# Patient Record
Sex: FEMALE | Race: WHITE | NOT HISPANIC OR LATINO | Employment: OTHER | URBAN - METROPOLITAN AREA
[De-identification: names, ages, dates, MRNs, and addresses within clinical notes are randomized per-mention and may not be internally consistent; named-entity substitution may affect disease eponyms.]

---

## 2017-01-02 ENCOUNTER — OFFICE VISIT (OUTPATIENT)
Dept: FAMILY MEDICINE | Facility: CLINIC | Age: 82
End: 2017-01-02
Payer: COMMERCIAL

## 2017-01-02 VITALS
OXYGEN SATURATION: 97 % | DIASTOLIC BLOOD PRESSURE: 58 MMHG | HEIGHT: 59 IN | TEMPERATURE: 99.4 F | HEART RATE: 62 BPM | BODY MASS INDEX: 28.43 KG/M2 | SYSTOLIC BLOOD PRESSURE: 124 MMHG | WEIGHT: 141 LBS

## 2017-01-02 DIAGNOSIS — I35.9 AORTIC VALVE DISORDER: ICD-10-CM

## 2017-01-02 DIAGNOSIS — I05.9 MITRAL VALVE DISORDER: ICD-10-CM

## 2017-01-02 DIAGNOSIS — R82.90 ABNORMAL URINALYSIS: ICD-10-CM

## 2017-01-02 DIAGNOSIS — M17.12 PRIMARY OSTEOARTHRITIS OF LEFT KNEE: ICD-10-CM

## 2017-01-02 DIAGNOSIS — Z01.818 PREOPERATIVE EXAMINATION: Primary | ICD-10-CM

## 2017-01-02 DIAGNOSIS — G89.29 OTHER CHRONIC PAIN: ICD-10-CM

## 2017-01-02 DIAGNOSIS — M35.3 PMR (POLYMYALGIA RHEUMATICA) (H): ICD-10-CM

## 2017-01-02 DIAGNOSIS — Z79.52 CURRENT CHRONIC USE OF SYSTEMIC STEROIDS: ICD-10-CM

## 2017-01-02 LAB
ALBUMIN UR-MCNC: NEGATIVE MG/DL
ANION GAP SERPL CALCULATED.3IONS-SCNC: 9 MMOL/L (ref 3–14)
APPEARANCE UR: ABNORMAL
BILIRUB UR QL STRIP: NEGATIVE
BUN SERPL-MCNC: 15 MG/DL (ref 7–30)
CALCIUM SERPL-MCNC: 9 MG/DL (ref 8.5–10.1)
CHLORIDE SERPL-SCNC: 103 MMOL/L (ref 94–109)
CO2 SERPL-SCNC: 27 MMOL/L (ref 20–32)
COLOR UR AUTO: YELLOW
CREAT SERPL-MCNC: 0.7 MG/DL (ref 0.52–1.04)
ERYTHROCYTE [DISTWIDTH] IN BLOOD BY AUTOMATED COUNT: 12.7 % (ref 10–15)
GFR SERPL CREATININE-BSD FRML MDRD: 80 ML/MIN/1.7M2
GLUCOSE SERPL-MCNC: 89 MG/DL (ref 70–99)
GLUCOSE UR STRIP-MCNC: NEGATIVE MG/DL
HCT VFR BLD AUTO: 38.1 % (ref 35–47)
HGB BLD-MCNC: 12.4 G/DL (ref 11.7–15.7)
HGB UR QL STRIP: ABNORMAL
INR PPP: 0.91 (ref 0.86–1.14)
KETONES UR STRIP-MCNC: NEGATIVE MG/DL
LEUKOCYTE ESTERASE UR QL STRIP: ABNORMAL
MCH RBC QN AUTO: 31.4 PG (ref 26.5–33)
MCHC RBC AUTO-ENTMCNC: 32.5 G/DL (ref 31.5–36.5)
MCV RBC AUTO: 97 FL (ref 78–100)
NITRATE UR QL: NEGATIVE
NON-SQ EPI CELLS #/AREA URNS LPF: ABNORMAL /LPF
PH UR STRIP: 6 PH (ref 5–7)
PLATELET # BLD AUTO: 247 10E9/L (ref 150–450)
POTASSIUM SERPL-SCNC: 3.8 MMOL/L (ref 3.4–5.3)
RBC # BLD AUTO: 3.95 10E12/L (ref 3.8–5.2)
RBC #/AREA URNS AUTO: ABNORMAL /HPF (ref 0–2)
SODIUM SERPL-SCNC: 139 MMOL/L (ref 133–144)
SP GR UR STRIP: <=1.005 (ref 1–1.03)
URN SPEC COLLECT METH UR: ABNORMAL
UROBILINOGEN UR STRIP-ACNC: 0.2 EU/DL (ref 0.2–1)
WBC # BLD AUTO: 11.2 10E9/L (ref 4–11)
WBC #/AREA URNS AUTO: ABNORMAL /HPF (ref 0–2)

## 2017-01-02 PROCEDURE — 81001 URINALYSIS AUTO W/SCOPE: CPT | Performed by: INTERNAL MEDICINE

## 2017-01-02 PROCEDURE — 80048 BASIC METABOLIC PNL TOTAL CA: CPT | Performed by: INTERNAL MEDICINE

## 2017-01-02 PROCEDURE — 85610 PROTHROMBIN TIME: CPT | Performed by: INTERNAL MEDICINE

## 2017-01-02 PROCEDURE — 36415 COLL VENOUS BLD VENIPUNCTURE: CPT | Performed by: INTERNAL MEDICINE

## 2017-01-02 PROCEDURE — 85027 COMPLETE CBC AUTOMATED: CPT | Performed by: INTERNAL MEDICINE

## 2017-01-02 PROCEDURE — 93000 ELECTROCARDIOGRAM COMPLETE: CPT | Performed by: INTERNAL MEDICINE

## 2017-01-02 PROCEDURE — 99215 OFFICE O/P EST HI 40 MIN: CPT | Performed by: INTERNAL MEDICINE

## 2017-01-02 ASSESSMENT — ANXIETY QUESTIONNAIRES
3. WORRYING TOO MUCH ABOUT DIFFERENT THINGS: NOT AT ALL
GAD7 TOTAL SCORE: 2
1. FEELING NERVOUS, ANXIOUS, OR ON EDGE: SEVERAL DAYS
7. FEELING AFRAID AS IF SOMETHING AWFUL MIGHT HAPPEN: SEVERAL DAYS
IF YOU CHECKED OFF ANY PROBLEMS ON THIS QUESTIONNAIRE, HOW DIFFICULT HAVE THESE PROBLEMS MADE IT FOR YOU TO DO YOUR WORK, TAKE CARE OF THINGS AT HOME, OR GET ALONG WITH OTHER PEOPLE: NOT DIFFICULT AT ALL
2. NOT BEING ABLE TO STOP OR CONTROL WORRYING: NOT AT ALL
5. BEING SO RESTLESS THAT IT IS HARD TO SIT STILL: NOT AT ALL
6. BECOMING EASILY ANNOYED OR IRRITABLE: NOT AT ALL

## 2017-01-02 ASSESSMENT — PAIN SCALES - GENERAL: PAINLEVEL: EXTREME PAIN (8)

## 2017-01-02 ASSESSMENT — PATIENT HEALTH QUESTIONNAIRE - PHQ9: 5. POOR APPETITE OR OVEREATING: NOT AT ALL

## 2017-01-02 NOTE — NURSING NOTE
"Chief Complaint   Patient presents with     Pre Op Exam       Initial /58 mmHg  Pulse 62  Temp(Src) 99.4  F (37.4  C) (Oral)  Ht 4' 11\" (1.499 m)  Wt 141 lb (63.957 kg)  BMI 28.46 kg/m2  SpO2 97%  Breastfeeding? No Estimated body mass index is 28.46 kg/(m^2) as calculated from the following:    Height as of this encounter: 4' 11\" (1.499 m).    Weight as of this encounter: 141 lb (63.957 kg).  BP completed using cuff size: regular  K.TEJADA/MA      "

## 2017-01-02 NOTE — PATIENT INSTRUCTIONS
Stop aspirin, ibuprofen, aleve, and fish oil products 7 days before surgery. Tylenol is okay if needed.   Take Atenolol, Citalopram, Omeprazole, and Prednisone on morning of surgery with small sip of water.     Before Your Surgery      Call your surgeon if there is any change in your health. This includes signs of a cold or flu (such as a sore throat, runny nose, cough, rash or fever).    Do not smoke, drink alcohol or take over the counter medicine (unless your surgeon or primary care doctor tells you to) for the 24 hours before and after surgery.    If you take prescribed drugs: Follow your doctor s orders about which medicines to take and which to stop until after surgery.    Eating and drinking prior to surgery: follow the instructions from your surgeon    Take a shower or bath the night before surgery. Use the soap your surgeon gave you to gently clean your skin. If you do not have soap from your surgeon, use your regular soap. Do not shave or scrub the surgery site.  Wear clean pajamas and have clean sheets on your bed.     [facility]    If you have any questions regarding to your visit please contact your care team:     Team Pink:   Clinic Hours Telephone Number   Internal Medicine:  Dr. Maddi Chamorro, NP       7am-7pm  Monday - Thursday   7am-5pm  Fridays  [phone]  (Appointment scheduling available 24/7)    Questions about your visit?  Team Line  [phone]   Urgent Care - [place] and [place] - 11am-9pm Monday-Friday Saturday-Sunday- 9am-5pm   [place] - 5pm-9pm Monday-Friday Saturday-Sunday- 9am-5pm  [phone] - [place]   [phone] - [place]       What options do I have for visits at the clinic other than the traditional office visit?  To expand how we care for you, many of our providers are utilizing electronic visits (e-visits) and telephone visits, when medically appropriate, for interactions with their patients  rather than a visit in the clinic.   We also offer nurse visits for many medical concerns. Just like any other service, we will bill your insurance company for this type of visit based on time spent on the phone with your provider. Not all insurance companies cover these visits. Please check with your medical insurance if this type of visit is covered. You will be responsible for any charges that are not paid by your insurance.      E-visits via Sightlyhart:  generally incur a $35.00 fee.  Telephone visits:  Time spent on the phone: *charged based on time that is spent on the phone in increments of 10 minutes. Estimated cost:   5-10 mins $30.00   11-20 mins. $59.00   21-30 mins. $85.00   Use [employer] (secure email communication and access to your chart) to send your primary care provider a message or make an appointment. Ask someone on your Team how to sign up for NanoG[employer].    For a Price Quote for your services, please call our Consumer Price Line at [phone].    As always, Thank you for trusting us with your health care needs!    Discharged by NADIA VERDIN

## 2017-01-02 NOTE — MR AVS SNAPSHOT
After Visit Summary   1/2/2017    Ailyn Trevino    MRN: [id number]           Patient Information     Date Of Birth          1935        Visit Information        Provider Department      1/2/2017 2:00 PM Maddi Church MD [facility]        Today's Diagnoses     Preoperative examination    -  1     Primary osteoarthritis of left knee         Aortic valve disorder         Mitral valve disorder         Other chronic pain         PMR (polymyalgia rheumatica) (H)         Current chronic use of systemic steroids           Care Instructions    Stop aspirin, ibuprofen, aleve, and fish oil products 7 days before surgery. Tylenol is okay if needed.   Take Atenolol, Citalopram, Omeprazole, and Prednisone on morning of surgery with small sip of water.     Before Your Surgery      Call your surgeon if there is any change in your health. This includes signs of a cold or flu (such as a sore throat, runny nose, cough, rash or fever).    Do not smoke, drink alcohol or take over the counter medicine (unless your surgeon or primary care doctor tells you to) for the 24 hours before and after surgery.    If you take prescribed drugs: Follow your doctor s orders about which medicines to take and which to stop until after surgery.    Eating and drinking prior to surgery: follow the instructions from your surgeon    Take a shower or bath the night before surgery. Use the soap your surgeon gave you to gently clean your skin. If you do not have soap from your surgeon, use your regular soap. Do not shave or scrub the surgery site.  Wear clean pajamas and have clean sheets on your bed.     [facility]    If you have any questions regarding to your visit please contact your care team:     Team Pink:   Clinic Hours Telephone Number   Internal Medicine:  Dr. Maddi Chamorro NP       7am-7pm  Monday - Thursday   7am-5pm  Fridays  [phone]  (Appointment  scheduling available 24/7)    Questions about your visit?  Team Line  [phone]   Urgent Care - [place] and [place] - 11am-9pm Monday-Friday Saturday-Sunday- 9am-5pm   [place] - 5pm-9pm Monday-Friday Saturday-Sunday- 9am-5pm  [phone] - [place]   [phone] - [place]       What options do I have for visits at the clinic other than the traditional office visit?  To expand how we care for you, many of our providers are utilizing electronic visits (e-visits) and telephone visits, when medically appropriate, for interactions with their patients rather than a visit in the clinic.   We also offer nurse visits for many medical concerns. Just like any other service, we will bill your insurance company for this type of visit based on time spent on the phone with your provider. Not all insurance companies cover these visits. Please check with your medical insurance if this type of visit is covered. You will be responsible for any charges that are not paid by your insurance.      E-visits via Next [employer]:  generally incur a $35.00 fee.  Telephone visits:  Time spent on the phone: *charged based on time that is spent on the phone in increments of 10 minutes. Estimated cost:   5-10 mins $30.00   11-20 mins. $59.00   21-30 mins. $85.00   Use Funky Moveshart (secure email communication and access to your chart) to send your primary care provider a message or make an appointment. Ask someone on your Team how to sign up for Next Safety.    For a Price Quote for your services, please call our Consumer Price Line at [phone].    As always, Thank you for trusting us with your health care needs!    Discharged by NADIA VERDIN          Follow-ups after your visit        Who to contact     If you have questions or need follow up information about today's clinic visit or your schedule please contact [facility] [place] directly at [phone].  Normal or non-critical lab and imaging results will be communicated to  "you by MyChart, letter or phone within 4 business days after the clinic has received the results. If you do not hear from us within 7 days, please contact the clinic through Synfora or phone. If you have a critical or abnormal lab result, we will notify you by phone as soon as possible.  Submit refill requests through Synfora or call your pharmacy and they will forward the refill request to us. Please allow 3 business days for your refill to be completed.          Additional Information About Your Visit        [employer] Information     Synfora gives you secure access to your electronic health record. If you see a primary care provider, you can also send messages to your care team and make appointments. If you have questions, please call your primary care clinic.  If you do not have a primary care provider, please call [phone] and they will assist you.        Your Vitals Were     Pulse Temperature Height BMI (Body Mass Index) Pulse Oximetry Breastfeeding?    62 99.4  F (37.4  C) (Oral) 4' 11\" (1.499 m) 28.46 kg/m2 97% No       Blood Pressure from Last 3 Encounters:   01/02/17 124/58   12/09/16 112/54   10/26/16 138/56    Weight from Last 3 Encounters:   01/02/17 141 lb (63.957 kg)   12/09/16 141 lb (63.957 kg)   10/04/16 141 lb (63.957 kg)              We Performed the Following     *UA reflex to Microscopic and Culture ([facility], [place] [facility] [place] [facility] (except [place] and [place])     Basic metabolic panel     CBC with platelets     EKG 12-lead complete w/read - Clinics     INR          Today's Medication Changes          These changes are accurate as of: 1/2/17  2:54 PM.  If you have any questions, ask your nurse or doctor.               These medicines have changed or have updated prescriptions.        Dose/Directions    omeprazole 20 MG tablet   This may have changed:    - when to take this  - reasons to take this  - additional instructions   Used for:  Esophageal reflux        Dose:  20 mg   Take 1 " tablet (20 mg) by mouth daily Take 30-60 minutes before a meal.   Quantity:  60 tablet   Refills:  3                Primary Care Provider Office Phone # Fax #    Maddi Church [phone]       [address]  [place] [address]        Thank you!     Thank you for choosing [facility]  for your care. Our goal is always to provide you with excellent care. Hearing back from our patients is one way we can continue to improve our services. Please take a few minutes to complete the written survey that you may receive in the mail after your visit with us. Thank you!             Your Updated Medication List - Protect others around you: Learn how to safely use, store and throw away your medicines at www.disposemymeds.org.          This list is accurate as of: 1/2/17  2:54 PM.  Always use your most recent med list.                   Brand Name Dispense Instructions for use    alendronate 70 MG tablet    FOSAMAX    12 tablet    Take 1 tablet (70 mg) by mouth every 7 days Take with over 8 ounces water and stay upright for at least 30 minutes after dose.  Take at least 60 minutes before breakfast       atenolol 25 MG tablet    TENORMIN    90 tablet    Take 1 tablet (25 mg) by mouth daily       CALCIUM + D 500-1000-40 MG-UNT-MCG Chew   Generic drug:  Calcium-Vitamin D-Vitamin K      Take 1 tablet by mouth 2 times daily       CENTRUM SILVER ADULT 50+ PO      Take 1 tablet by mouth daily       citalopram 20 MG tablet    celeXA    90 tablet    Take 1 tablet (20 mg) by mouth daily       HYDROcodone-acetaminophen 5-325 MG per tablet    NORCO    90 tablet    Take 1 tablet by mouth every 6 hours as needed for moderate to severe pain       ipratropium 0.06 % spray    ATROVENT    1 Box    Spray 2 sprays into both nostrils 4 times daily as needed for rhinitis       lisinopril 5 MG tablet    PRINIVIL/ZESTRIL    90 tablet    Take 1 tablet (5 mg) by mouth daily       omeprazole  20 MG tablet     60 tablet    Take 1 tablet (20 mg) by mouth daily Take 30-60 minutes before a meal.       predniSONE 1 MG tablet    DELTASONE    450 tablet    Take 5 tablets (5 mg) by mouth daily Taper as directed       simvastatin 10 MG tablet    ZOCOR    90 tablet    Take 1 tablet (10 mg) by mouth At Bedtime       triamcinolone 0.1 % cream    KENALOG    45 g    Apply sparingly to affected area two times daily for 7 days.       vitamin D 1000 UNITS capsule      Take 1 capsule by mouth daily

## 2017-01-02 NOTE — PROGRESS NOTES
.  [facility]  [address] [facility] [address]  [phone]  Dept: [phone]    PRE-OP EVALUATION:  Today's date: 2017    Ailyn Trevino (: 1935) presents for pre-operative evaluation assessment as requested by Dr. Hema Orosco.  She requires evaluation and anesthesia risk assessment prior to undergoing surgery/procedure for treatment of Left knee pain .  Proposed procedure: L TKA    Date of Surgery/ Procedure: 17  Time of Surgery/ Procedure: 7AM  Hospital/Surgical Facility: [facility]  Fax number for surgical facility: [phone]  Primary Physician: Maddi Church  Type of Anesthesia Anticipated: to be determined    Patient has a Health Care Directive or Living Will:  NO    1. NO - Do you have a history of heart attack, stroke, stent, bypass or surgery on an artery in the head, neck, heart or legs?  2. NO - Do you ever have any pain or discomfort in your chest?  3. NO - Do you have a history of  Heart Failure?  4. NO - Are you troubled by shortness of breath when: walking on the level, up a slight hill or at night?  5. NO - Do you currently have a cold, bronchitis or other respiratory infection?  6. NO - Do you have a cough, shortness of breath or wheezing?  7. NO - Do you sometimes get pains in the calves of your legs when you walk?  8. NO - Do you or anyone in your family have previous history of blood clots?  9. NO - Do you or does anyone in your family have a serious bleeding problem such as prolonged bleeding following surgeries or cuts?  10. NO - Have you ever had problems with anemia or been told to take iron pills?  11. NO - Have you had any abnormal blood loss such as black, tarry or bloody stools, or abnormal vaginal bleeding?  12. NO - Have you ever had a blood transfusion?  13. NO - Have you or any of your relatives ever had problems with anesthesia?  14. NO - Do you have sleep apnea, excessive snoring or daytime drowsiness?  15. NO - Do you have any  prosthetic heart valves?  16. NO - Do you have prosthetic joints?  17. NO - Is there any chance that you may be pregnant?      HPI:                                                      Brief HPI related to upcoming procedure: She has progressive left knee pain that hasn't responded to conservative treatment.      She has a history of aortic and mitral regurgitation.  She has seen cardiology a couple months ago with no changes needed.  See echo below.  Can walk 2 blocks without stopping to rest for her knee pain.      Kiara CAMPBELL/MA    She has a history of PMR and is on chronic prednisone for this.  Her pain is well controlled.     See problem list for active medical problems.  Problems all longstanding and stable, except as noted/documented.  See ROS for pertinent symptoms related to these conditions.                                                                                                  .    MEDICAL HISTORY:                                                      Patient Active Problem List    Diagnosis Date Noted     Current chronic use of systemic steroids 01/02/2017     Priority: Medium     Major depressive disorder, single episode, mild (H) 12/03/2015     Priority: Medium     Encounter for long-term current use of medication 08/03/2015     Priority: Medium     Problem list name updated by automated process. Provider to review       Other chronic pain 08/03/2015     Priority: Medium     Patient is followed by KIARA RAINES for ongoing prescription of pain medication.  All refills should be approved by this provider, or covering partner.    Medication(s): Hydrocodone/APAP.   Maximum quantity per month: 30  Clinic visit frequency required: Q 3 months     Controlled substance agreement on file: Yes       Date(s): 8/3/15    Pain Clinic evaluation in the past: No    DIRE Total Score(s):  No flowsheet data found.    Last [place]MP website verification:  done on 8/3/15    [email or web address]       PMR (polymyalgia rheumatica) (H) 04/10/2015     Priority: Medium     Impaired fasting glucose 11/26/2014     Priority: Medium     Aortic valve disorder 03/19/2014     Priority: Medium     Problem list name updated by automated process. Provider to review       Mitral valve disorder 03/19/2014     Priority: Medium     Problem list name updated by automated process. Provider to review       Insomnia 10/08/2013     Priority: Medium     Benadryl gives her RLS  Trazodone gave her side effects  Tried Ambien in the past  Vicodin helps her back and for sleep       Osteoarthritis of knee 10/08/2013     Priority: Medium     Sees Dr. Whaley  Patient is followed by KIARA RAINES for ongoing prescription of narcotic pain medicine.  Med: Vicodin.   Maximum use per month: 30  Expected duration: lifelong  Narcotic agreement on file: YES  Clinic visit recommended: Q 3 months       Health Care Home 09/28/2012     Priority: Medium     Turner Nieves RN,C--[phone]   [facility]        DX V65.8 REPLACED WITH [id number] [facility]ALTH CARE HOME (04/08/2013)       Renal cyst 08/07/2012     Priority: Medium     Workup with multiple serial imaging reveals benign hemorrhagic cyst       Granulomatous lung disease (H) 08/07/2012     Priority: Medium     Multiple serial CT with no change in granuloma and splenic lesions       Advanced directives, counseling/discussion 07/31/2012     Priority: Medium     Discussed advance care planning with patient; information given to patient to review. 7/31/2012          Osteopenia 07/31/2012     Priority: Medium     Due to worsening bone density in 2016 she was started on Fosamax.       Palpitations 07/31/2012     Priority: Medium     Hyperlipidemia LDL goal <130 07/31/2012     Priority: Medium      Past Medical History   Diagnosis Date     High cholesterol      Osteopenia 7/31/2012     Renal cyst 8/7/2012     Granulomatous lung disease (H)  8/7/2012     Insomnia 10/8/2013     Benadryl gives her RLS     Osteoarthritis of knee 10/8/2013     Sees Dr. Whaley     Other chronic pain 8/3/2015     Patient is followed by KIARA RAINES for ongoing prescription of pain medication.  All refills should be approved by this provider, or covering partner.  Medication(s): Hydrocodone/APAP.  Maximum quantity per month: 30 Clinic visit frequency required: Q 3 months   Controlled substance agreement on file: Yes      Date(s): 8/3/15  Pain Clinic evaluation in the past: No  DIRE Total Score(s): No fl     Past Surgical History   Procedure Laterality Date     Cholecystectomy  2010     Appendectomy  1951     Cataract iol, rt/lt  2010     bilateral      Surgical history of -   12/13     bilateral YAG laser surgery of eyes     Current Outpatient Prescriptions   Medication Sig Dispense Refill     predniSONE (DELTASONE) 1 MG tablet Take 5 tablets (5 mg) by mouth daily Taper as directed 450 tablet 1     atenolol (TENORMIN) 25 MG tablet Take 1 tablet (25 mg) by mouth daily 90 tablet 3     lisinopril (PRINIVIL,ZESTRIL) 5 MG tablet Take 1 tablet (5 mg) by mouth daily 90 tablet 3     HYDROcodone-acetaminophen (NORCO) 5-325 MG per tablet Take 1 tablet by mouth every 6 hours as needed for moderate to severe pain 90 tablet 0     simvastatin (ZOCOR) 10 MG tablet Take 1 tablet (10 mg) by mouth At Bedtime 90 tablet 3     citalopram (CELEXA) 20 MG tablet Take 1 tablet (20 mg) by mouth daily 90 tablet 1     ipratropium (ATROVENT) 0.06 % nasal spray Spray 2 sprays into both nostrils 4 times daily as needed for rhinitis 1 Box 3     alendronate (FOSAMAX) 70 MG tablet Take 1 tablet (70 mg) by mouth every 7 days Take with over 8 ounces water and stay upright for at least 30 minutes after dose.  Take at least 60 minutes before breakfast 12 tablet 3     Calcium-Vitamin D-Vitamin K (CALCIUM + D) 500-1000-40 MG-UNT-MCG CHEW Take 1 tablet by mouth 2 times daily       triamcinolone (KENALOG) 0.1 %  "cream Apply sparingly to affected area two times daily for 7 days. 45 g 0     omeprazole 20 MG tablet Take 1 tablet (20 mg) by mouth daily Take 30-60 minutes before a meal. (Patient taking differently: Take 20 mg by mouth as needed Take 30-60 minutes before a meal.) 60 tablet 3     Cholecalciferol (VITAMIN D) 1000 UNITS capsule Take 1 capsule by mouth daily       Multiple Vitamins-Minerals (CENTRUM SILVER ADULT 50+ PO) Take 1 tablet by mouth daily       OTC products: None, except as noted above    No Known Allergies   Latex Allergy: NO    Social History   Substance Use Topics     Smoking status: Former Smoker     Quit date: 01/01/1998     Smokeless tobacco: Never Used     Alcohol Use: Yes     History   Drug Use No       Echo 12/5/16  Final Conclusion    Normal LV size and systolic function with estimated ejection fraction of 60-65%.    Mild concentric LVH.    Moderate left atrial enlargement.    Mild aortic valve sclerosis without stenosis; moderate aortic regurgitation.    Mild mitral regurgitation.    Right ventricular systolic pressure estimated at 30 mmHg + RAP.    Estimated EF: 60-65%      REVIEW OF SYSTEMS:                                                     ROS: 10 point ROS neg other than the symptoms noted above in the HPI.     EXAM:                                                    /58 mmHg  Pulse 62  Temp(Src) 99.4  F (37.4  C) (Oral)  Ht 4' 11\" (1.499 m)  Wt 141 lb (63.957 kg)  BMI 28.46 kg/m2  SpO2 97%  Breastfeeding? No  GENERAL APPEARANCE: healthy, alert and no distress  EYES: Eyes grossly normal to inspection, PERRL and conjunctivae and sclerae normal  HENT: ear canals and TM's normal and oropharynx clear  NECK: no adenopathy, no asymmetry, masses, or scars, thyroid normal to palpation and no bruits  RESP: lungs clear to auscultation - no rales, rhonchi or wheezes  CV: regular rates and rhythm and normal S1 S2, no S3 or S4  LYMPHATICS: normal ant/post cervical and supraclavicular " nodes  ABDOMEN: soft, nontender, without hepatosplenomegaly or masses and bowel sounds normal  MS: extremities normal- no gross deformities noted  SKIN: no suspicious lesions or rashes  NEURO: mentation intact and speech normal  PSYCH: mentation appears normal and affect normal/bright     DIAGNOSTICS:                                                      EKG: EKG was reviewed by myself. Q waves in III and Avf which is unchanged from prior ekg's  Labs Drawn and in Process:   Unresulted Labs Ordered in the Past 30 Days of this Admission     No orders found from 11/4/2016 to 1/3/2017.          Recent Labs   Lab Test  10/04/16   1145  02/05/16   1500   03/27/15   1419   HGB   --   11.8   --   11.8   PLT   --   313   --   309   NA  138  140   < >  139   POTASSIUM  4.1  3.8   < >  4.1   CR  0.65  0.68   < >  0.78   A1C  5.2   --    --    --     < > = values in this interval not displayed.        IMPRESSION:                                                    Reason for surgery/procedure: left knee osteoarthritis   Diagnosis/reason for consult: comanage comorbid conditions and perioperative risk assessment     The proposed surgical procedure is considered INTERMEDIATE risk.    REVISED CARDIAC RISK INDEX  The patient has the following serious cardiovascular risks for perioperative complications such as (MI, PE, VFib and 3  AV Block):  No serious cardiac risks  INTERPRETATION: 0 risks: Class I (very low risk - 0.4% complication rate)    The patient has the following additional risks for perioperative complications:    Chronic steroid use for PMR      ICD-10-CM    1. Preoperative examination Z01.818 EKG 12-lead complete w/read - Clinics     CBC with platelets     Basic metabolic panel     INR     *UA reflex to Microscopic and Culture ([facility] (except [place] and [place])   2. Primary osteoarthritis of left knee M17.12    3. Aortic valve disorder I35.9 CBC with platelets     Basic metabolic  panel     INR     *UA reflex to Microscopic and Culture ([facility] (except [place] and [place])   4. Mitral valve disorder I05.9 CBC with platelets     Basic metabolic panel     INR     *UA reflex to Microscopic and Culture ([facility] (except [place] and [place])   5. Other chronic pain G89.29    6. PMR (polymyalgia rheumatica) (H) M35.3    7. Current chronic use of systemic steroids Z79.52        RECOMMENDATIONS:                                                      --Consult hospital rounder / IM to assist post-op medical management    Cardiovascular Risk  Performs 4 METs exercise without symptoms (Light housework (dusting, washing dishes)) .   Patient is already on a Beta Blocker. Continue Betablocker therapy after surgery, using Beta blocker order set as necessary for NPO status.  EKG is abnormal but stable for her since at least 2012.  No further cardiac workup.  Recent echo in 12/2016 stable.       --Patient is to take all scheduled medications on the day of surgery EXCEPT for modifications listed below.    Anticoagulant or Antiplatelet Medication Use  ASPIRIN: Discontinue ASA 7-10 days prior to procedure to reduce bleeding risk.  It should be resumed post-operatively.          Chronic Corticosteroid Use  Stress dose steroids are indicated due to chronic steroid use in last 3 months (e.g. >3 weeks of predisone 20 mg or daily prednisone 5 mg)  Moderate procedure:   Hydrocortisone 50-75 mg IV on day of surgery.  Taper to baseline preoperative dose over the subsequent 1-2 days.    Holding Ace inhibitor due to fluid shifts.      Patient Instructions   Stop aspirin, ibuprofen, aleve, and fish oil products 7 days before surgery. Tylenol is okay if needed.   Take Atenolol, Citalopram, Omeprazole, and Prednisone on morning of surgery with small sip of water.     Before Your Surgery      Call your surgeon if there is any change in your health. This includes  signs of a cold or flu (such as a sore throat, runny nose, cough, rash or fever).    Do not smoke, drink alcohol or take over the counter medicine (unless your surgeon or primary care doctor tells you to) for the 24 hours before and after surgery.    If you take prescribed drugs: Follow your doctor s orders about which medicines to take and which to stop until after surgery.    Eating and drinking prior to surgery: follow the instructions from your surgeon    Take a shower or bath the night before surgery. Use the soap your surgeon gave you to gently clean your skin. If you do not have soap from your surgeon, use your regular soap. Do not shave or scrub the surgery site.  Wear clean pajamas and have clean sheets on your bed.          APPROVAL GIVEN to proceed with proposed procedure, without further diagnostic evaluation, pending her labs        Signed Electronically by: Maddi Church MD    Copy of this evaluation report is provided to requesting physician.    [facility] Preop Guidelines

## 2017-01-03 ASSESSMENT — ANXIETY QUESTIONNAIRES: GAD7 TOTAL SCORE: 2

## 2017-01-03 ASSESSMENT — PATIENT HEALTH QUESTIONNAIRE - PHQ9: SUM OF ALL RESPONSES TO PHQ QUESTIONS 1-9: 0

## 2017-01-03 NOTE — PROGRESS NOTES
Quick Note:    Normal electrolytes. Normal kidney function. Normal blood clotting. Elevated white cell count is minor and consistent with prednisone treatment. The urine is slightly abnormal. Please advise on clean catch technique and come in for a repeat urinalysis reflex to micro and culture in 2-3 days.     Indication preop, microscopic hematuria  ______

## 2017-01-04 DIAGNOSIS — R82.90 ABNORMAL URINALYSIS: ICD-10-CM

## 2017-01-04 DIAGNOSIS — Z01.818 PREOPERATIVE EXAMINATION: ICD-10-CM

## 2017-01-04 LAB
ALBUMIN UR-MCNC: NEGATIVE MG/DL
APPEARANCE UR: CLEAR
BILIRUB UR QL STRIP: NEGATIVE
COLOR UR AUTO: YELLOW
GLUCOSE UR STRIP-MCNC: NEGATIVE MG/DL
HGB UR QL STRIP: ABNORMAL
KETONES UR STRIP-MCNC: NEGATIVE MG/DL
LEUKOCYTE ESTERASE UR QL STRIP: NEGATIVE
NITRATE UR QL: NEGATIVE
NON-SQ EPI CELLS #/AREA URNS LPF: ABNORMAL /LPF
PH UR STRIP: 6 PH (ref 5–7)
RBC #/AREA URNS AUTO: ABNORMAL /HPF (ref 0–2)
SP GR UR STRIP: 1.01 (ref 1–1.03)
URN SPEC COLLECT METH UR: ABNORMAL
UROBILINOGEN UR STRIP-ACNC: 0.2 EU/DL (ref 0.2–1)
WBC #/AREA URNS AUTO: ABNORMAL /HPF (ref 0–2)

## 2017-01-04 PROCEDURE — 81001 URINALYSIS AUTO W/SCOPE: CPT | Performed by: INTERNAL MEDICINE

## 2017-01-04 NOTE — PROGRESS NOTES
Quick Note:    No bladder infection.There is a little bit of blood in the urine that is very minor. It should not affect your surgery in any way and we can follow up on it after you are healed from surgery.     Dr. Church       -fax to preop  ______

## 2017-01-09 ENCOUNTER — TRANSFERRED RECORDS (OUTPATIENT)
Dept: HEALTH INFORMATION MANAGEMENT | Facility: CLINIC | Age: 82
End: 2017-01-09

## 2017-01-16 ENCOUNTER — CARE COORDINATION (OUTPATIENT)
Dept: CASE MANAGEMENT | Facility: CLINIC | Age: 82
End: 2017-01-16

## 2017-01-18 ENCOUNTER — CARE COORDINATION (OUTPATIENT)
Dept: CASE MANAGEMENT | Facility: CLINIC | Age: 82
End: 2017-01-18

## 2017-01-18 DIAGNOSIS — Z76.89 HEALTH CARE HOME: Primary | ICD-10-CM

## 2017-01-18 DIAGNOSIS — Z96.652 STATUS POST TOTAL LEFT KNEE REPLACEMENT: ICD-10-CM

## 2017-01-18 NOTE — Clinical Note
Health Care Home - Access Care Plan    About Me  Patient Name:  Ailyn Alfonso    YOB: 1935  Age:                            81 year old   [place] MRN:         [id number] Telephone Information:     Home Phone [phone]   Mobile Not on file.       Address:    [facility] [place] [address] Email address:  [email or web address]      Emergency Contact(s)  Name Relationship Lgl Grd Work Phone Home Phone Mobile Phone   1. KWAME ALFONSO Spouse   [phone]    2. DEIDRE DUBOSE Daughter    [phone]             Health Maintenance: Routine Health maintenance Reviewed: Due/Overdue:  Has an appointment with Dr. Church on 1/24/2017.    My Access Plan  Medical Emergency 911   Questions or concerns during clinic hours Primary Clinic Line, I will call the clinic directly: Primary Clinic: [facility] [phone]   24 Hour Appointment Line [phone] or  [phone])  (toll free)   24 Hour Nurse Line [phone] (toll free)   Questions or concerns outside clinic hours 24 Hour Appointment Line, I will call the after-hours on-call line:   [facility] [phone] or [phone] ([phone]) (toll-free)   Preferred Urgent Care Preferred Urgent Care: [facility], [phone]   Preferred Hospital Preferred Hospital: [facility]  [phone]   Preferred Pharmacy [facility] [address] [place] [facility] [place], [place] - [address]     Behavioral Health Crisis Line [facility], [phone] or [phone]     My Care Team Members  Patient Care Team       Relationship Specialty Notifications Start End    Maddi Church MD PCP - General Internal Medicine  4/10/15     Comment:  referring to Dr Hurst    Phone: [phone] Fax: [phone]         [facility] [address] [facility] [place] [address]    Bud Hurst MD MD Rheumatology  4/10/15     Phone: [phone] Fax: [phone]         [facility]  [address] [address]    Silvia Sarmiento, RN Case Manager   1/23/17     Phone: [phone] Fax: [phone]            My Medical and Care Information  Problem List   Patient Active Problem List   Diagnosis     Advanced directives, counseling/discussion     Osteopenia     Palpitations     Hyperlipidemia LDL goal <130     Renal cyst     Granulomatous lung disease (H)     Health Care Home     Insomnia     Osteoarthritis of knee     Aortic valve disorder     Mitral valve disorder     Impaired fasting glucose     PMR (polymyalgia rheumatica) (H)     Encounter for long-term current use of medication     Other chronic pain     Major depressive disorder, single episode, mild (H)     Current chronic use of systemic steroids      Current Medications and Allergies:  See printed Medication Report

## 2017-01-18 NOTE — Clinical Note
[facility] - CARE MANAGEMENT DEPT  [address] [address]  [place] [address]  Phone: [phone]    01/23/2017    Ailyn Trevino  [address] [address]        Dear Ailyn,  I am the Clinic Care Coordinator that works with your primary care provider's clinic. I wanted to thank you for spending the time to talk with me on 1/18/2017 regarding your recent discharge from [facility].  Below is a description of what Clinic Care Coordination is and how I can further assist you.     The Clinic Care Coordinator role is a Registered Nurse and/or  who understands the health care system. The goal of Clinic Care Coordination is to help you manage your health and improve access to the [facility] system in the most efficient manner.  The Registered Nurse can assist you in meeting your health care goals by providing education, coordinating services, and strengthening the communication among your providers. The  can assist you with financial, behavioral, psychosocial, and chemical dependency and counseling/psychiatric resources.    Please feel free to keep this letter and contact information to contact me at [phone] with any further questions or concerns that may arise. We at [facility] are focused on providing you with the highest-quality healthcare experience possible and that all starts with you.       Sincerely,     FANTA Chiang, RN, PHN  FPA Care Coordinator      Enclosed: I have enclosed a copy of a 24 Hour Access Plan. This has helpful phone numbers for you to call when needed. Please keep this in an easy to access place to use as needed.               Using Your Patient Care Plan: [facility]  When do I use my care plan?    Emergency room visits: The care plan gives the emergency room staff an overview of your health. And it gives instructions from your doctor about your care.    Hospital: If you bring your care plan, it will  take less time to give your health history when you are admitted.    Seeing a specialist:  Specialists can track changes to your medicines or enter a new diagnosis. You can have them fax the changes to your doctor to update your plan.    Regular or chronic care visits: Review your care plan for any errors before regular visits. Add information you feel would be helpful. (For example, all blood draws should be finger pokes if possible or note that your child cannot sit in a room for very long.)    Caregivers: Give the care plan to all caregivers. This might include your in-home health care team and family members.  How do I make changes to my care plan?  You may write on the care plan. Make changes by crossing out or adding information. Bring the revised care plan when you see your doctor, and share it with your Health Care Home team.  To send plan updates to your Health Care Home team, call, fax, mail or drop off the changes. We will update your care plan and send you a new copy. Please tell us if you need more than one copy. It s a good idea to keep a copy in your home, car, wheel-chair bag or wherever you might need one.

## 2017-01-18 NOTE — PROGRESS NOTES
Clinic Care Coordination Contact  OUTREACH    Referral Information:  Referral Source: SNF/TCU Hand-Off (non-IP Report)  Reason for Contact: s/p left TKA  Care Conference: No     Universal Utilization:   ED Visits in last year: 0  Hospital visits in last year: 1  Last PCP appointment: 01/20/17  Missed Appointments: 0  Concerns: None at this time. Only one admission this year for TKA  Multiple Providers or Specialists: Yes, ortho and rheumatology  Upcoming appointment: 01/20/17 (with ortho, 1/24/2017 with Dr. Church)    Clinical Concerns:  Current Medical Concerns: s/p left TKA    Current Behavioral Concerns: No concerns reported at this time    Education Provided to patient: Patient advised to take Senna while she is taking the oxycodone to prevent constipation.  I also advised her to consider ensure if she continues with  No appetite.  I also advised her to discuss loss of appetite with surgeon or PCP if it persists.   Clinical Pathway Name: None  Clinical Pathway: None    Medication Management:  Patient manages her own medications.      Functional Status:  Mobility Status: Independent w/Device  Equipment Currently Used at Home: walker, rolling  Transportation:   She has been able to get dressed and take a shower on her own.  She said her  is nearby when she showers.  She said family is helping with meals as her  does not cook. Se usually does the cooking.             Psychosocial:  Current living arrangement:: I live in a private home with spouse  Financial/Insurance: [facility]   and family are very supportive     Resources and Interventions:  Current Resources:     Outpatient PT at [facility] in [place]        Advanced Care Plans/Directives on file: No  Referrals Placed: None at this time     Goals: None         Barriers: Recent knee surgery  Strengths:  and family is supportive, able to express concerns and needs,  Following up as recommended with PCP and ortho.    Patient/Caregiver  understanding: Ailyn said she is doing well since being home.  She was only at [facility] for 3 days.  She said she is going to outpatient PT at [facility] every other day.  Her  is driving her to PT.  She is using Oxycodone mostly in the morning and before bed.  She will also take some prior to PT.  She uses Tylenol in between Oxycodone.  She is worried she will not be able to get a refill when she runs out.  I advised her that she is taking it appropriately and should hopefully have no issues getting a refill.  She said she will discuss it with her ortho doctor at her appointment on Friday.  She said she does have swelling in her knee but feels it is going down.  She is wearing compression, elevating her leg and icing her leg daily.  She said she does not have much of an appetite.  She said she tends to eat more in the evening.  We discussed trying to eat regularly and importance of nutrition and protein in healing.  I advised her to discuss with her surgeon and or PCP if the decreased appetite continues.  She verbalized understanding.  She said she has not had BM for two days.  She is not taking a stool softener while taking Oxycodone.  I also advised on importance of eating regularly and getting plenty of fluids to help with constipation.  She was not aware of her appointment with PCP on 1/24/2017.  She said she does have PT that day but will call to see if it can be moved back.    Frequency of Care Coordination: As needed       Plan:     1) Ailyn will follow up with surgeon as scheduled on 1/20/2017 and on 1/24/2017 with Dr. Church.    2) RN CC will mail out 24 Hour care plan.  No ongoing care coordination needs identified at this time.  Patient has good support from  and family.  I encouraged Ailyn to call me if she has questions or needs further assistance.      Silvia Sarmiento, FANTA, RN, PHN  A Care Coordinator  [phone]  January 18, 2017

## 2017-01-19 RX ORDER — ASPIRIN 325 MG
325 TABLET, DELAYED RELEASE (ENTERIC COATED) ORAL DAILY
Status: ON HOLD | COMMUNITY
Start: 2017-01-19 | End: 2018-10-24

## 2017-01-19 RX ORDER — ACETAMINOPHEN 500 MG
1000 TABLET ORAL EVERY 6 HOURS PRN
Status: ON HOLD | COMMUNITY
Start: 2017-01-19 | End: 2018-10-24

## 2017-01-19 RX ORDER — AMOXICILLIN 250 MG
2 CAPSULE ORAL 2 TIMES DAILY
Status: ON HOLD | COMMUNITY
Start: 2017-01-19 | End: 2018-10-24

## 2017-01-23 NOTE — PROGRESS NOTES
Clinic Care Coordination Contact  Care Team Conversations      Plan:  24 hour Access care plan mailed to Ailyn.  No ongoing care coordination needs identified at this time.  Patient has good support from family.  I encouraged Ailyn to call me if she has questions or needs further assistance.      FANTA Chiang, RN, PHN  A Care Coordinator  [phone]  January 23, 2017

## 2017-01-24 ENCOUNTER — OFFICE VISIT (OUTPATIENT)
Dept: FAMILY MEDICINE | Facility: CLINIC | Age: 82
End: 2017-01-24
Payer: COMMERCIAL

## 2017-01-24 VITALS
HEIGHT: 59 IN | DIASTOLIC BLOOD PRESSURE: 48 MMHG | SYSTOLIC BLOOD PRESSURE: 100 MMHG | BODY MASS INDEX: 28.02 KG/M2 | OXYGEN SATURATION: 98 % | HEART RATE: 58 BPM | TEMPERATURE: 97.8 F | WEIGHT: 139 LBS

## 2017-01-24 DIAGNOSIS — Z96.652 HISTORY OF ARTHROPLASTY OF LEFT KNEE: Primary | ICD-10-CM

## 2017-01-24 DIAGNOSIS — D62 ACUTE POSTHEMORRHAGIC ANEMIA: ICD-10-CM

## 2017-01-24 DIAGNOSIS — I95.1 ORTHOSTATIC HYPOTENSION: ICD-10-CM

## 2017-01-24 PROCEDURE — 99213 OFFICE O/P EST LOW 20 MIN: CPT | Performed by: INTERNAL MEDICINE

## 2017-01-24 NOTE — PATIENT INSTRUCTIONS
Stop taking Lisinopril. My Chart or call in your blood pressure on Thursday.  Follow up for repeat hemoglobin at the lab in 3 weeks.    [facility]    If you have any questions regarding to your visit please contact your care team:     Team Pink:   Clinic Hours Telephone Number   Internal Medicine:  Dr. Maddi Chamorro, NP       7am-7pm  Monday - Thursday   7am-5pm  Fridays  [phone]  (Appointment scheduling available 24/7)    Questions about your visit?  Team Line  [phone]   [facility] [place] [facility]pm Monday-Friday Saturday-Sunday- 9am-5pm   [place] - 5pm-9pm Monday-Friday Saturday-Sunday- 9am-5pm  [phone] - [place]   [phone] - [place]       What options do I have for visits at the clinic other than the traditional office visit?  To expand how we care for you, many of our providers are utilizing electronic visits (e-visits) and telephone visits, when medically appropriate, for interactions with their patients rather than a visit in the clinic.   We also offer nurse visits for many medical concerns. Just like any other service, we will bill your insurance company for this type of visit based on time spent on the phone with your provider. Not all insurance companies cover these visits. Please check with your medical insurance if this type of visit is covered. You will be responsible for any charges that are not paid by your insurance.      E-visits via Product Hunt:  generally incur a $35.00 fee.  Telephone visits:  Time spent on the phone: *charged based on time that is spent on the phone in increments of 10 minutes. Estimated cost:   5-10 mins $30.00   11-20 mins. $59.00   21-30 mins. $85.00   Use get2playt (secure email communication and access to your chart) to send your primary care provider a message or make an appointment. Ask someone on your Team how to sign up for Product Hunt.    For a Price Quote for your  services, please call our Consumer Price Line at [phone].    As always, Thank you for trusting us with your health care needs!    Discharged by NADIA Palomo

## 2017-01-24 NOTE — NURSING NOTE
"Chief Complaint   Patient presents with     Surgical Followup     01/09/2017       Initial /56 mmHg  Pulse 65  Temp(Src) 97.8  F (36.6  C) (Oral)  Ht 4' 11\" (1.499 m)  Wt 139 lb (63.05 kg)  BMI 28.06 kg/m2  SpO2 98% Estimated body mass index is 28.06 kg/(m^2) as calculated from the following:    Height as of this encounter: 4' 11\" (1.499 m).    Weight as of this encounter: 139 lb (63.05 kg).  BP completed using cuff size: sarita MASTERS CMA ([facility])      "

## 2017-01-24 NOTE — PROGRESS NOTES
INTERNAL MEDICINE   SUBJECTIVE:                                                    Ailyn Trevino is a 81 year old female who presents to clinic today for the following health issues:      Patient presents with:  Surgical Followup: 01/09/2017      SUBJECTIVE:                                                    Ailyn Trevino is a 81 year old female who presents to clinic today for the following health issues:          Hospital Follow-up Visit:    Hospital/Nursing Home/IP Rehab Facility: [facility]  Date of Admission: 1/9/17  Date of Discharge: 1/13/17  Reason(s) for Admission: knee arthroplasty             Problems taking medications regularly:  None       Medication changes since discharge: pain medication and stool softener       Problems adhering to non-medication therapy:  None    Summary of hospitalization:  CareEverywhere information obtained and reviewed  Diagnostic Tests/Treatments reviewed.  Follow up needed: Ortho  Other Healthcare Providers Involved in Patient s Care:         Specialist appointment - ortho  Update since discharge: improved.     Post Discharge Medication Reconciliation: discharge medications reconciled and changed, per note/orders (see AVS).  Plan of care communicated with patient and family     Coding guidelines for this visit:  Type of Medical   Decision Making Face-to-Face Visit       within 7 Days of discharge Face-to-Face Visit        within 14 days of discharge   Moderate Complexity [id number]   High Complexity 96197 [id number]                She has been getting better everyday since her left knee arthroplasty on 1/12/2017. She has been home for a week. She doesn't really have any concerns about her knee procedure. She has been going to physical therapy, which she thinks that been helping her pain. She takes 1 Hydrocodone in the morning and 1 at night and uses Tylenol in between. The pills help with the pain, but do not completely remove it. She explains that she has been getting more and  "more constipated since the surgery. She is fine now, after taking Miralax.     Problem list and histories reviewed & adjusted, as indicated.  Additional history: as documented    Labs reviewed in EPIC  Problem list, Medication list, Allergies, and Medical/Social/Surgical histories reviewed in [facility] and updated as appropriate.    ROS:  C: NEGATIVE for fever, chills, change in weight  GI: NEGATIVE for nausea, abdominal pain, heartburn, or change in bowel habits  M: NEGATIVE for significant arthralgias or myalgia POSITIVE for mild knee pain.   H: NEGATIVE for bleeding problems  P: NEGATIVE for changes in mood or affect  All other systems reviewed and were negative.      This document serves as a record of the services and decisions personally performed and made by Maddi Church MD. It was created on his/her behalf by Tatyana Thayer, a trained medical scribe. The creation of this document is based the provider's statements to the medical scribe.    Scribruddy Thayer 2:30 PM, January 24, 2017    OBJECTIVE:                                                    /48 mmHg  Pulse 58  Temp(Src) 97.8  F (36.6  C) (Oral)  Ht 1.499 m (4' 11\")  Wt 63.05 kg (139 lb)  BMI 28.06 kg/m2  SpO2 98%  Body mass index is 28.06 kg/(m^2).  GENERAL: healthy, alert and no distress  RESP: lungs clear to auscultation - no rales, rhonchi or wheezes  CV: regular rate and rhythm, normal S1 S2, no S3 or S4, no murmur, click or rub, no peripheral edema and peripheral pulses strong  MS: no gross musculoskeletal defects noted, no edema, healing surgical incision, trace pretibial edema, swelling in the knee.   PSYCH: mentation appears normal, affect normal/bright    Diagnostic Test Results:  None     ASSESSMENT/PLAN:                                                    I spent 17 minutes of time with the patient and >50% of it was in education and counseling regarding post operative health.   Status post left knee arthroplasty - doing " well.    1. Acute posthemorrhagic anemia  Common with her type of surgery.  Should resolve on its own otherwise will prescribe iron  - Hemoglobin; Future    2. Orthostatic hypotension   Patient will start checking blood pressures at home.  Is too low today   - order for DME; Equipment being ordered: blood pressure machine  Dispense: 1 Device; Refill: 1    Patient Instructions   Stop taking Lisinopril. My Chart or call in your blood pressure on Thursday.  Follow up for repeat hemoglobin at the lab in 3 weeks.      The information in this document, created by the medical scribe for me, accurately reflects the services I personally performed and the decisions made by me. I have reviewed and approved this document for accuracy prior to leaving the patient care area.  Maddi Church MD  2:30 PM, 01/24/2017    Maddi Church MD  [facility]    Start: 2:28 PM   End: 2:45 PM

## 2017-01-24 NOTE — MR AVS SNAPSHOT
After Visit Summary   1/24/2017    Ailyn Trevino    MRN: [id number]           Patient Information     Date Of Birth          1935        Visit Information        Provider Department      1/24/2017 2:15 PM Maddi Church MD [facility]        Today's Diagnoses     Acute posthemorrhagic anemia    -  1     Orthostatic hypotension           Care Instructions    Stop taking Lisinopril. My Chart or call in your blood pressure on Thursday.  Follow up for repeat hemoglobin at the lab in 3 weeks.    [facility]    If you have any questions regarding to your visit please contact your care team:     Team Pink:   Clinic Hours Telephone Number   Internal Medicine:  Dr. Maddi Chamorro NP       7am-7pm  Monday - Thursday   7am-5pm  Fridays  [phone]  (Appointment scheduling available 24/7)    Questions about your visit?  Team Line  [phone]   Urgent Care - [place] and [place] - 11am-9pm Monday-Friday Saturday-Sunday- 9am-5pm   [place] - 5pm-9pm Monday-Friday Saturday-Sunday- 9am-5pm  [phone] - [place]   [phone] - [place]       What options do I have for visits at the clinic other than the traditional office visit?  To expand how we care for you, many of our providers are utilizing electronic visits (e-visits) and telephone visits, when medically appropriate, for interactions with their patients rather than a visit in the clinic.   We also offer nurse visits for many medical concerns. Just like any other service, we will bill your insurance company for this type of visit based on time spent on the phone with your provider. Not all insurance companies cover these visits. Please check with your medical insurance if this type of visit is covered. You will be responsible for any charges that are not paid by your insurance.      E-visits via Appriss:  generally incur a $35.00 fee.  Telephone visits:  Time  spent on the phone: *charged based on time that is spent on the phone in increments of 10 minutes. Estimated cost:   5-10 mins $30.00   11-20 mins. $59.00   21-30 mins. $85.00   Use Hythiam (secure email communication and access to your chart) to send your primary care provider a message or make an appointment. Ask someone on your Team how to sign up for GotGamet.    For a Price Quote for your services, please call our [employer] Price Line at [phone].    As always, Thank you for trusting us with your health care needs!    Discharged by NADIA Palomo          Follow-ups after your visit        Future tests that were ordered for you today     Open Future Orders        Priority Expected Expires Ordered    Hemoglobin Routine 2/14/2017 1/24/2018 1/24/2017            Who to contact     If you have questions or need follow up information about today's clinic visit or your schedule please contact [facility] directly at [phone].  Normal or non-critical lab and imaging results will be communicated to you by MyChart, letter or phone within 4 business days after the clinic has received the results. If you do not hear from us within 7 days, please contact the clinic through Insight Direct (ServiceCEO)hart or phone. If you have a critical or abnormal lab result, we will notify you by phone as soon as possible.  Submit refill requests through Hythiam or call your pharmacy and they will forward the refill request to us. Please allow 3 business days for your refill to be completed.          Additional Information About Your Visit        Insight Direct (ServiceCEO)hart Information     Hythiam gives you secure access to your electronic health record. If you see a primary care provider, you can also send messages to your care team and make appointments. If you have questions, please call your primary care clinic.  If you do not have a primary care provider, please call [phone] and they will assist you.        Care EveryWhere ID     This is your Care  "EveryWhere ID. This could be used by other organizations to access your [place] medical records  [id number]        Your Vitals Were     Pulse Temperature Height BMI (Body Mass Index) Pulse Oximetry       58 97.8  F (36.6  C) (Oral) 4' 11\" (1.499 m) 28.06 kg/m2 98%        Blood Pressure from Last 3 Encounters:   01/24/17 100/48   01/02/17 124/58   12/09/16 112/54    Weight from Last 3 Encounters:   01/24/17 139 lb (63.05 kg)   01/02/17 141 lb (63.957 kg)   12/09/16 141 lb (63.957 kg)                 Today's Medication Changes          These changes are accurate as of: 1/24/17  2:47 PM.  If you have any questions, ask your nurse or doctor.               Start taking these medicines.        Dose/Directions    order for DME   Used for:  Orthostatic hypotension   Started by:  Maddi Church MD        Equipment being ordered: blood pressure machine   Quantity:  1 Device   Refills:  1         These medicines have changed or have updated prescriptions.        Dose/Directions    omeprazole 20 MG tablet   This may have changed:    - when to take this  - reasons to take this  - additional instructions   Used for:  Esophageal reflux        Dose:  20 mg   Take 1 tablet (20 mg) by mouth daily Take 30-60 minutes before a meal.   Quantity:  60 tablet   Refills:  3         Stop taking these medicines if you haven't already. Please contact your care team if you have questions.     lisinopril 5 MG tablet   Commonly known as:  PRINIVIL/ZESTRIL   Stopped by:  Maddi Church MD                Where to get your medicines      Some of these will need a paper prescription and others can be bought over the counter.  Ask your nurse if you have questions.     Bring a paper prescription for each of these medications    - order for DME             Primary Care Provider Office Phone # Fax #    Maddi Church [phone]       [facility] [address] [address]        Thank you!     Thank " you for choosing [facility]  for your care. Our goal is always to provide you with excellent care. Hearing back from our patients is one way we can continue to improve our services. Please take a few minutes to complete the written survey that you may receive in the mail after your visit with us. Thank you!             Your Updated Medication List - Protect others around you: Learn how to safely use, store and throw away your medicines at www.disposemymeds.org.          This list is accurate as of: 1/24/17  2:47 PM.  Always use your most recent med list.                   Brand Name Dispense Instructions for use    alendronate 70 MG tablet    FOSAMAX    12 tablet    Take 1 tablet (70 mg) by mouth every 7 days Take with over 8 ounces water and stay upright for at least 30 minutes after dose.  Take at least 60 minutes before breakfast       aspirin 325 MG EC tablet      Take 1 tablet (325 mg) by mouth daily For 30 days       atenolol 25 MG tablet    TENORMIN    90 tablet    Take 1 tablet (25 mg) by mouth daily       CALCIUM + D 500-1000-40 MG-UNT-MCG Chew   Generic drug:  Calcium-Vitamin D-Vitamin K      Take 1 tablet by mouth 2 times daily       CENTRUM SILVER ADULT 50+ PO      Take 1 tablet by mouth daily       citalopram 20 MG tablet    celeXA    90 tablet    Take 1 tablet (20 mg) by mouth daily       HYDROcodone-acetaminophen 5-325 MG per tablet    NORCO    90 tablet    Take 1 tablet by mouth every 6 hours as needed for moderate to severe pain       ipratropium 0.06 % spray    ATROVENT    1 Box    Spray 2 sprays into both nostrils 4 times daily as needed for rhinitis       omeprazole 20 MG tablet     60 tablet    Take 1 tablet (20 mg) by mouth daily Take 30-60 minutes before a meal.       order for DME     1 Device    Equipment being ordered: blood pressure machine       predniSONE 1 MG tablet    DELTASONE    450 tablet    Take 5 tablets (5 mg) by mouth daily Taper as directed       senna-docusate  8.6-50 MG per tablet    SENOKOT-S;PERICOLACE     Take 2 tablets by mouth 2 times daily       simvastatin 10 MG tablet    ZOCOR    90 tablet    Take 1 tablet (10 mg) by mouth At Bedtime       triamcinolone 0.1 % cream    KENALOG    45 g    Apply sparingly to affected area two times daily for 7 days.       TYLENOL 500 MG tablet   Generic drug:  acetaminophen      Take 2 tablets (1,000 mg) by mouth every 6 hours as needed (Max acetaminophen dose: 4000mg in 24 hrs.)       vitamin D 1000 UNITS capsule      Take 1 capsule by mouth daily

## 2017-01-25 DIAGNOSIS — K21.9 GASTROESOPHAGEAL REFLUX DISEASE, ESOPHAGITIS PRESENCE NOT SPECIFIED: Primary | ICD-10-CM

## 2017-01-26 NOTE — TELEPHONE ENCOUNTER
omeprazole 20 MG tablet      Last Written Prescription Date: 11/26/14  Last Fill Quantity: 60,  # refills: 3   Last Office Visit with [facility], [facility] or [facility] prescribing provider: 1/24/17

## 2017-01-27 NOTE — TELEPHONE ENCOUNTER
Routing refill request to provider for review/approval because:  A break in medication: last refilled on 11/26/14    Jes CORLEY RN, BSN

## 2017-02-02 DIAGNOSIS — F32.1 MAJOR DEPRESSIVE DISORDER, SINGLE EPISODE, MODERATE (H): Primary | ICD-10-CM

## 2017-02-02 NOTE — TELEPHONE ENCOUNTER
citalopram (CELEXA) 20 MG tablet     Last Written Prescription Date: 8/5/16  Last Fill Quantity: 90, # refills: 1  Last Office Visit with [facility] primary care provider:  1/24/17        Last PHQ-9 score on record=   PHQ-9 SCORE 1/2/2017   Total Score 0

## 2017-02-06 RX ORDER — CITALOPRAM HYDROBROMIDE 20 MG/1
TABLET ORAL
Qty: 90 TABLET | Refills: 1 | Status: SHIPPED | OUTPATIENT
Start: 2017-02-06 | End: 2017-08-10

## 2017-02-07 DIAGNOSIS — D62 ACUTE POSTHEMORRHAGIC ANEMIA: ICD-10-CM

## 2017-02-07 LAB — HGB BLD-MCNC: 12.7 G/DL (ref 11.7–15.7)

## 2017-02-07 PROCEDURE — 85018 HEMOGLOBIN: CPT | Performed by: INTERNAL MEDICINE

## 2017-02-07 PROCEDURE — 36415 COLL VENOUS BLD VENIPUNCTURE: CPT | Performed by: INTERNAL MEDICINE

## 2017-02-07 NOTE — TELEPHONE ENCOUNTER
Prescription approved per [facility] Refill Protocol.  Keily Christopher RN

## 2017-02-08 ENCOUNTER — MYC MEDICAL ADVICE (OUTPATIENT)
Dept: FAMILY MEDICINE | Facility: CLINIC | Age: 82
End: 2017-02-08

## 2017-03-17 ENCOUNTER — MYC MEDICAL ADVICE (OUTPATIENT)
Dept: FAMILY MEDICINE | Facility: CLINIC | Age: 82
End: 2017-03-17

## 2017-03-17 ENCOUNTER — OFFICE VISIT (OUTPATIENT)
Dept: FAMILY MEDICINE | Facility: CLINIC | Age: 82
End: 2017-03-17
Payer: COMMERCIAL

## 2017-03-17 VITALS
OXYGEN SATURATION: 98 % | BODY MASS INDEX: 28.68 KG/M2 | TEMPERATURE: 97.2 F | SYSTOLIC BLOOD PRESSURE: 152 MMHG | WEIGHT: 142 LBS | DIASTOLIC BLOOD PRESSURE: 64 MMHG | HEART RATE: 63 BPM

## 2017-03-17 DIAGNOSIS — R73.01 IMPAIRED FASTING GLUCOSE: ICD-10-CM

## 2017-03-17 DIAGNOSIS — M35.3 PMR (POLYMYALGIA RHEUMATICA) (H): Primary | ICD-10-CM

## 2017-03-17 DIAGNOSIS — D72.829 LEUKOCYTOSIS, UNSPECIFIED TYPE: ICD-10-CM

## 2017-03-17 DIAGNOSIS — M25.562 CHRONIC PAIN OF LEFT KNEE: ICD-10-CM

## 2017-03-17 DIAGNOSIS — G89.29 CHRONIC PAIN OF LEFT KNEE: ICD-10-CM

## 2017-03-17 DIAGNOSIS — R15.2 FECAL URGENCY: ICD-10-CM

## 2017-03-17 DIAGNOSIS — M85.80 OSTEOPENIA: ICD-10-CM

## 2017-03-17 DIAGNOSIS — Z79.52 CURRENT CHRONIC USE OF SYSTEMIC STEROIDS: ICD-10-CM

## 2017-03-17 DIAGNOSIS — R53.83 FATIGUE, UNSPECIFIED TYPE: ICD-10-CM

## 2017-03-17 DIAGNOSIS — I87.2 VENOUS STASIS DERMATITIS OF LEFT LOWER EXTREMITY: ICD-10-CM

## 2017-03-17 DIAGNOSIS — F32.0 MAJOR DEPRESSIVE DISORDER, SINGLE EPISODE, MILD (H): ICD-10-CM

## 2017-03-17 LAB
BASOPHILS # BLD AUTO: 0 10E9/L (ref 0–0.2)
BASOPHILS NFR BLD AUTO: 0.3 %
CRP SERPL-MCNC: 7.5 MG/L (ref 0–8)
DIFFERENTIAL METHOD BLD: NORMAL
EOSINOPHIL # BLD AUTO: 0.3 10E9/L (ref 0–0.7)
EOSINOPHIL NFR BLD AUTO: 3.1 %
ERYTHROCYTE [DISTWIDTH] IN BLOOD BY AUTOMATED COUNT: 12.8 % (ref 10–15)
HCT VFR BLD AUTO: 38.4 % (ref 35–47)
HGB BLD-MCNC: 12.9 G/DL (ref 11.7–15.7)
LYMPHOCYTES # BLD AUTO: 1.3 10E9/L (ref 0.8–5.3)
LYMPHOCYTES NFR BLD AUTO: 14.2 %
MCH RBC QN AUTO: 32.9 PG (ref 26.5–33)
MCHC RBC AUTO-ENTMCNC: 33.6 G/DL (ref 31.5–36.5)
MCV RBC AUTO: 98 FL (ref 78–100)
MONOCYTES # BLD AUTO: 0.7 10E9/L (ref 0–1.3)
MONOCYTES NFR BLD AUTO: 7.2 %
NEUTROPHILS # BLD AUTO: 7 10E9/L (ref 1.6–8.3)
NEUTROPHILS NFR BLD AUTO: 75.2 %
PLATELET # BLD AUTO: 242 10E9/L (ref 150–450)
RBC # BLD AUTO: 3.92 10E12/L (ref 3.8–5.2)
WBC # BLD AUTO: 9.3 10E9/L (ref 4–11)

## 2017-03-17 PROCEDURE — 36415 COLL VENOUS BLD VENIPUNCTURE: CPT | Performed by: INTERNAL MEDICINE

## 2017-03-17 PROCEDURE — 86140 C-REACTIVE PROTEIN: CPT | Performed by: INTERNAL MEDICINE

## 2017-03-17 PROCEDURE — 85025 COMPLETE CBC W/AUTO DIFF WBC: CPT | Performed by: INTERNAL MEDICINE

## 2017-03-17 PROCEDURE — 99214 OFFICE O/P EST MOD 30 MIN: CPT | Performed by: INTERNAL MEDICINE

## 2017-03-17 RX ORDER — ALENDRONATE SODIUM 70 MG/1
70 TABLET ORAL
Qty: 12 TABLET | Refills: 3 | Status: SHIPPED
Start: 2017-03-17 | End: 2018-04-21

## 2017-03-17 RX ORDER — HYDROCODONE BITARTRATE AND ACETAMINOPHEN 5; 325 MG/1; MG/1
1 TABLET ORAL EVERY 8 HOURS PRN
Qty: 90 TABLET | Refills: 0 | Status: SHIPPED | OUTPATIENT
Start: 2017-03-17 | End: 2017-08-10

## 2017-03-17 ASSESSMENT — PAIN SCALES - GENERAL: PAINLEVEL: MILD PAIN (3)

## 2017-03-17 NOTE — MR AVS SNAPSHOT
After Visit Summary   3/17/2017    Ailyn Trevino    MRN: [id number]           Patient Information     Date Of Birth          1935        Visit Information        Provider Department      3/17/2017 10:00 AM Maddi Church MD [facility]        Today's Diagnoses     Chronic pain of left knee    -  1    Osteopenia        PMR (polymyalgia rheumatica) (H)        Fatigue, unspecified type        Major depressive disorder, single episode, mild (H)        Current chronic use of systemic steroids        Impaired fasting glucose        Leukocytosis, unspecified type        Fecal urgency        Venous stasis dermatitis of left lower extremity          Care Instructions    - You can stop Fosamax once you're completely off prednisone.     - Let me know if your top blood pressure number goes below 100, or if it goes above 140.     - If you're not going to be at home, you can take an Imodium to help with bowel control.     - You can start using your compression stockings again.  You can get a pair 10-20 compression stocking from [employer] or ordered online.     [facility]    If you have any questions regarding to your visit please contact your care team:     Team Pink:   Clinic Hours Telephone Number   Internal Medicine:  Dr. Maddi Chamorro NP       7am-7pm  Monday - Thursday   7am-5pm  Fridays  [phone]  (Appointment scheduling available 24/7)    Questions about your visit?  Team Line  [phone]   Urgent Care - [place] - 11am-9pm Monday-Friday Saturday-Sunday- 9am-5pm   [place] - 5pm-9pm Monday-Friday Saturday-Sunday- 9am-5pm  [phone] - [place]  [phone] - [place]       What options do I have for visits at the clinic other than the traditional office visit?  To expand how we care for you, many of our providers are utilizing electronic visits (e-visits) and telephone visits, when medically  appropriate, for interactions with their patients rather than a visit in the clinic.   We also offer nurse visits for many medical concerns. Just like any other service, we will bill your insurance company for this type of visit based on time spent on the phone with your provider. Not all insurance companies cover these visits. Please check with your medical insurance if this type of visit is covered. You will be responsible for any charges that are not paid by your insurance.      E-visits via [employer]:  generally incur a $35.00 fee.  Telephone visits:  Time spent on the phone: *charged based on time that is spent on the phone in increments of 10 minutes. Estimated cost:   5-10 mins $30.00   11-20 mins. $59.00   21-30 mins. $85.00   Use [facility] (secure email communication and access to your chart) to send your primary care provider a message or make an appointment. Ask someone on your Team how to sign up for [facility].    For a Price Quote for your services, please call our retickr Line at [phone].    As always, Thank you for trusting us with your health care needs!    Discharged By:  JALEN APARICIO          Follow-ups after your visit        Who to contact     If you have questions or need follow up information about today's clinic visit or your schedule please contact [facility] directly at [phone].  Normal or non-critical lab and imaging results will be communicated to you by [employer], letter or phone within 4 business days after the clinic has received the results. If you do not hear from us within 7 days, please contact the clinic through [employer] or phone. If you have a critical or abnormal lab result, we will notify you by phone as soon as possible.  Submit refill requests through [facility] or call your pharmacy and they will forward the refill request to us. Please allow 3 business days for your refill to be completed.          Additional Information About Your Visit        [facility]  Information     [employer] gives you secure access to your electronic health record. If you see a primary care provider, you can also send messages to your care team and make appointments. If you have questions, please call your primary care clinic.  If you do not have a primary care provider, please call [phone] and they will assist you.        Care EveryWhere ID     This is your Care EveryWhere ID. This could be used by other organizations to access your [place] medical records  [id number]        Your Vitals Were     Pulse Temperature Pulse Oximetry BMI (Body Mass Index)          63 97.2  F (36.2  C) (Oral) 98% 28.68 kg/m2         Blood Pressure from Last 3 Encounters:   03/17/17 152/64   01/24/17 100/48   01/02/17 124/58    Weight from Last 3 Encounters:   03/17/17 142 lb (64.4 kg)   01/24/17 139 lb (63 kg)   01/02/17 141 lb (64 kg)              We Performed the Following     CBC with platelets differential     CRP inflammation     DEPRESSION ACTION PLAN (DAP) Order [[id number]]          Today's Medication Changes          These changes are accurate as of: 3/17/17 11:06 AM.  If you have any questions, ask your nurse or doctor.               These medicines have changed or have updated prescriptions.        Dose/Directions    HYDROcodone-acetaminophen 5-325 MG per tablet   Commonly known as:  NORCO   This may have changed:    - when to take this  - additional instructions   Used for:  Chronic pain of left knee   Changed by:  Maddi Church MD        Dose:  1 tablet   Take 1 tablet by mouth every 8 hours as needed for moderate to severe pain maximum 3 tablet(s) per day   Quantity:  90 tablet   Refills:  0       * omeprazole 20 MG tablet   This may have changed:    - when to take this  - reasons to take this  - additional instructions   Used for:  Esophageal reflux   Changed by:  Maddi Church MD        Dose:  20 mg   Take 1 tablet (20 mg) by mouth daily Take 30-60 minutes before a meal.   Quantity:  60  tablet   Refills:  3       * omeprazole 20 MG CR capsule   Commonly known as:  priLOSEC   This may have changed:  Another medication with the same name was changed. Make sure you understand how and when to take each.   Used for:  Gastroesophageal reflux disease, esophagitis presence not specified   Changed by:  Maddi Church MD        TAKE ONE CAPSULE BY MOUTH TWICE DAILY 30-60 MINUTES BEFORE A MEAL.   Quantity:  180 capsule   Refills:  1       * order for DME   This may have changed:  Another medication with the same name was added. Make sure you understand how and when to take each.   Used for:  Orthostatic hypotension   Changed by:  Maddi Church MD        Equipment being ordered: blood pressure machine   Quantity:  1 Device   Refills:  1       * order for DME   This may have changed:  You were already taking a medication with the same name, and this prescription was added. Make sure you understand how and when to take each.   Used for:  Venous stasis dermatitis of left lower extremity   Changed by:  Maddi Church MD        Equipment being ordered: 10-20 mm Hg knee high compression stockings   Quantity:  1 Device   Refills:  0       * Notice:  This list has 4 medication(s) that are the same as other medications prescribed for you. Read the directions carefully, and ask your doctor or other care provider to review them with you.         Where to get your medicines      These medications were sent to [place] [address] [place] [facility]  [address]  [address] [place] [address]     Phone:  [phone]     alendronate 70 MG tablet         Some of these will need a paper prescription and others can be bought over the counter.  Ask your nurse if you have questions.     Bring a paper prescription for each of these medications     HYDROcodone-acetaminophen 5-325 MG per tablet    order for DME                Primary Care Provider Office Phone # Fax #    Maddi Church [phone]        [facility] [address]  [place] [address]        Thank you!     Thank you for choosing [facility]  for your care. Our goal is always to provide you with excellent care. Hearing back from our patients is one way we can continue to improve our services. Please take a few minutes to complete the written survey that you may receive in the mail after your visit with us. Thank you!             Your Updated Medication List - Protect others around you: Learn how to safely use, store and throw away your medicines at www.disposemymeds.org.          This list is accurate as of: 3/17/17 11:06 AM.  Always use your most recent med list.                   Brand Name Dispense Instructions for use    alendronate 70 MG tablet    FOSAMAX    12 tablet    Take 1 tablet (70 mg) by mouth every 7 days Take with over 8 ounces water and stay upright for at least 30 minutes after dose.  Take at least 60 minutes before breakfast       aspirin 325 MG EC tablet      Take 325 mg by mouth daily Reported on 3/17/2017       atenolol 25 MG tablet    TENORMIN    90 tablet    Take 1 tablet (25 mg) by mouth daily       CALCIUM + D 500-1000-40 MG-UNT-MCG Chew   Generic drug:  Calcium-Vitamin D-Vitamin K      Take 1 tablet by mouth 2 times daily       CENTRUM SILVER ADULT 50+ PO      Take 1 tablet by mouth daily       citalopram 20 MG tablet    celeXA    90 tablet    TAKE 1 TABLET (20 MG) BY MOUTH DAILY       HYDROcodone-acetaminophen 5-325 MG per tablet    NORCO    90 tablet    Take 1 tablet by mouth every 8 hours as needed for moderate to severe pain maximum 3 tablet(s) per day       ipratropium 0.06 % spray    ATROVENT    1 Box    Spray 2 sprays into both nostrils 4 times daily as needed for rhinitis       * omeprazole 20 MG tablet     60 tablet    Take 1 tablet (20 mg) by mouth daily Take 30-60 minutes before a meal.       * omeprazole 20 MG CR capsule    priLOSEC    180 capsule    TAKE ONE CAPSULE BY MOUTH  TWICE DAILY 30-60 MINUTES BEFORE A MEAL.       * order for DME     1 Device    Equipment being ordered: blood pressure machine       * order for DME     1 Device    Equipment being ordered: 10-20 mm Hg knee high compression stockings       predniSONE 1 MG tablet    DELTASONE    450 tablet    Take 5 tablets (5 mg) by mouth daily Taper as directed       senna-docusate 8.6-50 MG per tablet    SENOKOT-S;PERICOLACE     Take 2 tablets by mouth 2 times daily       simvastatin 10 MG tablet    ZOCOR    90 tablet    Take 1 tablet (10 mg) by mouth At Bedtime       triamcinolone 0.1 % cream    KENALOG    45 g    Apply sparingly to affected area two times daily for 7 days.       TYLENOL 500 MG tablet   Generic drug:  acetaminophen      Take 2 tablets (1,000 mg) by mouth every 6 hours as needed (Max acetaminophen dose: 4000mg in 24 hrs.)       vitamin D 1000 UNITS capsule      Take 1 capsule by mouth daily       * Notice:  This list has 4 medication(s) that are the same as other medications prescribed for you. Read the directions carefully, and ask your doctor or other care provider to review them with you.

## 2017-03-17 NOTE — PROGRESS NOTES
INTERNAL MEDICINE   SUBJECTIVE:                                                    Ailyn Trevino is a 81 year old female who presents to clinic today for the following health issues:      Chief Complaint   Patient presents with     RECHECK     Recheck Medication       Left Knee F/U: Her knee has been getting better, but it is still sore. She has been using a cane for assistance with walking. She explains that therapy has been going well and she enjoys going. Since the surgery she has been having more bowel movements than in the past. She is generally able to control it, but there have been times that she has not gotten to the bathroom in time. Her left ankle continues to have some numbness.     Wrist Weakness: She has been having muscle pain and weakness in her hands and wrists. She notes that this is more of a strength issue rather than any pain. She has been taking 4 Prednisone tablets per day. She has been taking this for a month without any change in symptoms.     Fatigue: She has been very sleepy throughout the day now. She does not know if this is related to her recent surgery or medications.     Medication F/U: She has not been taking Fosamax. She estimates that she took it for about 6 months, and might stop using it again, but once she is off of Prednisone. She is taking Atenolol. She is not taking Simvastatin.     Problem list and histories reviewed & adjusted, as indicated.  Additional history: as documented    Patient Active Problem List   Diagnosis     Advanced directives, counseling/discussion     Osteopenia     Palpitations     Hyperlipidemia LDL goal <130     Renal cyst     Granulomatous lung disease (H)     Health Care Home     Insomnia     Osteoarthritis of knee     Aortic valve disorder     Mitral valve disorder     Impaired fasting glucose     PMR (polymyalgia rheumatica) (H)     Encounter for long-term current use of medication     Other chronic pain     Major depressive disorder, single  episode, mild (H)     Current chronic use of systemic steroids     Past Surgical History   Procedure Laterality Date     Cholecystectomy  2010     Appendectomy  1951     Cataract iol, rt/lt  2010     bilateral      Surgical history of -   12/13     bilateral YAG laser surgery of eyes       Social History   Substance Use Topics     Smoking status: Former Smoker     Quit date: 1/1/1998     Smokeless tobacco: Never Used     Alcohol use Yes     Family History   Problem Relation Age of Onset     CANCER Mother      CEREBROVASCULAR DISEASE Father          Labs reviewed in EPIC    Reviewed and updated as needed this visit by clinical staff       Reviewed and updated as needed this visit by Provider       ROS:  C: NEGATIVE for fever, chills, change in weight. POSITIVE for fatigue.   GI: NEGATIVE for nausea, abdominal pain, heartburn. POSITIVE for fecal urgency.   M: POSITIVE for left knee soreness.   N: NEGATIVE for dizziness or paresthesias. POSITIVE for morning wrist weakness bilaterally.   All other systems reviewed and were negative.      This document serves as a record of the services and decisions personally performed and made by Maddi Church MD. It was created on his/her behalf by Tatyana Thayer, a trained medical scribe. The creation of this document is based the provider's statements to the medical scribe.    Isha Thayer 10:18 AM, March 17, 2017    OBJECTIVE:                                                    /60  Pulse 63  Temp 97.2  F (36.2  C) (Oral)  Wt 64.4 kg (142 lb)  SpO2 98%  BMI 28.68 kg/m2  Body mass index is 28.68 kg/(m^2).  GENERAL: healthy, alert and no distress  RESP: lungs clear to auscultation - no rales, rhonchi or wheezes  CV: regular rate and rhythm, normal S1 S2, no S3 or S4, 2/6 murmur best heard at the apex, click or rub, no peripheral edema and peripheral pulses strong  ABDOMEN: soft, nontender, no hepatosplenomegaly, no masses and bowel sounds normal  MS: no gross  musculoskeletal defects noted, no edema. Erythema and thickened skin to the left ankle.   PSYCH: mentation appears normal, affect normal/bright    Diagnostic Test Results:  Results for orders placed or performed in visit on 02/07/17   Hemoglobin   Result Value Ref Range    Hemoglobin 12.7 11.7 - 15.7 g/dL        ASSESSMENT/PLAN:                                                    I spent 25 minutes of time with the patient and >50% of it was in education and counseling regarding preventive healthcare.     1. Osteopenia  Needs to take fosamax due to prednisone use.  The current medical regimen is effective; continue present plan and medications.   - alendronate (FOSAMAX) 70 MG tablet; Take 1 tablet (70 mg) by mouth every 7 days Take with over 8 ounces water and stay upright for at least 30 minutes after dose.  Take at least 60 minutes before breakfast  Dispense: 12 tablet; Refill: 3    2. Chronic pain of left knee  Follow up with ortho.  Signed pain contract as she has been getting narcotics haphazardly.   The current medical regimen is effective; continue present plan and medications. Patient signed controlled substance agreement.   - HYDROcodone-acetaminophen (NORCO) 5-325 MG per tablet; Take 1 tablet by mouth every 8 hours as needed for moderate to severe pain maximum 3 tablet(s) per day  Dispense: 90 tablet; Refill: 0    3. PMR (polymyalgia rheumatica) (H)  Titrate down prednisone to 3 mg if crp is normal   - CRP inflammation    4. Fatigue, unspecified type  Multifactorial related to meds, medical conditions.     5. Major depressive disorder, single episode, mild (H)  The current medical regimen is effective;  continue present plan and medications.     6. Current chronic use of systemic steroids  Discussed with patient risk of ulcers and bone diseae     7. Impaired fasting glucose      8. Leukocytosis, unspecified type  Likely due to prednisone.   - CBC with platelets differential    9. Fecal urgency  Unclear  etiology.    10. Venous stasis dermatitis of left lower extremity  Since her surgery.  Will use compression   - order for DME; Equipment being ordered: 10-20 mm Hg knee high compression stockings  Dispense: 1 Device; Refill: 0    Patient Instructions   - You can stop Fosamax once you're completely off prednisone.     - Let me know if your top blood pressure number goes below 100, or if it goes above 140.     - If you're not going to be at home, you can take an Imodium to help with bowel control.     - You can start using your compression stockings again.  You can get a pair 10-20 compression stocking from [employer] or ordered online.       The information in this document, created by the medical scribe for me, accurately reflects the services I personally performed and the decisions made by me. I have reviewed and approved this document for accuracy prior to leaving the patient care area.  Maddi Church MD  10:18 AM, 03/17/176    Maddi Church MD  [facility]    Start: 10:39 AM   End: 11:04 AM

## 2017-03-17 NOTE — PATIENT INSTRUCTIONS
- You can stop Fosamax once you're completely off prednisone.     - Let me know if your top blood pressure number goes below 100, or if it goes above 140.     - If you're not going to be at home, you can take an Imodium to help with bowel control.     - You can start using your compression stockings again.  You can get a pair 10-20 compression stocking from [employer] or ordered online.     [facility] [place]    If you have any questions regarding to your visit please contact your care team:     Team Pink:   Clinic Hours Telephone Number   Internal Medicine:  Dr. Maddi Chamorro, NP       7am-7pm  Monday - Thursday   7am-5pm  Fridays  [phone]  (Appointment scheduling available 24/7)    Questions about your visit?  Team Line  [phone]   Urgent Care - [place] and [place] - 11am-9pm Monday-Friday Saturday-Sunday- 9am-5pm   [place] - 5pm-9pm Monday-Friday Saturday-Sunday- 9am-5pm  [phone] - [place]   [phone] - [place]       What options do I have for visits at the clinic other than the traditional office visit?  To expand how we care for you, many of our providers are utilizing electronic visits (e-visits) and telephone visits, when medically appropriate, for interactions with their patients rather than a visit in the clinic.   We also offer nurse visits for many medical concerns. Just like any other service, we will bill your insurance company for this type of visit based on time spent on the phone with your provider. Not all insurance companies cover these visits. Please check with your medical insurance if this type of visit is covered. You will be responsible for any charges that are not paid by your insurance.      E-visits via [employer]:  generally incur a $35.00 fee.  Telephone visits:  Time spent on the phone: *charged based on time that is spent on the phone in increments of 10 minutes. Estimated cost:   5-10 mins $30.00   11-20  mins. $59.00   21-30 mins. $85.00   Use [employer] (secure email communication and access to your chart) to send your primary care provider a message or make an appointment. Ask someone on your Team how to sign up for [employer].    For a Price Quote for your services, please call our [employer] Price Line at [phone].    As always, Thank you for trusting us with your health care needs!    Discharged By:  JALEN APARICIO

## 2017-03-17 NOTE — LETTER
[facility]    03/17/17    Patient: Ailyn Trevino  YOB: 1935  Medical Record Number: [id number]                                                                  Controlled Substance Agreement  I understand that my care provider has prescribed controlled substances (narcotics, tranquilizers, and/or stimulants) to help manage my condition(s).  I am taking this medicine to help me function or work.  I know that this is strong medicine.  It could have serious side effects and even cause a dependency on the drug.  If I stop these medicines suddenly, I could have severe withdrawal symptoms.    The risks, benefits, and side effects of these medication(s) were explained to me.  I agree that:  1. I will take part in other treatments as advised by my provider.  This may be psychiatry or counseling, physical therapy, behavioral therapy, group treatment, or a referral to a pain clinic.  I will reduce or stop my medicine when my provider tells me to do so.   2. I will take my medicines as prescribed.  I will not change the dose or schedule unless my provider tells me to.  There will be no refills if I  run out early.   I may be contacted at any time without warning and asked to complete a drug test or pill count.   3. I will keep all my appointments at the clinic.  If I miss appointments or fail to follow instructions, my provider may stop my medicine.  4. I will not ask other providers to prescribe controlled substances. And I will not accept controlled substances from other people. If I need another prescribed controlled substance for a new reason, I will notify my provider within one business day.  5. If I enroll in the [place] Medical Marijuana program, I will tell my provider.  I will also sign an agreement to share my medical records with my provider.  6. I will use one pharmacy to fill all of my controlled substance prescriptions.  If my prescription is mailed to my pharmacy, it may take 5  to 7 days for my medicine to be ready.  7. I understand that my provider, clinic care team, and pharmacy can track controlled substance prescriptions from other providers through a central database (prescription monitoring program).  8. I will bring in my list of medications (or my medicine bottles) each time I come to the clinic.  REV- 04/2016                                                                                                                                            Page 1 of 2      [facility]    03/17/17    Patient: Ailyn Trevino  YOB: 1935  Medical Record Number: [id number]    9. Refills of controlled substances will be made only during office hours.  It is up to me to make sure that I do not run out of my medicines on weekends or holidays.    10. I am responsible for my prescriptions.  If the medicine is lost or stolen, it will not be replaced.   I also agree not to share these medicines with anyone.  11. I agree to not use ANY illegal or recreational drugs.  This includes marijuana, cocaine, bath salts or other drugs.  I agree not to use alcohol unless my provider says I may.  I agree to give urine samples whenever asked.  If I fail to give a urine sample, the provider may stop my medicine.     12. I will tell my nurse or provider right away if I become pregnant or have a new medical problem treated outside of [facility].  13. I understand that this medicine can affect my thinking and judgment.  It may be unsafe for me to drive, use machinery and do dangerous tasks.  I will not do any of these things until I know how the medicine affects me.  If my dose changes, I will wait to see how it affects me.  I will contact my provider if I have concerns about medicine side effects.  I understand that if I do not follow any of the conditions above, my prescriptions or treatment may be stopped.    I agree that my provider, clinic care team, and pharmacy may work with any  city, state or federal law enforcement agency that investigates the misuse, sale, or other diversion of my controlled medicine. I will allow my provider to discuss my care with or share a copy of this agreement with any other treating provider, pharmacy or emergency room where I receive care.  I agree to give up (waive) any right of privacy or confidentiality with respect to these authorizations.   I have read this agreement and have asked questions about anything I did not understand.   ___________________________________    ___________________________  Patient Signature                                                           Date and Time  ___________________________________     ____________________________  Witness                                                                            Date and Time  ___________________________________  Maddi Church MD  REV-  04/2016                                                                                                                                                                 Page 2 of 2  Opioid Pain Medicines (also known as Narcotics)  What You Need to Know      What are opioids?   Opioids are pain medicines that must be prescribed by a doctor. Examples are:     morphine (MS Contin, Afua)    oxycodone (Oxycontin)    oxycodone and acetaminophen (Percocet)    hydrocodone and acetaminophen (Vicodin, Norco)     fentanyl patch (Duragesic)     hydromorphone (Dilaudid)     methadone     What do opioids do well?   Opioids are best for short-term pain after a surgery or injury. They also work well for cancer pain. Unlike other pain medicines, they do not cause liver or kidney failure or ulcers. They may help some people with long-lasting (chronic) pain.     What do opioids NOT do well?   Opioids never get rid of pain entirely, and they do not work well for most patients with chronic pain. Opioids do not reduce swelling, one of the causes of pain. They also don t  work well for nerve pain.     Side effects  Talk to your doctor before you start or decide to keep taking one of these medicines. Side effects include:    Lowers your breathing rate enough that it could cause death    Death due to taking more than the prescribed dose    Serious lifelong opioid use      Dependence is not the same as addiction. Addiction is when people keep using a substance that harms their body, their mind or their relations with others. If you have a history of drug or alcohol abuse, taking opioids can cause a relapse.  Over time, opioids don t work as well. Most people will need higher and higher doses. The higher the dose, the more serious the side effects. We don t know the long-term effects of opioids.   People who have used opioids for a long time have a lower quality of life, worse depression, higher levels of pain and more visits to doctors.  Overdose from prescription drugs is the second leading cause of death in the [place] The risk of overdose rises when opioids are taken with other drugs such as:    Medicines used for anxiety and panic attacks (such as lorazepam, alprazolam, clonazepam    Other sedatives    Alcohol    Illegal drugs such as heroin  Never share your opioids with others. Be sure to store opioids in a secure place, locked if possible.Young children can easily swallow them and overdose.     Are there other ways to manage pain?  Ways to help reduce pain:    Exercise every day.    Treat health problems that may be causing pain.    Treat mental health problems like depression and anxiety.     Worse depression symptoms; Less pleasure in things you usually enjoy    Feeling tired or sluggish    Slower thoughts or cloudy thinking    Being more sensitive to pain over time; Pain is harder to control.    Trouble sleeping or restless sleep    Changes in hormone levels (for example, less testosterone).     Changes in sex drive or ability to have sex    Long lasting nausea and  constipation    Trouble breathing while asleep; This is worse with lung problems like COPD or sleep apnea.    Unsafe driving    Getting sick more often    Itching    Feeling dizzy    Dry mouth    Sweating    Trouble emptying the bladder (peeing). This is worse if you have an enlarged prostate or get urinary tract infections (UTIs).    What else should I know about opioids?  When someone takes opioids for too long or too often, they become dependent. This means that if you stop or reduce the medicine too quickly, you will have withdrawal symptoms.          Practice good sleep habits.  Try to go to bed and get up at the same time every day.    Stop smoking.  Tobacco use can make pain worse.    Do things that you enjoy.    Find a way to work through pain without drugs.  Try deep breathing, meditation, visual imagery and aromatherapy.    Ask your doctor to help you create a plan to manage your pain.

## 2017-03-17 NOTE — NURSING NOTE
"Chief Complaint   Patient presents with     RECHECK     Recheck Medication       Initial /60  Pulse 63  Temp 97.2  F (36.2  C) (Oral)  Wt 142 lb (64.4 kg)  SpO2 98%  BMI 28.68 kg/m2 Estimated body mass index is 28.68 kg/(m^2) as calculated from the following:    Height as of 1/24/17: 4' 11\" (1.499 m).    Weight as of this encounter: 142 lb (64.4 kg).  Medication Reconciliation: complete   Nu Nieves MA    "

## 2017-03-20 NOTE — PROGRESS NOTES
CRP is on the high end of normal.  Let's keep at 4 mg prednisone for another month.  CRP at that time - lab only appointment needed.      Dr. Church    crp in 1 month - indication PMR

## 2017-03-27 ENCOUNTER — MYC MEDICAL ADVICE (OUTPATIENT)
Dept: FAMILY MEDICINE | Facility: CLINIC | Age: 82
End: 2017-03-27

## 2017-03-27 DIAGNOSIS — M35.3 PMR (POLYMYALGIA RHEUMATICA) (H): ICD-10-CM

## 2017-03-27 RX ORDER — PREDNISONE 1 MG/1
6 TABLET ORAL DAILY
COMMUNITY
Start: 2017-03-27 | End: 2017-04-18

## 2017-04-18 ENCOUNTER — MYC MEDICAL ADVICE (OUTPATIENT)
Dept: FAMILY MEDICINE | Facility: CLINIC | Age: 82
End: 2017-04-18

## 2017-04-18 DIAGNOSIS — M35.3 PMR (POLYMYALGIA RHEUMATICA) (H): ICD-10-CM

## 2017-04-18 RX ORDER — PREDNISONE 1 MG/1
3 TABLET ORAL DAILY
COMMUNITY
Start: 2017-04-18 | End: 2018-04-21

## 2017-04-18 NOTE — TELEPHONE ENCOUNTER
Per DR. Church: ok to increase prednisone to 8mg. See if rheumatology has any openings or cancellations. Otherwise, f/u with Dr. Christopher    Patient notified.   No openings available for rheumatology.  Scheduled appointment to see DR. Christopher on 4/20/17    Keily Christopher RN

## 2017-04-20 ENCOUNTER — OFFICE VISIT (OUTPATIENT)
Dept: FAMILY MEDICINE | Facility: CLINIC | Age: 82
End: 2017-04-20
Payer: COMMERCIAL

## 2017-04-20 VITALS
DIASTOLIC BLOOD PRESSURE: 62 MMHG | OXYGEN SATURATION: 98 % | BODY MASS INDEX: 29.03 KG/M2 | SYSTOLIC BLOOD PRESSURE: 136 MMHG | WEIGHT: 144 LBS | HEIGHT: 59 IN | TEMPERATURE: 98.2 F | HEART RATE: 67 BPM

## 2017-04-20 DIAGNOSIS — M35.3 PMR (POLYMYALGIA RHEUMATICA) (H): Primary | ICD-10-CM

## 2017-04-20 LAB
BASOPHILS # BLD AUTO: 0 10E9/L (ref 0–0.2)
BASOPHILS NFR BLD AUTO: 0.3 %
CRP SERPL-MCNC: 5.8 MG/L (ref 0–8)
DIFFERENTIAL METHOD BLD: ABNORMAL
EOSINOPHIL # BLD AUTO: 0 10E9/L (ref 0–0.7)
EOSINOPHIL NFR BLD AUTO: 0.3 %
ERYTHROCYTE [DISTWIDTH] IN BLOOD BY AUTOMATED COUNT: 12.8 % (ref 10–15)
ERYTHROCYTE [SEDIMENTATION RATE] IN BLOOD BY WESTERGREN METHOD: 6 MM/H (ref 0–30)
HCT VFR BLD AUTO: 40.2 % (ref 35–47)
HGB BLD-MCNC: 13.4 G/DL (ref 11.7–15.7)
LYMPHOCYTES # BLD AUTO: 1.1 10E9/L (ref 0.8–5.3)
LYMPHOCYTES NFR BLD AUTO: 11 %
MCH RBC QN AUTO: 32 PG (ref 26.5–33)
MCHC RBC AUTO-ENTMCNC: 33.3 G/DL (ref 31.5–36.5)
MCV RBC AUTO: 96 FL (ref 78–100)
MONOCYTES # BLD AUTO: 0.4 10E9/L (ref 0–1.3)
MONOCYTES NFR BLD AUTO: 3.5 %
NEUTROPHILS # BLD AUTO: 8.5 10E9/L (ref 1.6–8.3)
NEUTROPHILS NFR BLD AUTO: 84.9 %
PLATELET # BLD AUTO: 268 10E9/L (ref 150–450)
RBC # BLD AUTO: 4.19 10E12/L (ref 3.8–5.2)
WBC # BLD AUTO: 10 10E9/L (ref 4–11)

## 2017-04-20 PROCEDURE — 85652 RBC SED RATE AUTOMATED: CPT | Performed by: INTERNAL MEDICINE

## 2017-04-20 PROCEDURE — 85025 COMPLETE CBC W/AUTO DIFF WBC: CPT | Performed by: INTERNAL MEDICINE

## 2017-04-20 PROCEDURE — 86140 C-REACTIVE PROTEIN: CPT | Performed by: INTERNAL MEDICINE

## 2017-04-20 PROCEDURE — 99214 OFFICE O/P EST MOD 30 MIN: CPT | Performed by: INTERNAL MEDICINE

## 2017-04-20 PROCEDURE — 36415 COLL VENOUS BLD VENIPUNCTURE: CPT | Performed by: INTERNAL MEDICINE

## 2017-04-20 RX ORDER — PREDNISONE 5 MG/1
20 TABLET ORAL DAILY
Qty: 120 TABLET | Refills: 0 | Status: SHIPPED | OUTPATIENT
Start: 2017-04-20 | End: 2017-05-15

## 2017-04-20 ASSESSMENT — PAIN SCALES - GENERAL: PAINLEVEL: EXTREME PAIN (8)

## 2017-04-20 NOTE — LETTER
[address] [place] [address]    April 21, 2017    Ailyn Trevino  [address] [place] [address] [place]  [facility] [place] [address]          Dear Ailyn,    These tests are actually completely normal. I will forward your chart to your primary care physician to consider this when she sees you in follow up.  Results for orders placed or performed in visit on 04/20/17   CRP inflammation   Result Value Ref Range    CRP Inflammation 5.8 0.0 - 8.0 mg/L   Erythrocyte sedimentation rate auto   Result Value Ref Range    Sed Rate 6 0 - 30 mm/h   CBC with platelets differential   Result Value Ref Range    WBC 10.0 4.0 - 11.0 10e9/L    RBC Count 4.19 3.8 - 5.2 10e12/L    Hemoglobin 13.4 11.7 - 15.7 g/dL    Hematocrit 40.2 35.0 - 47.0 %    MCV 96 78 - 100 fl    MCH 32.0 26.5 - 33.0 pg    MCHC 33.3 31.5 - 36.5 g/dL    RDW 12.8 10.0 - 15.0 %    Platelet Count 268 150 - 450 10e9/L    Diff Method Automated Method     % Neutrophils 84.9 %    % Lymphocytes 11.0 %    % Monocytes 3.5 %    % Eosinophils 0.3 %    % Basophils 0.3 %    Absolute Neutrophil 8.5 (H) 1.6 - 8.3 10e9/L    Absolute Lymphocytes 1.1 0.8 - 5.3 10e9/L    Absolute Monocytes 0.4 0.0 - 1.3 10e9/L    Absolute Eosinophils 0.0 0.0 - 0.7 10e9/L    Absolute Basophils 0.0 0.0 - 0.2 10e9/L   If you have any questions or concerns, please me or my clinic team at [phone].      Sincerely,        Miki Christopher MD/sherie

## 2017-04-20 NOTE — MR AVS SNAPSHOT
After Visit Summary   4/20/2017    Ailyn Trevino    MRN: [id number]           Patient Information     Date Of Birth          1935        Visit Information        Provider Department      4/20/2017 2:30 PM Miki Christopher MD [facility]        Today's Diagnoses     PMR (polymyalgia rheumatica) (H)    -  1      Care Instructions    - I will follow up with you on lab test results.    - Take 20 MG of Prednisone per day until your visit Dr. Church.    - For PMR   Joint guidelines from the  League Against Rheumatism (EULAR) and the American College of Rheumatology (ACR) conditionally recommend starting corticosteroid therapy with 12.5-25 mg/day of prednisone or the equivalent. A systematic examination of the peer-reviewed literature, which included 30 studies, found that remission of PMR seemed to be achieved for most patients with a starting dose of prednisone at 15 mg/day. A slow tapering of the prednisone, less than 1 mg/month, was associated with fewer relapses. Once prednisone is tapered to 10 mg/day, a slow taper by 1 mg every 2 months until treatment discontinuation was associated with optimal control of disease activity.    [facility]    If you have any questions regarding to your visit please contact your care team:     Team Pink:   Clinic Hours Telephone Number   Internal Medicine:  Dr. Maddi Chamorro NP       7am-7pm  Monday - Thursday   7am-5pm  Fridays  [phone]  (Appointment scheduling available 24/7)    Questions about your visit?  Team Line  [phone]   Urgent Care - [place] - 11am-9pm Monday-Friday Saturday-Sunday- 9am-5pm   [place] - 5pm-9pm Monday-Friday Saturday-Sunday- 9am-5pm  [phone] - [place]y HYDE  [phone] - [place]       What options do I have for visits at the clinic other than the traditional office visit?  To expand how we care for you, many of our  providers are utilizing electronic visits (e-visits) and telephone visits, when medically appropriate, for interactions with their patients rather than a visit in the clinic.   We also offer nurse visits for many medical concerns. Just like any other service, we will bill your insurance company for this type of visit based on time spent on the phone with your provider. Not all insurance companies cover these visits. Please check with your medical insurance if this type of visit is covered. You will be responsible for any charges that are not paid by your insurance.      E-visits via DarkWorks:  generally incur a $35.00 fee.  Telephone visits:  Time spent on the phone: *charged based on time that is spent on the phone in increments of 10 minutes. Estimated cost:   5-10 mins $30.00   11-20 mins. $59.00   21-30 mins. $85.00   Use DarkWorks (secure email communication and access to your chart) to send your primary care provider a message or make an appointment. Ask someone on your Team how to sign up for DarkWorks.    For a Price Quote for your services, please call our SimpleSite Line at [phone].    As always, Thank you for trusting us with your health care needs!    Discharged By:  JALEN APARICIO          Follow-ups after your visit        Your next 10 appointments already scheduled     May 09, 2017 10:30 AM CDT   [facility]    [address] [address]   [phone]           Patient must bring picture ID.  Patient should be prepared to give a urine specimen  Please do not eat 10-12 hours before your appointment if you are coming in fasting for labs on lipids, cholesterol, or glucose (sugar).  Pregnant women should follow their Care Team instructions. Water with medications is okay. Do not drink coffee or other fluids.   If you have concerns about taking  your medications, please ask at office or if scheduling via DarkWorks, send a message by  "clicking on Secure Messaging, Message Your Care Team.            May 15, 2017  3:30 PM CDT   Office Visit with Maddi Church MD   [facility])    [address]  [place] [address] [phone]           Bring a current list of meds and any records pertaining to this visit.  For Physicals, please bring immunization records and any forms needing to be filled out.  Please arrive 10 minutes early to complete paperwork.              Who to contact     If you have questions or need follow up information about today's clinic visit or your schedule please contact [facility] directly at [phone].  Normal or non-critical lab and imaging results will be communicated to you by BuzzSpicehart, letter or phone within 4 business days after the clinic has received the results. If you do not hear from us within 7 days, please contact the clinic through BuzzSpicehart or phone. If you have a critical or abnormal lab result, we will notify you by phone as soon as possible.  Submit refill requests through S[employer]ORTEX or call your pharmacy and they will forward the refill request to us. Please allow 3 business days for your refill to be completed.          Additional Information About Your Visit        [employer] Information     SAV[employer]EX gives you secure access to your electronic health record. If you see a primary care provider, you can also send messages to your care team and make appointments. If you have questions, please call your primary care clinic.  If you do not have a primary care provider, please call [phone] and they will assist you.        Care EveryWhere ID     This is your Care EveryWhere ID. This could be used by other organizations to access your [facility] medical records  [id number]        Your Vitals Were     Pulse Temperature Height Pulse Oximetry BMI (Body Mass Index)       67 98.2  F (36.8  C) (Oral) 4' 11\" (1.499 m) 98% 29.08 kg/m2        Blood Pressure from " Last 3 Encounters:   04/20/17 136/62   03/17/17 152/64   01/24/17 100/48    Weight from Last 3 Encounters:   04/20/17 144 lb (65.3 kg)   03/17/17 142 lb (64.4 kg)   01/24/17 139 lb (63 kg)              We Performed the Following     CBC with platelets differential     CRP inflammation     Erythrocyte sedimentation rate auto          Today's Medication Changes          These changes are accurate as of: 4/20/17  3:09 PM.  If you have any questions, ask your nurse or doctor.               These medicines have changed or have updated prescriptions.        Dose/Directions    * omeprazole 20 MG tablet   This may have changed:    - when to take this  - reasons to take this  - additional instructions   Used for:  Esophageal reflux   Changed by:  Maddi Church MD        Dose:  20 mg   Take 1 tablet (20 mg) by mouth daily Take 30-60 minutes before a meal.   Quantity:  60 tablet   Refills:  3       * omeprazole 20 MG CR capsule   Commonly known as:  priLOSEC   This may have changed:  Another medication with the same name was changed. Make sure you understand how and when to take each.   Used for:  Gastroesophageal reflux disease, esophagitis presence not specified   Changed by:  Maddi Church MD        TAKE ONE CAPSULE BY MOUTH TWICE DAILY 30-60 MINUTES BEFORE A MEAL.   Quantity:  180 capsule   Refills:  1       * predniSONE 1 MG tablet   Commonly known as:  DELTASONE   This may have changed:  Another medication with the same name was added. Make sure you understand how and when to take each.   Used for:  PMR (polymyalgia rheumatica) (H)   Changed by:  Maddi Church MD        Dose:  8 mg   Take 8 tablets (8 mg) by mouth daily Taper as directed   Refills:  0       * predniSONE 5 MG tablet   Commonly known as:  DELTASONE   This may have changed:  You were already taking a medication with the same name, and this prescription was added. Make sure you understand how and when to take each.   Used for:  PMR (polymyalgia  rheumatica) (H)   Changed by:  Miki Christopher MD        Dose:  20 mg   Take 4 tablets (20 mg) by mouth daily   Quantity:  120 tablet   Refills:  0       * Notice:  This list has 4 medication(s) that are the same as other medications prescribed for you. Read the directions carefully, and ask your doctor or other care provider to review them with you.         Where to get your medicines      Call your pharmacy to confirm that your medication is ready for pickup. It may take up to 24 hours for them to receive the prescription. If the prescription is not ready within 3 business days, please contact your clinic or your provider.     We will let you know when these medications are ready. If you don't hear back within 3 business days, please contact us.     predniSONE 5 MG tablet                Primary Care Provider Office Phone # Fax #    Maddi Marine Church [phone]       [address] [address]        Thank you!     Thank you for choosing [facility]  for your care. Our goal is always to provide you with excellent care. Hearing back from our patients is one way we can continue to improve our services. Please take a few minutes to complete the written survey that you may receive in the mail after your visit with us. Thank you!             Your Updated Medication List - Protect others around you: Learn how to safely use, store and throw away your medicines at www.disposemymeds.org.          This list is accurate as of: 4/20/17  3:09 PM.  Always use your most recent med list.                   Brand Name Dispense Instructions for use    alendronate 70 MG tablet    FOSAMAX    12 tablet    Take 1 tablet (70 mg) by mouth every 7 days Take with over 8 ounces water and stay upright for at least 30 minutes after dose.  Take at least 60 minutes before breakfast       aspirin 325 MG EC tablet      Take 325 mg by mouth daily Reported on 3/17/2017       atenolol  25 MG tablet    TENORMIN    90 tablet    Take 1 tablet (25 mg) by mouth daily       CALCIUM + D 500-1000-40 MG-UNT-MCG Chew   Generic drug:  Calcium-Vitamin D-Vitamin K      Take 1 tablet by mouth 2 times daily Reported on 4/20/2017       CENTRUM SILVER ADULT 50+ PO      Take 1 tablet by mouth daily Reported on 4/20/2017       citalopram 20 MG tablet    celeXA    90 tablet    TAKE 1 TABLET (20 MG) BY MOUTH DAILY       HYDROcodone-acetaminophen 5-325 MG per tablet    NORCO    90 tablet    Take 1 tablet by mouth every 8 hours as needed for moderate to severe pain maximum 3 tablet(s) per day       ipratropium 0.06 % spray    ATROVENT    1 Box    Spray 2 sprays into both nostrils 4 times daily as needed for rhinitis       * omeprazole 20 MG tablet     60 tablet    Take 1 tablet (20 mg) by mouth daily Take 30-60 minutes before a meal.       * omeprazole 20 MG CR capsule    priLOSEC    180 capsule    TAKE ONE CAPSULE BY MOUTH TWICE DAILY 30-60 MINUTES BEFORE A MEAL.       * order for DME     1 Device    Equipment being ordered: blood pressure machine       * order for DME     1 Device    Equipment being ordered: 10-20 mm Hg knee high compression stockings       * predniSONE 1 MG tablet    DELTASONE     Take 8 tablets (8 mg) by mouth daily Taper as directed       * predniSONE 5 MG tablet    DELTASONE    120 tablet    Take 4 tablets (20 mg) by mouth daily       senna-docusate 8.6-50 MG per tablet    SENOKOT-S;PERICOLACE     Take 2 tablets by mouth 2 times daily Reported on 4/20/2017       simvastatin 10 MG tablet    ZOCOR    90 tablet    Take 1 tablet (10 mg) by mouth At Bedtime       triamcinolone 0.1 % cream    KENALOG    45 g    Apply sparingly to affected area two times daily for 7 days.       TYLENOL 500 MG tablet   Generic drug:  acetaminophen      Take 2 tablets (1,000 mg) by mouth every 6 hours as needed (Max acetaminophen dose: 4000mg in 24 hrs.)       vitamin D 1000 UNITS capsule      Take 1 capsule by mouth daily  Reported on 4/20/2017       * Notice:  This list has 6 medication(s) that are the same as other medications prescribed for you. Read the directions carefully, and ask your doctor or other care provider to review them with you.

## 2017-04-20 NOTE — PATIENT INSTRUCTIONS
- I will follow up with you on lab test results.    - Take 20 MG of Prednisone per day until your visit Dr. Church.    - For PMR   Joint guidelines from the  League Against Rheumatism (EULAR) and the American College of Rheumatology (ACR) conditionally recommend starting corticosteroid therapy with 12.5-25 mg/day of prednisone or the equivalent. A systematic examination of the peer-reviewed literature, which included 30 studies, found that remission of PMR seemed to be achieved for most patients with a starting dose of prednisone at 15 mg/day. A slow tapering of the prednisone, less than 1 mg/month, was associated with fewer relapses. Once prednisone is tapered to 10 mg/day, a slow taper by 1 mg every 2 months until treatment discontinuation was associated with optimal control of disease activity.    [facility]    If you have any questions regarding to your visit please contact your care team:     Team Pink:   Clinic Hours Telephone Number   Internal Medicine:  Dr. Mdadi Chamorro, NP       7am-7pm  Monday - Thursday   7am-5pm  Fridays  [phone]  (Appointment scheduling available 24/7)    Questions about your visit?  Team Line  [phone]   Urgent Care - [place] and [place] - 11am-9pm Monday-Friday Saturday-Sunday- 9am-5pm   [place] - 5pm-9pm Monday-Friday Saturday-Sunday- 9am-5pm  [phone] - [facility]DE  [phone] - [place]       What options do I have for visits at the clinic other than the traditional office visit?  To expand how we care for you, many of our providers are utilizing electronic visits (e-visits) and telephone visits, when medically appropriate, for interactions with their patients rather than a visit in the clinic.   We also offer nurse visits for many medical concerns. Just like any other service, we will bill your insurance company for this type of visit based on time spent on the phone with your provider.  Not all insurance companies cover these visits. Please check with your medical insurance if this type of visit is covered. You will be responsible for any charges that are not paid by your insurance.      E-visits via [employer]:  generally incur a $35.00 fee.  Telephone visits:  Time spent on the phone: *charged based on time that is spent on the phone in increments of 10 minutes. Estimated cost:   5-10 mins $30.00   11-20 mins. $59.00   21-30 mins. $85.00   Use [employer] (secure email communication and access to your chart) to send your primary care provider a message or make an appointment. Ask someone on your Team how to sign up for [employer].    For a Price Quote for your services, please call our Consumer Price Line at [phone].    As always, Thank you for trusting us with your health care needs!    Discharged By:  JALEN APARICIO

## 2017-04-20 NOTE — NURSING NOTE
"Chief Complaint   Patient presents with     Pain     Pain in Hips. Elbows and under Buttocks        Initial /62  Pulse 67  Temp 98.2  F (36.8  C) (Oral)  Ht 4' 11\" (1.499 m)  Wt 144 lb (65.3 kg)  SpO2 98%  BMI 29.08 kg/m2 Estimated body mass index is 29.08 kg/(m^2) as calculated from the following:    Height as of this encounter: 4' 11\" (1.499 m).    Weight as of this encounter: 144 lb (65.3 kg).  Medication Reconciliation: complete   Nu Nieves MA    "

## 2017-04-20 NOTE — PROGRESS NOTES
SUBJECTIVE:                                                    Ailyn Trevino is a 81 year old female who presents to clinic today for the following health issues:      Chief Complaint   Patient presents with     Pain     Pain in Hips. Elbows and under Buttocks      PMR -- She states she has had pain in her ribcage, back, and hips. She was diagnosed with PMR on 4/10/2015. She states she usually takes 2 MG Prednisone a day, but her symptoms worsened a few weeks ago. On 04/18/2017 her prescription was increased to 8 MG but she has not noticed any improvement.    Problem list and histories reviewed & adjusted, as indicated.  Additional history: as documented    Patient Active Problem List   Diagnosis     Advanced directives, counseling/discussion     Osteopenia     Palpitations     Hyperlipidemia LDL goal <130     Renal cyst     Granulomatous lung disease (H)     Health Care Home     Insomnia     Osteoarthritis of knee     Aortic valve disorder     Mitral valve disorder     Impaired fasting glucose     PMR (polymyalgia rheumatica) (H)     Encounter for long-term current use of medication     Other chronic pain     Major depressive disorder, single episode, mild (H)     Current chronic use of systemic steroids     Fatigue, unspecified type     Chronic pain of left knee     Venous stasis dermatitis of left lower extremity     Fecal urgency     Past Surgical History:   Procedure Laterality Date     APPENDECTOMY  1951     CATARACT IOL, RT/LT  2010    bilateral      CHOLECYSTECTOMY  2010     SURGICAL HISTORY OF -   12/13    bilateral YAG laser surgery of eyes       Social History   Substance Use Topics     Smoking status: Former Smoker     Quit date: 1/1/1998     Smokeless tobacco: Never Used     Alcohol use Yes     Family History   Problem Relation Age of Onset     CANCER Mother      CEREBROVASCULAR DISEASE Father          Current Outpatient Prescriptions   Medication Sig Dispense Refill     predniSONE (DELTASONE) 5 MG  tablet Take 4 tablets (20 mg) by mouth daily 120 tablet 0     predniSONE (DELTASONE) 1 MG tablet Take 8 tablets (8 mg) by mouth daily Taper as directed       alendronate (FOSAMAX) 70 MG tablet Take 1 tablet (70 mg) by mouth every 7 days Take with over 8 ounces water and stay upright for at least 30 minutes after dose.  Take at least 60 minutes before breakfast 12 tablet 3     HYDROcodone-acetaminophen (NORCO) 5-325 MG per tablet Take 1 tablet by mouth every 8 hours as needed for moderate to severe pain maximum 3 tablet(s) per day 90 tablet 0     order for DME Equipment being ordered: 10-20 mm Hg knee high compression stockings 1 Device 0     citalopram (CELEXA) 20 MG tablet TAKE 1 TABLET (20 MG) BY MOUTH DAILY 90 tablet 1     omeprazole (PRILOSEC) 20 MG CR capsule TAKE ONE CAPSULE BY MOUTH TWICE DAILY 30-60 MINUTES BEFORE A MEAL. 180 capsule 1     order for DME Equipment being ordered: blood pressure machine 1 Device 1     acetaminophen (TYLENOL) 500 MG tablet Take 2 tablets (1,000 mg) by mouth every 6 hours as needed (Max acetaminophen dose: 4000mg in 24 hrs.)       aspirin 325 MG EC tablet Take 325 mg by mouth daily Reported on 3/17/2017       atenolol (TENORMIN) 25 MG tablet Take 1 tablet (25 mg) by mouth daily 90 tablet 3     simvastatin (ZOCOR) 10 MG tablet Take 1 tablet (10 mg) by mouth At Bedtime 90 tablet 3     ipratropium (ATROVENT) 0.06 % nasal spray Spray 2 sprays into both nostrils 4 times daily as needed for rhinitis 1 Box 3     triamcinolone (KENALOG) 0.1 % cream Apply sparingly to affected area two times daily for 7 days. 45 g 0     omeprazole 20 MG tablet Take 1 tablet (20 mg) by mouth daily Take 30-60 minutes before a meal. (Patient taking differently: Take 20 mg by mouth as needed Take 30-60 minutes before a meal.) 60 tablet 3     senna-docusate (SENOKOT-S;PERICOLACE) 8.6-50 MG per tablet Take 2 tablets by mouth 2 times daily Reported on 4/20/2017       Calcium-Vitamin D-Vitamin K (CALCIUM + D)  "500-1000-40 MG-UNT-MCG CHEW Take 1 tablet by mouth 2 times daily Reported on 4/20/2017       Cholecalciferol (VITAMIN D) 1000 UNITS capsule Take 1 capsule by mouth daily Reported on 4/20/2017       Multiple Vitamins-Minerals (CENTRUM SILVER ADULT 50+ PO) Take 1 tablet by mouth daily Reported on 4/20/2017       Labs reviewed in EPIC    Reviewed and updated as needed this visit by clinical staff  Tobacco  Allergies  Meds  Med Hx  Surg Hx  Fam Hx  Soc Hx      Reviewed and updated as needed this visit by Provider         ROS:  Constitutional, HEENT, cardiovascular, pulmonary, GI, , musculoskeletal, neuro, skin, endocrine and psych systems are negative, except as otherwise noted.    This document serves as a record of the services and decisions personally performed and made by Miki Christopher MD. It was created on their behalf by Arnaldo Mcgregor, a trained medical scribe. The creation of this document is based the provider's statements to the medical scribe.  Arnaldo Mcgregor April 20, 2017 2:38 PM    OBJECTIVE:                                                    /62  Pulse 67  Temp 98.2  F (36.8  C) (Oral)  Ht 1.499 m (4' 11\")  Wt 65.3 kg (144 lb)  SpO2 98%  BMI 29.08 kg/m2  Body mass index is 29.08 kg/(m^2).  GENERAL: healthy, alert and no distress  EYES: Eyes grossly normal to inspection, PERRL and conjunctivae and sclerae normal  NECK: no adenopathy, no asymmetry, masses, or scars and thyroid normal to palpation  ABDOMEN: soft, nontender, no hepatosplenomegaly, no masses and bowel sounds normal  MS: no gross musculoskeletal defects noted, no edema, Hip range of motion normal, no trigger point tenderness noted, no trochanteric bursitis findings. She is mildly sore all across the pelvic girdle without focal findings on musculoskeletal exam  SKIN: no suspicious lesions or rashes to visible skin  NEURO: mentation intact and speech normal  PSYCH: mentation appears normal, affect normal/bright    Diagnostic " Test Results:  Results for orders placed or performed in visit on 03/17/17   CRP inflammation   Result Value Ref Range    CRP Inflammation 7.5 0.0 - 8.0 mg/L   CBC with platelets differential   Result Value Ref Range    WBC 9.3 4.0 - 11.0 10e9/L    RBC Count 3.92 3.8 - 5.2 10e12/L    Hemoglobin 12.9 11.7 - 15.7 g/dL    Hematocrit 38.4 35.0 - 47.0 %    MCV 98 78 - 100 fl    MCH 32.9 26.5 - 33.0 pg    MCHC 33.6 31.5 - 36.5 g/dL    RDW 12.8 10.0 - 15.0 %    Platelet Count 242 150 - 450 10e9/L    Diff Method Automated Method     % Neutrophils 75.2 %    % Lymphocytes 14.2 %    % Monocytes 7.2 %    % Eosinophils 3.1 %    % Basophils 0.3 %    Absolute Neutrophil 7.0 1.6 - 8.3 10e9/L    Absolute Lymphocytes 1.3 0.8 - 5.3 10e9/L    Absolute Monocytes 0.7 0.0 - 1.3 10e9/L    Absolute Eosinophils 0.3 0.0 - 0.7 10e9/L    Absolute Basophils 0.0 0.0 - 0.2 10e9/L        ASSESSMENT/PLAN:                                                      (M35.3) PMR (polymyalgia rheumatica) (H)  (primary encounter diagnosis)  Comment: this is sometimes a tricky diagnosis as possibilities of continued reoccurrences may continue for a long time. I agreed to a returned to 20 milligrams of prednisone based on guidelines for polymyalgia rheumatica treatment but the rate of tapering off can be complicated. I suggested a follow up with Dr. Dale Marquis, rheumatologist with [facility] if further dose reductions with prednisone continue to be associated with reoccurrence of symptoms   Plan: CRP inflammation, Erythrocyte sedimentation         rate auto, CBC with platelets differential,         predniSONE (DELTASONE) 5 MG tablet        She has a follow up with primary care physician in approximately 1 month      Patient Instructions   - I will follow up with you on lab test results.    - Take 20 MG of Prednisone per day until your visit Dr. Church.    - For PMR   Joint guidelines from the  League Against Rheumatism (EULAR) and the American College of  Rheumatology (ACR) conditionally recommend starting corticosteroid therapy with 12.5-25 mg/day of prednisone or the equivalent. A systematic examination of the peer-reviewed literature, which included 30 studies, found that remission of PMR seemed to be achieved for most patients with a starting dose of prednisone at 15 mg/day. A slow tapering of the prednisone, less than 1 mg/month, was associated with fewer relapses. Once prednisone is tapered to 10 mg/day, a slow taper by 1 mg every 2 months until treatment discontinuation was associated with optimal control of disease activity.      The information in this document, created by the medical scribe for me, accurately reflects the services I personally performed and the decisions made by me. I have reviewed and approved this document for accuracy.   MD Miki Lowe MD  [facility]

## 2017-05-09 DIAGNOSIS — M35.3 PMR (POLYMYALGIA RHEUMATICA) (H): ICD-10-CM

## 2017-05-09 LAB
CRP SERPL-MCNC: 4.4 MG/L (ref 0–8)
ERYTHROCYTE [SEDIMENTATION RATE] IN BLOOD BY WESTERGREN METHOD: 5 MM/H (ref 0–30)

## 2017-05-09 PROCEDURE — 36415 COLL VENOUS BLD VENIPUNCTURE: CPT | Performed by: NURSE PRACTITIONER

## 2017-05-09 PROCEDURE — 85652 RBC SED RATE AUTOMATED: CPT | Performed by: NURSE PRACTITIONER

## 2017-05-09 PROCEDURE — 86140 C-REACTIVE PROTEIN: CPT | Performed by: NURSE PRACTITIONER

## 2017-05-09 NOTE — PROGRESS NOTES
Dear Ailyn,    Your recent test results are attached.      Normal marker of inflammation.    If you have any questions please feel free to contact [phone] or myself via [employer].    Sincerely,  Opal Chamorro, CNP

## 2017-05-15 ENCOUNTER — OFFICE VISIT (OUTPATIENT)
Dept: FAMILY MEDICINE | Facility: CLINIC | Age: 82
End: 2017-05-15
Payer: COMMERCIAL

## 2017-05-15 ENCOUNTER — RADIANT APPOINTMENT (OUTPATIENT)
Dept: GENERAL RADIOLOGY | Facility: CLINIC | Age: 82
End: 2017-05-15
Attending: INTERNAL MEDICINE
Payer: COMMERCIAL

## 2017-05-15 VITALS
DIASTOLIC BLOOD PRESSURE: 80 MMHG | OXYGEN SATURATION: 96 % | TEMPERATURE: 99.2 F | SYSTOLIC BLOOD PRESSURE: 126 MMHG | BODY MASS INDEX: 29.29 KG/M2 | HEART RATE: 70 BPM | WEIGHT: 145 LBS

## 2017-05-15 DIAGNOSIS — M85.80 OSTEOPENIA: ICD-10-CM

## 2017-05-15 DIAGNOSIS — M25.551 PAIN IN JOINT INVOLVING RIGHT PELVIC REGION AND THIGH: Primary | ICD-10-CM

## 2017-05-15 DIAGNOSIS — F32.0 MAJOR DEPRESSIVE DISORDER, SINGLE EPISODE, MILD (H): ICD-10-CM

## 2017-05-15 DIAGNOSIS — R15.2 FECAL URGENCY: ICD-10-CM

## 2017-05-15 DIAGNOSIS — R19.8 CHANGE IN BOWEL MOVEMENT: ICD-10-CM

## 2017-05-15 DIAGNOSIS — M35.3 PMR (POLYMYALGIA RHEUMATICA) (H): ICD-10-CM

## 2017-05-15 PROCEDURE — 99214 OFFICE O/P EST MOD 30 MIN: CPT | Performed by: INTERNAL MEDICINE

## 2017-05-15 PROCEDURE — 73502 X-RAY EXAM HIP UNI 2-3 VIEWS: CPT

## 2017-05-15 PROCEDURE — 72170 X-RAY EXAM OF PELVIS: CPT | Mod: 59

## 2017-05-15 RX ORDER — PREDNISONE 5 MG/1
5 TABLET ORAL DAILY
Qty: 120 TABLET | Refills: 3
Start: 2017-05-15 | End: 2018-04-21

## 2017-05-15 ASSESSMENT — PAIN SCALES - GENERAL: PAINLEVEL: EXTREME PAIN (8)

## 2017-05-15 NOTE — PROGRESS NOTES
INTERNAL MEDICINE   SUBJECTIVE:                                                    Ailyn Trevino is a 81 year old female who presents to clinic today for the following health issues:  PMR F/U      Polymyalgia Rheumatica Check: She reports that she continues to have pain. She has been taking 20 MG Prednisone as instructed by Dr. Christopher, but does not feel that this has been helping. The pain in her ribs and hips has somewhat improved since she started taking 20 MG Prednisone. She has been having pain in her left knee and is in physical therapy for a pulled muscle through her groin. She is uncertain as to whether or not this is really a muscular issue, and she will sometimes have a burning sensation through the area. It was suggested that she will need to meet with a neurologist because her balance is not great. She does not feel comfortable with how she walks. She feels that this is due to the pain when she walks than it is something else. The pain has been going on for about 6 weeks.     Bowel Issues: She has noticed that when she goes to the bathroom, she will sometimes have a bowel movement while urinating which hasn't happened to her in the past. Her stools will also have yellowish parts to them that has her concerned and they are like diarrhea in consistency. She has been taking Prilosec occasionally.       Problem list and histories reviewed & adjusted, as indicated.  Additional history: as documented    Patient Active Problem List   Diagnosis     Advanced directives, counseling/discussion     Osteopenia     Palpitations     Hyperlipidemia LDL goal <130     Renal cyst     Granulomatous lung disease (H)     Health Care Home     Insomnia     Osteoarthritis of knee     Aortic valve disorder     Mitral valve disorder     Impaired fasting glucose     PMR (polymyalgia rheumatica) (H)     Encounter for long-term current use of medication     Other chronic pain     Major depressive disorder, single episode, mild (H)      Current chronic use of systemic steroids     Fatigue, unspecified type     Chronic pain of left knee     Venous stasis dermatitis of left lower extremity     Fecal urgency     Past Surgical History:   Procedure Laterality Date     APPENDECTOMY  1951     CATARACT IOL, RT/LT  2010    bilateral      CHOLECYSTECTOMY  2010     SURGICAL HISTORY OF -   12/13    bilateral YAG laser surgery of eyes       Social History   Substance Use Topics     Smoking status: Former Smoker     Quit date: 1/1/1998     Smokeless tobacco: Never Used     Alcohol use Yes     Family History   Problem Relation Age of Onset     CANCER Mother      CEREBROVASCULAR DISEASE Father          Labs reviewed in EPIC    Reviewed and updated as needed this visit by clinical staff  Tobacco  Allergies  Meds  Med Hx  Surg Hx  Fam Hx  Soc Hx      Reviewed and updated as needed this visit by Provider       ROS:  C: NEGATIVE for fever, chills, change in weight  GI: NEGATIVE for nausea, abdominal pain, heartburn. POSITIVE for diarrhea.   : NEGATIVE for frequency, dysuria, or hematuria  M: NEGATIVE for significant arthralgias or myalgia. POSITIVE for pain in ribs, lower back, hips, and left knee.   N: NEGATIVE for dizziness or paresthesias. POSITIVE for weakness in wrists.   P: NEGATIVE for changes in mood or affect  All other systems reviewed and were negative.      This document serves as a record of the services and decisions personally performed and made by Maddi Church MD. It was created on his/her behalf by Tatyana Thayer, a trained medical scribe. The creation of this document is based the provider's statements to the medical scribe.    Isha Thayer 3:47 PM, May 15, 2017    OBJECTIVE:                                                    /80 (BP Location: Right arm, Patient Position: Chair, Cuff Size: Adult Regular)  Pulse 70  Temp 99.2  F (37.3  C) (Oral)  Wt 65.8 kg (145 lb)  SpO2 96%  BMI 29.29 kg/m2  Body mass index is  29.29 kg/(m^2).  GENERAL: healthy, alert and no distress  RESP: lungs clear to auscultation - no rales, rhonchi or wheezes  CV: regular rate and rhythm, normal S1 S2, no S3 or S4, no murmur, click or rub, no peripheral edema and peripheral pulses strong  ABDOMEN: soft, nontender, no hepatosplenomegaly, no masses and bowel sounds normal  MS: no gross musculoskeletal defects noted, no edema  PSYCH: mentation appears normal, affect normal/bright    Diagnostic Test Results:  No results found for this or any previous visit (from the past 24 hour(s)).  Results for orders placed or performed in visit on 05/09/17   ESR: Erythrocyte sedimentation rate   Result Value Ref Range    Sed Rate 5 0 - 30 mm/h   CRP, inflammation   Result Value Ref Range    CRP Inflammation 4.4 0.0 - 8.0 mg/L        ASSESSMENT/PLAN:                                                    I spent 20 minutes of time with the patient and >50% of it was in education and counseling regarding preventive healthcare.     1. PMR (polymyalgia rheumatica) (H)   Patient to start taking 10 MG for three days, and will then start 5 MG. Her pain is not related to PMR as the inflammatory markers were normal, pain was not classic (right groin and hands) and didn't respond to increasing prednisone.    - predniSONE (DELTASONE) 5 MG tablet; Take 1 tablet (5 mg) by mouth daily  Dispense: 120 tablet; Refill: 3    2. Fecal urgency  Not limiting her life at all.      3. Change in bowel movement  Bowel movements are now yellow.  Doubt liver disease as patient is concerned about.      4. Major depressive disorder, single episode, mild (H)  The current medical regimen is effective;  continue present plan and medications.     5. Osteopenia  The current medical regimen is effective;  continue present plan and medications.     6. Pain in joint involving right pelvic region and thigh  I independently visualized the xray:  No fracture.  Awaiting official read by radiology.  Patient to  schedule appointment with chiropractor.  Per patient instructions.   - XR Pelvis 1/2 Views  - XR Hip Right 2-3 Views  - [facility] REFERRAL    Patient Instructions   - Start taking 10 MG Prednisone for three days, then drop to 5 MG.   - Schedule an appointment with the chiropractor. [facility] is at the [facility]. If this does not help, then you might need to consult with your surgeon.   - In one month let's start going down to 4 mg of prednisone daily.  -Avoid ibuprofen.      The information in this document, created by the medical scribe for me, accurately reflects the services I personally performed and the decisions made by me. I have reviewed and approved this document for accuracy prior to leaving the patient care area.  Maddi Church MD  3:48 PM, 05/15/17    Maddi Church MD  [facility]     Start: 3:53 PM   Out: 4:08 PM   In: 4:58 PM   End: 5:03 PM

## 2017-05-15 NOTE — MR AVS SNAPSHOT
After Visit Summary   5/15/2017    Ailyn Trevino    MRN: [id number]           Patient Information     Date Of Birth          1935        Visit Information        Provider Department      5/15/2017 3:30 PM Maddi Church MD [facility]        Today's Diagnoses     Fecal urgency    -  1    PMR (polymyalgia rheumatica) (H)        Change in bowel movement        Major depressive disorder, single episode, mild (H)        Osteopenia        Pain in joint involving right pelvic region and thigh          Care Instructions    - Start taking 10 MG Prednisone for three days, then drop to 5 MG.   - Schedule an appointment with the chiropractor. Guido Mg is at the [facility]. If this does not help, then you might need to consult with your surgeon.   - In one month let's start going down to 4 mg of prednisone daily.  -Avoid ibuprofen.      [facility]    If you have any questions regarding to your visit please contact your care team:     Team Pink:   Clinic Hours Telephone Number   Internal Medicine:  Dr. Maddi Chamorro NP       7am-7pm  Monday - Thursday   7am-5pm  Fridays  [phone]  (Appointment scheduling available 24/7)    Questions about your visit?  Team Line  [phone]   Urgent Care - [place] and [place] - 11am-9pm Monday-Friday Saturday-Sunday- 9am-5pm   [place] - 5pm-9pm Monday-Friday Saturday-Sunday- 9am-5pm  [phone] - [place]  [phone] - [place]       What options do I have for visits at the clinic other than the traditional office visit?  To expand how we care for you, many of our providers are utilizing electronic visits (e-visits) and telephone visits, when medically appropriate, for interactions with their patients rather than a visit in the clinic.   We also offer nurse visits for many medical concerns. Just like any other service, we will bill your insurance company for this  type of visit based on time spent on the phone with your provider. Not all insurance companies cover these visits. Please check with your medical insurance if this type of visit is covered. You will be responsible for any charges that are not paid by your insurance.      E-visits via Kleen Extremehart:  generally incur a $35.00 fee.  Telephone visits:  Time spent on the phone: *charged based on time that is spent on the phone in increments of 10 minutes. Estimated cost:   5-10 mins $30.00   11-20 mins. $59.00   21-30 mins. $85.00   Use bCommunities (secure email communication and access to your chart) to send your primary care provider a message or make an appointment. Ask someone on your Team how to sign up for bCommunities.    For a Price Quote for your services, please call our Craigslist Price Line at [phone].    As always, Thank you for trusting us with your health care needs!    Discharged by Silvia MASTERS CMA ([facility])          Follow-ups after your visit        Additional Services     ALEX[facility]A PT, HAND, AND CHIROPRACTIC REFERRAL       **This order will print in the [facility] Scheduling Office**    Physical Therapy, Hand Therapy and Chiropractic Care are available through:    *[facility]  *[facility]  *[facility]    Call one number to schedule at any of the above locations: [phone].    Your provider has referred you to: Chiropractic at [facility] or [facility]    Indication/Reason for Referral: Hip Pain and pelvic pain  Onset of Illness: 6 weeks   Therapy Orders: Evaluate and Treat  Special Programs:   Special Request:     Mikaela Al      Additional Comments for the Therapist or Chiropractor:     Please be aware that coverage of these services is subject to the terms and limitations of your health insurance plan.  Call member services at your health plan with any benefit or coverage questions.      Please bring the following to your appointment:    *Your personal calendar for scheduling  future appointments  *Comfortable clothing                  Who to contact     If you have questions or need follow up information about today's clinic visit or your schedule please contact [facility] directly at [phone].  Normal or non-critical lab and imaging results will be communicated to you by MyChart, letter or phone within 4 business days after the clinic has received the results. If you do not hear from us within 7 days, please contact the clinic through Dryadhart or phone. If you have a critical or abnormal lab result, we will notify you by phone as soon as possible.  Submit refill requests through SelectHub or call your pharmacy and they will forward the refill request to us. Please allow 3 business days for your refill to be completed.          Additional Information About Your Visit        SelectHub Information     SelectHub gives you secure access to your electronic health record. If you see a primary care provider, you can also send messages to your care team and make appointments. If you have questions, please call your primary care clinic.  If you do not have a primary care provider, please call [phone] and they will assist you.        Care EveryWhere ID     This is your Care EveryWhere ID. This could be used by other organizations to access your [place] medical records  [id number]        Your Vitals Were     Pulse Temperature Pulse Oximetry BMI (Body Mass Index)          70 99.2  F (37.3  C) (Oral) 96% 29.29 kg/m2         Blood Pressure from Last 3 Encounters:   05/15/17 126/80   04/20/17 136/62   03/17/17 152/64    Weight from Last 3 Encounters:   05/15/17 145 lb (65.8 kg)   04/20/17 144 lb (65.3 kg)   03/17/17 142 lb (64.4 kg)              We Performed the Following     ALEX[facility]DRA PT, HAND, AND CHIROPRACTIC REFERRAL     XR Hip Right 2-3 Views     XR Pelvis 1/2 Views          Today's Medication Changes          These changes are accurate as of: 5/15/17  5:10 PM.  If you have any  questions, ask your nurse or doctor.               These medicines have changed or have updated prescriptions.        Dose/Directions    omeprazole 20 MG tablet   This may have changed:    - when to take this  - reasons to take this  - additional instructions  - Another medication with the same name was removed. Continue taking this medication, and follow the directions you see here.   Used for:  Esophageal reflux   Changed by:  Maddi Church MD        Dose:  20 mg   Take 1 tablet (20 mg) by mouth daily Take 30-60 minutes before a meal.   Quantity:  60 tablet   Refills:  3       * predniSONE 1 MG tablet   Commonly known as:  DELTASONE   This may have changed:  Another medication with the same name was changed. Make sure you understand how and when to take each.   Used for:  PMR (polymyalgia rheumatica) (H)   Changed by:  Maddi Church MD        Dose:  8 mg   Take 8 tablets (8 mg) by mouth daily Taper as directed   Refills:  0       * predniSONE 5 MG tablet   Commonly known as:  DELTASONE   This may have changed:  how much to take   Used for:  PMR (polymyalgia rheumatica) (H)   Changed by:  Maddi Church MD        Dose:  5 mg   Take 1 tablet (5 mg) by mouth daily   Quantity:  120 tablet   Refills:  3       * Notice:  This list has 2 medication(s) that are the same as other medications prescribed for you. Read the directions carefully, and ask your doctor or other care provider to review them with you.         Where to get your medicines      Some of these will need a paper prescription and others can be bought over the counter.  Ask your nurse if you have questions.     You don't need a prescription for these medications     predniSONE 5 MG tablet                Primary Care Provider Office Phone # Fax #    Maddi Church [phone]       [address] [address]        Thank you!     Thank you for choosing [facility]  for your  care. Our goal is always to provide you with excellent care. Hearing back from our patients is one way we can continue to improve our services. Please take a few minutes to complete the written survey that you may receive in the mail after your visit with us. Thank you!             Your Updated Medication List - Protect others around you: Learn how to safely use, store and throw away your medicines at www.disposemymeds.org.          This list is accurate as of: 5/15/17  5:10 PM.  Always use your most recent med list.                   Brand Name Dispense Instructions for use    alendronate 70 MG tablet    FOSAMAX    12 tablet    Take 1 tablet (70 mg) by mouth every 7 days Take with over 8 ounces water and stay upright for at least 30 minutes after dose.  Take at least 60 minutes before breakfast       aspirin 325 MG EC tablet      Take 325 mg by mouth daily Reported on 3/17/2017       atenolol 25 MG tablet    TENORMIN    90 tablet    Take 1 tablet (25 mg) by mouth daily       CALCIUM + D 500-1000-40 MG-UNT-MCG Chew   Generic drug:  Calcium-Vitamin D-Vitamin K      Take 1 tablet by mouth 2 times daily Reported on 4/20/2017       CENTRUM SILVER ADULT 50+ PO      Take 1 tablet by mouth daily Reported on 4/20/2017       citalopram 20 MG tablet    celeXA    90 tablet    TAKE 1 TABLET (20 MG) BY MOUTH DAILY       HYDROcodone-acetaminophen 5-325 MG per tablet    NORCO    90 tablet    Take 1 tablet by mouth every 8 hours as needed for moderate to severe pain maximum 3 tablet(s) per day       ipratropium 0.06 % spray    ATROVENT    1 Box    Spray 2 sprays into both nostrils 4 times daily as needed for rhinitis       omeprazole 20 MG tablet     60 tablet    Take 1 tablet (20 mg) by mouth daily Take 30-60 minutes before a meal.       * order for DME     1 Device    Equipment being ordered: blood pressure machine       * order for DME     1 Device    Equipment being ordered: 10-20 mm Hg knee high compression stockings       *  predniSONE 1 MG tablet    DELTASONE     Take 8 tablets (8 mg) by mouth daily Taper as directed       * predniSONE 5 MG tablet    DELTASONE    120 tablet    Take 1 tablet (5 mg) by mouth daily       senna-docusate 8.6-50 MG per tablet    SENOKOT-S;PERICOLACE     Take 2 tablets by mouth 2 times daily Reported on 4/20/2017       simvastatin 10 MG tablet    ZOCOR    90 tablet    Take 1 tablet (10 mg) by mouth At Bedtime       triamcinolone 0.1 % cream    KENALOG    45 g    Apply sparingly to affected area two times daily for 7 days.       TYLENOL 500 MG tablet   Generic drug:  acetaminophen      Take 2 tablets (1,000 mg) by mouth every 6 hours as needed (Max acetaminophen dose: 4000mg in 24 hrs.)       vitamin D 1000 UNITS capsule      Take 1 capsule by mouth daily Reported on 4/20/2017       * Notice:  This list has 4 medication(s) that are the same as other medications prescribed for you. Read the directions carefully, and ask your doctor or other care provider to review them with you.

## 2017-05-15 NOTE — NURSING NOTE
"Chief Complaint   Patient presents with     RECHECK       Initial /80 (BP Location: Right arm, Patient Position: Chair, Cuff Size: Adult Regular)  Pulse 70  Temp 99.2  F (37.3  C) (Oral)  Wt 145 lb (65.8 kg)  SpO2 96%  BMI 29.29 kg/m2 Estimated body mass index is 29.29 kg/(m^2) as calculated from the following:    Height as of 4/20/17: 4' 11\" (1.499 m).    Weight as of this encounter: 145 lb (65.8 kg).  Medication Reconciliation: complete   ADRIAN/MA      "

## 2017-05-15 NOTE — PATIENT INSTRUCTIONS
- Start taking 10 MG Prednisone for three days, then drop to 5 MG.   - Schedule an appointment with the chiropractor. Guido Mg is at the [facility]. If this does not help, then you might need to consult with your surgeon.   - In one month let's start going down to 4 mg of prednisone daily.  -Avoid ibuprofen.      [facility]    If you have any questions regarding to your visit please contact your care team:     Team Pink:   Clinic Hours Telephone Number   Internal Medicine:  Dr. Maddi Chamorro, NP       7am-7pm  Monday - Thursday   7am-5pm  Fridays  [phone]  (Appointment scheduling available 24/7)    Questions about your visit?  Team Line  [phone]   Urgent Care - [place] and [facility] - 11am-9pm Monday-Friday Saturday-Sunday- 9am-5pm   [place] - 5pm-9pm Monday-Friday Saturday-Sunday- 9am-5pm  [phone] - [place]   [phone] [facility]       What options do I have for visits at the clinic other than the traditional office visit?  To expand how we care for you, many of our providers are utilizing electronic visits (e-visits) and telephone visits, when medically appropriate, for interactions with their patients rather than a visit in the clinic.   We also offer nurse visits for many medical concerns. Just like any other service, we will bill your insurance company for this type of visit based on time spent on the phone with your provider. Not all insurance companies cover these visits. Please check with your medical insurance if this type of visit is covered. You will be responsible for any charges that are not paid by your insurance.      E-visits via MondeCafes:  generally incur a $35.00 fee.  Telephone visits:  Time spent on the phone: *charged based on time that is spent on the phone in increments of 10 minutes. Estimated cost:   5-10 mins $30.00   11-20 mins. $59.00   21-30 mins. $85.00   Use MondeCafes (secure email  communication and access to your chart) to send your primary care provider a message or make an appointment. Ask someone on your Team how to sign up for Vigilenthart.    For a Price Quote for your services, please call our Consumer Price Line at [phone].    As always, Thank you for trusting us with your health care needs!    Discharged by Silvia MASTERS CMA ([facility])

## 2017-06-15 ENCOUNTER — THERAPY VISIT (OUTPATIENT)
Dept: CHIROPRACTIC MEDICINE | Facility: CLINIC | Age: 82
End: 2017-06-15
Payer: COMMERCIAL

## 2017-06-15 DIAGNOSIS — M62.838 SPASM OF MUSCLE: ICD-10-CM

## 2017-06-15 DIAGNOSIS — M99.05 SOMATIC DYSFUNCTION OF PELVIS REGION: Primary | ICD-10-CM

## 2017-06-15 DIAGNOSIS — M54.50 LUMBAGO: ICD-10-CM

## 2017-06-15 DIAGNOSIS — M99.03 SOMATIC DYSFUNCTION OF LUMBAR REGION: ICD-10-CM

## 2017-06-15 PROCEDURE — 98940 CHIROPRACT MANJ 1-2 REGIONS: CPT | Mod: AT | Performed by: CHIROPRACTOR

## 2017-06-15 PROCEDURE — 99203 OFFICE O/P NEW LOW 30 MIN: CPT | Mod: 25 | Performed by: CHIROPRACTOR

## 2017-06-15 NOTE — PROGRESS NOTES
Initial Chiropractic Clinic Visit    PCP: Maddi Church    Ailyn Trevino is a 81 year old female who is seen  in consultation at the request of  Maddi Church M.D. presenting with low back pain . Patient reports that the onset was about 2 months ago. 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  She currently rates her pain at a 9 out of 10.  There are no radiating factors, she is able to sleep through the night and symptome tend to be worse in the evening.      Injury: none    Location of Pain: lower right lumbar at the following level(s) L4 , L5  and PSIS Right   Duration of Pain: 2 month(s)  Rating of Pain at worst: 9/10  Rating of Pain Currently: 9/10  Symptoms are better with: Rest  Symptoms are worse with: walking  Additional Features:      Health History  as reported by the patient:    How does the patient rate their own health:   Good    Current or past medical history:   Osteoporosis    Medical allergies  None    Past Traumas/Surgeries  Orthopedic: knee/foot and gallbladder    Family History  The family history includes CANCER in her mother; CEREBROVASCULAR DISEASE in her father.    Medications:  Anti-depressants, Pain and Steroids    Occupation:  retired    Primary job tasks:   Driving, Lifting/carrying and Repetitive tasks    Barriers as home/work:   none    Additional health Issues:                 Ailyn was asked to complete the Oswestry Low Back Disability Index and Mikaela Start Back screening tool.  today in the office.  Patient declined to complete the form.    Review of Systems  Musculoskeletal: as above  Remainder of review of systems is negative including constitutional, CV, pulmonary, GI, Skin and Neurologic except as noted in HPI or medical history.    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 Whaley     Osteopenia 7/31/2012     Other chronic pain 8/3/2015    Patient is followed by KIARA RAINES for ongoing prescription of pain medication.  All refills should be approved by this provider, or covering partner.  Medication(s): Hydrocodone/APAP.  Maximum quantity per month: 30 Clinic visit frequency required: Q 3 months   Controlled substance agreement on file: Yes      Date(s): 8/3/15  Pain Clinic evaluation in the past: No  DIRE Total Score(s): No fl     Renal cyst 8/7/2012     Past Surgical History:   Procedure Laterality Date     APPENDECTOMY  1951     CATARACT IOL, RT/LT  2010    bilateral      CHOLECYSTECTOMY  2010     SURGICAL HISTORY OF -   12/13    bilateral YAG laser surgery of eyes     Objective  There were no vitals taken for this visit.      GENERAL APPEARANCE: healthy, alert and no distress   GAIT: NORMAL  SKIN: no suspicious lesions or rashes  NEURO: Normal strength and tone, mentation intact and speech normal  PSYCH:  mentation appears normal and affect normal/bright    Low back exam:    Inspection:  \"     no visible deformity in the low back       normal skin\",    ROM:       full flexion       limited extension due to pain    Tender:       paraspinal muscles    Non Tender:       remainder of lumbar spine    Strength:       hip flexion 5/5       knee extension 5/5       ankle dorsiflexion 5/5       ankle plantarflexion 5/5       dorsiflexion of the great toe 5/5    Reflexes:          Sensation:      grossly intact throughout lower extremities    Special tests:  SLR - Right negative and Left negative, Fabere - Right negative and Left negative, Yeoman's - Right negative and Left negative, Delano - Right negative and Left negative and Ely's - Right negative and Left negative    Segmental spinal dysfunction/restrictions found at::  L4 Right rotation restricted  L5 Right rotation restricted  PSIS Right Extension " restriction.    The following soft tissue hypotonicities were observed:Piriformis: right, referred pain: no    Trigger points were found in:Piriformis    Muscle spasm found in:Piriformis      Radiology:      Assessment:    1. Somatic dysfunction of pelvis region    2. Lumbago    3. Somatic dysfunction of lumbar region    4. Spasm of muscle        RX ordered/plan of care  Anticipated outcomes  Possible risks and side effects    After discussing the risk and benefits of care, patient consented to treatment    Prognosis: Good      Patient's condition:  Patient had restrictions pre-manipulation    Treatment effectiveness:  Post manipulation there is better intersegmental movement and Patient claims to feel looser post manipulation      Plan:    Procedures:  Evaluation and Management:  12684 Moderate level exam 30 min    CMT:  72169 Chiropractic manipulative treatment 1-2 regions performed   Lumbar: Activator, L4, L5, Prone  Pelvis: Activator, PSIS Right , Prone    Modalities:  12324: Heat:   For 5 min to Piriformis  20802: MSTM:  To Piriformis  for 5 min    Therapeutic procedures:  None    Response to Treatment  Reduction in symptoms as reported by patient      Treatment plan and goals:  Goals:  Ailyn would like to be able to walk comfortably for 4 block in 6 weeks    Frequency of care  Duration of care is estimated to be 6 weeks, from the initial treatment.  It is estimated that the patient will need a total of 6 visits to resolve this episode.  For the initial therapeutic trial of care, the frequency is recommended at 1 X week, once daily.  A reevaluation would be clinically appropriate in 6 visits, to determine progress and further course of care.    In-Office Treatment  Evaluation  Spinal Chiropractic Manipulative Therapy:    Modalities:  Heat and mstm        Recommendations:    Instructions:ice 20 minutes every other hour as needed    Follow-up:  Return to care in one week.       Discussed the assessment with the  patient.      Disclaimer: This note consists of symbols derived from keyboarding, dictation and/or voice recognition software. As a result, there may be errors in the script that have gone undetected. Please consider this when interpreting information found in this chart.

## 2017-06-22 ENCOUNTER — THERAPY VISIT (OUTPATIENT)
Dept: CHIROPRACTIC MEDICINE | Facility: CLINIC | Age: 82
End: 2017-06-22
Payer: COMMERCIAL

## 2017-06-22 DIAGNOSIS — M62.838 SPASM OF MUSCLE: ICD-10-CM

## 2017-06-22 DIAGNOSIS — M99.05 SEGMENTAL DYSFUNCTION OF PELVIC REGION: Primary | ICD-10-CM

## 2017-06-22 DIAGNOSIS — M54.50 LUMBAGO: ICD-10-CM

## 2017-06-22 DIAGNOSIS — M99.03 SOMATIC DYSFUNCTION OF LUMBAR REGION: ICD-10-CM

## 2017-06-22 PROCEDURE — 98940 CHIROPRACT MANJ 1-2 REGIONS: CPT | Mod: AT | Performed by: CHIROPRACTOR

## 2017-06-22 NOTE — PROGRESS NOTES
Visit #:  2 of 8, based on treatment plan    Subjective:  Ailyn Trevino is a 81 year old female who is seen in f/u up for:        Segmental dysfunction of pelvic region  Lumbago  Somatic dysfunction of lumbar region  Spasm of muscle.     Since last visit on 6/15/2017,  Ailyn Trevino reports the following changes: Pain immediately after last treatment: 2/10 and their pain level today 8/10.  Ailyn reports that she has relief for a couple of hours after last session and then the pain returned.  She is  Frustrated that this happened.    Area of chief complaint:  Lumbar :  Symptoms are graded at 8/10. The quality is described as stiff, achey, sharp, dull.  Motion has increased, but is still not normal. Patient feels that they have not improved and feel the same.        Objective:  The following was observed:    P: pain elicited on palpation, piriformis on left    A: static palpation demonstrates intersegmental asymmetry , pelvis and lumbar    R: motion palpation notes restricted motion, :  L4 Right rotation restricted  L5 Right rotation restricted  PSIS Right Extension restriction    T: hypertonicity at: Piriformis L>>R      Assessment:    Segmental spinal dysfunction/restrictions found at:  L4  L5  PSIS Right    Diagnoses:      1. Segmental dysfunction of pelvic region    2. Lumbago    3. Somatic dysfunction of lumbar region    4. Spasm of muscle        Patient's condition:  Patient had restrictions pre-manipulation    Treatment effectiveness:  Post manipulation there is better intersegmental movement and Patient claims to feel looser post manipulation      Procedures:  CMT:  95224 Chiropractic manipulative treatment 1-2 regions performed   Lumbar: Activator, L4, L5, Prone  Pelvis: Activator, PSIS Right , Prone    Modalities:  77702: MSTM:  To Piriformis  for 5 min    Therapeutic procedures:  None      Prognosis: Good    Progress towards Goals: Patient is making progress towards the goal     Response to Treatment:    Reduction in symptoms as reported by patient      Recommendations:    Instructions:ice 20 minutes every other hour as needed    Follow-up:  Return to care in one week    XR of lumbar spine

## 2017-06-26 ENCOUNTER — OFFICE VISIT (OUTPATIENT)
Dept: FAMILY MEDICINE | Facility: CLINIC | Age: 82
End: 2017-06-26
Payer: COMMERCIAL

## 2017-06-26 VITALS
OXYGEN SATURATION: 97 % | HEART RATE: 59 BPM | SYSTOLIC BLOOD PRESSURE: 146 MMHG | WEIGHT: 149.4 LBS | DIASTOLIC BLOOD PRESSURE: 46 MMHG | BODY MASS INDEX: 30.18 KG/M2

## 2017-06-26 DIAGNOSIS — M25.531 PAIN IN BOTH WRISTS: ICD-10-CM

## 2017-06-26 DIAGNOSIS — R26.9 ABNORMAL GAIT: ICD-10-CM

## 2017-06-26 DIAGNOSIS — M35.3 PMR (POLYMYALGIA RHEUMATICA) (H): Primary | ICD-10-CM

## 2017-06-26 DIAGNOSIS — R10.31 RIGHT GROIN PAIN: ICD-10-CM

## 2017-06-26 DIAGNOSIS — I10 BENIGN ESSENTIAL HYPERTENSION: ICD-10-CM

## 2017-06-26 DIAGNOSIS — M25.532 PAIN IN BOTH WRISTS: ICD-10-CM

## 2017-06-26 DIAGNOSIS — I35.9 AORTIC VALVE DISORDER: ICD-10-CM

## 2017-06-26 LAB — ERYTHROCYTE [SEDIMENTATION RATE] IN BLOOD BY WESTERGREN METHOD: 6 MM/H (ref 0–30)

## 2017-06-26 PROCEDURE — 86431 RHEUMATOID FACTOR QUANT: CPT | Performed by: INTERNAL MEDICINE

## 2017-06-26 PROCEDURE — 36415 COLL VENOUS BLD VENIPUNCTURE: CPT | Performed by: INTERNAL MEDICINE

## 2017-06-26 PROCEDURE — 99214 OFFICE O/P EST MOD 30 MIN: CPT | Performed by: INTERNAL MEDICINE

## 2017-06-26 PROCEDURE — 80053 COMPREHEN METABOLIC PANEL: CPT | Performed by: INTERNAL MEDICINE

## 2017-06-26 PROCEDURE — 86038 ANTINUCLEAR ANTIBODIES: CPT | Performed by: INTERNAL MEDICINE

## 2017-06-26 PROCEDURE — 86140 C-REACTIVE PROTEIN: CPT | Performed by: INTERNAL MEDICINE

## 2017-06-26 PROCEDURE — 85652 RBC SED RATE AUTOMATED: CPT | Performed by: INTERNAL MEDICINE

## 2017-06-26 PROCEDURE — 86200 CCP ANTIBODY: CPT | Performed by: INTERNAL MEDICINE

## 2017-06-26 PROCEDURE — 84443 ASSAY THYROID STIM HORMONE: CPT | Performed by: INTERNAL MEDICINE

## 2017-06-26 RX ORDER — LISINOPRIL 5 MG/1
5 TABLET ORAL DAILY
Qty: 90 TABLET | Refills: 1 | Status: SHIPPED | OUTPATIENT
Start: 2017-06-26 | End: 2017-10-13

## 2017-06-26 NOTE — NURSING NOTE
"Chief Complaint   Patient presents with     RECHECK     on leg/chiropractor ; Not helping        Initial /50  Pulse 59  Wt 149 lb 6.4 oz (67.8 kg)  SpO2 97%  BMI 30.18 kg/m2 Estimated body mass index is 30.18 kg/(m^2) as calculated from the following:    Height as of 4/20/17: 4' 11\" (1.499 m).    Weight as of this encounter: 149 lb 6.4 oz (67.8 kg).  Medication Reconciliation: complete     Kajal Le MA    "

## 2017-06-26 NOTE — PROGRESS NOTES
"INTERNAL MEDICINE  SUBJECTIVE:                                                    Ailyn Trevino is a 81 year old female who presents to clinic today for the following health issues:    Patient presents to clinic today for evaluation of leg pain. Chiropractor not offering relief.     Clinic on 5/15/17:  \"Polymyalgia Rheumatica Check: She reports that she continues to have pain. She has been taking 20 MG Prednisone as instructed by Dr. Christopher, but does not feel that this has been helping. The pain in her ribs and hips has somewhat improved since she started taking 20 MG Prednisone. She has been having pain in her left knee and is in physical therapy for a pulled muscle through her groin. She is uncertain as to whether or not this is really a muscular issue, and she will sometimes have a burning sensation through the area. It was suggested that she will need to meet with a neurologist because her balance is not great. She does not feel comfortable with how she walks. She feels that this is due to the pain when she walks than it is something else. The pain has been going on for about 6 weeks.   1. PMR (polymyalgia rheumatica) (H)   Patient to start taking 10 MG for three days, and will then start 5 MG. Her pain is not related to PMR as the inflammatory markers were normal, pain was not classic (right groin and hands) and didn't respond to increasing prednisone.\"        She feels rotten. Patient is having difficulties walking and it's not because of her knee. Her knee is  but doesn't prevent her from walking. She has aching in her ribs, groin area, and in her wrists. It's uncomfortable for her to wear a bra. She saw the chiropractor twice and doesn't feel she's making any progress. Chiropractor recommended possible lumbar XR.  Her gait feels off when walking. She feels like she \"doesn't have the power in her legs\". Patient is unsure if it's her spine; if she should return to the back specialist, or where she " should go from here. She last saw ortho for follow up after knee surgery in March/April. Patient mentioned her symptoms, he noted that her gait was off, and he told her to attend therapy. If that didn't worked her recommended she see a Neurologist. Patient denies numbness or tingling in her lower extremities. Her feet are bad and she's considered getting orthotics.     Patient has noticed increased fatigue. She feels tired all the time. Denies night sweats, fevers, chills. She is sleeping through the night in the absence of pain. She's tried to ice the areas and continues to do the exercises she was provided.     She is currently on 4 mg of prednisone- for two weeks now.  She is not taking lisinopril- stopped after surgery as her BP was low.      BP Readings from Last 3 Encounters:   06/26/17 146/46   05/15/17 126/80   04/20/17 136/62     Wt Readings from Last 5 Encounters:   06/26/17 67.8 kg (149 lb 6.4 oz)   05/15/17 65.8 kg (145 lb)   04/20/17 65.3 kg (144 lb)   03/17/17 64.4 kg (142 lb)   01/24/17 63 kg (139 lb)     Problem list and histories reviewed & adjusted, as indicated.  Additional history: as documented    Labs reviewed in EPIC  Reviewed and updated as needed this visit by clinical staff  Reviewed and updated as needed this visit by Provider      ROS:  C: NEGATIVE for fever, chills, change in weight  R: NEGATIVE for significant cough or SOB  CV: NEGATIVE for chest pain, palpitations or peripheral edema  M: NEGATIVE for significant arthralgias or myalgia  N: NEGATIVE for weakness, dizziness or paresthesias  P: NEGATIVE for changes in mood or affect    This document serves as a record of the services and decisions personally performed and made by Maddi Church MD. It was created on his/her behalf by Ophelia De Jesus, trained medical scribe. The creation of this document is based the provider's statements to the medical scribes.    Scribruddy De Jesus 5:58 PM, June 26, 2017  OBJECTIVE:   /46  Pulse  59  Wt 67.8 kg (149 lb 6.4 oz)  SpO2 97%  BMI 30.18 kg/m2  Body mass index is 30.18 kg/(m^2).  GENERAL: alert and no distress  RESP: lungs clear to auscultation - no rales, rhonchi or wheezes  CV: regular rate and rhythm, normal S1 S2, no S3 or S4, no murmur, click or rub, no peripheral edema and peripheral pulses strong  ABDOMEN: soft, nontender, no hepatosplenomegaly, no masses and bowel sounds normal  BACK: negative straight leg raises   MS: no gross musculoskeletal defects noted, no edema. No active synovitis, normal squeeze test, full ROM in the wrists and hips   NEURO: Normal strength and tone, mentation intact and speech normal  PSYCH: mentation appears normal, affect normal/bright    Diagnostic Test Results:  No results found for this or any previous visit (from the past 24 hour(s)).   ASSESSMENT/PLAN:   1. Benign essential hypertension  Above goal. Pt will restart lisinopril 5 mg.   - lisinopril (PRINIVIL/ZESTRIL) 5 MG tablet; Take 1 tablet (5 mg) by mouth daily  Dispense: 90 tablet; Refill: 1  - TSH with free T4 reflex  - Comprehensive metabolic panel    2. Aortic valve disorder  Stable. No need to repeat echo.  Blood pressure needs to remain lower.  - TSH with free T4 reflex  - Comprehensive metabolic panel    3. PMR (polymyalgia rheumatica) (H)  She continues to have body aches in her bilateral sides, wrists, and groin area, with unclear etiology. This doesn't fit a PMR type picture but as I look back those are the symptoms she had when first diagnosed with PMR.  She is no longer seeing rheumatology due to provider moving.  Pt is having difficulty walking with abnormal gait from her sx. She was place on a prednisone taper 5/15/17 and is currently on 4 mg daily. Chiropractic is not offering relief. She will schedule with Rheumatology for further evaluation.   - RHEUMATOLOGY REFERRAL  - TSH with free T4 reflex  - Comprehensive metabolic panel  - Antinuclear antibody screen by EIA  - CRP inflammation  -  Erythrocyte sedimentation rate auto  - Rheumatoid factor  - Cyclic Citrullinated Peptide Antibody IgG    4. Right groin pain  See above. Has orthopaedist that she needs to continue to discuss this with.  Xray and exam negative.  Not fitting with PMR.  Physical therapy and chiropractic not helping.   - RHEUMATOLOGY REFERRAL  - TSH with free T4 reflex  - Comprehensive metabolic panel  - Antinuclear antibody screen by EIA  - CRP inflammation  - Erythrocyte sedimentation rate auto  - Rheumatoid factor  - Cyclic Citrullinated Peptide Antibody IgG    5. Pain in both wrists  See above.  - RHEUMATOLOGY REFERRAL  - TSH with free T4 reflex  - Comprehensive metabolic panel  - Antinuclear antibody screen by EIA  - CRP inflammation  - Erythrocyte sedimentation rate auto  - Rheumatoid factor  - Cyclic Citrullinated Peptide Antibody IgG    6. Abnormal gait  See above.  - RHEUMATOLOGY REFERRAL  - TSH with free T4 reflex  - Comprehensive metabolic panel  - Antinuclear antibody screen by EIA  - CRP inflammation  - Erythrocyte sedimentation rate auto  - Rheumatoid factor  - Cyclic Citrullinated Peptide Antibody IgG      Patient Instructions   Schedule with rheumatology Dr. Marquis OR Dr. Alexis for further evaluation.    I think you can stop chiropractic as you are not experiencing relief.     Stop simvastatin and see if your aching improves.    Restart lisinopril 5 mg to help lower your blood pressure.      I spent 22 minutes of time with the patient and >50% of it was in education and counseling regarding chiropractor follow up.    The information in this document, created by the medical scribe for me, accurately reflects the services I personally performed and the decisions made by me. I have reviewed and approved this document for accuracy prior to leaving the patient care area.  Maddi Church MD  5:58 PM, 06/26/17    Maddi Church MD  [facility]    Start 6:19 PM  End 6:21 PM  Post call  Start 6:29 PM  End 6:48 PM

## 2017-06-26 NOTE — MR AVS SNAPSHOT
After Visit Summary   6/26/2017    Ailyn Trevino    MRN: [id number]           Patient Information     Date Of Birth          1935        Visit Information        Provider Department      6/26/2017 6:00 PM Maddi Church MD [facility]        Today's Diagnoses     Benign essential hypertension    -  1    Aortic valve disorder        PMR (polymyalgia rheumatica) (H)        Right groin pain        Pain in both wrists        Abnormal gait          Care Instructions    Schedule with rheumatology Dr. Marquis OR Dr. Hurst for further evaluation.    I think you can stop chiropractic as you are not experiencing relief.     Stop simvastatin and see if your aching improves.    Restart lisinopril 5 mg to help lower your blood pressure.    [facility]    If you have any questions regarding to your visit please contact your care team:     Team Pink:   Clinic Hours Telephone Number   Internal Medicine:  Dr. Maddi Chamorro NP       7am-7pm  Monday - Thursday   7am-5pm  Fridays  [phone]  (Appointment scheduling available 24/7)    Questions about your visit?  Team Line  [phone]   Urgent Care - [place] and [place] - 11am-9pm Monday-Friday Saturday-Sunday- 9am-5pm   [place] - 5pm-9pm Monday-Friday Saturday-Sunday- 9am-5pm  [phone] - [place]   [phone] - [place]       What options do I have for visits at the clinic other than the traditional office visit?  To expand how we care for you, many of our providers are utilizing electronic visits (e-visits) and telephone visits, when medically appropriate, for interactions with their patients rather than a visit in the clinic.   We also offer nurse visits for many medical concerns. Just like any other service, we will bill your insurance company for this type of visit based on time spent on the phone with your provider. Not all insurance companies cover  these visits. Please check with your medical insurance if this type of visit is covered. You will be responsible for any charges that are not paid by your insurance.      E-visits via [employer]:  generally incur a $35.00 fee.  Telephone visits:  Time spent on the phone: *charged based on time that is spent on the phone in increments of 10 minutes. Estimated cost:   5-10 mins $30.00   11-20 mins. $59.00   21-30 mins. $85.00   Use Mark[employer] (secure email communication and access to your chart) to send your primary care provider a message or make an appointment. Ask someone on your Team how to sign up for Markkit.    For a Price Quote for your services, please call our IQMS Line at [phone].    As always, Thank you for trusting us with your health care needs!    Kajal Le MA            Follow-ups after your visit        Additional Services     RHEUMATOLOGY REFERRAL       Your provider has referred you to: G: [facility] [phone]   [email or web address]  FMG: [facility] [phone]   [email or web address]  FMG:  [facility]   [phone] [email or web address]  FMG: [facility] [phone]   [email or web address]  [facility] [phone]   [email or web address]    Please be aware that coverage of these services is subject to the terms and limitations of your health insurance plan.  Call member services at your health plan with any benefit or coverage questions.      Please bring the following with you to your appointment:    (1) Any X-Rays, CTs or MRIs which have been performed.  Contact the facility where they were done to arrange for  prior to your scheduled appointment.    (2) List of current medications   (3) This referral  request   (4) Any documents/labs given to you for this referral                  Your next 10 appointments already scheduled     Jun 29, 2017  2:00 PM CDT   ALEX[facility]ANDRA Chiropractor with [facility] (ALEXSANDRA CAVAZOS)    [address]  [place] [address] [phone]              Who to contact     If you have questions or need follow up information about today's clinic visit or your schedule please contact [facility] [place] directly at [phone].  Normal or non-critical lab and imaging results will be communicated to you by MediaVasthart, letter or phone within 4 business days after the clinic has received the results. If you do not hear from us within 7 days, please contact the clinic through Levantat or phone. If you have a critical or abnormal lab result, we will notify you by phone as soon as possible.  Submit refill requests through [employer] or call your pharmacy and they will forward the refill request to us. Please allow 3 business days for your refill to be completed.          Additional Information About Your Visit        MyChart Information     [employer] gives you secure access to your electronic health record. If you see a primary care provider, you can also send messages to your care team and make appointments. If you have questions, please call your primary care clinic.  If you do not have a primary care provider, please call [phone] and they will assist you.        Care EveryWhere ID     This is your Care EveryWhere ID. This could be used by other organizations to access your [facility] medical records  [id number]        Your Vitals Were     Pulse Pulse Oximetry BMI (Body Mass Index)             59 97% 30.18 kg/m2          Blood Pressure from Last 3 Encounters:   06/26/17 146/46   05/15/17 126/80   04/20/17 136/62    Weight from Last 3 Encounters:   06/26/17 149 lb 6.4 oz (67.8 kg)   05/15/17 145 lb (65.8 kg)   04/20/17 144 lb (65.3 kg)               We Performed the Following     Antinuclear antibody screen by EIA     Comprehensive metabolic panel     CRP inflammation     Cyclic Citrullinated Peptide Antibody IgG     Erythrocyte sedimentation rate auto     Rheumatoid factor     RHEUMATOLOGY REFERRAL     TSH with free T4 reflex          Today's Medication Changes          These changes are accurate as of: 6/26/17  6:49 PM.  If you have any questions, ask your nurse or doctor.               Start taking these medicines.        Dose/Directions    lisinopril 5 MG tablet   Commonly known as:  PRINIVIL/ZESTRIL   Used for:  Benign essential hypertension   Started by:  Maddi Church MD        Dose:  5 mg   Take 1 tablet (5 mg) by mouth daily   Quantity:  90 tablet   Refills:  1         These medicines have changed or have updated prescriptions.        Dose/Directions    omeprazole 20 MG tablet   This may have changed:    - when to take this  - reasons to take this  - additional instructions   Used for:  Esophageal reflux        Dose:  20 mg   Take 1 tablet (20 mg) by mouth daily Take 30-60 minutes before a meal.   Quantity:  60 tablet   Refills:  3         Stop taking these medicines if you haven't already. Please contact your care team if you have questions.     simvastatin 10 MG tablet   Commonly known as:  ZOCOR   Stopped by:  Maddi Church MD                Where to get your medicines      These medications were sent to [place] [address] [place] [facility] [place], [place] - [address]  [address] [place] [address]     Phone:  [phone]     lisinopril 5 MG tablet                Primary Care Provider Office Phone # Fax #    Maddi Church [phone]       [facility] [address]  [place] [address]        Equal Access to Services     [facility] NA : Hadii tammie hineso Socesar, waaxda luqadaha, qaybta kaalmada miles, ayesha muhammad. So [facility] [phone].    ATENCIÓN: Spring cortes,  tiene a valdez disposición servicios gratuitos de asistencia lingüística. Rajesh sifuentes [phone].    We comply with applicable federal civil rights laws and [place] laws. We do not discriminate on the basis of race, color, national origin, age, disability sex, sexual orientation or gender identity.            Thank you!     Thank you for choosing [facility]  for your care. Our goal is always to provide you with excellent care. Hearing back from our patients is one way we can continue to improve our services. Please take a few minutes to complete the written survey that you may receive in the mail after your visit with us. Thank you!             Your Updated Medication List - Protect others around you: Learn how to safely use, store and throw away your medicines at www.disposemymeds.org.          This list is accurate as of: 6/26/17  6:49 PM.  Always use your most recent med list.                   Brand Name Dispense Instructions for use Diagnosis    alendronate 70 MG tablet    FOSAMAX    12 tablet    Take 1 tablet (70 mg) by mouth every 7 days Take with over 8 ounces water and stay upright for at least 30 minutes after dose.  Take at least 60 minutes before breakfast    Osteopenia       aspirin 325 MG EC tablet      Take 325 mg by mouth daily Reported on 3/17/2017    Health Care Home, Status post total left knee replacement       atenolol 25 MG tablet    TENORMIN    90 tablet    Take 1 tablet (25 mg) by mouth daily    Benign essential hypertension       CALCIUM + D 500-1000-40 MG-UNT-MCG Chew   Generic drug:  Calcium-Vitamin D-Vitamin K      Take 1 tablet by mouth 2 times daily Reported on 4/20/2017        CENTRUM SILVER ADULT 50+ PO      Take 1 tablet by mouth daily Reported on 4/20/2017        citalopram 20 MG tablet    celeXA    90 tablet    TAKE 1 TABLET (20 MG) BY MOUTH DAILY    Major depressive disorder, single episode, moderate (H)       HYDROcodone-acetaminophen 5-325 MG per tablet    NORCO    90  tablet    Take 1 tablet by mouth every 8 hours as needed for moderate to severe pain maximum 3 tablet(s) per day    Chronic pain of left knee       ipratropium 0.06 % spray    ATROVENT    1 Box    Spray 2 sprays into both nostrils 4 times daily as needed for rhinitis    Chronic rhinitis       lisinopril 5 MG tablet    PRINIVIL/ZESTRIL    90 tablet    Take 1 tablet (5 mg) by mouth daily    Benign essential hypertension       omeprazole 20 MG tablet     60 tablet    Take 1 tablet (20 mg) by mouth daily Take 30-60 minutes before a meal.    Esophageal reflux       * order for DME     1 Device    Equipment being ordered: blood pressure machine    Orthostatic hypotension       * order for DME     1 Device    Equipment being ordered: 10-20 mm Hg knee high compression stockings    Venous stasis dermatitis of left lower extremity       * predniSONE 1 MG tablet    DELTASONE     Take 8 tablets (8 mg) by mouth daily Taper as directed    PMR (polymyalgia rheumatica) (H)       * predniSONE 5 MG tablet    DELTASONE    120 tablet    Take 1 tablet (5 mg) by mouth daily    PMR (polymyalgia rheumatica) (H)       senna-docusate 8.6-50 MG per tablet    SENOKOT-S;PERICOLACE     Take 2 tablets by mouth 2 times daily Reported on 4/20/2017    Health Care Home, Status post total left knee replacement       triamcinolone 0.1 % cream    KENALOG    45 g    Apply sparingly to affected area two times daily for 7 days.    Rash       TYLENOL 500 MG tablet   Generic drug:  acetaminophen      Take 2 tablets (1,000 mg) by mouth every 6 hours as needed (Max acetaminophen dose: 4000mg in 24 hrs.)    Health Care Home, Status post total left knee replacement       vitamin D 1000 UNITS capsule      Take 1 capsule by mouth daily Reported on 4/20/2017        * Notice:  This list has 4 medication(s) that are the same as other medications prescribed for you. Read the directions carefully, and ask your doctor or other care provider to review them with you.

## 2017-06-26 NOTE — PATIENT INSTRUCTIONS
Schedule with rheumatology Dr. Marquis OR Dr. Hurst for further evaluation.    I think you can stop chiropractic as you are not experiencing relief.     Stop simvastatin and see if your aching improves.    Restart lisinopril 5 mg to help lower your blood pressure.    [facility]    If you have any questions regarding to your visit please contact your care team:     Team Pink:   Clinic Hours Telephone Number   Internal Medicine:  Dr. Maddi Chamorro NP       7am-7pm  Monday - Thursday   7am-5pm  Fridays  [phone]  (Appointment scheduling available 24/7)    Questions about your visit?  Team Line  [phone]   Urgent Care - [place] and [place] - 11am-9pm Monday-Friday Saturday-Sunday- 9am-5pm   [place] - 5pm-9pm Monday-Friday Saturday-Sunday- 9am-5pm  [phone] - [place]   [phone] - [place]       What options do I have for visits at the clinic other than the traditional office visit?  To expand how we care for you, many of our providers are utilizing electronic visits (e-visits) and telephone visits, when medically appropriate, for interactions with their patients rather than a visit in the clinic.   We also offer nurse visits for many medical concerns. Just like any other service, we will bill your insurance company for this type of visit based on time spent on the phone with your provider. Not all insurance companies cover these visits. Please check with your medical insurance if this type of visit is covered. You will be responsible for any charges that are not paid by your insurance.      E-visits via [employer]:  generally incur a $35.00 fee.  Telephone visits:  Time spent on the phone: *charged based on time that is spent on the phone in increments of 10 minutes. Estimated cost:   5-10 mins $30.00   11-20 mins. $59.00   21-30 mins. $85.00   Use [employer] (secure email communication and access to your chart) to send your  primary care provider a message or make an appointment. Ask someone on your Team how to sign up for Proterrohart.    For a Price Quote for your services, please call our Consumer Price Line at [phone].    As always, Thank you for trusting us with your health care needs!    Kajal Le MA

## 2017-06-27 LAB
ALBUMIN SERPL-MCNC: 3.8 G/DL (ref 3.4–5)
ALP SERPL-CCNC: 62 U/L (ref 40–150)
ALT SERPL W P-5'-P-CCNC: 27 U/L (ref 0–50)
ANION GAP SERPL CALCULATED.3IONS-SCNC: 6 MMOL/L (ref 3–14)
AST SERPL W P-5'-P-CCNC: 18 U/L (ref 0–45)
BILIRUB SERPL-MCNC: 0.5 MG/DL (ref 0.2–1.3)
BUN SERPL-MCNC: 16 MG/DL (ref 7–30)
CALCIUM SERPL-MCNC: 8.7 MG/DL (ref 8.5–10.1)
CHLORIDE SERPL-SCNC: 108 MMOL/L (ref 94–109)
CO2 SERPL-SCNC: 28 MMOL/L (ref 20–32)
CREAT SERPL-MCNC: 0.61 MG/DL (ref 0.52–1.04)
CRP SERPL-MCNC: 5.2 MG/L (ref 0–8)
GFR SERPL CREATININE-BSD FRML MDRD: NORMAL ML/MIN/1.7M2
GLUCOSE SERPL-MCNC: 81 MG/DL (ref 70–99)
POTASSIUM SERPL-SCNC: 4.1 MMOL/L (ref 3.4–5.3)
PROT SERPL-MCNC: 6.9 G/DL (ref 6.8–8.8)
RHEUMATOID FACT SER NEPH-ACNC: <20 IU/ML (ref 0–20)
SODIUM SERPL-SCNC: 142 MMOL/L (ref 133–144)
TSH SERPL DL<=0.005 MIU/L-ACNC: 0.91 MU/L (ref 0.4–4)

## 2017-06-27 NOTE — PROGRESS NOTES
Normal rheumatoid factor.  Normal thyroid. Normal electrolytes. Normal kidney function. Normal liver blood test. Normal inflammation test.

## 2017-06-28 LAB
ANA SER QL IA: NORMAL
CCP AB SER IA-ACNC: NORMAL U/ML

## 2017-07-07 ENCOUNTER — TRANSFERRED RECORDS (OUTPATIENT)
Dept: HEALTH INFORMATION MANAGEMENT | Facility: CLINIC | Age: 82
End: 2017-07-07

## 2017-07-09 ENCOUNTER — MYC MEDICAL ADVICE (OUTPATIENT)
Dept: FAMILY MEDICINE | Facility: CLINIC | Age: 82
End: 2017-07-09

## 2017-08-01 ENCOUNTER — TRANSFERRED RECORDS (OUTPATIENT)
Dept: HEALTH INFORMATION MANAGEMENT | Facility: CLINIC | Age: 82
End: 2017-08-01

## 2017-08-10 ENCOUNTER — TELEPHONE (OUTPATIENT)
Dept: FAMILY MEDICINE | Facility: CLINIC | Age: 82
End: 2017-08-10

## 2017-08-10 DIAGNOSIS — F32.1 MAJOR DEPRESSIVE DISORDER, SINGLE EPISODE, MODERATE (H): ICD-10-CM

## 2017-08-10 DIAGNOSIS — M25.562 CHRONIC PAIN OF LEFT KNEE: ICD-10-CM

## 2017-08-10 DIAGNOSIS — G89.29 CHRONIC PAIN OF LEFT KNEE: ICD-10-CM

## 2017-08-10 RX ORDER — CITALOPRAM HYDROBROMIDE 20 MG/1
TABLET ORAL
Qty: 90 TABLET | Refills: 1 | Status: SHIPPED | OUTPATIENT
Start: 2017-08-10 | End: 2018-02-12

## 2017-08-10 RX ORDER — HYDROCODONE BITARTRATE AND ACETAMINOPHEN 5; 325 MG/1; MG/1
1 TABLET ORAL EVERY 8 HOURS PRN
Qty: 90 TABLET | Refills: 0 | Status: SHIPPED | OUTPATIENT
Start: 2017-08-10 | End: 2017-10-13

## 2017-08-10 ASSESSMENT — PATIENT HEALTH QUESTIONNAIRE - PHQ9: SUM OF ALL RESPONSES TO PHQ QUESTIONS 1-9: 2

## 2017-08-10 NOTE — TELEPHONE ENCOUNTER
Called and completed a PHQ 9, patient also would like refills for her hydrocodone- acetaminophen medication. And she also wanted to know if she still needs to have her visit with the rheumatologist.

## 2017-08-10 NOTE — TELEPHONE ENCOUNTER
citalopram (CELEXA) 20 MG tablet     Last Written Prescription Date: 2/6/17  Last Fill Quantity: 90, # refills: 1  Last Office Visit with G primary care provider:  6/26/17        Last PHQ-9 score on record=   PHQ-9 SCORE 1/2/2017   Total Score 0

## 2017-08-11 NOTE — TELEPHONE ENCOUNTER
Called patient and informed of going to rheumatology, and agreed. Put script downstairs for patient to  today or Monday.     Silvia MASTERS CMA ([facility])

## 2017-10-10 NOTE — PROGRESS NOTES
INTERNAL MEDICINE  SUBJECTIVE:   Ailyn Trevino is a 82 year old female who presents to clinic today for the following health issues:    Patient presents to clinic today for cholesterol recheck.     Knee pain - She has been having trouble walking since her knee surgery. Her balance is off and she cannot walk far. Patient is unsure of what to do. She did not like the chiropractor she worked with but is open to a new chiropractor or other options. Denies pain while sitting.     Meds - Since she stopped taking her cholesterol meds, she has had no pain in arms. She is no longer taking lisinopril or omeprazole. She uses atenolol and takes 1 hydrocodone per day.     Additional notes:  Her depression is stable.   SI joint injection did not alleviate her lower back pain    She is down to 3 mg on her prednisone.  She doesn't feel like her PMR is back.  She has no arm or leg pain.      Has history of granulomatous lung disease.  Patient denies any exertional chest pain, dyspnea, palpitations, syncope, orthopnea, edema or paroxysmal nocturnal dyspnea.     She has also noted a vaginal odor.  No new sexual partners.       Problem list and histories reviewed & adjusted, as indicated.  Additional history: as documented    Labs reviewed in EPIC    Reviewed and updated as needed this visit by clinical staff  Tobacco  Allergies  Meds  Problems  Med Hx  Surg Hx  Fam Hx  Soc Hx        Reviewed and updated as needed this visit by Provider  Allergies  Meds  Problems         ROS:  C: NEGATIVE for fever, chills, change in weight  I: NEGATIVE for worrisome rashes, moles or lesions  E/M: NEGATIVE for ear, mouth and throat problems  R: NEGATIVE for significant cough or SOB  CV: NEGATIVE for chest pain, palpitations or peripheral edema  M: NEGATIVE for significant arthralgias or myalgia  N: NEGATIVE for weakness, dizziness or paresthesias  E: NEGATIVE for temperature intolerance, skin/hair changes  P: NEGATIVE for changes in mood or  "affect    This document serves as a record of the services and decisions personally performed and made by Maddi Church MD. It was created on his/her behalf by Bhavik Kelly, trained medical scribe. The creation of this document is based the provider's statements to the medical scribes.    Isha Kelly 2:19 PM, October 13, 2017    OBJECTIVE:   /62  Pulse 62  Temp 99.1  F (37.3  C) (Oral)  Ht 1.499 m (4' 11\")  Wt 68 kg (150 lb)  SpO2 95%  BMI 30.3 kg/m2  Body mass index is 30.3 kg/(m^2).  GENERAL: healthy, alert and no distress  RESP: lungs clear to auscultation - no rales, rhonchi or wheezes  CV: regular rate and rhythm, normal S1 S2, no S3 or S4, no murmur, click or rub, no peripheral edema and peripheral pulses strong  NEURO: Normal strength and tone, mentation intact and speech normal  PSYCH: mentation appears normal, affect normal/bright    Diagnostic Test Results:  Results for orders placed or performed in visit on 10/13/17   Wet prep   Result Value Ref Range    Specimen Description Vagina     Wet Prep No Trichomonas seen     Wet Prep Clue cells seen (A)     Wet Prep Yeast seen (A)        ASSESSMENT/PLAN:   1. Abnormal gait  Unclear etiology   - [facility] PT, HAND, AND CHIROPRACTIC REFERRAL    2. Benign essential hypertension  The current medical regimen is effective;  continue present plan and medications.  - atenolol (TENORMIN) 25 MG tablet; Take 1 tablet (25 mg) by mouth daily  Dispense: 90 tablet; Refill: 3    3. Chronic pain of left knee  I advise patient not to take medication for sleep.   - HYDROcodone-acetaminophen (NORCO) 5-325 MG per tablet; Take 1 tablet by mouth every 8 hours as needed for moderate to severe pain maximum 3 tablet(s) per day  Dispense: 90 tablet; Refill: 0    4. Granulomatous lung disease (H)  Stable     5. PMR (polymyalgia rheumatica) (H)  Patient will follow up with rheumatology.  Plan for relapse is needed.  - CRP inflammation; Future  - **ESR FUTURE anytime; Future    6. " Major depressive disorder, single episode, mild (H)  Stable. The current medical regimen is effective;  continue present plan and medications.    7. Hyperlipidemia LDL goal <130  Patient will start Pravastatin if cholesterol is still elevated  - **Lipid panel reflex to direct LDL FUTURE anytime; Future    8. High risk medication use    - Drug abuse screen 6 urine (chem dep) ([facility])    9. Vaginal odor    - Wet prep    Patient Instructions     Consider going to physical therapy and getting your limb length measured and gait evaluated.    Take half a tablet of hydrocodone or try melatonin for sleep.     Schedule a lab appointment.  If the cholesterol is still elevated then we will start Pravastatin.      [facility]    If you have any questions regarding to your visit please contact your care team:     Team Pink:   Clinic Hours Telephone Number   Internal Medicine:  Dr. Maddi Chamorro, NP       7am-7pm  Monday - Thursday   7am-5pm  Fridays  [phone]  (Appointment scheduling available 24/7)    Questions about your visit?  Team Line  [phone]   Urgent Care - [place] and [place] - 11am-9pm Monday-Friday Saturday-Sunday- 9am-5pm   [place] - 5pm-9pm Monday-Friday Saturday-Sunday- 9am-5pm  [phone] - [place]  [phone] - [place]       What options do I have for visits at the clinic other than the traditional office visit?  To expand how we care for you, many of our providers are utilizing electronic visits (e-visits) and telephone visits, when medically appropriate, for interactions with their patients rather than a visit in the clinic.   We also offer nurse visits for many medical concerns. Just like any other service, we will bill your insurance company for this type of visit based on time spent on the phone with your provider. Not all insurance companies cover these visits. Please check with your medical insurance if this type of  visit is covered. You will be responsible for any charges that are not paid by your insurance.      E-visits via myinfoQhart:  generally incur a $35.00 fee.  Telephone visits:  Time spent on the phone: *charged based on time that is spent on the phone in increments of 10 minutes. Estimated cost:   5-10 mins $30.00   11-20 mins. $59.00   21-30 mins. $85.00   Use myinfoQhart (secure email communication and access to your chart) to send your primary care provider a message or make an appointment. Ask someone on your Team how to sign up for Factyle.    For a Price Quote for your services, please call our Telebit Price Line at [phone].    As always, Thank you for trusting us with your health care needs!    Discharged by Silvia MASTERS CMA ([facility])        The information in this document, created by a scribe for me, accurately reflects the services I personally performed and the decisions made by me. I have reviewed and approved this document for accuracy.     I spent 20 minutes of time with the patient and >50% of it was in education and counseling regarding cholesterol recheck.    Maddi Church MD  [facility]    Start 2:15 PM  End 2:35 PM

## 2017-10-13 ENCOUNTER — OFFICE VISIT (OUTPATIENT)
Dept: INTERNAL MEDICINE | Facility: CLINIC | Age: 82
End: 2017-10-13
Payer: COMMERCIAL

## 2017-10-13 VITALS
WEIGHT: 150 LBS | TEMPERATURE: 99.1 F | HEIGHT: 59 IN | DIASTOLIC BLOOD PRESSURE: 62 MMHG | BODY MASS INDEX: 30.24 KG/M2 | OXYGEN SATURATION: 95 % | SYSTOLIC BLOOD PRESSURE: 104 MMHG | HEART RATE: 62 BPM

## 2017-10-13 DIAGNOSIS — I10 BENIGN ESSENTIAL HYPERTENSION: ICD-10-CM

## 2017-10-13 DIAGNOSIS — J84.10 GRANULOMATOUS LUNG DISEASE (H): ICD-10-CM

## 2017-10-13 DIAGNOSIS — Z79.899 HIGH RISK MEDICATION USE: ICD-10-CM

## 2017-10-13 DIAGNOSIS — R26.9 ABNORMAL GAIT: Primary | ICD-10-CM

## 2017-10-13 DIAGNOSIS — M25.562 CHRONIC PAIN OF LEFT KNEE: ICD-10-CM

## 2017-10-13 DIAGNOSIS — M35.3 PMR (POLYMYALGIA RHEUMATICA) (H): ICD-10-CM

## 2017-10-13 DIAGNOSIS — N76.0 BV (BACTERIAL VAGINOSIS): ICD-10-CM

## 2017-10-13 DIAGNOSIS — N89.8 VAGINAL ODOR: ICD-10-CM

## 2017-10-13 DIAGNOSIS — B96.89 BV (BACTERIAL VAGINOSIS): ICD-10-CM

## 2017-10-13 DIAGNOSIS — F32.0 MAJOR DEPRESSIVE DISORDER, SINGLE EPISODE, MILD (H): ICD-10-CM

## 2017-10-13 DIAGNOSIS — G89.29 CHRONIC PAIN OF LEFT KNEE: ICD-10-CM

## 2017-10-13 DIAGNOSIS — E78.5 HYPERLIPIDEMIA LDL GOAL <130: ICD-10-CM

## 2017-10-13 LAB
AMPHETAMINES UR QL SCN: NEGATIVE
BARBITURATES UR QL: NEGATIVE
BENZODIAZ UR QL: NEGATIVE
CANNABINOIDS UR QL SCN: NEGATIVE
COCAINE UR QL: NEGATIVE
ETHANOL UR QL SCN: NEGATIVE
OPIATES UR QL SCN: POSITIVE
SPECIMEN SOURCE: ABNORMAL
WET PREP SPEC: ABNORMAL

## 2017-10-13 PROCEDURE — 80307 DRUG TEST PRSMV CHEM ANLYZR: CPT | Performed by: INTERNAL MEDICINE

## 2017-10-13 PROCEDURE — 80320 DRUG SCREEN QUANTALCOHOLS: CPT | Performed by: INTERNAL MEDICINE

## 2017-10-13 PROCEDURE — 99207 C PAF COMPLETED  NO CHARGE: CPT | Performed by: INTERNAL MEDICINE

## 2017-10-13 PROCEDURE — 99214 OFFICE O/P EST MOD 30 MIN: CPT | Performed by: INTERNAL MEDICINE

## 2017-10-13 PROCEDURE — 87210 SMEAR WET MOUNT SALINE/INK: CPT | Performed by: INTERNAL MEDICINE

## 2017-10-13 RX ORDER — HYDROCODONE BITARTRATE AND ACETAMINOPHEN 5; 325 MG/1; MG/1
1 TABLET ORAL EVERY 8 HOURS PRN
Qty: 90 TABLET | Refills: 0 | Status: SHIPPED | OUTPATIENT
Start: 2017-11-03 | End: 2018-04-21

## 2017-10-13 RX ORDER — METRONIDAZOLE 500 MG/1
500 TABLET ORAL 2 TIMES DAILY
Qty: 14 TABLET | Refills: 0 | Status: SHIPPED | OUTPATIENT
Start: 2017-10-13 | End: 2017-10-20

## 2017-10-13 RX ORDER — ATENOLOL 25 MG/1
25 TABLET ORAL DAILY
Qty: 90 TABLET | Refills: 3 | Status: SHIPPED | OUTPATIENT
Start: 2017-10-13 | End: 2017-11-22

## 2017-10-13 NOTE — PROGRESS NOTES
Bacterial vaginosis is present.  This is an overgrowth of vaginal bacteria.  Flagyl 500 mg twice daily for 7 days. No alcohol while taking this medication.  A yeast infection is also seen.  Please use Monistat over the counter treatment.

## 2017-10-13 NOTE — MR AVS SNAPSHOT
After Visit Summary   10/13/2017    Ailyn Trevino    MRN: [id number]           Patient Information     Date Of Birth          1935        Visit Information        Provider Department      10/13/2017 2:00 PM Maddi Church MD [facility]        Today's Diagnoses     Abnormal gait    -  1    Benign essential hypertension        Chronic pain of left knee        Granulomatous lung disease (H)        PMR (polymyalgia rheumatica) (H)        Major depressive disorder, single episode, mild (H)        Hyperlipidemia LDL goal <130        High risk medication use        Vaginal odor          Care Instructions    Consider going to physical therapy and getting your limb length measured and gait evaluated.    Take half a tablet of hydrocodone or try melatonin for sleep.     Schedule a lab appointment.  If the cholesterol is still elevated then we will start Pravastatin.      [facility]    If you have any questions regarding to your visit please contact your care team:     Team Pink:   Clinic Hours Telephone Number   Internal Medicine:  Dr. Maddi Chamorro NP       7am-7pm  Monday - Thursday   7am-5pm  Fridays  [phone]  (Appointment scheduling available 24/7)    Questions about your visit?  Team Line  [phone]   Urgent Care - [place] and [place] - 11am-9pm Monday-Friday Saturday-Sunday- 9am-5pm   [place] - 5pm-9pm Monday-Friday Saturday-Sunday- 9am-5pm  [phone] - [place]   [phone] - [place]       What options do I have for visits at the clinic other than the traditional office visit?  To expand how we care for you, many of our providers are utilizing electronic visits (e-visits) and telephone visits, when medically appropriate, for interactions with their patients rather than a visit in the clinic.   We also offer nurse visits for many medical concerns. Just like any other service, we will bill your  insurance company for this type of visit based on time spent on the phone with your provider. Not all insurance companies cover these visits. Please check with your medical insurance if this type of visit is covered. You will be responsible for any charges that are not paid by your insurance.      E-visits via Personal CapitalharUni-Control:  generally incur a $35.00 fee.  Telephone visits:  Time spent on the phone: *charged based on time that is spent on the phone in increments of 10 minutes. Estimated cost:   5-10 mins $30.00   11-20 mins. $59.00   21-30 mins. $85.00   Use RateElert (secure email communication and access to your chart) to send your primary care provider a message or make an appointment. Ask someone on your Team how to sign up for RateElert.    For a Price Quote for your services, please call our Consumer Price Line at [phone].    As always, Thank you for trusting us with your health care needs!    Discharged by Silvia MASTERS CMA ([facility])            Follow-ups after your visit        Additional Services     [facility]EX[facility] PT, HAND, AND CHIROPRACTIC REFERRAL       **This order will print in the [facility] Scheduling Office**    Physical Therapy, Hand Therapy and Chiropractic Care are available through:    *[facility]  *[facility]  *[facility]    Call one number to schedule at any of the above locations: [phone].    Your provider has referred you to: Physical Therapy at [facility] or [facility]    Indication/Reason for Referral: gait abnormality  Onset of Illness: months  Therapy Orders: Evaluate and Treat  Special Programs:  Limb length discrepancy?  Special Request: Modalities:   Orthopedics Special Request:     Mikaela Al      Additional Comments for the Therapist or Chiropractor:     Please be aware that coverage of these services is subject to the terms and limitations of your health insurance plan.  Call member services at your health plan with any benefit or coverage questions.       Please bring the following to your appointment:    *Your personal calendar for scheduling future appointments  *Comfortable clothing                  Your next 10 appointments already scheduled     Oct 18, 2017 11:00 AM CDT   New Visit with Dale Marquis MD   [facility] [place] ([facility] [place])    [address]  [place] [id number] [phone]              Future tests that were ordered for you today     Open Future Orders        Priority Expected Expires Ordered    **Lipid panel reflex to direct LDL FUTURE anytime Routine 10/13/2017 10/13/2018 10/13/2017    CRP inflammation Routine  10/13/2018 10/13/2017    **ESR FUTURE anytime Routine 10/13/2017 10/13/2018 10/13/2017            Who to contact     If you have questions or need follow up information about today's clinic visit or your schedule please contact [facility] directly at [phone].  Normal or non-critical lab and imaging results will be communicated to you by Optima Neurosciencehart, letter or phone within 4 business days after the clinic has received the results. If you do not hear from us within 7 days, please contact the clinic through Optima Neurosciencehart or phone. If you have a critical or abnormal lab result, we will notify you by phone as soon as possible.  Submit refill requests through Cloudsnap or call your pharmacy and they will forward the refill request to us. Please allow 3 business days for your refill to be completed.          Additional Information About Your Visit        Optima NeuroscienceharBiOWiSH Information     Cloudsnap gives you secure access to your electronic health record. If you see a primary care provider, you can also send messages to your care team and make appointments. If you have questions, please call your primary care clinic.  If you do not have a primary care provider, please call [phone] and they will assist you.        Care EveryWhere ID     This is your Care EveryWhere ID. This could be used by other organizations  "to access your [facility] medical records  [id number]        Your Vitals Were     Pulse Temperature Height Pulse Oximetry BMI (Body Mass Index)       62 99.1  F (37.3  C) (Oral) 4' 11\" (1.499 m) 95% 30.3 kg/m2        Blood Pressure from Last 3 Encounters:   10/13/17 104/62   06/26/17 146/46   05/15/17 126/80    Weight from Last 3 Encounters:   10/13/17 150 lb (68 kg)   06/26/17 149 lb 6.4 oz (67.8 kg)   05/15/17 145 lb (65.8 kg)              We Performed the Following     Drug abuse screen 6 urine (chem dep) ([facility])     ALEXSANDRA PT, HAND, AND CHIROPRACTIC REFERRAL     PAF COMPLETED     Wet prep          Today's Medication Changes          These changes are accurate as of: 10/13/17  2:39 PM.  If you have any questions, ask your nurse or doctor.               Stop taking these medicines if you haven't already. Please contact your care team if you have questions.     lisinopril 5 MG tablet   Commonly known as:  PRINIVIL/ZESTRIL   Stopped by:  Maddi Church MD           omeprazole 20 MG tablet   Stopped by:  Maddi Church MD                Where to get your medicines      These medications were sent to [place] [address] [place] [facility] [place] [id number] [facility] [address] [place] [address]     Phone:  [phone]     atenolol 25 MG tablet         Some of these will need a paper prescription and others can be bought over the counter.  Ask your nurse if you have questions.     Bring a paper prescription for each of these medications     HYDROcodone-acetaminophen 5-325 MG per tablet                Primary Care Provider Office Phone # Fax #    Maddi Church [phone] [address]  [place] [address]        Equal Access to Services     NILE MONZON : Cj Wang, waaxda luqadaha, qaybta kaalmada adegennyyamirtha, ayesha muhammad. So [facility] [phone].    ATENCIÓN: Si habla español, tiene a valdez disposición servicios gratuitos de asistencia lingüística. " Saeeddell sifuentes [phone].    We comply with applicable federal civil rights laws and [place] laws. We do not discriminate on the basis of race, color, national origin, age, disability, sex, sexual orientation, or gender identity.            Thank you!     Thank you for choosing [facility] [place]  for your care. Our goal is always to provide you with excellent care. Hearing back from our patients is one way we can continue to improve our services. Please take a few minutes to complete the written survey that you may receive in the mail after your visit with us. Thank you!             Your Updated Medication List - Protect others around you: Learn how to safely use, store and throw away your medicines at www.disposemymeds.org.          This list is accurate as of: 10/13/17  2:39 PM.  Always use your most recent med list.                   Brand Name Dispense Instructions for use Diagnosis    alendronate 70 MG tablet    FOSAMAX    12 tablet    Take 1 tablet (70 mg) by mouth every 7 days Take with over 8 ounces water and stay upright for at least 30 minutes after dose.  Take at least 60 minutes before breakfast    Osteopenia       aspirin 325 MG EC tablet      Take 325 mg by mouth daily Reported on 3/17/2017    Health Care Home, Status post total left knee replacement       atenolol 25 MG tablet    TENORMIN    90 tablet    Take 1 tablet (25 mg) by mouth daily    Benign essential hypertension       CALCIUM + D 500-1000-40 MG-UNT-MCG Chew   Generic drug:  Calcium-Vitamin D-Vitamin K      Take 1 tablet by mouth 2 times daily Reported on 4/20/2017        CENTRUM SILVER ADULT 50+ PO      Take 1 tablet by mouth daily Reported on 4/20/2017        citalopram 20 MG tablet    celeXA    90 tablet    TAKE 1 TABLET (20 MG) BY MOUTH DAILY    Major depressive disorder, single episode, moderate (H)       HYDROcodone-acetaminophen 5-325 MG per tablet   Start taking on:  11/3/2017    NORCO    90 tablet    Take 1 tablet by mouth  every 8 hours as needed for moderate to severe pain maximum 3 tablet(s) per day    Chronic pain of left knee       ipratropium 0.06 % spray    ATROVENT    1 Box    Spray 2 sprays into both nostrils 4 times daily as needed for rhinitis    Chronic rhinitis       * order for DME     1 Device    Equipment being ordered: blood pressure machine    Orthostatic hypotension       * order for DME     1 Device    Equipment being ordered: 10-20 mm Hg knee high compression stockings    Venous stasis dermatitis of left lower extremity       * predniSONE 1 MG tablet    DELTASONE     Take 3 mg by mouth daily Taper as directed    PMR (polymyalgia rheumatica) (H)       * predniSONE 5 MG tablet    DELTASONE    120 tablet    Take 1 tablet (5 mg) by mouth daily    PMR (polymyalgia rheumatica) (H)       senna-docusate 8.6-50 MG per tablet    SENOKOT-S;PERICOLACE     Take 2 tablets by mouth 2 times daily Reported on 4/20/2017    Health Care Home, Status post total left knee replacement       triamcinolone 0.1 % cream    KENALOG    45 g    Apply sparingly to affected area two times daily for 7 days.    Rash       TYLENOL 500 MG tablet   Generic drug:  acetaminophen      Take 2 tablets (1,000 mg) by mouth every 6 hours as needed (Max acetaminophen dose: 4000mg in 24 hrs.)    Health Care Home, Status post total left knee replacement       vitamin D 1000 UNITS capsule      Take 1 capsule by mouth daily Reported on 4/20/2017        * Notice:  This list has 4 medication(s) that are the same as other medications prescribed for you. Read the directions carefully, and ask your doctor or other care provider to review them with you.

## 2017-10-13 NOTE — PATIENT INSTRUCTIONS
Consider going to physical therapy and getting your limb length measured and gait evaluated.    Take half a tablet of hydrocodone or try melatonin for sleep.     Schedule a lab appointment.  If the cholesterol is still elevated then we will start Pravastatin.      [facility]    If you have any questions regarding to your visit please contact your care team:     Team Pink:   Clinic Hours Telephone Number   Internal Medicine:  Dr. Maddi Chamorro, NP       7am-7pm  Monday - Thursday   7am-5pm  Fridays  [phone]  (Appointment scheduling available 24/7)    Questions about your visit?  Team Line  [phone]   Ur[facility] - [place] [facility] - 11am-9pm Monday-Friday Saturday-Sunday- 9am-5pm   [place] - 5pm-9pm Monday-Friday Saturday-Sunday- 9am-5pm  [phone] - [place]   [phone] - [place]       What options do I have for visits at the clinic other than the traditional office visit?  To expand how we care for you, many of our providers are utilizing electronic visits (e-visits) and telephone visits, when medically appropriate, for interactions with their patients rather than a visit in the clinic.   We also offer nurse visits for many medical concerns. Just like any other service, we will bill your insurance company for this type of visit based on time spent on the phone with your provider. Not all insurance companies cover these visits. Please check with your medical insurance if this type of visit is covered. You will be responsible for any charges that are not paid by your insurance.      E-visits via bewarket:  generally incur a $35.00 fee.  Telephone visits:  Time spent on the phone: *charged based on time that is spent on the phone in increments of 10 minutes. Estimated cost:   5-10 mins $30.00   11-20 mins. $59.00   21-30 mins. $85.00   Use bewarket (secure email communication and access to your chart) to send your primary care  provider a message or make an appointment. Ask someone on your Team how to sign up for Extreme Reach (formerly BrandAds)hart.    For a Price Quote for your services, please call our Consumer Price Line at [phone].    As always, Thank you for trusting us with your health care needs!    Discharged by Silvia MASTERS CMA ([facility])

## 2017-10-13 NOTE — NURSING NOTE
"Chief Complaint   Patient presents with     Lipids       Initial /62  Pulse 62  Temp 99.1  F (37.3  C) (Oral)  Ht 4' 11\" (1.499 m)  Wt 150 lb (68 kg)  SpO2 95%  BMI 30.3 kg/m2 Estimated body mass index is 30.3 kg/(m^2) as calculated from the following:    Height as of this encounter: 4' 11\" (1.499 m).    Weight as of this encounter: 150 lb (68 kg).  Medication Reconciliation: complete   Silvia MASTERS CMA ([facility])      "

## 2017-10-18 ENCOUNTER — OFFICE VISIT (OUTPATIENT)
Dept: RHEUMATOLOGY | Facility: CLINIC | Age: 82
End: 2017-10-18
Payer: COMMERCIAL

## 2017-10-18 ENCOUNTER — RADIANT APPOINTMENT (OUTPATIENT)
Dept: GENERAL RADIOLOGY | Facility: CLINIC | Age: 82
End: 2017-10-18
Attending: INTERNAL MEDICINE
Payer: COMMERCIAL

## 2017-10-18 VITALS
RESPIRATION RATE: 16 BRPM | SYSTOLIC BLOOD PRESSURE: 117 MMHG | DIASTOLIC BLOOD PRESSURE: 46 MMHG | TEMPERATURE: 98.9 F | HEART RATE: 63 BPM | WEIGHT: 149.8 LBS | OXYGEN SATURATION: 94 % | HEIGHT: 59 IN | BODY MASS INDEX: 30.2 KG/M2

## 2017-10-18 DIAGNOSIS — G89.29 CHRONIC MIDLINE THORACIC BACK PAIN: ICD-10-CM

## 2017-10-18 DIAGNOSIS — M35.3 PMR (POLYMYALGIA RHEUMATICA) (H): ICD-10-CM

## 2017-10-18 DIAGNOSIS — M54.6 CHRONIC MIDLINE THORACIC BACK PAIN: ICD-10-CM

## 2017-10-18 DIAGNOSIS — M35.3 PMR (POLYMYALGIA RHEUMATICA) (H): Primary | ICD-10-CM

## 2017-10-18 LAB
CRP SERPL-MCNC: 7.1 MG/L (ref 0–8)
ERYTHROCYTE [SEDIMENTATION RATE] IN BLOOD BY WESTERGREN METHOD: 8 MM/H (ref 0–30)

## 2017-10-18 PROCEDURE — 72072 X-RAY EXAM THORAC SPINE 3VWS: CPT

## 2017-10-18 PROCEDURE — 86140 C-REACTIVE PROTEIN: CPT | Performed by: INTERNAL MEDICINE

## 2017-10-18 PROCEDURE — 36415 COLL VENOUS BLD VENIPUNCTURE: CPT | Performed by: INTERNAL MEDICINE

## 2017-10-18 PROCEDURE — 85652 RBC SED RATE AUTOMATED: CPT | Performed by: INTERNAL MEDICINE

## 2017-10-18 PROCEDURE — 99214 OFFICE O/P EST MOD 30 MIN: CPT | Performed by: INTERNAL MEDICINE

## 2017-10-18 ASSESSMENT — PAIN SCALES - GENERAL: PAINLEVEL: SEVERE PAIN (6)

## 2017-10-18 NOTE — Clinical Note
Dr. Church, PMR seems well controlled but symptoms of her April flare are concerning for thoracic spine issue.  I am checking an x-rays today and possibly MRI depending on results. Dale

## 2017-10-18 NOTE — MR AVS SNAPSHOT
After Visit Summary   10/18/2017    Ailyn Trevino    MRN: [id number]           Patient Information     Date Of Birth          1935        Visit Information        Provider Department      10/18/2017 11:00 AM Dale Marquis MD [facility]        Today's Diagnoses     PMR (polymyalgia rheumatica) (H)    -  1    Chronic midline thoracic back pain          Care Instructions    [facility] schedule line number is [phone].    Dr. Marquis s Rheumatology Clinics  Locations Clinic Hours Telephone Number     [facility]  [address]  [place] [address]     Wednesday: 7:20AM - 4:00PM  Thursday:     7:20AM - 4:00PM     Friday:          7:20AM - 11:00AM       To schedule an appointment with  Dr. Marquis,  please contact  Specialty [phone]       [facility]  [address]  [place] [address]       Monday:       7:20AM - 4:00PM        [facility]  [address] [address]       Tuesday:      7:20AM - 4:00PM          Thank you!    Mesha Nieves CMA              Follow-ups after your visit        Your next 10 appointments already scheduled     Oct 27, 2017 10:50 AM CDT   (Arrive by 10:35 AM)   [facility] Extremity with Pj Magana PT   [facility] ([facility])    [address]  [address]  [place] [address]   [phone]            2017  3:40 PM CST   Return Visit with MD [facility] ([facility])    [address] [address]   [phone]              Future tests that were ordered for you today     Open Future Orders        Priority Expected Expires Ordered    XR Thoracic Spine 3 Views Routine 10/18/2017 10/19/2018 10/18/2017            Who to contact     If you have questions or need follow up information about today's clinic visit or your schedule please contact [facility] directly at  "[phone].  Normal or non-critical lab and imaging results will be communicated to you by MyChart, letter or phone within 4 business days after the clinic has received the results. If you do not hear from us within 7 days, please contact the clinic through Accer[facility]art or phone. If you have a critical or abnormal lab result, we will notify you by phone as soon as possible.  Submit refill requests through Happy [employer] or call your pharmacy and they will forward the refill request to us. Please allow 3 business days for your refill to be completed.          Additional Information About Your Visit        Accerahart Information     Happy Days gives you secure access to your electronic health record. If you see a primary care provider, you can also send messages to your care team and make appointments. If you have questions, please call your primary care clinic.  If you do not have a primary care provider, please call [phone] and they will assist you.        Care EveryWhere ID     This is your Care EveryWhere ID. This could be used by other organizations to access your [facility] medical records  [id number]        Your Vitals Were     Pulse Temperature Respirations Height Pulse Oximetry BMI (Body Mass Index)    63 98.9  F (37.2  C) (Oral) 16 1.499 m (4' 11\") 94% 30.26 kg/m2       Blood Pressure from Last 3 Encounters:   10/18/17 117/46   10/13/17 104/62   06/26/17 146/46    Weight from Last 3 Encounters:   10/18/17 67.9 kg (149 lb 12.8 oz)   10/13/17 68 kg (150 lb)   06/26/17 67.8 kg (149 lb 6.4 oz)              We Performed the Following     CRP inflammation     Erythrocyte sedimentation rate auto        Primary Care Provider Office Phone # Fax #    Maddi Church [phone] [address] [place] [address]        Equal Access to Services     NILE MONZON AH: Hadii tammie hineso Socesar, waaxda luqadaha, qaybta kaalmada adeegyada, ayesha muhammad. So wa " [phone].    ATENCIÓN: Si tabby cortes, tiene a valdez disposición servicios gratuitos de asistencia lingüística. Rajesh sifuentes [phone].    We comply with applicable federal civil rights laws and [place] laws. We do not discriminate on the basis of race, color, national origin, age, disability, sex, sexual orientation, or gender identity.            Thank you!     Thank you for choosing [facility]  for your care. Our goal is always to provide you with excellent care. Hearing back from our patients is one way we can continue to improve our services. Please take a few minutes to complete the written survey that you may receive in the mail after your visit with us. Thank you!             Your Updated Medication List - Protect others around you: Learn how to safely use, store and throw away your medicines at www.disposemymeds.org.          This list is accurate as of: 10/18/17 11:51 AM.  Always use your most recent med list.                   Brand Name Dispense Instructions for use Diagnosis    alendronate 70 MG tablet    FOSAMAX    12 tablet    Take 1 tablet (70 mg) by mouth every 7 days Take with over 8 ounces water and stay upright for at least 30 minutes after dose.  Take at least 60 minutes before breakfast    Osteopenia       aspirin 325 MG EC tablet      Take 325 mg by mouth daily Reported on 3/17/2017    Health Care Home, Status post total left knee replacement       atenolol 25 MG tablet    TENORMIN    90 tablet    Take 1 tablet (25 mg) by mouth daily    Benign essential hypertension       CALCIUM + D 500-1000-40 MG-UNT-MCG Chew   Generic drug:  Calcium-Vitamin D-Vitamin K      Take 1 tablet by mouth 2 times daily Reported on 4/20/2017        CENTRUM SILVER ADULT 50+ PO      Take 1 tablet by mouth daily Reported on 4/20/2017        citalopram 20 MG tablet    celeXA    90 tablet    TAKE 1 TABLET (20 MG) BY MOUTH DAILY    Major depressive disorder, single episode, moderate (H)        HYDROcodone-acetaminophen 5-325 MG per tablet   Start taking on:  11/3/2017    NORCO    90 tablet    Take 1 tablet by mouth every 8 hours as needed for moderate to severe pain maximum 3 tablet(s) per day    Chronic pain of left knee       ipratropium 0.06 % spray    ATROVENT    1 Box    Spray 2 sprays into both nostrils 4 times daily as needed for rhinitis    Chronic rhinitis       metroNIDAZOLE 500 MG tablet    FLAGYL    14 tablet    Take 1 tablet (500 mg) by mouth 2 times daily for 7 days    BV (bacterial vaginosis)       * order for DME     1 Device    Equipment being ordered: blood pressure machine    Orthostatic hypotension       * order for DME     1 Device    Equipment being ordered: 10-20 mm Hg knee high compression stockings    Venous stasis dermatitis of left lower extremity       * predniSONE 1 MG tablet    DELTASONE     Take 3 mg by mouth daily Taper as directed    PMR (polymyalgia rheumatica) (H)       * predniSONE 5 MG tablet    DELTASONE    120 tablet    Take 1 tablet (5 mg) by mouth daily    PMR (polymyalgia rheumatica) (H)       senna-docusate 8.6-50 MG per tablet    SENOKOT-S;PERICOLACE     Take 2 tablets by mouth 2 times daily Reported on 4/20/2017    Health Care Home, Status post total left knee replacement       triamcinolone 0.1 % cream    KENALOG    45 g    Apply sparingly to affected area two times daily for 7 days.    Rash       TYLENOL 500 MG tablet   Generic drug:  acetaminophen      Take 2 tablets (1,000 mg) by mouth every 6 hours as needed (Max acetaminophen dose: 4000mg in 24 hrs.)    Health Care Home, Status post total left knee replacement       vitamin D 1000 UNITS capsule      Take 1 capsule by mouth daily Reported on 4/20/2017        * Notice:  This list has 4 medication(s) that are the same as other medications prescribed for you. Read the directions carefully, and ask your doctor or other care provider to review them with you.

## 2017-10-18 NOTE — PROGRESS NOTES
"Rheumatology Clinic Visit      Ailyn Trevino MRN# [id number]   YOB: 1935 Age: 82 year old      Date of visit: 10/18/17   Referring provider: Dr. Maddi Church  PCP: Dr. Maddi Church    Chief Complaint   Patient presents with:  Consult: PMR. Dr. Church has requested her to be seen by Dr. Marquis. She has been dealing with PMR for about 2 years, going on 3 years. She currently takes prednisone but would like to get off it. Most pain with walking, just had an epidural injection.       Assessment and Plan     1. Polymyalgia rheumatica / Back Pain: Based on historical records, I agreed that she most likely had polymyalgia rheumatica. PMR was steroid responsive and she has been able to reduce her prednisone dose to 3 mg daily without issue, but then had a \"flare\" involving back pain that radiated around to include just over her bilateral lower rib cage. At the time of her flare in April 2017, the ESR and CRP were normal. She was also having hip pain at that time; she had her left TKA performed in January 2017. Prednisone dose was increased at that time with resolution of her pain. I question if the cause of her pain is due to polymyalgia rheumatica versus potential spinal etiology. I suspect that she may have degenerative changes of her thoracic spine that could explain the worsening back pain with radiation to her lower ribs. That she has degenerative changes seen on a lumbar spine MRI that was performed at [facility], per a clinic note from [facility] orthopedics. Check x-ray of the thoracic spine today as well as ESR and CRP. Depending on the results, may get an MRI of the thoracic spine that she would like to have done at [facility] if it is needed.  - Labs today: ESR and CRP  - X-rays today: T-spine    Ms. Trevino verbalized agreement with and understanding of the rational for the diagnosis and treatment plan.  All questions were answered to best of my ability and the patient's " "satisfaction. Ms. Trevino was advised to contact the clinic with any questions that may arise after the clinic visit.      Thank you for involving me in the care of the patient    Return to clinic: 2 weeks      HPI   Ailyn Trevino is a 82 year old female with a past medical history significant for hyperlipidemia, granulomatous lung disease, aortic valve disorder, mitral valve disorder, impaired fasting glucose, polymyalgia rheumatica, and depression who is seen in consultation at the request of Dr. Maddi Church for evaluation of PMR, right groin pain, both wrists, and abnormal gait.    4/20/2015 rheumatology clinic note by Dr. Bud Hurst at the [facility] documents the patient does not have signs or symptoms of GCA. Significant myalgias in March 2015 in her shoulders and popliteal area, but not in the hip area. Cortisone shots in the hip in March 2015. Elevated CRP and mildly abnormal sedimentation rate. Morning stiffness for about 2 weeks. 2 hours to loosen up. Difficulty lifting her arms above her shoulders. CK was normal. Prednisone 20 mg daily \"helped about almost completely in 4 days' time\"    Today, Ms. Trevino reports that she was initially diagnosed with pulmonology rheumatica and treated with steroids it was very effective. At that time, she reports having some difficulty raising her arms above her head. She says that she has always had some hip and knee pain that was thought to be secondary to her left knee osteoarthritis. She had a left total knee arthroplasty in January 2017. She also reports having flat feet bilaterally. She has been reducing prednisone slowly over time, and is currently on prednisone 3 mg daily. When she was evaluated by Dr. Christopher  on 4/20/2017 it was noted that she was having pain in her rib cage, back, and hips. She had just reduced prednisone and today she says at that time she was on 1 mg daily, so the dose was increased to 20 mg a day with resolution " of her pain. She feels like she has still doing well while on prednisone 3 mg daily. She still has some thoracic back pain. No difficult or raising her arms above her head today. No difficulty standing up from a low seated position. No jaw claudication, scalp tenderness, headaches, or vision changes.she also reports having some right groin pain that she thinks is due to altered gait because of her knee issues. She also reports having lower back pain that was addressed at [facility] with an epidural steroid injection that was not effective. This was at the direction of her orthopedic surgeon who is at [facility] orthopedics. She reports having had an MRI of her lumbar spine at [facility]. 8/1/2017 clinic note by Prosper Leonardo at [facility] documents that an MRI showed multilevel degenerative changes namely L4-L5 central canal compromise and severe right lateral recess stenosis. Currently without hand pain, but she says that she has had some pain in her fingers from time to time. She denies having stiffness in her hands.     Denies fevers, chills, nausea, vomiting, constipation, diarrhea. No abdominal pain. No chest pain/pressure, palpitations, or shortness of breath. No LE swelling. No neck pain. No oral or nasal sores.  No rash. No sicca symptoms. No photosensitivity or photophobia. No eye pain or redness. No history of inflammatory eye disease.  No history of DVT, pulmonary embolism, or miscarriage.   No history of serositis.  No history of Raynaud's Phenomenon.  seizure history No known renal disorder.      Tobacco:  None  EtOH:  occasional  Drugs:   Occupation: used to work as a , retired now    ROS   GEN: No fevers, chills, night sweats, or weight change  SKIN: No itching, rashes, sores  HEENT: No epistaxis. No oral or nasal ulcers.  CV: No chest pain, pressure, palpitations, or dyspnea on exertion.  PULM: No SOB, wheeze, cough.  GI: No nausea, vomiting, constipation,  diarrhea. No blood in stool. No abdominal pain.  : No blood in urine.  MSK: See HPI.  NEURO: No numbness or tingling  ENDO: No heat/cold intolerance.  EXT: No LE swelling  PSYCH: Negative    Active Problem List     Patient Active Problem List   Diagnosis     Advanced directives, counseling/discussion     Osteopenia     Palpitations     Hyperlipidemia LDL goal <130     Renal cyst     Granulomatous lung disease (H)     Health Care Home     Insomnia     Osteoarthritis of knee     Aortic valve disorder     Mitral valve disorder     Impaired fasting glucose     PMR (polymyalgia rheumatica) (H)     Encounter for long-term current use of medication     Other chronic pain     Major depressive disorder, single episode, mild (H)     Current chronic use of systemic steroids     Fatigue, unspecified type     Chronic pain of left knee     Venous stasis dermatitis of left lower extremity     Fecal urgency     Past Medical History     Past Medical History:   Diagnosis Date     Granulomatous lung disease (H) 8/7/2012     High cholesterol      Insomnia 10/8/2013    Benadryl gives her RLS     Osteoarthritis of knee 10/8/2013    Sees Dr. Whaley     Osteopenia 7/31/2012     Other chronic pain 8/3/2015    Patient is followed by KIARA RAINES for ongoing prescription of pain medication.  All refills should be approved by this provider, or covering partner.  Medication(s): Hydrocodone/APAP.  Maximum quantity per month: 30 Clinic visit frequency required: Q 3 months   Controlled substance agreement on file: Yes      Date(s): 8/3/15  Pain Clinic evaluation in the past: No  DIRE Total Score(s): No fl     Renal cyst 8/7/2012     Past Surgical History     Past Surgical History:   Procedure Laterality Date     APPENDECTOMY  1951     CATARACT IOL, RT/LT  2010    bilateral      CHOLECYSTECTOMY  2010     KNEE SURGERY Left 01/2017     SURGICAL HISTORY OF -   12/13    bilateral YAG laser surgery of eyes     Allergy   No Known  Allergies  Current Medication List     Current Outpatient Prescriptions   Medication Sig     atenolol (TENORMIN) 25 MG tablet Take 1 tablet (25 mg) by mouth daily     [START ON 11/3/2017] HYDROcodone-acetaminophen (NORCO) 5-325 MG per tablet Take 1 tablet by mouth every 8 hours as needed for moderate to severe pain maximum 3 tablet(s) per day     metroNIDAZOLE (FLAGYL) 500 MG tablet Take 1 tablet (500 mg) by mouth 2 times daily for 7 days     citalopram (CELEXA) 20 MG tablet TAKE 1 TABLET (20 MG) BY MOUTH DAILY     predniSONE (DELTASONE) 5 MG tablet Take 1 tablet (5 mg) by mouth daily     predniSONE (DELTASONE) 1 MG tablet Take 3 mg by mouth daily Taper as directed     alendronate (FOSAMAX) 70 MG tablet Take 1 tablet (70 mg) by mouth every 7 days Take with over 8 ounces water and stay upright for at least 30 minutes after dose.  Take at least 60 minutes before breakfast     order for DME Equipment being ordered: 10-20 mm Hg knee high compression stockings     order for DME Equipment being ordered: blood pressure machine     acetaminophen (TYLENOL) 500 MG tablet Take 2 tablets (1,000 mg) by mouth every 6 hours as needed (Max acetaminophen dose: 4000mg in 24 hrs.)     aspirin 325 MG EC tablet Take 325 mg by mouth daily Reported on 3/17/2017     senna-docusate (SENOKOT-S;PERICOLACE) 8.6-50 MG per tablet Take 2 tablets by mouth 2 times daily Reported on 4/20/2017     ipratropium (ATROVENT) 0.06 % nasal spray Spray 2 sprays into both nostrils 4 times daily as needed for rhinitis     Calcium-Vitamin D-Vitamin K (CALCIUM + D) 500-1000-40 MG-UNT-MCG CHEW Take 1 tablet by mouth 2 times daily Reported on 4/20/2017     triamcinolone (KENALOG) 0.1 % cream Apply sparingly to affected area two times daily for 7 days.     Cholecalciferol (VITAMIN D) 1000 UNITS capsule Take 1 capsule by mouth daily Reported on 4/20/2017     Multiple Vitamins-Minerals (CENTRUM SILVER ADULT 50+ PO) Take 1 tablet by mouth daily Reported on 4/20/2017  "    No current facility-administered medications for this visit.        Social History   See HPI    Family History     Family History   Problem Relation Age of Onset     CANCER Mother      CEREBROVASCULAR DISEASE Father      Denies family history of autoimmune disease     Physical Exam     Temp Readings from Last 3 Encounters:   10/18/17 98.9  F (37.2  C) (Oral)   10/13/17 99.1  F (37.3  C) (Oral)   05/15/17 99.2  F (37.3  C) (Oral)     BP Readings from Last 5 Encounters:   10/18/17 117/46   10/13/17 104/62   06/26/17 146/46   05/15/17 126/80   04/20/17 136/62     Pulse Readings from Last 1 Encounters:   10/18/17 63     Resp Readings from Last 1 Encounters:   10/18/17 16     Estimated body mass index is 30.26 kg/(m^2) as calculated from the following:    Height as of this encounter: 1.499 m (4' 11\").    Weight as of this encounter: 67.9 kg (149 lb 12.8 oz).    GEN: NAD  HEENT: MMM. No oral lesions. EOMI. Anicteric, noninjected sclera  CV: S1, S2. RRR. No m/r/g.  PULM: CTA bilaterally. No w/c.  ABD: +BS.   MSK:  Hands, wrists, and elbows without synovial swelling, increased warmth, tenderness to palpation, or overlying erythema.  Negative MCP squeeze.  Normal  strength.  Heberden's and Kecia's nodes present. Bilateral shoulders nontender to palpation or with active ROM; for range of motion. She is able to raise her arms above her head without difficulty. Bilateral hips nontender to direct palpation or with range of motion. Knees, ankles, and feet without swelling or tenderness to palpation. She is able to stand up from a chair without assistance and without difficulty   NEURO: UE and LE strengths 5/5 and equal bilaterally.   SKIN: No rash  EXT: No LE edema  PSYCH: Alert. Appropriate.    Labs / Imaging (select studies)     RF/CCP  Recent Labs   Lab Test  06/26/17   1854  04/20/15   1340  02/02/15   1115   CCPABY   --   <20  Interpretation:  Negative     --    CCPIGG  <1  Negative     --    --    RHF  <20   --   " <20     CALVIN  Recent Labs   Lab Test  06/26/17   1854  02/02/15   1115   DOUGIE  <1.0  Interpretation:  Negative    <1.0  Interpretation:  Negative       CBC  Recent Labs   Lab Test  04/20/17   1513  03/17/17   1112  02/07/17   1015  01/02/17   1503  02/05/16   1500   WBC  10.0  9.3   --   11.2*  10.8   RBC  4.19  3.92   --   3.95  3.74*   HGB  13.4  12.9  12.7  12.4  11.8   HCT  40.2  38.4   --   38.1  35.7   MCV  96  98   --   97  96   RDW  12.8  12.8   --   12.7  12.7   PLT  268  242   --   247  313   MCH  32.0  32.9   --   31.4  31.6   MCHC  33.3  33.6   --   32.5  33.1   NEUTROPHIL  84.9  75.2   --    --   63.2   LYMPH  11.0  14.2   --    --   25.9   MONOCYTE  3.5  7.2   --    --   9.5   EOSINOPHIL  0.3  3.1   --    --   1.1   BASOPHIL  0.3  0.3   --    --   0.3   ANEU  8.5*  7.0   --    --   6.8   ALYM  1.1  1.3   --    --   2.8   LAURENCE  0.4  0.7   --    --   1.0   AEOS  0.0  0.3   --    --   0.1   ABAS  0.0  0.0   --    --   0.0     CMP  Recent Labs   Lab Test  06/26/17   1854  01/02/17   1503  10/04/16   1145  02/05/16   1500   12/02/15   0901  03/27/15   1419  08/26/11   NA  142  139  138  140   < >  145*  139   < >  140   POTASSIUM  4.1  3.8  4.1  3.8   --   3.9  4.1   < >  4.5   CHLORIDE  108  103  107  106   --   110*  107   < >  106   CO2  28  27  25  29   --   28  28   < >   --    ANIONGAP  6  9  6  5   --   7  4   < >  11.5   GLC  81  89  79  70   --   87  118*   < >  91   BUN  16  15  16  18   --   13  22   < >  10   CR  0.61  0.70  0.65  0.68   --   0.66  0.78   < >  0.9   GFRESTIMATED  >90  Non  GFR Calc    80  88  84   --   86  71   < >   --    GFRESTBLACK  >90   GFR Calc    >90   GFR Calc    >90   GFR Calc    >90   GFR Calc     --   >90   GFR Calc    86   < >   --    ALEJANDRO  8.7  9.0  8.7  9.1   --   8.8  9.1   < >  9.3   BILITOTAL  0.5   --    --   0.4   --    --   0.5   --   0.5   ALBUMIN  3.8   --    --    3.5   --    --   3.6   --   4.1   PROTTOTAL  6.9   --    --   6.9   --    --   6.8   --   6.9   ALKPHOS  62   --    --   72   --    --   93   --   106   AST  18   --   16  15   --   15  15   < >  25   ALT  27   --   23  18   --   30  27   < >  40    < > = values in this interval not displayed.     HgA1c  Recent Labs   Lab Test  10/04/16   1145   A1C  5.2     Calcium/VitaminD  Recent Labs   Lab Test  06/26/17   1854  01/02/17   1503  10/04/16   1145  02/05/16   1500   10/08/13   1031  08/01/12   1018   ALEJANDRO  8.7  9.0  8.7  9.1   < >  9.5  9.2   VITDT   --    --    --   39   --   49  35    < > = values in this interval not displayed.     ESR/CRP  Recent Labs   Lab Test  06/26/17   1854  05/09/17   1042  04/20/17   1513   SED  6  5  6   CRP  5.2  4.4  5.8     CK/Aldolase  Recent Labs   Lab Test  02/05/16   1500  03/27/15   1419  10/08/13   1031   CKT  41  47  44     TSH/T4  Recent Labs   Lab Test  06/26/17   1854  02/05/16   1500  11/18/14   0937   TSH  0.91  1.31  1.42     Lipid Panel  Recent Labs   Lab Test  12/02/15   0901  07/02/15   0904  11/18/14   0937  03/17/14   1159   CHOL  172  215*  157  202*   TRIG  88  114  87  170*   HDL  64  65  58  45*   LDL  90  127  82  123   VLDL   --   23  17  34*   CHOLHDLRATIO   --   3.3  2.7  4.5   NHDL  108   --    --    --      UA  Recent Labs   Lab Test  01/04/17   0850  01/02/17   1459   COLOR  Yellow  Yellow   APPEARANCE  Clear  Slightly Cloudy   URINEGLC  Negative  Negative   URINEBILI  Negative  Negative   SG  1.015  <=1.005   URINEPH  6.0  6.0   PROTEIN  Negative  Negative   UROBILINOGEN  0.2  0.2   NITRITE  Negative  Negative   UBLD  Small*  Trace*   LEUKEST  Negative  Small*   WBCU  O - 2  5-10*   RBCU  2-5*  2-5*   SQUAMOUSEPI  Few  Few     Urine Microscopic  Recent Labs   Lab Test  01/04/17   0850  01/02/17   1459   WBCU  O - 2  5-10*   RBCU  2-5*  2-5*   SQUAMOUSEPI  Few  Few     Immunization History     Immunization History   Administered Date(s) Administered      Influenza (High Dose) 3 valent vaccine 10/08/2013, 10/22/2014, 10/05/2015, 10/04/2016, 09/08/2017     Influenza (IIV3) 10/26/2010, 10/24/2011, 10/23/2012     Pneumococcal (PCV 13) 11/03/2015     Pneumococcal 23 valent 12/16/2004     Tdap (Adacel,Boostrix) 07/21/2010     Zoster vaccine, live 06/24/2009          Chart documentation done in part with Dragon Voice recognition Software. Although reviewed after completion, some word and grammatical error may remain.    Dale Marquis MD

## 2017-10-18 NOTE — NURSING NOTE
"Chief Complaint   Patient presents with     Consult     PMR. Dr. Church has requested her to be seen by Dr. Marquis. She has been dealing with PMR for about 2 years, going on 3 years. She currently takes prednisone but would like to get off it. Most pain with walking, just had an epidural injection.        Initial /46  Pulse 63  Temp 98.9  F (37.2  C) (Oral)  Resp 16  Ht 1.499 m (4' 11\")  Wt 67.9 kg (149 lb 12.8 oz)  SpO2 94%  BMI 30.26 kg/m2 Estimated body mass index is 30.26 kg/(m^2) as calculated from the following:    Height as of this encounter: 1.499 m (4' 11\").    Weight as of this encounter: 67.9 kg (149 lb 12.8 oz).  Medication Reconciliation: finn Sabillon Certified Medical Assistant  RAPID3 (0-30) Cumulative Score  15.7          RAPID3 Weighted Score (divide #4 by 3 and that is the weighted score)  5.23         "

## 2017-10-18 NOTE — PATIENT INSTRUCTIONS
[facility] Imaging schedule line number is [phone].    Dr. Marquis s Rheumatology Clinics  Locations Clinic Hours Telephone Number     Danilo Mojica  [address] [place] [address]     Wednesday: 7:20AM - 4:00PM  Thursday:     7:20AM - 4:00PM     Friday:          7:20AM - 11:00AM       To schedule an appointment with  Dr. Marquis,  please contact  Specialty [phone]       Danilo Lopez  [address]  [place] [address]       Monday:       7:20AM - 4:00PM        Danilo Gold  [address] [place] [address]       Tuesday:      7:20AM - 4:00PM          Thank you!    Mesha Nieves [facility]

## 2017-10-19 DIAGNOSIS — M54.6 CHRONIC MIDLINE THORACIC BACK PAIN: Primary | ICD-10-CM

## 2017-10-19 DIAGNOSIS — G89.29 CHRONIC MIDLINE THORACIC BACK PAIN: Primary | ICD-10-CM

## 2017-10-20 NOTE — PROGRESS NOTES
"Rheumatology team: please call to ensure Ms. Trevino gets the message below.  Also, the order for the MRI will need to be sent to [facility] and a call to confirm receipt will be needed.  The patient may also  the order.  If she prefers to have it done at [facility] then the order may be faxed over there instead.     Ogorod message sent:  \"Ms. Trevino,    Inflammatory markers ESR and CRP are normal.  There are multilevel thoracic spine degenerative changes seen on x-ray.     To further evaluate, I have placed an order for an MRI to be done at [facility].  Please call to schedule the MRI.    Sincerely,  Dale Marquis MD  10/19/2017 11:47 PM\""

## 2017-10-23 DIAGNOSIS — M35.3 PMR (POLYMYALGIA RHEUMATICA) (H): ICD-10-CM

## 2017-10-23 DIAGNOSIS — E78.5 HYPERLIPIDEMIA LDL GOAL <130: ICD-10-CM

## 2017-10-23 LAB
CHOLEST SERPL-MCNC: 209 MG/DL
CRP SERPL-MCNC: 12.6 MG/L (ref 0–8)
ERYTHROCYTE [SEDIMENTATION RATE] IN BLOOD BY WESTERGREN METHOD: 8 MM/H (ref 0–30)
HDLC SERPL-MCNC: 57 MG/DL
LDLC SERPL CALC-MCNC: 118 MG/DL
NONHDLC SERPL-MCNC: 152 MG/DL
TRIGL SERPL-MCNC: 169 MG/DL

## 2017-10-23 PROCEDURE — 85652 RBC SED RATE AUTOMATED: CPT | Performed by: INTERNAL MEDICINE

## 2017-10-23 PROCEDURE — 36415 COLL VENOUS BLD VENIPUNCTURE: CPT | Performed by: INTERNAL MEDICINE

## 2017-10-23 PROCEDURE — 80061 LIPID PANEL: CPT | Performed by: INTERNAL MEDICINE

## 2017-10-23 PROCEDURE — 86140 C-REACTIVE PROTEIN: CPT | Performed by: INTERNAL MEDICINE

## 2017-10-23 NOTE — PROGRESS NOTES
Ailyn Arreola.      Cholesterol is higher but is ok.  No changes.      I will send your inflammation test results to Dr. Marquis for his opinion.    Dr. Church

## 2017-10-31 ENCOUNTER — THERAPY VISIT (OUTPATIENT)
Dept: PHYSICAL THERAPY | Facility: CLINIC | Age: 82
End: 2017-10-31
Payer: COMMERCIAL

## 2017-10-31 DIAGNOSIS — M54.59 MECHANICAL LOW BACK PAIN: Primary | ICD-10-CM

## 2017-10-31 DIAGNOSIS — R26.9 ABNORMAL GAIT: ICD-10-CM

## 2017-10-31 PROCEDURE — G8978 MOBILITY CURRENT STATUS: HCPCS | Mod: GP | Performed by: PHYSICAL THERAPIST

## 2017-10-31 PROCEDURE — 97110 THERAPEUTIC EXERCISES: CPT | Mod: GP | Performed by: PHYSICAL THERAPIST

## 2017-10-31 PROCEDURE — 97140 MANUAL THERAPY 1/> REGIONS: CPT | Mod: GP | Performed by: PHYSICAL THERAPIST

## 2017-10-31 PROCEDURE — 97161 PT EVAL LOW COMPLEX 20 MIN: CPT | Mod: GP | Performed by: PHYSICAL THERAPIST

## 2017-10-31 PROCEDURE — G8979 MOBILITY GOAL STATUS: HCPCS | Mod: GP | Performed by: PHYSICAL THERAPIST

## 2017-10-31 NOTE — PROGRESS NOTES
Subjective:    Patient is a 82 year old female presenting with rehab back hpi. The history is provided by the patient.   Ailyn Trevino is a 82 year old female with a lumbar and sacroiliac condition.  Condition occurred with:  Degenerative joint disease.  Condition occurred: at home.  This is a chronic condition  Pt reports she walks with pain in her back. She feels she walks funny due to her LBP. She has had LBP for many years. She had knee TKA left in 2017. .    Patient reports pain:  Lower lumbar spine, central lumbar spine, lumbar spine left and lumbar spine right.  Radiates to:  Gluteals left and gluteals right.   and is intermittent and reported as 8/10.  Associated symptoms:  Loss of motion/stiffness. Pain is the same all the time.  Symptoms are exacerbated by standing, walking, carrying and bending and relieved by rest.    Special tests:  MRI.        Pertinent medical history includes:  Rheumatoid arthritis, osteoporosis and high blood pressure.        Current occupation is Retired, 40yrs  .            Red flags:  None as reported by the patient.                        Objective:    System         Lumbar/SI Evaluation              Lumbar Palpation:    Tenderness present at Left:    Quadratus Lumborum; Erector Spinae; Piriformis; Iliac Crest; Gluteus Medius and Greater Trochanter  Tenderness present at Right: Quadratus Lumborum; Erector Spinae; Piriformis; Iliac Crest; Gluteus Medius and Greater Trochanter  Functional Tests:  Core strength and proprioception lumbar: single bridge unable bilat           Spinal Segmental Conclusions:     Level: Hypo noted at S1, L5, L4, L3 and L1      SI joint/Sacrum:    Rt SI dysf, post rot Rt>left , pelvic asymetry, symetrical leg length.       Left negative at:    Ilium Ventromedial  Right positive at:    Ilium Ventromedial    Sacral conclusion right:  Sacral torsion, upslip, posterior inominate, locked, outflare and elevated pubic sym                                   Hip Evaluation  HIP AROM:  AROM:    Left Hip:     Normal    Right Hip:   Normal                  Hip PROM:  Hip PROM:  Left Hip:    Normal  Right Hip:  Normal                          Hip Strength:  : 4/5 hip abd left  : -4/5 hip abd, -3/5 hip ext bilat                           Hip Special Testing:    Left hip positive for the following special tests:  Piriformis and Ana's  Left hip negative for the following special tests:  Holli; Fadir/Labrum or SLR   Right hip positive for the following special tests:  Piriformis and Ana'sRight hip negative for the following special tests:  Holli; Fadir/Labrum or SLR    Hip Palpation:    Left hip tenderness present at:   Greater Trachanter; IT Band; Piriformis; Adductors; Abductors; Gluteus Medius and Bursa  Right hip tenderness present at:  Greater Trachanter; IT Band; Piriformis; Adductors; Abductors; Gluteus Medius and Bursa             General     ROS    Assessment/Plan:      Patient is a 82 year old female with sacral, right side hip and right side knee complaints.    Patient has the following significant findings with corresponding treatment plan.                Diagnosis 1:  Abnormal gait   Pain -  manual therapy, self management and home program  Decreased ROM/flexibility - manual therapy and therapeutic exercise  Decreased joint mobility - manual therapy and therapeutic exercise  Decreased strength - therapeutic exercise and therapeutic activities  Decreased proprioception - neuro re-education and therapeutic activities  Impaired gait - gait training  Impaired muscle performance - neuro re-education  Decreased function - therapeutic activities  Diagnosis 2:  LBP    Pain -  mechanical traction, manual therapy, self management and home program  Decreased ROM/flexibility - manual therapy, therapeutic exercise and home program  Decreased joint mobility - manual therapy and therapeutic exercise  Decreased strength - therapeutic exercise and therapeutic  activities  Decreased proprioception - neuro re-education and therapeutic activities  Impaired gait - gait training  Impaired muscle performance - neuro re-education  Decreased function - therapeutic activities    Therapy Evaluation Codes:   1) History comprised of:   Personal factors that impact the plan of care:      Age, Coping style, Overall behavior pattern and Past/current experiences.    Comorbidity factors that impact the plan of care are:      High blood pressure, Osteoarthritis and Rheumatoid arthritis.     Medications impacting care: Pain.  2) Examination of Body Systems comprised of:   Body structures and functions that impact the plan of care:      Hip, Knee, Lumbar spine and Sacral illiac joint.   Activity limitations that impact the plan of care are:      Squatting/kneeling, Stairs, Standing and Walking.  3) Clinical presentation characteristics are:   Stable/Uncomplicated.  4) Decision-Making    Low complexity using standardized patient assessment instrument and/or measureable assessment of functional outcome.  Cumulative Therapy Evaluation is: Low complexity.    Previous and current functional limitations:  (See Goal Flow Sheet for this information)    Short term and Long term goals: (See Goal Flow Sheet for this information)     Communication ability:  Patient appears to be able to clearly communicate and understand verbal and written communication and follow directions correctly.  Treatment Explanation - The following has been discussed with the patient:   RX ordered/plan of care  Anticipated outcomes  Possible risks and side effects  This patient would benefit from PT intervention to resume normal activities.   Rehab potential is good.    Frequency:  1 X week, once daily  Duration:  for 6 weeks  Discharge Plan:  Achieve all LTG.  Independent in home treatment program.  Reach maximal therapeutic benefit.    Please refer to the daily flowsheet for treatment today, total treatment time and time  spent performing 1:1 timed codes.

## 2017-10-31 NOTE — MR AVS SNAPSHOT
After Visit Summary   10/31/2017    Ailyn Trevino    MRN: [id number]           Patient Information     Date Of Birth          1935        Visit Information        Provider Department      10/31/2017 3:20 PM Pj Magana, PT [facility]        Today's Diagnoses     Mechanical low back pain    -  1    Abnormal gait           Follow-ups after your visit        Your next 10 appointments already scheduled     Nov 01, 2017 11:45 AM CDT   MR THORACIC SPINE W/O CONTRAST with BEMR1   [facility] ([facility])    [address] [place] [id number] [phone]           Take your medicines as usual, unless your doctor tells you not to. Bring a list of your current medicines to your exam (including vitamins, minerals and over-the-counter drugs). Also bring the results of similar scans you may have had.  Please remove any body piercings and hair extensions before you arrive.  Follow your doctor s orders. If you do not, we may have to postpone your exam.  You will not have contrast for this exam. You do not need to do anything special to prepare.  The MRI machine uses a strong magnet. Please wear clothes without metal (snaps, zippers). A sweatsuit works well, or we may give you a hospital gown.   **IMPORTANT** THE INSTRUCTIONS BELOW ARE ONLY FOR THOSE PATIENTS WHO HAVE BEEN TOLD THEY WILL RECEIVE SEDATION OR GENERAL ANESTHESIA DURING THEIR MRI PROCEDURE:  IF YOU WILL RECEIVE SEDATION (take medicine to help you relax during your exam):   You must get the medicine from your doctor before you arrive. Bring the medicine to the exam. Do not take it at home.   Arrive one hour early. Bring someone who can take you home after the test. Your medicine will make you sleepy. After the exam, you may not drive, take a bus or take a taxi by yourself.   No eating 8 hours before your exam. You may have clear liquids up until 4 hours before your exam. (Clear liquids include  water, clear tea, black coffee and fruit juice without pulp.)  IF YOU WILL RECEIVE ANESTHESIA (be asleep for your exam):   Arrive 1 1/2 hours early. Bring someone who can take you home after the test. You may not drive, take a bus or take a taxi by yourself.   No eating 8 hours before your exam. You may have clear liquids up until 4 hours before your exam. (Clear liquids include water, clear tea, black coffee and fruit juice without pulp.)   You will spend four to five hours in the recovery room.  Please call the Imaging Department at your exam site with any questions.            Nov 09, 2017  3:40 PM CST   Return Visit with Dale Marquis MD   [facility]    [address] [address]   [phone]            Nov 13, 2017  3:20 PM CST   [facility] Extremity with [facility] ([facility])    [address]  [address]  [place] [address] [phone]              Who to contact     If you have questions or need follow up information about today's clinic visit or your schedule please contact [facility] directly at [phone].  Normal or non-critical lab and imaging results will be communicated to you by MyChart, letter or phone within 4 business days after the clinic has received the results. If you do not hear from us within 7 days, please contact the clinic through MyChart or phone. If you have a critical or abnormal lab result, we will notify you by phone as soon as possible.  Submit refill requests through [facility] or call your pharmacy and they will forward the refill request to us. Please allow 3 business days for your refill to be completed.          Additional Information About Your Visit        [facility] [facility] Information     [facility] gives you secure access to your electronic health record. If you see a primary care provider, you can also send messages to your care team and make appointments. If you have questions, please  call your primary care clinic.  If you do not have a primary care provider, please call [phone] and they will assist you.        Care EveryWhere ID     This is your Care EveryWhere ID. This could be used by other organizations to access your [place] medical records  [id number]         Blood Pressure from Last 3 Encounters:   10/18/17 117/46   10/13/17 104/62   06/26/17 146/46    Weight from Last 3 Encounters:   10/18/17 67.9 kg (149 lb 12.8 oz)   10/13/17 68 kg (150 lb)   06/26/17 67.8 kg (149 lb 6.4 oz)              We Performed the Following     HC PT EVAL, LOW COMPLEXITY     [facility] INITIAL EVAL REPORT     MANUAL THER TECH,1+REGIONS,EA 15 MIN     THERAPEUTIC EXERCISES        Primary Care Provider Office Phone # Fax #    Maddi Marine Church [phone] [address]  [place] [address]        Equal Access to Services     [facility] AH: Hadii aad ku hadasho Soomaali, waaxda luqadaha, qaybta kaalmada adeegyada, waxay idiin hayaan adeeg kharash la'aan . So [facility] [phone].    ATENCIÓN: Si habla español, tiene a valdez disposición servicios gratuitos de asistencia lingüística. Llame al [phone].    We comply with applicable federal civil rights laws and [place] laws. We do not discriminate on the basis of race, color, national origin, age, disability, sex, sexual orientation, or gender identity.            Thank you!     Thank you for choosing [facility] [place] [facility]  for your care. Our goal is always to provide you with excellent care. Hearing back from our patients is one way we can continue to improve our services. Please take a few minutes to complete the written survey that you may receive in the mail after your visit with us. Thank you!             Your Updated Medication List - Protect others around you: Learn how to safely use, store and throw away your medicines at www.disposemymeds.org.          This list is accurate as of: 10/31/17  6:15 PM.  Always use your most recent med list.                    Brand Name Dispense Instructions for use Diagnosis    alendronate 70 MG tablet    FOSAMAX    12 tablet    Take 1 tablet (70 mg) by mouth every 7 days Take with over 8 ounces water and stay upright for at least 30 minutes after dose.  Take at least 60 minutes before breakfast    Osteopenia       aspirin 325 MG EC tablet      Take 325 mg by mouth daily Reported on 3/17/2017    Health Care Home, Status post total left knee replacement       atenolol 25 MG tablet    TENORMIN    90 tablet    Take 1 tablet (25 mg) by mouth daily    Benign essential hypertension       CALCIUM + D 500-1000-40 MG-UNT-MCG Chew   Generic drug:  Calcium-Vitamin D-Vitamin K      Take 1 tablet by mouth 2 times daily Reported on 4/20/2017        CENTRUM SILVER ADULT 50+ PO      Take 1 tablet by mouth daily Reported on 4/20/2017        citalopram 20 MG tablet    celeXA    90 tablet    TAKE 1 TABLET (20 MG) BY MOUTH DAILY    Major depressive disorder, single episode, moderate (H)       HYDROcodone-acetaminophen 5-325 MG per tablet   Start taking on:  11/3/2017    NORCO    90 tablet    Take 1 tablet by mouth every 8 hours as needed for moderate to severe pain maximum 3 tablet(s) per day    Chronic pain of left knee       ipratropium 0.06 % spray    ATROVENT    1 Box    Spray 2 sprays into both nostrils 4 times daily as needed for rhinitis    Chronic rhinitis       * order for DME     1 Device    Equipment being ordered: blood pressure machine    Orthostatic hypotension       * order for DME     1 Device    Equipment being ordered: 10-20 mm Hg knee high compression stockings    Venous stasis dermatitis of left lower extremity       * predniSONE 1 MG tablet    DELTASONE     Take 3 mg by mouth daily Taper as directed    PMR (polymyalgia rheumatica) (H)       * predniSONE 5 MG tablet    DELTASONE    120 tablet    Take 1 tablet (5 mg) by mouth daily    PMR (polymyalgia rheumatica) (H)       senna-docusate 8.6-50 MG per tablet     SENOKOT-S;PERICOLACE     Take 2 tablets by mouth 2 times daily Reported on 4/20/2017    Health Care Home, Status post total left knee replacement       triamcinolone 0.1 % cream    KENALOG    45 g    Apply sparingly to affected area two times daily for 7 days.    Rash       TYLENOL 500 MG tablet   Generic drug:  acetaminophen      Take 2 tablets (1,000 mg) by mouth every 6 hours as needed (Max acetaminophen dose: 4000mg in 24 hrs.)    Health Care Home, Status post total left knee replacement       vitamin D 1000 UNITS capsule      Take 1 capsule by mouth daily Reported on 4/20/2017        * Notice:  This list has 4 medication(s) that are the same as other medications prescribed for you. Read the directions carefully, and ask your doctor or other care provider to review them with you.

## 2017-11-01 ENCOUNTER — RADIANT APPOINTMENT (OUTPATIENT)
Dept: MRI IMAGING | Facility: CLINIC | Age: 82
End: 2017-11-01
Attending: INTERNAL MEDICINE
Payer: COMMERCIAL

## 2017-11-01 ENCOUNTER — TELEPHONE (OUTPATIENT)
Dept: INTERNAL MEDICINE | Facility: CLINIC | Age: 82
End: 2017-11-01

## 2017-11-01 DIAGNOSIS — M54.6 CHRONIC MIDLINE THORACIC BACK PAIN: ICD-10-CM

## 2017-11-01 DIAGNOSIS — G89.29 CHRONIC MIDLINE THORACIC BACK PAIN: ICD-10-CM

## 2017-11-01 DIAGNOSIS — M54.50 LOWER BACK PAIN: Primary | ICD-10-CM

## 2017-11-01 PROCEDURE — 72146 MRI CHEST SPINE W/O DYE: CPT | Mod: TC

## 2017-11-01 NOTE — TELEPHONE ENCOUNTER
Message  Received: Yesterday       Maddi Church MD  P Fz Rn Triage Pool                    Team RN, can you write this physical therapy order again and link to low back pain?  Thanks ,   Dr. Church            Previous Messages       ----- Message -----      From: Pj Magana, PT      Sent: 10/31/2017   6:16 PM        To: MD Dr Ranjit Rich,   Thanks for referring Ailyn to PT.  This will be fun. But I need orders for her LBP. Could I ask you to enter an order for her back too? Thanks!!     Ann Magana PT          Keily Christopher RN

## 2017-11-09 ENCOUNTER — OFFICE VISIT (OUTPATIENT)
Dept: RHEUMATOLOGY | Facility: CLINIC | Age: 82
End: 2017-11-09
Payer: COMMERCIAL

## 2017-11-09 VITALS
DIASTOLIC BLOOD PRESSURE: 54 MMHG | TEMPERATURE: 97.9 F | BODY MASS INDEX: 30.38 KG/M2 | WEIGHT: 150.4 LBS | OXYGEN SATURATION: 95 % | SYSTOLIC BLOOD PRESSURE: 126 MMHG | HEART RATE: 66 BPM

## 2017-11-09 DIAGNOSIS — G89.29 CHRONIC MIDLINE BACK PAIN, UNSPECIFIED BACK LOCATION: Primary | ICD-10-CM

## 2017-11-09 DIAGNOSIS — M54.9 CHRONIC MIDLINE BACK PAIN, UNSPECIFIED BACK LOCATION: Primary | ICD-10-CM

## 2017-11-09 PROCEDURE — 99213 OFFICE O/P EST LOW 20 MIN: CPT | Performed by: INTERNAL MEDICINE

## 2017-11-09 NOTE — NURSING NOTE
"Chief Complaint   Patient presents with     RECHECK     follow up on MRI        Initial /54  Pulse 66  Temp 97.9  F (36.6  C) (Oral)  Wt 68.2 kg (150 lb 6.4 oz)  SpO2 95%  BMI 30.38 kg/m2 Estimated body mass index is 30.38 kg/(m^2) as calculated from the following:    Height as of 10/18/17: 1.499 m (4' 11\").    Weight as of this encounter: 68.2 kg (150 lb 6.4 oz).  BP completed using cuff size: sarita Wynn MA.... 3:50 PM....11/9/2017           RAPID3 (0-30) Cumulative Score  20.3          RAPID3 Weighted Score (divide #4 by 3 and that is the weighted score)  6.8         "

## 2017-11-09 NOTE — PROGRESS NOTES
"Rheumatology Clinic Visit      Ailyn Trevino MRN# [id number]   YOB: 1935 Age: 82 year old      Date of visit: 11/09/17   PCP: Dr. Maddi Church    Chief Complaint   Patient presents with:  RECHECK: follow up on MRI       Assessment and Plan     1. Polymyalgia rheumatica / Back Pain: Based on historical records, I agreed that she most likely had polymyalgia rheumatica. PMR was steroid responsive and she has been able to reduce her prednisone dose to 3 mg daily without issue, but then had a \"flare\" involving back pain that radiated around to include just over her bilateral lower rib cage. At the time of her flare in April 2017, the ESR and CRP were normal. She was also having hip pain at that time; she had her left TKA performed in January 2017. Prednisone dose was increased at that time with resolution of her pain. I suspect that the pain she was having at that time was due to degenerative spine change rather than PMR.  11/1/2017 MRI of the T-spine showed minimal disc space narrowing at several levels in the mid and lower thoracic spine, minimal osteophyte formation or disc protrusions at several levels, but no stenosis.  Large right renal cyst and several small left renal cysts incidentally seen - I advised her to discuss this finding with her PCP. She has degenerative changes seen on a lumbar spine MRI that was performed at [facility], per a clinic note from [facility] orth[facility]. At this point I believe that she can continue tapering off of prednisone slowly as the PMR appears well controlled.  I advised Ms. Trevino to reduce prednisone to 2mg daily x2weeks, then 1mg daily x2weeks, then stop; she said that she did not need a refill of prednisone. I advised her to notify me if PMR symptoms return.  Encouraged her to continue PT for the back pain.    Ms. Trevino verbalized agreement with and understanding of the rational for the diagnosis and treatment plan.  All questions were answered to " "best of my ability and the patient's satisfaction. Ms. Trevino was advised to contact the clinic with any questions that may arise after the clinic visit.      Thank you for involving me in the care of the patient    Return to clinic: JANELL CORRALES   Ailyn Trevino is a 82 year old female with a past medical history significant for hyperlipidemia, granulomatous lung disease, aortic valve disorder, mitral valve disorder, impaired fasting glucose, polymyalgia rheumatica, and depression who presents for f/u of back pain.     4/20/2015 rheumatology clinic note by Dr. Bud Hurst at the [facility] documents the patient does not have signs or symptoms of GCA. Significant myalgias in March 2015 in her shoulders and popliteal area, but not in the hip area. Cortisone shots in the hip in March 2015. Elevated CRP and mildly abnormal sedimentation rate. Morning stiffness for about 2 weeks. 2 hours to loosen up. Difficulty lifting her arms above her shoulders. CK was normal. Prednisone 20 mg daily \"helped about almost completely in 4 days' time\"    In 10/2017, Ms. Trevino reported that she was initially diagnosed with PMR and treated with steroids that were very effective. At that time, she reports having some difficulty raising her arms above her head. She says that she has always had some hip and knee pain that was thought to be secondary to her left knee osteoarthritis. She had a left total knee arthroplasty in January 2017. She also reports having flat feet bilaterally. She has been reducing prednisone slowly over time, and was on prednisone 3 mg daily at that time. When she was evaluated by Dr. Christopher  on 4/20/2017 it was noted that she was having pain in her rib cage, back, and hips. She had just reduced prednisone and today she says at that time she was on 1 mg daily, so the dose was increased to 20 mg a day with resolution of her pain. She feels like she has still doing well while on prednisone " 3 mg daily. She still has some thoracic back pain. No difficult or raising her arms above her head today. No difficulty standing up from a low seated position. No jaw claudication, scalp tenderness, headaches, or vision changes.she also reports having some right groin pain that she thinks is due to altered gait because of her knee issues. She also reports having lower back pain that was addressed at [facility] with an epidural steroid injection that was not effective. This was at the direction of her orthopedic surgeon who is at [facility] orthopedics. She reports having had an MRI of her lumbar spine at [facility]. 8/1/2017 clinic note by Prosper Leonardo at [facility] orthopedics documents that an MRI showed multilevel degenerative changes namely L4-L5 central canal compromise and severe right lateral recess stenosis. Currently without hand pain, but she says that she has had some pain in her fingers from time to time. She denies having stiffness in her hands.     Today, she returns after having completed an MRI of the thoracic spine.  This showed renal cysts and degenerative changes of the T-spine.  We reviewed the MRI together in clinic.  Denies jaw claudication, scalp tenderness, vision change, new headache, difficulty standing up from a chair, or difficulty raising her arms above her head.  Still on prednisone 3mg daily. No wrist pain currently.    Denies fevers, chills, nausea, vomiting, constipation, diarrhea. No abdominal pain. No chest pain/pressure, palpitations, or shortness of breath. No LE swelling. No neck pain. No oral or nasal sores.  No rash. No sicca symptoms. No photosensitivity or photophobia. No eye pain or redness. No history of inflammatory eye disease.  No history of DVT, pulmonary embolism, or miscarriage.   No history of serositis.  No history of Raynaud's Phenomenon.  seizure history No known renal disorder.      Tobacco:  None  EtOH:  occasional  Drugs:   Occupation:  used to work as a , retired now    ROS   GEN: No fevers, chills, night sweats, or weight change  SKIN: No itching, rashes, sores  HEENT: No epistaxis. No oral or nasal ulcers.  CV: No chest pain, pressure, palpitations, or dyspnea on exertion.  PULM: No SOB, wheeze, cough.  GI: No nausea, vomiting, constipation, diarrhea. No blood in stool. No abdominal pain.  : No blood in urine.  MSK: See HPI.  NEURO: No numbness or tingling  ENDO: No heat/cold intolerance.  EXT: No LE swelling  PSYCH: Negative    Active Problem List     Patient Active Problem List   Diagnosis     Advanced directives, counseling/discussion     Osteopenia     Palpitations     Hyperlipidemia LDL goal <130     Renal cyst     Granulomatous lung disease (H)     Health Care Home     Insomnia     Osteoarthritis of knee     Aortic valve disorder     Mitral valve disorder     Impaired fasting glucose     PMR (polymyalgia rheumatica) (H)     Encounter for long-term current use of medication     Other chronic pain     Major depressive disorder, single episode, mild (H)     Current chronic use of systemic steroids     Fatigue, unspecified type     Chronic pain of left knee     Venous stasis dermatitis of left lower extremity     Fecal urgency     Mechanical low back pain     Abnormal gait     Past Medical History     Past Medical History:   Diagnosis Date     Granulomatous lung disease (H) 8/7/2012     High cholesterol      Insomnia 10/8/2013    Benadryl gives her RLS     Osteoarthritis of knee 10/8/2013    Sees Dr. Whaley     Osteopenia 7/31/2012     Other chronic pain 8/3/2015    Patient is followed by KIARA RAINES for ongoing prescription of pain medication.  All refills should be approved by this provider, or covering partner.  Medication(s): Hydrocodone/APAP.  Maximum quantity per month: 30 Clinic visit frequency required: Q 3 months   Controlled substance agreement on file: Yes      Date(s): 8/3/15  Pain Clinic evaluation in the past: No   DIRE Total Score(s): No fl     Renal cyst 8/7/2012     Past Surgical History     Past Surgical History:   Procedure Laterality Date     APPENDECTOMY  1951     CATARACT IOL, RT/LT  2010    bilateral      CHOLECYSTECTOMY  2010     KNEE SURGERY Left 01/2017     SURGICAL HISTORY OF -   12/13    bilateral YAG laser surgery of eyes     Allergy   No Known Allergies  Current Medication List     Current Outpatient Prescriptions   Medication Sig     atenolol (TENORMIN) 25 MG tablet Take 1 tablet (25 mg) by mouth daily     HYDROcodone-acetaminophen (NORCO) 5-325 MG per tablet Take 1 tablet by mouth every 8 hours as needed for moderate to severe pain maximum 3 tablet(s) per day     citalopram (CELEXA) 20 MG tablet TAKE 1 TABLET (20 MG) BY MOUTH DAILY     predniSONE (DELTASONE) 5 MG tablet Take 1 tablet (5 mg) by mouth daily     predniSONE (DELTASONE) 1 MG tablet Take 3 mg by mouth daily Taper as directed     alendronate (FOSAMAX) 70 MG tablet Take 1 tablet (70 mg) by mouth every 7 days Take with over 8 ounces water and stay upright for at least 30 minutes after dose.  Take at least 60 minutes before breakfast     order for DME Equipment being ordered: 10-20 mm Hg knee high compression stockings     order for DME Equipment being ordered: blood pressure machine     acetaminophen (TYLENOL) 500 MG tablet Take 2 tablets (1,000 mg) by mouth every 6 hours as needed (Max acetaminophen dose: 4000mg in 24 hrs.)     aspirin 325 MG EC tablet Take 325 mg by mouth daily Reported on 3/17/2017     senna-docusate (SENOKOT-S;PERICOLACE) 8.6-50 MG per tablet Take 2 tablets by mouth 2 times daily Reported on 4/20/2017     ipratropium (ATROVENT) 0.06 % nasal spray Spray 2 sprays into both nostrils 4 times daily as needed for rhinitis     Calcium-Vitamin D-Vitamin K (CALCIUM + D) 500-1000-40 MG-UNT-MCG CHEW Take 1 tablet by mouth 2 times daily Reported on 4/20/2017     triamcinolone (KENALOG) 0.1 % cream Apply sparingly to affected area two times  "daily for 7 days.     Cholecalciferol (VITAMIN D) 1000 UNITS capsule Take 1 capsule by mouth daily Reported on 4/20/2017     Multiple Vitamins-Minerals (CENTRUM SILVER ADULT 50+ PO) Take 1 tablet by mouth daily Reported on 4/20/2017     No current facility-administered medications for this visit.        Social History   See HPI    Family History     Family History   Problem Relation Age of Onset     CANCER Mother      CEREBROVASCULAR DISEASE Father      Denies family history of autoimmune disease     Physical Exam     Temp Readings from Last 3 Encounters:   10/18/17 98.9  F (37.2  C) (Oral)   10/13/17 99.1  F (37.3  C) (Oral)   05/15/17 99.2  F (37.3  C) (Oral)     BP Readings from Last 5 Encounters:   10/18/17 117/46   10/13/17 104/62   06/26/17 146/46   05/15/17 126/80   04/20/17 136/62     Pulse Readings from Last 1 Encounters:   10/18/17 63     Resp Readings from Last 1 Encounters:   10/18/17 16     Estimated body mass index is 30.26 kg/(m^2) as calculated from the following:    Height as of 10/18/17: 1.499 m (4' 11\").    Weight as of 10/18/17: 67.9 kg (149 lb 12.8 oz).    GEN: NAD  HEENT: MMM. No oral lesions. EOMI. Anicteric, noninjected sclera  CV: S1, S2. RRR. No m/r/g.  PULM: CTA bilaterally. No w/c.  MSK:  Hands, wrists, and elbows without synovial swelling, increased warmth, tenderness to palpation, or overlying erythema.  Negative MCP squeeze.  Heberden's and Kecia's nodes present. Bilateral shoulders nontender to palpation or with active ROM; for range of motion. She is able to raise her arms above her head without difficulty. Bilateral hips nontender to direct palpation or with range of motion. Knees, ankles, and feet without swelling or tenderness to palpation. She is able to stand up from a chair without assistance and without difficulty   NEURO: UE and LE strengths 5/5 and equal bilaterally.   SKIN: No rash  EXT: No LE edema  PSYCH: Alert. Appropriate.    Labs / Imaging (select studies) "   RF/CCP  Recent Labs   Lab Test  06/26/17   1854  04/20/15   1340  02/02/15   1115   CCPABY   --   <20  Interpretation:  Negative     --    CCPIGG  <1  Negative     --    --    RHF  <20   --   <20     CALVIN  Recent Labs   Lab Test  06/26/17   1854  02/02/15   1115   DOUGIE  <1.0  Interpretation:  Negative    <1.0  Interpretation:  Negative       CBC  Recent Labs   Lab Test  04/20/17   1513  03/17/17   1112  02/07/17   1015  01/02/17   1503  02/05/16   1500   WBC  10.0  9.3   --   11.2*  10.8   RBC  4.19  3.92   --   3.95  3.74*   HGB  13.4  12.9  12.7  12.4  11.8   HCT  40.2  38.4   --   38.1  35.7   MCV  96  98   --   97  96   RDW  12.8  12.8   --   12.7  12.7   PLT  268  242   --   247  313   MCH  32.0  32.9   --   31.4  31.6   MCHC  33.3  33.6   --   32.5  33.1   NEUTROPHIL  84.9  75.2   --    --   63.2   LYMPH  11.0  14.2   --    --   25.9   MONOCYTE  3.5  7.2   --    --   9.5   EOSINOPHIL  0.3  3.1   --    --   1.1   BASOPHIL  0.3  0.3   --    --   0.3   ANEU  8.5*  7.0   --    --   6.8   ALYM  1.1  1.3   --    --   2.8   LAURENCE  0.4  0.7   --    --   1.0   AEOS  0.0  0.3   --    --   0.1   ABAS  0.0  0.0   --    --   0.0     CMP  Recent Labs   Lab Test  06/26/17   1854 01/02/17   1503  10/04/16   1145  02/05/16   1500   12/02/15   0901  03/27/15   1419  08/26/11   NA  142  139  138  140   < >  145*  139   < >  140   POTASSIUM  4.1  3.8  4.1  3.8   --   3.9  4.1   < >  4.5   CHLORIDE  108  103  107  106   --   110*  107   < >  106   CO2  28  27  25  29   --   28  28   < >   --    ANIONGAP  6  9  6  5   --   7  4   < >  11.5   GLC  81  89  79  70   --   87  118*   < >  91   BUN  16  15  16  18   --   13  22   < >  10   CR  0.61  0.70  0.65  0.68   --   0.66  0.78   < >  0.9   GFRESTIMATED  >90  Non  GFR Calc    80  88  84   --   86  71   < >   --    GFRESTBLACK  >90   GFR Calc    >90   GFR Calc    >90   GFR Calc    >90   GFR Calc     --    >90   GFR Calc    86   < >   --    ALEJANDRO  8.7  9.0  8.7  9.1   --   8.8  9.1   < >  9.3   BILITOTAL  0.5   --    --   0.4   --    --   0.5   --   0.5   ALBUMIN  3.8   --    --   3.5   --    --   3.6   --   4.1   PROTTOTAL  6.9   --    --   6.9   --    --   6.8   --   6.9   ALKPHOS  62   --    --   72   --    --   93   --   106   AST  18   --   16  15   --   15  15   < >  25   ALT  27   --   23  18   --   30  27   < >  40    < > = values in this interval not displayed.     HgA1c  Recent Labs   Lab Test  10/04/16   1145   A1C  5.2     Calcium/VitaminD  Recent Labs   Lab Test  06/26/17   1854  01/02/17   1503  10/04/16   1145  02/05/16   1500   10/08/13   1031  08/01/12   1018   ALEJANDRO  8.7  9.0  8.7  9.1   < >  9.5  9.2   VITDT   --    --    --   39   --   49  35    < > = values in this interval not displayed.     ESR/CRP  Recent Labs   Lab Test  10/23/17   1032  10/18/17   1154  06/26/17   1854   SED  8  8  6   CRP  12.6*  7.1  5.2     CK/Aldolase  Recent Labs   Lab Test  02/05/16   1500  03/27/15   1419  10/08/13   1031   CKT  41  47  44     TSH/T4  Recent Labs   Lab Test  06/26/17   1854  02/05/16   1500  11/18/14   0937   TSH  0.91  1.31  1.42     UA  Recent Labs   Lab Test  01/04/17   0850  01/02/17   1459   COLOR  Yellow  Yellow   APPEARANCE  Clear  Slightly Cloudy   URINEGLC  Negative  Negative   URINEBILI  Negative  Negative   SG  1.015  <=1.005   URINEPH  6.0  6.0   PROTEIN  Negative  Negative   UROBILINOGEN  0.2  0.2   NITRITE  Negative  Negative   UBLD  Small*  Trace*   LEUKEST  Negative  Small*   WBCU  O - 2  5-10*   RBCU  2-5*  2-5*   SQUAMOUSEPI  Few  Few     Urine Microscopic  Recent Labs   Lab Test  01/04/17   0850  01/02/17   1459   WBCU  O - 2  5-10*   RBCU  2-5*  2-5*   SQUAMOUSEPI  Few  Few     Immunization History     Immunization History   Administered Date(s) Administered     Influenza (High Dose) 3 valent vaccine 10/08/2013, 10/22/2014, 10/05/2015, 10/04/2016, 09/08/2017      Influenza (IIV3) 10/26/2010, 10/24/2011, 10/23/2012     Pneumococcal (PCV 13) 11/03/2015     Pneumococcal 23 valent 12/16/2004     Tdap (Adacel,Boostrix) 07/21/2010     Zoster vaccine, live 06/24/2009          Chart documentation done in part with Dragon Voice recognition Software. Although reviewed after completion, some word and grammatical error may remain.    Dale Marquis MD

## 2017-11-13 ENCOUNTER — THERAPY VISIT (OUTPATIENT)
Dept: PHYSICAL THERAPY | Facility: CLINIC | Age: 82
End: 2017-11-13
Payer: COMMERCIAL

## 2017-11-13 DIAGNOSIS — R26.9 ABNORMAL GAIT: ICD-10-CM

## 2017-11-13 DIAGNOSIS — M54.59 MECHANICAL LOW BACK PAIN: ICD-10-CM

## 2017-11-13 PROCEDURE — 97140 MANUAL THERAPY 1/> REGIONS: CPT | Mod: GP | Performed by: PHYSICAL THERAPIST

## 2017-11-13 PROCEDURE — 97112 NEUROMUSCULAR REEDUCATION: CPT | Mod: GP | Performed by: PHYSICAL THERAPIST

## 2017-11-13 PROCEDURE — 97110 THERAPEUTIC EXERCISES: CPT | Mod: GP | Performed by: PHYSICAL THERAPIST

## 2017-11-22 ENCOUNTER — THERAPY VISIT (OUTPATIENT)
Dept: PHYSICAL THERAPY | Facility: CLINIC | Age: 82
End: 2017-11-22
Payer: COMMERCIAL

## 2017-11-22 DIAGNOSIS — R26.9 ABNORMAL GAIT: ICD-10-CM

## 2017-11-22 DIAGNOSIS — M54.59 MECHANICAL LOW BACK PAIN: ICD-10-CM

## 2017-11-22 PROCEDURE — 97140 MANUAL THERAPY 1/> REGIONS: CPT | Mod: GP

## 2017-11-22 PROCEDURE — 97110 THERAPEUTIC EXERCISES: CPT | Mod: GP

## 2017-11-28 ENCOUNTER — THERAPY VISIT (OUTPATIENT)
Dept: PHYSICAL THERAPY | Facility: CLINIC | Age: 82
End: 2017-11-28
Payer: COMMERCIAL

## 2017-11-28 DIAGNOSIS — R26.9 ABNORMAL GAIT: ICD-10-CM

## 2017-11-28 DIAGNOSIS — M54.59 MECHANICAL LOW BACK PAIN: ICD-10-CM

## 2017-11-28 PROCEDURE — 97140 MANUAL THERAPY 1/> REGIONS: CPT | Mod: GP | Performed by: PHYSICAL THERAPIST

## 2017-11-28 PROCEDURE — 97110 THERAPEUTIC EXERCISES: CPT | Mod: GP | Performed by: PHYSICAL THERAPIST

## 2017-12-07 ENCOUNTER — THERAPY VISIT (OUTPATIENT)
Dept: PHYSICAL THERAPY | Facility: CLINIC | Age: 82
End: 2017-12-07
Payer: COMMERCIAL

## 2017-12-07 DIAGNOSIS — R26.9 ABNORMAL GAIT: ICD-10-CM

## 2017-12-07 DIAGNOSIS — M54.59 MECHANICAL LOW BACK PAIN: ICD-10-CM

## 2017-12-07 PROCEDURE — 97140 MANUAL THERAPY 1/> REGIONS: CPT | Mod: GP | Performed by: PHYSICAL THERAPIST

## 2017-12-07 PROCEDURE — 97110 THERAPEUTIC EXERCISES: CPT | Mod: GP | Performed by: PHYSICAL THERAPIST

## 2017-12-07 NOTE — MR AVS SNAPSHOT
After Visit Summary   12/7/2017    Ailyn Trevino    MRN: [id number]           Patient Information     Date Of Birth          1935        Visit Information        Provider Department      12/7/2017 12:40 PM Pj Magana, PT [facility]        Today's Diagnoses     Mechanical low back pain        Abnormal gait           Follow-ups after your visit        Who to contact     If you have questions or need follow up information about today's clinic visit or your schedule please contact [facility] directly at [phone].  Normal or non-critical lab and imaging results will be communicated to you by [employer], letter or phone within 4 business days after the clinic has received the results. If you do not hear from us within 7 days, please contact the clinic through [employer] or phone. If you have a critical or abnormal lab result, we will notify you by phone as soon as possible.  Submit refill requests through [employer] or call your pharmacy and they will forward the refill request to us. Please allow 3 business days for your refill to be completed.          Additional Information About Your Visit        MyChart Information     [employer] gives you secure access to your electronic health record. If you see a primary care provider, you can also send messages to your care team and make appointments. If you have questions, please call your primary care clinic.  If you do not have a primary care provider, please call [phone] and they will assist you.        Care EveryWhere ID     This is your Care EveryWhere ID. This could be used by other organizations to access your [place] medical records  [id number]         Blood Pressure from Last 3 Encounters:   11/09/17 126/54   10/18/17 117/46   10/13/17 104/62    Weight from Last 3 Encounters:   11/09/17 68.2 kg (150 lb 6.4 oz)   10/18/17 67.9 kg (149 lb 12.8 oz)   10/13/17 68 kg (150 lb)              We Performed the Following     MANUAL THER  TECH,1+[facility], 15 MIN     THERAPEUTIC EXERCISES        Primary Care Provider Office Phone # Fax #    Maddi Church [phone]       [address]  [place] [address]        Equal Access to Services     NHUNGANETTE NA : Hadii aad ku hadasho Soomaali, waaxda luqadaha, qaybta kaalmada adeegyada, waxay idiin hayaan adegenny fisher latessyn . So [facility] [phone].    ATENCIÓN: Si habla español, tiene a valdez disposición servicios gratuitos de asistencia lingüística. Llame al [phone].    We comply with applicable federal civil rights laws and [place] laws. We do not discriminate on the basis of race, color, national origin, age, disability, sex, sexual orientation, or gender identity.            Thank you!     Thank you for choosing [facility]  for your care. Our goal is always to provide you with excellent care. Hearing back from our patients is one way we can continue to improve our services. Please take a few minutes to complete the written survey that you may receive in the mail after your visit with us. Thank you!             Your Updated Medication List - Protect others around you: Learn how to safely use, store and throw away your medicines at www.disposemymeds.org.          This list is accurate as of: 12/7/17  1:27 PM.  Always use your most recent med list.                   Brand Name Dispense Instructions for use Diagnosis    alendronate 70 MG tablet    FOSAMAX    12 tablet    Take 1 tablet (70 mg) by mouth every 7 days Take with over 8 ounces water and stay upright for at least 30 minutes after dose.  Take at least 60 minutes before breakfast    Osteopenia       aspirin 325 MG EC tablet      Take 325 mg by mouth daily Reported on 3/17/2017    Health Care Home, Status post total left knee replacement       atenolol 25 MG tablet    TENORMIN    90 tablet    TAKE 1 TABLET (25 MG) BY MOUTH DAILY    Benign essential hypertension       CALCIUM + D 500-1000-40 MG-UNT-MCG Chew    Generic drug:  Calcium-Vitamin D-Vitamin K      Take 1 tablet by mouth 2 times daily Reported on 4/20/2017        CENTRUM SILVER ADULT 50+ PO      Take 1 tablet by mouth daily Reported on 4/20/2017        citalopram 20 MG tablet    celeXA    90 tablet    TAKE 1 TABLET (20 MG) BY MOUTH DAILY    Major depressive disorder, single episode, moderate (H)       HYDROcodone-acetaminophen 5-325 MG per tablet    NORCO    90 tablet    Take 1 tablet by mouth every 8 hours as needed for moderate to severe pain maximum 3 tablet(s) per day    Chronic pain of left knee       ipratropium 0.06 % spray    ATROVENT    1 Box    Spray 2 sprays into both nostrils 4 times daily as needed for rhinitis    Chronic rhinitis       * order for DME     1 Device    Equipment being ordered: blood pressure machine    Orthostatic hypotension       * order for DME     1 Device    Equipment being ordered: 10-20 mm Hg knee high compression stockings    Venous stasis dermatitis of left lower extremity       * predniSONE 1 MG tablet    DELTASONE     Take 3 mg by mouth daily Taper as directed    PMR (polymyalgia rheumatica) (H)       * predniSONE 5 MG tablet    DELTASONE    120 tablet    Take 1 tablet (5 mg) by mouth daily    PMR (polymyalgia rheumatica) (H)       senna-docusate 8.6-50 MG per tablet    SENOKOT-S;PERICOLACE     Take 2 tablets by mouth 2 times daily Reported on 4/20/2017    Health Care Home, Status post total left knee replacement       triamcinolone 0.1 % cream    KENALOG    45 g    Apply sparingly to affected area two times daily for 7 days.    Rash       TYLENOL 500 MG tablet   Generic drug:  acetaminophen      Take 2 tablets (1,000 mg) by mouth every 6 hours as needed (Max acetaminophen dose: 4000mg in 24 hrs.)    Health Care Home, Status post total left knee replacement       vitamin D 1000 UNITS capsule      Take 1 capsule by mouth daily Reported on 4/20/2017        * Notice:  This list has 4 medication(s) that are the same as other  medications prescribed for you. Read the directions carefully, and ask your doctor or other care provider to review them with you.

## 2017-12-08 DIAGNOSIS — M35.3 PMR (POLYMYALGIA RHEUMATICA) (H): ICD-10-CM

## 2017-12-08 DIAGNOSIS — M54.59 MECHANICAL LOW BACK PAIN: ICD-10-CM

## 2017-12-08 LAB
CRP SERPL-MCNC: 5.4 MG/L (ref 0–8)
ERYTHROCYTE [SEDIMENTATION RATE] IN BLOOD BY WESTERGREN METHOD: 8 MM/H (ref 0–30)

## 2017-12-08 PROCEDURE — 36415 COLL VENOUS BLD VENIPUNCTURE: CPT | Performed by: INTERNAL MEDICINE

## 2017-12-08 PROCEDURE — 86140 C-REACTIVE PROTEIN: CPT | Performed by: INTERNAL MEDICINE

## 2017-12-08 PROCEDURE — 85652 RBC SED RATE AUTOMATED: CPT | Performed by: INTERNAL MEDICINE

## 2017-12-16 ENCOUNTER — TELEPHONE (OUTPATIENT)
Dept: FAMILY MEDICINE | Facility: CLINIC | Age: 82
End: 2017-12-16

## 2017-12-16 DIAGNOSIS — E78.5 HYPERLIPIDEMIA LDL GOAL <130: Primary | ICD-10-CM

## 2017-12-16 DIAGNOSIS — E78.00 HYPERCHOLESTEROLEMIA: ICD-10-CM

## 2017-12-18 NOTE — TELEPHONE ENCOUNTER
Addended by: SHER BRADFORD on: 5/15/2017 04:15 PM     Modules accepted: Orders     simvastatin (ZOCOR) 10 MG tablet (Discontinued)      Last Written Prescription Date:  10/4/2016  Last Fill Quantity: 90,   # refills: 3  Last Office Visit: 10/13/2017  Future Office visit:       Routing refill request to provider for review/approval because:  Drug not active on patient's medication list

## 2017-12-19 NOTE — TELEPHONE ENCOUNTER
Spoke with pt. She did not initiate this request. She stopped taking the simvastatin on 6/26/17 due to aches and pains. She did not restart this. Going off of the medication helped the aches and pains go away. Now is off of the prednisone as well.     Should she have labs done to see where her lipids are at now? Please advise.    Brandi Weeks RN  [facility]

## 2017-12-20 RX ORDER — SIMVASTATIN 10 MG
TABLET ORAL
Qty: 90 TABLET | Refills: 2 | OUTPATIENT
Start: 2017-12-20

## 2017-12-20 NOTE — TELEPHONE ENCOUNTER
Pt notified of provider's message. Lab only appt. Scheduled for 1/3/18.    Called pharmacy and cancelled refill on Simvastatin as pt is not taking this at this time.    Brandi Weeks RN  [facility]

## 2018-01-03 DIAGNOSIS — E78.5 HYPERLIPIDEMIA LDL GOAL <130: ICD-10-CM

## 2018-01-03 DIAGNOSIS — E78.00 HYPERCHOLESTEROLEMIA: ICD-10-CM

## 2018-01-03 LAB
CHOLEST SERPL-MCNC: 216 MG/DL
HDLC SERPL-MCNC: 53 MG/DL
LDLC SERPL CALC-MCNC: 134 MG/DL
NONHDLC SERPL-MCNC: 163 MG/DL
TRIGL SERPL-MCNC: 144 MG/DL

## 2018-01-03 PROCEDURE — 36415 COLL VENOUS BLD VENIPUNCTURE: CPT | Performed by: INTERNAL MEDICINE

## 2018-01-03 PROCEDURE — 80061 LIPID PANEL: CPT | Performed by: INTERNAL MEDICINE

## 2018-01-17 ENCOUNTER — TELEPHONE (OUTPATIENT)
Dept: RHEUMATOLOGY | Facility: CLINIC | Age: 83
End: 2018-01-17

## 2018-01-17 NOTE — TELEPHONE ENCOUNTER
Reason for call:  Other   Patient called regarding (reason for call): call back  Additional comments: Aching all over body since no prednisone; please contact with next steps.      Phone number to reach patient:  Home number on file [phone] (home)    Best Time:  ASAP    Can we leave a detailed message on this number?  YES

## 2018-01-19 NOTE — TELEPHONE ENCOUNTER
Rheumatology team: Please call Ms. Trevino to schedule an appointment within the next 2 weeks.  If no availability then discuss with me.    Dale Marquis MD  1/19/2018 12:27 AM

## 2018-01-22 NOTE — TELEPHONE ENCOUNTER
Appointment made for patient for 1/24/2018 at 8:20. Patient was happy.  Mesha Nieves CMA  1/22/2018 10:44 AM

## 2018-01-24 ENCOUNTER — OFFICE VISIT (OUTPATIENT)
Dept: RHEUMATOLOGY | Facility: CLINIC | Age: 83
End: 2018-01-24
Payer: COMMERCIAL

## 2018-01-24 ENCOUNTER — RADIANT APPOINTMENT (OUTPATIENT)
Dept: GENERAL RADIOLOGY | Facility: CLINIC | Age: 83
End: 2018-01-24
Attending: INTERNAL MEDICINE
Payer: COMMERCIAL

## 2018-01-24 VITALS
WEIGHT: 151.2 LBS | HEART RATE: 60 BPM | BODY MASS INDEX: 30.48 KG/M2 | SYSTOLIC BLOOD PRESSURE: 149 MMHG | OXYGEN SATURATION: 94 % | DIASTOLIC BLOOD PRESSURE: 69 MMHG | HEIGHT: 59 IN

## 2018-01-24 DIAGNOSIS — M06.4 INFLAMMATORY POLYARTHROPATHY (H): ICD-10-CM

## 2018-01-24 DIAGNOSIS — M06.4 INFLAMMATORY POLYARTHROPATHY (H): Primary | ICD-10-CM

## 2018-01-24 DIAGNOSIS — Z79.899 HIGH RISK MEDICATIONS (NOT ANTICOAGULANTS) LONG-TERM USE: ICD-10-CM

## 2018-01-24 LAB
ALBUMIN SERPL-MCNC: 3.7 G/DL (ref 3.4–5)
ALP SERPL-CCNC: 78 U/L (ref 40–150)
ALT SERPL W P-5'-P-CCNC: 26 U/L (ref 0–50)
AST SERPL W P-5'-P-CCNC: 17 U/L (ref 0–45)
BASOPHILS # BLD AUTO: 0 10E9/L (ref 0–0.2)
BASOPHILS NFR BLD AUTO: 0.5 %
BILIRUB DIRECT SERPL-MCNC: 0.1 MG/DL (ref 0–0.2)
BILIRUB SERPL-MCNC: 0.5 MG/DL (ref 0.2–1.3)
CREAT SERPL-MCNC: 0.61 MG/DL (ref 0.52–1.04)
CRP SERPL-MCNC: 6.3 MG/L (ref 0–8)
DIFFERENTIAL METHOD BLD: NORMAL
EOSINOPHIL # BLD AUTO: 0.3 10E9/L (ref 0–0.7)
EOSINOPHIL NFR BLD AUTO: 4.6 %
ERYTHROCYTE [DISTWIDTH] IN BLOOD BY AUTOMATED COUNT: 12.7 % (ref 10–15)
ERYTHROCYTE [SEDIMENTATION RATE] IN BLOOD BY WESTERGREN METHOD: 7 MM/H (ref 0–30)
GFR SERPL CREATININE-BSD FRML MDRD: >90 ML/MIN/1.7M2
HBV CORE AB SERPL QL IA: NONREACTIVE
HBV SURFACE AG SERPL QL IA: NONREACTIVE
HCT VFR BLD AUTO: 39.8 % (ref 35–47)
HCV AB SERPL QL IA: NONREACTIVE
HGB BLD-MCNC: 13.3 G/DL (ref 11.7–15.7)
LYMPHOCYTES # BLD AUTO: 1.4 10E9/L (ref 0.8–5.3)
LYMPHOCYTES NFR BLD AUTO: 22 %
MCH RBC QN AUTO: 31.7 PG (ref 26.5–33)
MCHC RBC AUTO-ENTMCNC: 33.4 G/DL (ref 31.5–36.5)
MCV RBC AUTO: 95 FL (ref 78–100)
MONOCYTES # BLD AUTO: 0.7 10E9/L (ref 0–1.3)
MONOCYTES NFR BLD AUTO: 11.7 %
NEUTROPHILS # BLD AUTO: 3.9 10E9/L (ref 1.6–8.3)
NEUTROPHILS NFR BLD AUTO: 61.2 %
PLATELET # BLD AUTO: 253 10E9/L (ref 150–450)
PROT SERPL-MCNC: 6.8 G/DL (ref 6.8–8.8)
RBC # BLD AUTO: 4.2 10E12/L (ref 3.8–5.2)
WBC # BLD AUTO: 6.3 10E9/L (ref 4–11)

## 2018-01-24 PROCEDURE — 82565 ASSAY OF CREATININE: CPT | Performed by: INTERNAL MEDICINE

## 2018-01-24 PROCEDURE — 99214 OFFICE O/P EST MOD 30 MIN: CPT | Performed by: INTERNAL MEDICINE

## 2018-01-24 PROCEDURE — 87340 HEPATITIS B SURFACE AG IA: CPT | Performed by: INTERNAL MEDICINE

## 2018-01-24 PROCEDURE — 85025 COMPLETE CBC W/AUTO DIFF WBC: CPT | Performed by: INTERNAL MEDICINE

## 2018-01-24 PROCEDURE — 36415 COLL VENOUS BLD VENIPUNCTURE: CPT | Performed by: INTERNAL MEDICINE

## 2018-01-24 PROCEDURE — 86704 HEP B CORE ANTIBODY TOTAL: CPT | Performed by: INTERNAL MEDICINE

## 2018-01-24 PROCEDURE — 85652 RBC SED RATE AUTOMATED: CPT | Performed by: INTERNAL MEDICINE

## 2018-01-24 PROCEDURE — 80076 HEPATIC FUNCTION PANEL: CPT | Performed by: INTERNAL MEDICINE

## 2018-01-24 PROCEDURE — 86140 C-REACTIVE PROTEIN: CPT | Performed by: INTERNAL MEDICINE

## 2018-01-24 PROCEDURE — 73130 X-RAY EXAM OF HAND: CPT | Mod: RT

## 2018-01-24 PROCEDURE — 86803 HEPATITIS C AB TEST: CPT | Performed by: INTERNAL MEDICINE

## 2018-01-24 RX ORDER — FOLIC ACID 1 MG/1
1 TABLET ORAL DAILY
Qty: 100 TABLET | Refills: 1 | Status: SHIPPED | OUTPATIENT
Start: 2018-01-24 | End: 2018-08-13

## 2018-01-24 RX ORDER — PREDNISONE 1 MG/1
TABLET ORAL
Qty: 180 TABLET | Refills: 0 | Status: SHIPPED | OUTPATIENT
Start: 2018-01-24 | End: 2018-04-21

## 2018-01-24 RX ORDER — METHOTREXATE 2.5 MG/1
15 TABLET ORAL WEEKLY
Qty: 24 TABLET | Refills: 3 | Status: SHIPPED | OUTPATIENT
Start: 2018-01-24 | End: 2018-05-18

## 2018-01-24 NOTE — PROGRESS NOTES
"Rheumatology Clinic Visit      Ailyn Trevino MRN# [id number]   YOB: 1935 Age: 82 year old      Date of visit: 1/24/18   PCP: Dr. Maddi Church    Chief Complaint   Patient presents with:  RECHECK: patient states her wrists and hands hurt, neck and both sides of chest area hurt. Pain is getting worse    Assessment and Plan     1.  Inflammatory arthritis / PMR: Based on historical records, I agreed that she most likely had polymyalgia rheumatica. PMR was steroid responsive and she has been able to reduce her prednisone dose to 3 mg daily without issue, but then had a \"flare\" involving back pain that radiated around to include just over her bilateral lower rib cage. At the time of her flare in April 2017, the ESR and CRP were normal. She was also having hip pain at that time; she had her left TKA performed in January 2017. Prednisone dose was increased at that time with resolution of her pain. I suspected that the pain she was having at that time was due to degenerative spine change rather than PMR.  11/1/2017 MRI of the T-spine showed minimal disc space narrowing at several levels in the mid and lower thoracic spine, minimal osteophyte formation or disc protrusions at several levels, but no stenosis.  Large right renal cyst and several small left renal cysts incidentally seen - I advised her to discuss this finding with her PCP. She has degenerative changes seen on a lumbar spine MRI that was performed at [facility], per a clinic note from [facility] orthopedics.  Prednisone was tapered off over time without cues worsening of her symptoms.  Today, she returns with bilateral wrist swelling and tenderness palpation; also some pain in the back of her hand.  Continues to have neck pain and back pain.  Given this finding, I suspect that she has an inflammatory arthritis and because she did well previously with prednisone 3 mg daily will restart this with plans to taper over time as methotrexate " becomes effective.  Methotrexate will be used as a steroid sparing DMARD.    - Start methotrexate 15 mg once weekly  - Start folic acid 1 mg daily  - Start prednisone 3 mg daily ×30 days, then 2 mg daily ×30 days, then 1 mg daily ×30 days, then stop  - X-rays today: bilateral hands  - Labs today: CBC, creatinine, hepatic panel, ESR, CRP, hepatitis B/C  - Labs monthly: CBC, creatinine, hepatic panel  - Labs 2-3 days prior to the next rheumatology clinic visit: CBC, Creatinine, Hepatic Panel, ESR, CRP    # Methotrexate Risks and Benefits: The risks and benefits of methotrexate were discussed in detail and the patient verbalized understanding.  The risks discussed include, but are not limited to, the risk for hypersensitivity, anaphylaxis, anaphylactoid reactions, infections, bone marrow suppression, renal toxicity, hepatotoxicity, pulmonary toxicity, malignancy, impaired fertility, GI upset, alopecia, and oral and nasal sores.  Folic acid supplementation is recommended during methotrexate therapy to help prevent some of the side effects. Pregnancy prevention and planning was discussed; it is recommended that women of childbearing potential use reliable contraception during therapy.  The risks of taking both methotrexate and alcohol were reviewed; complete alcohol avoidance was discussed.  Routine laboratory monitoring is required during methotrexate therapy. Taking MTX once weekly, all within a 24 hour period was stressed and the patient verbalized this instruction back to me.  I encouraged reviewing the package insert and asking any questions about the medication.    # Prednisone Risks and Benefits: The risks and benefits of prednisone were discussed in detail and the patient verbalized understanding.  The risks discussed include, but are not limited to, weight gain, fluid retention, impaired wound healing, hyperglycemia, adrenal suppression, GI upset, peptic ulcer, hepatotoxicity, aseptic necrosis of the femoral and  "humeral heads, osteoporosis, myopathy, tendon rupture (particularly Achilles tendon), ocular changes including an increased intraocular pressure.  I encouraged reviewing the package insert and asking any questions about the medication.      Ms. Trevino verbalized agreement with and understanding of the rational for the diagnosis and treatment plan.  All questions were answered to best of my ability and the patient's satisfaction. Ms. Trevino was advised to contact the clinic with any questions that may arise after the clinic visit.      Thank you for involving me in the care of the patient    Return to clinic: 3 months      HPI   Ailyn Trevino is a 82 year old female with a past medical history significant for hyperlipidemia, granulomatous lung disease, aortic valve disorder, mitral valve disorder, impaired fasting glucose, polymyalgia rheumatica, and depression who presents for f/u of arthritis.     4/20/2015 rheumatology clinic note by Dr. Bud Husrt at the [facility] documents the patient does not have signs or symptoms of GCA. Significant myalgias in March 2015 in her shoulders and popliteal area, but not in the hip area. Cortisone shots in the hip in March 2015. Elevated CRP and mildly abnormal sedimentation rate. Morning stiffness for about 2 weeks. 2 hours to loosen up. Difficulty lifting her arms above her shoulders. CK was normal. Prednisone 20 mg daily \"helped about almost completely in 4 days' time\"    In 10/2017, Ms. Trevino reported that she was initially diagnosed with PMR and treated with steroids that were very effective. At that time, she reports having some difficulty raising her arms above her head. She says that she has always had some hip and knee pain that was thought to be secondary to her left knee osteoarthritis. She had a left total knee arthroplasty in January 2017. She also reports having flat feet bilaterally. She has been reducing prednisone slowly over " time, and was on prednisone 3 mg daily at that time. When she was evaluated by Dr. Christopher  on 4/20/2017 it was noted that she was having pain in her rib cage, back, and hips. She had just reduced prednisone and today she says at that time she was on 1 mg daily, so the dose was increased to 20 mg a day with resolution of her pain. She feels like she has still doing well while on prednisone 3 mg daily. She still has some thoracic back pain. No difficult or raising her arms above her head today. No difficulty standing up from a low seated position. No jaw claudication, scalp tenderness, headaches, or vision changes.she also reports having some right groin pain that she thinks is due to altered gait because of her knee issues. She also reports having lower back pain that was addressed at [facility] with an epidural steroid injection that was not effective. This was at the direction of her orthopedic surgeon who is at [facility]. She reports having had an MRI of her lumbar spine at [facility]. 8/1/2017 clinic note by Prosper Leonardo at [facility] orthopedics documents that an MRI showed multilevel degenerative changes namely L4-L5 central canal compromise and severe right lateral recess stenosis. Currently without hand pain, but she says that she has had some pain in her fingers from time to time. She denies having stiffness in her hands.     She then had an MRI of the thoracic spine showing degenerative changes of the thoracic spine    Today, she returns complaining of bilateral wrist pain, also some pain across the back of her hand.  Pain is worse in the morning with morning stiffness for at least one hour.  Stiffness improves with activity.  Pain in her hands improves with activity.  She continues to have pain in her lower back, upper back, mid back, and neck.  spine pain is worse with activity and improves with rest.  She is following with an orthopedic surgeon for her back.  No difficulty  raising her arms above her head or standing up from a low seated position.  Denies jaw claudication, scalp tenderness, vision change, new headache, difficulty standing up from a chair, or difficulty raising her arms above her head.  Still on prednisone 3mg daily. No wrist pain currently.    Denies fevers, chills, nausea, vomiting, constipation, diarrhea. No abdominal pain. No chest pain/pressure, palpitations, or shortness of breath. No LE swelling. No neck pain. No oral or nasal sores.  No rash. No sicca symptoms. No photosensitivity or photophobia. No eye pain or redness. No history of inflammatory eye disease.  No history of DVT, pulmonary embolism, or miscarriage.   No history of serositis.  No history of Raynaud's Phenomenon.  seizure history No known renal disorder.      Tobacco:  None  EtOH: No more than 1 drink per day  Drugs:   Occupation: used to work as a , retired now    ROS   GEN: No fevers, chills, night sweats, or weight change  SKIN: No itching, rashes, sores  HEENT: No oral or nasal ulcers.  CV: No chest pain, pressure, palpitations, or dyspnea on exertion.  PULM: No SOB, wheeze, cough.  GI: No nausea, vomiting, constipation, diarrhea. No blood in stool. No abdominal pain.  : No blood in urine.  MSK: See HPI.  NEURO: See HPI  ENDO: No heat/cold intolerance.  EXT: No LE swelling  PSYCH: Negative    Active Problem List     Patient Active Problem List   Diagnosis     Advanced directives, counseling/discussion     Osteopenia     Palpitations     Hyperlipidemia LDL goal <130     Renal cyst     Granulomatous lung disease (H)     Health Care Home     Insomnia     Osteoarthritis of knee     Aortic valve disorder     Mitral valve disorder     Impaired fasting glucose     PMR (polymyalgia rheumatica) (H)     Encounter for long-term current use of medication     Other chronic pain     Major depressive disorder, single episode, mild (H)     Current chronic use of systemic steroids     Fatigue,  unspecified type     Chronic pain of left knee     Venous stasis dermatitis of left lower extremity     Fecal urgency     Mechanical low back pain     Abnormal gait     Past Medical History     Past Medical History:   Diagnosis Date     Granulomatous lung disease (H) 8/7/2012     High cholesterol      Insomnia 10/8/2013    Benadryl gives her RLS     Osteoarthritis of knee 10/8/2013    Sees Dr. Whaley     Osteopenia 7/31/2012     Other chronic pain 8/3/2015    Patient is followed by KIARA RAINES for ongoing prescription of pain medication.  All refills should be approved by this provider, or covering partner.  Medication(s): Hydrocodone/APAP.  Maximum quantity per month: 30 Clinic visit frequency required: Q 3 months   Controlled substance agreement on file: Yes      Date(s): 8/3/15  Pain Clinic evaluation in the past: No  DIRE Total Score(s): No fl     Renal cyst 8/7/2012     Past Surgical History     Past Surgical History:   Procedure Laterality Date     APPENDECTOMY  1951     CATARACT IOL, RT/LT  2010    bilateral      CHOLECYSTECTOMY  2010     KNEE SURGERY Left 01/2017     SURGICAL HISTORY OF -   12/13    bilateral YAG laser surgery of eyes     Allergy   No Known Allergies  Current Medication List     Current Outpatient Prescriptions   Medication Sig     IBUPROFEN PO      atenolol (TENORMIN) 25 MG tablet TAKE 1 TABLET (25 MG) BY MOUTH DAILY     HYDROcodone-acetaminophen (NORCO) 5-325 MG per tablet Take 1 tablet by mouth every 8 hours as needed for moderate to severe pain maximum 3 tablet(s) per day     citalopram (CELEXA) 20 MG tablet TAKE 1 TABLET (20 MG) BY MOUTH DAILY     senna-docusate (SENOKOT-S;PERICOLACE) 8.6-50 MG per tablet Take 2 tablets by mouth 2 times daily Reported on 4/20/2017     Calcium-Vitamin D-Vitamin K (CALCIUM + D) 500-1000-40 MG-UNT-MCG CHEW Take 1 tablet by mouth 2 times daily Reported on 4/20/2017     Cholecalciferol (VITAMIN D) 1000 UNITS capsule Take 1 capsule by mouth daily  Reported on 4/20/2017     Multiple Vitamins-Minerals (CENTRUM SILVER ADULT 50+ PO) Take 1 tablet by mouth daily Reported on 4/20/2017     predniSONE (DELTASONE) 5 MG tablet Take 1 tablet (5 mg) by mouth daily (Patient not taking: Reported on 1/24/2018)     predniSONE (DELTASONE) 1 MG tablet Take 3 mg by mouth daily Taper as directed     alendronate (FOSAMAX) 70 MG tablet Take 1 tablet (70 mg) by mouth every 7 days Take with over 8 ounces water and stay upright for at least 30 minutes after dose.  Take at least 60 minutes before breakfast (Patient not taking: Reported on 1/24/2018)     order for DME Equipment being ordered: 10-20 mm Hg knee high compression stockings     order for DME Equipment being ordered: blood pressure machine     acetaminophen (TYLENOL) 500 MG tablet Take 2 tablets (1,000 mg) by mouth every 6 hours as needed (Max acetaminophen dose: 4000mg in 24 hrs.)     aspirin 325 MG EC tablet Take 325 mg by mouth daily Reported on 3/17/2017     ipratropium (ATROVENT) 0.06 % nasal spray Spray 2 sprays into both nostrils 4 times daily as needed for rhinitis (Patient not taking: Reported on 1/24/2018)     triamcinolone (KENALOG) 0.1 % cream Apply sparingly to affected area two times daily for 7 days. (Patient not taking: Reported on 1/24/2018)     No current facility-administered medications for this visit.        Social History   See HPI    Family History     Family History   Problem Relation Age of Onset     CANCER Mother      CEREBROVASCULAR DISEASE Father      Denies family history of autoimmune disease     Physical Exam     Temp Readings from Last 3 Encounters:   11/09/17 97.9  F (36.6  C) (Oral)   10/18/17 98.9  F (37.2  C) (Oral)   10/13/17 99.1  F (37.3  C) (Oral)     BP Readings from Last 5 Encounters:   01/24/18 147/67   11/09/17 126/54   10/18/17 117/46   10/13/17 104/62   06/26/17 146/46     Pulse Readings from Last 1 Encounters:   01/24/18 68     Resp Readings from Last 1 Encounters:   10/18/17 16  "    Estimated body mass index is 30.54 kg/(m^2) as calculated from the following:    Height as of this encounter: 1.499 m (4' 11\").    Weight as of this encounter: 68.6 kg (151 lb 3.2 oz).    GEN: NAD  HEENT: MMM. No oral lesions. EOMI. Anicteric, noninjected sclera  CV: S1, S2. RRR. No m/r/g.  PULM: CTA bilaterally. No w/c.  MSK: Mild swelling and tenderness palpation of the bilateral wrists.  Also tenderness palpation of the right second MCP and bilateral second PIPs without swelling.  Heberden's and Kecia's nodes present.  Elbows and shoulders without swelling or tenderness to palpation.  Normal range of motion of the shoulders.  She is able to raise her arms above her head without difficulty.   Bilateral hips nontender to direct palpation or with range of motion. Knees, ankles, and feet without swelling or tenderness to palpation. She is able to stand up unassisted from a chair without difficulty.   NEURO: UE and LE strengths 5/5 and equal bilaterally.   SKIN: No rash  EXT: No LE edema  PSYCH: Alert. Appropriate.    Labs / Imaging (select studies)   RF/CCP  Recent Labs   Lab Test  06/26/17   1854  04/20/15   1340  02/02/15   1115   CCPABY   --   <20  Interpretation:  Negative     --    CCPIGG  <1  Negative     --    --    RHF  <20   --   <20     CALVIN  Recent Labs   Lab Test  06/26/17   1854  02/02/15   1115   DOUGIE  <1.0  Interpretation:  Negative    <1.0  Interpretation:  Negative       CBC  Recent Labs   Lab Test  04/20/17   1513  03/17/17   1112  02/07/17   1015  01/02/17   1503  02/05/16   1500   WBC  10.0  9.3   --   11.2*  10.8   RBC  4.19  3.92   --   3.95  3.74*   HGB  13.4  12.9  12.7  12.4  11.8   HCT  40.2  38.4   --   38.1  35.7   MCV  96  98   --   97  96   RDW  12.8  12.8   --   12.7  12.7   PLT  268  242   --   247  313   MCH  32.0  32.9   --   31.4  31.6   MCHC  33.3  33.6   --   32.5  33.1   NEUTROPHIL  84.9  75.2   --    --   63.2   LYMPH  11.0  14.2   --    --   25.9   MONOCYTE  3.5  7.2   -- "    --   9.5   EOSINOPHIL  0.3  3.1   --    --   1.1   BASOPHIL  0.3  0.3   --    --   0.3   ANEU  8.5*  7.0   --    --   6.8   ALYM  1.1  1.3   --    --   2.8   LAURENCE  0.4  0.7   --    --   1.0   AEOS  0.0  0.3   --    --   0.1   ABAS  0.0  0.0   --    --   0.0     CMP  Recent Labs   Lab Test  06/26/17   1854  01/02/17   1503  10/04/16   1145  02/05/16   1500   12/02/15   0901  03/27/15   1419  08/26/11   NA  142  139  138  140   < >  145*  139   < >  140   POTASSIUM  4.1  3.8  4.1  3.8   --   3.9  4.1   < >  4.5   CHLORIDE  108  103  107  106   --   110*  107   < >  106   CO2  28  27  25  29   --   28  28   < >   --    ANIONGAP  6  9  6  5   --   7  4   < >  11.5   GLC  81  89  79  70   --   87  118*   < >  91   BUN  16  15  16  18   --   13  22   < >  10   CR  0.61  0.70  0.65  0.68   --   0.66  0.78   < >  0.9   GFRESTIMATED  >90  Non  GFR Calc    80  88  84   --   86  71   < >   --    GFRESTBLACK  >90   GFR Calc    >90   GFR Calc    >90   GFR Calc    >90   GFR Calc     --   >90   GFR Calc    86   < >   --    ALEJANDRO  8.7  9.0  8.7  9.1   --   8.8  9.1   < >  9.3   BILITOTAL  0.5   --    --   0.4   --    --   0.5   --   0.5   ALBUMIN  3.8   --    --   3.5   --    --   3.6   --   4.1   PROTTOTAL  6.9   --    --   6.9   --    --   6.8   --   6.9   ALKPHOS  62   --    --   72   --    --   93   --   106   AST  18   --   16  15   --   15  15   < >  25   ALT  27   --   23  18   --   30  27   < >  40    < > = values in this interval not displayed.     HgA1c  Recent Labs   Lab Test  10/04/16   1145   A1C  5.2     Calcium/VitaminD  Recent Labs   Lab Test  06/26/17   1854  01/02/17   1503  10/04/16   1145  02/05/16   1500   10/08/13   1031  08/01/12   1018   ALEJANDRO  8.7  9.0  8.7  9.1   < >  9.5  9.2   VITDT   --    --    --   39   --   49  35    < > = values in this interval not displayed.     ESR/CRP  Recent Labs   Lab Test   12/08/17   1532  10/23/17   1032  10/18/17   1154   SED  8  8  8   CRP  5.4  12.6*  7.1     CK/Aldolase  Recent Labs   Lab Test  02/05/16   1500  03/27/15   1419  10/08/13   1031   CKT  41  47  44     TSH/T4  Recent Labs   Lab Test  06/26/17   1854  02/05/16   1500  11/18/14   0937   TSH  0.91  1.31  1.42     Immunization History     Immunization History   Administered Date(s) Administered     Influenza (High Dose) 3 valent vaccine 10/08/2013, 10/22/2014, 10/05/2015, 10/04/2016, 09/08/2017     Influenza (IIV3) PF 10/26/2010, 10/24/2011, 10/23/2012     Pneumo Conj 13-V (2010&after) 11/03/2015     Pneumococcal 23 valent 12/16/2004     Tdap (Adacel,Boostrix) 07/21/2010     Zoster vaccine, live 06/24/2009          Chart documentation done in part with Dragon Voice recognition Software. Although reviewed after completion, some word and grammatical error may remain.    Dale Marquis MD

## 2018-01-24 NOTE — MR AVS SNAPSHOT
After Visit Summary   1/24/2018    Ailyn Trevino    MRN: [id number]           Patient Information     Date Of Birth          1935        Visit Information        Provider Department      1/24/2018 8:20 AM Dale Marquis MD [facility]        Today's Diagnoses     Inflammatory polyarthropathy (H)    -  1       Follow-ups after your visit        Your next 10 appointments already scheduled     Apr 23, 2018 11:00 AM CDT   LAB with  LAB   [facility] [place] ([facility])    [address] [address]   [phone]           Please do not eat 10-12 hours before your appointment if you are coming in fasting for labs on lipids, cholesterol, or glucose (sugar). This does not apply to pregnant women. Water, hot tea and black coffee (with nothing added) are okay. Do not drink other fluids, diet soda or chew gum.            Apr 26, 2018 10:00 AM CDT   Return Visit with Dale Marquis MD   [facility] [place] ([facility]    [address] [facility] [address]   [phone]              Future tests that were ordered for you today     Open Future Orders        Priority Expected Expires Ordered    XR Hand Bilateral G/E 3 Views Routine 1/24/2018 1/24/2019 1/24/2018    Hepatic panel Routine 4/20/2018 5/9/2018 1/24/2018    CRP inflammation Routine 4/20/2018 5/9/2018 1/24/2018    Erythrocyte sedimentation rate auto Routine 4/20/2018 5/9/2018 1/24/2018    Creatinine Routine 4/20/2018 5/9/2018 1/24/2018    CBC with platelets differential Routine 4/20/2018 5/9/2018 1/24/2018            Who to contact     If you have questions or need follow up information about today's clinic visit or your schedule please contact [facility] directly at [phone].  Normal or non-critical lab and imaging results will be communicated to you by MyChart, letter or phone within 4 business days after the clinic has received the results. If  "you do not hear from us within 7 days, please contact the clinic through textmetix or phone. If you have a critical or abnormal lab result, we will notify you by phone as soon as possible.  Submit refill requests through textmetix or call your pharmacy and they will forward the refill request to us. Please allow 3 business days for your refill to be completed.          Additional Information About Your Visit        [employer] Information     [employer]metix gives you secure access to your electronic health record. If you see a primary care provider, you can also send messages to your care team and make appointments. If you have questions, please call your primary care clinic.  If you do not have a primary care provider, please call [phone] and they will assist you.        Care EveryWhere ID     This is your Care EveryWhere ID. This could be used by other organizations to access your [facility] medical records  [id number]        Your Vitals Were     Pulse Height Pulse Oximetry BMI (Body Mass Index)          68 1.499 m (4' 11\") 94% 30.54 kg/m2         Blood Pressure from Last 3 Encounters:   01/24/18 147/67   11/09/17 126/54   10/18/17 117/46    Weight from Last 3 Encounters:   01/24/18 68.6 kg (151 lb 3.2 oz)   11/09/17 68.2 kg (150 lb 6.4 oz)   10/18/17 67.9 kg (149 lb 12.8 oz)              We Performed the Following     CBC with platelets differential     Creatinine     CRP inflammation     Erythrocyte sedimentation rate auto     Hepatic panel     Hepatitis B core antibody     Hepatitis B surface antigen     Hepatitis C Screen Reflex to HCV RNA Quant and Genotype          Today's Medication Changes          These changes are accurate as of 1/24/18  9:11 AM.  If you have any questions, ask your nurse or doctor.               Start taking these medicines.        Dose/Directions    folic acid 1 MG tablet   Commonly known as:  FOLVITE   Used for:  Inflammatory polyarthropathy (H)   Started by:  Dale Marquis MD        Dose:  1 " mg   Take 1 tablet (1 mg) by mouth daily   Quantity:  100 tablet   Refills:  1       methotrexate sodium 2.5 MG Tabs   Used for:  Inflammatory polyarthropathy (H)   Started by:  Dale Marquis MD        Dose:  15 mg   Take 15 mg by mouth once a week . Take all 6 tablets on the same day of each week.   Quantity:  24 tablet   Refills:  3         These medicines have changed or have updated prescriptions.        Dose/Directions    * predniSONE 1 MG tablet   Commonly known as:  DELTASONE   This may have changed:  Another medication with the same name was added. Make sure you understand how and when to take each.   Used for:  PMR (polymyalgia rheumatica) (H)   Changed by:  Dale Marquis MD        Dose:  3 mg   Take 3 mg by mouth daily Taper as directed   Refills:  0       * predniSONE 5 MG tablet   Commonly known as:  DELTASONE   This may have changed:  Another medication with the same name was added. Make sure you understand how and when to take each.   Used for:  PMR (polymyalgia rheumatica) (H)   Changed by:  Dale Marquis MD        Dose:  5 mg   Take 1 tablet (5 mg) by mouth daily   Quantity:  120 tablet   Refills:  3       * predniSONE 1 MG tablet   Commonly known as:  DELTASONE   This may have changed:  You were already taking a medication with the same name, and this prescription was added. Make sure you understand how and when to take each.   Used for:  Inflammatory polyarthropathy (H)   Changed by:  Dale Marquis MD        Prednisone 3mg daily x30days, then 2mg daily x30days, then 1mg daily x30days, then stop.   Quantity:  180 tablet   Refills:  0       * Notice:  This list has 3 medication(s) that are the same as other medications prescribed for you. Read the directions carefully, and ask your doctor or other care provider to review them with you.         Where to get your medicines      These medications were sent to [facility] [address] [place] [facility] [place] - [address]  [address] [place] [address]      Phone:  [phone]     folic acid 1 MG tablet    methotrexate sodium 2.5 MG Tabs    predniSONE 1 MG tablet                Primary Care Provider Office Phone # Fax #    Maddi Church [phone] [address] [facility] [address]        Equal Access to Services     [facility]: Hadii aad ku hadasho Soomaali, waaxda luqadaha, qaybta kaalmada adeegyada, waxay idiin hayaan adegenny fisher laRemingtonteddyn . So [facility] [phone].    ATENCIÓN: Si habla español, tiene a valdez disposición servicios gratuitos de asistencia lingüística. Llame al [phone].    We comply with applicable federal civil rights laws and [place] laws. We do not discriminate on the basis of race, color, national origin, age, disability, sex, sexual orientation, or gender identity.            Thank you!     Thank you for choosing [facility]  for your care. Our goal is always to provide you with excellent care. Hearing back from our patients is one way we can continue to improve our services. Please take a few minutes to complete the written survey that you may receive in the mail after your visit with us. Thank you!             Your Updated Medication List - Protect others around you: Learn how to safely use, store and throw away your medicines at www.disposemymeds.org.          This list is accurate as of 1/24/18  9:11 AM.  Always use your most recent med list.                   Brand Name Dispense Instructions for use Diagnosis    alendronate 70 MG tablet    FOSAMAX    12 tablet    Take 1 tablet (70 mg) by mouth every 7 days Take with over 8 ounces water and stay upright for at least 30 minutes after dose.  Take at least 60 minutes before breakfast    Osteopenia       aspirin 325 MG EC tablet      Take 325 mg by mouth daily Reported on 3/17/2017    Health Care Home, Status post total left knee replacement       atenolol 25 MG tablet    TENORMIN    90 tablet    TAKE 1 TABLET (25 MG) BY MOUTH DAILY     Benign essential hypertension       CALCIUM + D 500-1000-40 MG-UNT-MCG Chew   Generic drug:  Calcium-Vitamin D-Vitamin K      Take 1 tablet by mouth 2 times daily Reported on 4/20/2017        CENTRUM SILVER ADULT 50+ PO      Take 1 tablet by mouth daily Reported on 4/20/2017        citalopram 20 MG tablet    celeXA    90 tablet    TAKE 1 TABLET (20 MG) BY MOUTH DAILY    Major depressive disorder, single episode, moderate (H)       folic acid 1 MG tablet    FOLVITE    100 tablet    Take 1 tablet (1 mg) by mouth daily    Inflammatory polyarthropathy (H)       HYDROcodone-acetaminophen 5-325 MG per tablet    NORCO    90 tablet    Take 1 tablet by mouth every 8 hours as needed for moderate to severe pain maximum 3 tablet(s) per day    Chronic pain of left knee       IBUPROFEN PO           ipratropium 0.06 % spray    ATROVENT    1 Box    Spray 2 sprays into both nostrils 4 times daily as needed for rhinitis    Chronic rhinitis       methotrexate sodium 2.5 MG Tabs     24 tablet    Take 15 mg by mouth once a week . Take all 6 tablets on the same day of each week.    Inflammatory polyarthropathy (H)       * order for DME     1 Device    Equipment being ordered: blood pressure machine    Orthostatic hypotension       * order for DME     1 Device    Equipment being ordered: 10-20 mm Hg knee high compression stockings    Venous stasis dermatitis of left lower extremity       * predniSONE 1 MG tablet    DELTASONE     Take 3 mg by mouth daily Taper as directed    PMR (polymyalgia rheumatica) (H)       * predniSONE 5 MG tablet    DELTASONE    120 tablet    Take 1 tablet (5 mg) by mouth daily    PMR (polymyalgia rheumatica) (H)       * predniSONE 1 MG tablet    DELTASONE    180 tablet    Prednisone 3mg daily x30days, then 2mg daily x30days, then 1mg daily x30days, then stop.    Inflammatory polyarthropathy (H)       senna-docusate 8.6-50 MG per tablet    SENOKOT-S;PERICOLACE     Take 2 tablets by mouth 2 times daily Reported on  4/20/2017    Health Care Home, Status post total left knee replacement       triamcinolone 0.1 % cream    KENALOG    45 g    Apply sparingly to affected area two times daily for 7 days.    Rash       TYLENOL 500 MG tablet   Generic drug:  acetaminophen      Take 2 tablets (1,000 mg) by mouth every 6 hours as needed (Max acetaminophen dose: 4000mg in 24 hrs.)    Health Care Home, Status post total left knee replacement       vitamin D 1000 UNITS capsule      Take 1 capsule by mouth daily Reported on 4/20/2017        * Notice:  This list has 5 medication(s) that are the same as other medications prescribed for you. Read the directions carefully, and ask your doctor or other care provider to review them with you.

## 2018-01-24 NOTE — NURSING NOTE
"Chief Complaint   Patient presents with     RECHECK     patient states her wrists and hands hurt, neck and both sides of chest area hurt. Pain is getting worse       Initial /67 (BP Location: Left arm, Patient Position: Chair, Cuff Size: Adult Regular)  Pulse 68  Ht 1.499 m (4' 11\")  Wt 68.6 kg (151 lb 3.2 oz)  SpO2 94%  BMI 30.54 kg/m2 Estimated body mass index is 30.54 kg/(m^2) as calculated from the following:    Height as of this encounter: 1.499 m (4' 11\").    Weight as of this encounter: 68.6 kg (151 lb 3.2 oz).  BP completed using cuff size: regular         RAPID3 (0-30) Cumulative Score  24.7          RAPID3 Weighted Score (divide #4 by 3 and that is the weighted score)  8.23         "

## 2018-01-25 ENCOUNTER — TELEPHONE (OUTPATIENT)
Dept: RHEUMATOLOGY | Facility: CLINIC | Age: 83
End: 2018-01-25

## 2018-01-25 NOTE — TELEPHONE ENCOUNTER
Prior auth for methotrexate has been submitted and approved on covermymeds.    Your request has been approved   CaseId:[id number];Product Name:ANTINEOPLASTICS (Cyclophosphamide tablets and capsules, melphalan tablets (Alkeran), methotrexate tablets (Trexall, Rheumatrex), methotrexate soln (Xatmep), and methotrexate injection)- B VS. D only (PA type 3) - [employer] - [place];Status:Approved;Coverage Start Date:01/01/2018;Coverage End Date:01/30/2021;    Pharmacy has been notified.  Mesha Nieves CMA  1/25/2018 2:15 PM

## 2018-01-26 ENCOUNTER — TELEPHONE (OUTPATIENT)
Dept: RHEUMATOLOGY | Facility: CLINIC | Age: 83
End: 2018-01-26

## 2018-01-26 NOTE — TELEPHONE ENCOUNTER
Reason for Call:  Other prescription    Detailed comments: Patient has questions on new medication Methotrexate. [facility] called her but she does not understand why and that [facility]. Is there an insurance issue? Please call.    Phone Number Patient can be reached at: Home number on file [phone] (home)    Best Time: any    Can we leave a detailed message on this number? YES    Call taken on 1/26/2018 at 9:59 AM by Ronda Gan

## 2018-01-29 ENCOUNTER — TELEPHONE (OUTPATIENT)
Dept: RHEUMATOLOGY | Facility: CLINIC | Age: 83
End: 2018-01-29

## 2018-01-29 NOTE — TELEPHONE ENCOUNTER
Reason for Call:  Request for results:    Name of test or procedure: Labs and x-ray    Date of test of procedure: last week    Location of the test or procedure: [place]    OK to leave the result message on voice mail or with a family member? YES    Phone number Patient can be reached at:  Home number on file [phone] (home)    Additional comments: patient calling to check the status of the results.    Call taken on 1/29/2018 at 1:46 PM by Destiny Neri

## 2018-01-30 NOTE — TELEPHONE ENCOUNTER
Patient notified that methotrexate prior authorization has been approved through 1/30/2021. She may have gotten a call from [employer] when they were reviewing the PA.    Jani Vazquez CMA

## 2018-01-31 NOTE — TELEPHONE ENCOUNTER
Rheumatology team: Please call to notify Ms. Trevino of the following message:    Ms. Trevino,    Labs are normal.  X-rays show degenerative changes.  This does not change the plan outlined during the clinic visit.    Sincerely,  Dale Marquis MD  1/30/2018 7:12 PM

## 2018-01-31 NOTE — PROGRESS NOTES
"Kut message sent:  \"Ms. Trevino,    Labs are normal.  X-rays show degenerative changes.  This does not change the plan outlined during the clinic visit.    Sincerely,  Dale Marquis MD  1/30/2018 7:12 PM\""

## 2018-04-05 PROBLEM — R26.9 ABNORMAL GAIT: Status: RESOLVED | Noted: 2017-10-31 | Resolved: 2018-04-05

## 2018-04-05 PROBLEM — M54.59 MECHANICAL LOW BACK PAIN: Status: RESOLVED | Noted: 2017-10-31 | Resolved: 2018-04-05

## 2018-04-05 NOTE — PROGRESS NOTES
Subjective:  HPI                    Objective:  System    Physical Exam    General     ROS    Assessment/Plan:    DISCHARGE REPORT    Progress reporting period is from 10.31 to 4.5.18.     SUBJECTIVE  Subjective: yesterday was bad doing better on my exs    Current Pain level: 9/10   Initial Pain level: 8/10   Changes in function: No changes noted in function since last SOAP note   Adverse reactions: None;   ,     Patient has failed to return to therapy so current objective findings are unknown.    OBJECTIVE  Objective: functional AROM, incr'd ROM,       ASSESSMENT/PLAN  Updated problem list and treatment plan: Diagnosis 1:  LBP    STG/LTGs have been met or progress has been made towards goals:  None  Assessment of Progress: Patient has not returned to therapy.  Current status is unknown and discharge G code cannot be reported.  Self Management Plans:      Ailyn continues to require the following intervention to meet STG and LTG's:   The patient failed to complete scheduled/ordered appointments so current information is unknown.  We will discharge this patient from PT.    Recommendations:      Please refer to the daily flowsheet for treatment today, total treatment time and time spent performing 1:1 timed codes.

## 2018-04-21 ENCOUNTER — OFFICE VISIT (OUTPATIENT)
Dept: URGENT CARE | Facility: URGENT CARE | Age: 83
End: 2018-04-21
Payer: COMMERCIAL

## 2018-04-21 VITALS
SYSTOLIC BLOOD PRESSURE: 136 MMHG | DIASTOLIC BLOOD PRESSURE: 56 MMHG | HEIGHT: 59 IN | HEART RATE: 62 BPM | WEIGHT: 150.13 LBS | BODY MASS INDEX: 30.27 KG/M2 | OXYGEN SATURATION: 96 % | TEMPERATURE: 98.6 F

## 2018-04-21 DIAGNOSIS — R07.0 THROAT PAIN: Primary | ICD-10-CM

## 2018-04-21 DIAGNOSIS — R09.81 NASAL CONGESTION: ICD-10-CM

## 2018-04-21 LAB
DEPRECATED S PYO AG THROAT QL EIA: NORMAL
SPECIMEN SOURCE: NORMAL

## 2018-04-21 PROCEDURE — 87880 STREP A ASSAY W/OPTIC: CPT | Performed by: INTERNAL MEDICINE

## 2018-04-21 PROCEDURE — 99213 OFFICE O/P EST LOW 20 MIN: CPT | Performed by: INTERNAL MEDICINE

## 2018-04-21 PROCEDURE — 87081 CULTURE SCREEN ONLY: CPT | Performed by: INTERNAL MEDICINE

## 2018-04-21 RX ORDER — FLUTICASONE PROPIONATE 50 MCG
2 SPRAY, SUSPENSION (ML) NASAL DAILY
Qty: 1 BOTTLE | Refills: 1 | Status: ON HOLD | OUTPATIENT
Start: 2018-04-21 | End: 2018-10-24

## 2018-04-21 NOTE — MR AVS SNAPSHOT
After Visit Summary   4/21/2018    Ailyn Trevino    MRN: [id number]           Patient Information     Date Of Birth          1935        Visit Information        Provider Department      4/21/2018 12:30 PM Michelle Saravia MD [facility]        Today's Diagnoses     Throat pain    -  1    Nasal congestion           Follow-ups after your visit        Follow-up notes from your care team     Return in about 1 week (around 4/28/2018), or if symptoms worsen or fail to improve, for follow up with primary doctor.      Your next 10 appointments already scheduled     Apr 23, 2018 11:00 AM CDT   LAB with  LAB   [facility]    [address] [facility] [address] [phone]           Please do not eat 10-12 hours before your appointment if you are coming in fasting for labs on lipids, cholesterol, or glucose (sugar). This does not apply to pregnant women. Water, hot tea and black coffee (with nothing added) are okay. Do not drink other fluids, diet soda or chew gum.            Apr 26, 2018 10:00 AM CDT   [facility] Visit with Dale [facility]ius [facility]   [facility]    [address] [facility] [address] [phone]              Who to contact     If you have questions or need follow up information about today's clinic visit or your schedule please contact [facility] directly at [phone].  Normal or non-critical lab and imaging results will be communicated to you by MyChart, letter or phone within 4 business days after the clinic has received the results. If you do not hear from us within 7 days, please contact the clinic through MyChart or phone. If you have a critical or abnormal lab result, we will notify you by phone as soon as possible.  Submit refill requests through [employer] or call your pharmacy and they will forward the refill request to us. Please allow 3 business days for  "your refill to be completed.          Additional Information About Your Visit        [employer] Information     [employer] gives you secure access to your electronic health record. If you see a primary care provider, you can also send messages to your care team and make appointments. If you have questions, please call your primary care clinic.  If you do not have a primary care provider, please call [phone] and they will assist you.        Care EveryWhere ID     This is your Care EveryWhere ID. This could be used by other organizations to access your [facility] medical records  [id number]        Your Vitals Were     Pulse Temperature Height Pulse Oximetry BMI (Body Mass Index)       62 98.6  F (37  C) (Tympanic) 4' 11\" (1.499 m) 96% 30.32 kg/m2        Blood Pressure from Last 3 Encounters:   04/21/18 136/56   01/24/18 149/69   11/09/17 126/54    Weight from Last 3 Encounters:   04/21/18 150 lb 2 oz (68.1 kg)   01/24/18 151 lb 3.2 oz (68.6 kg)   11/09/17 150 lb 6.4 oz (68.2 kg)              We Performed the Following     Beta strep group A culture     Rapid strep screen          Today's Medication Changes          These changes are accurate as of 4/21/18  3:59 PM.  If you have any questions, ask your nurse or doctor.               Start taking these medicines.        Dose/Directions    fluticasone 50 MCG/ACT spray   Commonly known as:  FLONASE   Used for:  Nasal congestion   Started by:  Michelle Saravia MD        Dose:  2 spray   Spray 2 sprays into both nostrils daily   Quantity:  1 Bottle   Refills:  1         These medicines have changed or have updated prescriptions.        Dose/Directions    order for DME   This may have changed:  Another medication with the same name was removed. Continue taking this medication, and follow the directions you see here.   Used for:  Venous stasis dermatitis of left lower extremity   Changed by:  Michelle Saravia MD        Equipment being ordered: 10-20 mm Hg knee high " compression stockings   Quantity:  1 Device   Refills:  0         Stop taking these medicines if you haven't already. Please contact your care team if you have questions.     alendronate 70 MG tablet   Commonly known as:  FOSAMAX   Stopped by:  Michelle Saravia MD           HYDROcodone-acetaminophen 5-325 MG per tablet   Commonly known as:  NORCO   Stopped by:  Michelle Saravia MD           ipratropium 0.06 % spray   Commonly known as:  ATROVENT   Stopped by:  Michelle Saravia MD           predniSONE 1 MG tablet   Commonly known as:  DELTASONE   Stopped by:  Michelle Saravia MD           predniSONE 5 MG tablet   Commonly known as:  DELTASONE   Stopped by:  Michelle Saravia MD                Where to get your medicines      These medications were sent to [place] [address] [place] [facility] [address]  [address] [facility] [address]     Phone:  [phone]     fluticasone 50 MCG/ACT spray                Primary Care Provider Office Phone # Fax #    Maddi Marine Church [phone] [address] [place] [address] [address] [facility] [address]        Equal Access to Services     [facility]: Hadii aad ku hadasho Soomaali, waaxda luqadaha, qaybta kaalmada adeegyada, ayesha driscoll . So [facility] [phone].    ATENCIÓN: Si habla español, tiene a valdez disposición servicios gratuitos de asistencia lingüística. Llame al [phone].    We comply with applicable federal civil rights laws and [place] laws. We do not discriminate on the basis of race, color, national origin, age, disability, sex, sexual orientation, or gender identity.            Thank you!     Thank you for [facility]  [facility] your [facility]. Our goal is always to provide you with excellent care. Hearing back from our patients is one way we can continue to improve our services. Please take a few minutes to complete the written survey that you may receive in the mail after your visit with us. Thank  you!             Your Updated Medication List - Protect others around you: Learn how to safely use, store and throw away your medicines at www.disposemymeds.org.          This list is accurate as of 4/21/18  3:59 PM.  Always use your most recent med list.                   Brand Name Dispense Instructions for use Diagnosis    aspirin 325 MG EC tablet      Take 325 mg by mouth daily Reported on 3/17/2017    Health Care Home, Status post total left knee replacement       atenolol 25 MG tablet    TENORMIN    90 tablet    TAKE 1 TABLET (25 MG) BY MOUTH DAILY    Benign essential hypertension       CALCIUM + D 500-1000-40 MG-UNT-MCG Chew   Generic drug:  Calcium-Vitamin D-Vitamin K      Take 1 tablet by mouth 2 times daily Reported on 4/20/2017        CENTRUM SILVER ADULT 50+ PO      Take 1 tablet by mouth daily Reported on 4/20/2017        citalopram 20 MG tablet    celeXA    90 tablet    TAKE 1 TABLET (20 MG) BY MOUTH DAILY    Major depressive disorder, single episode, moderate (H)       fluticasone 50 MCG/ACT spray    FLONASE    1 Bottle    Spray 2 sprays into both nostrils daily    Nasal congestion       folic acid 1 MG tablet    FOLVITE    100 tablet    Take 1 tablet (1 mg) by mouth daily    Inflammatory polyarthropathy (H)       IBUPROFEN PO           methotrexate sodium 2.5 MG Tabs     24 tablet    Take 15 mg by mouth once a week . Take all 6 tablets on the same day of each week.    Inflammatory polyarthropathy (H)       order for DME     1 Device    Equipment being ordered: 10-20 mm Hg knee high compression stockings    Venous stasis dermatitis of left lower extremity       senna-docusate 8.6-50 MG per tablet    SENOKOT-S;PERICOLACE     Take 2 tablets by mouth 2 times daily Reported on 4/20/2017    Health Care Home, Status post total left knee replacement       triamcinolone 0.1 % cream    KENALOG    45 g    Apply sparingly to affected area two times daily for 7 days.    Rash       TYLENOL 500 MG tablet   Generic  drug:  acetaminophen      Take 2 tablets (1,000 mg) by mouth every 6 hours as needed (Max acetaminophen dose: 4000mg in 24 hrs.)    Health Care Home, Status post total left knee replacement       vitamin D 1000 units capsule      Take 1 capsule by mouth daily Reported on 4/20/2017

## 2018-04-21 NOTE — PROGRESS NOTES
SUBJECTIVE:  Ailyn Trevino is an 82 year old female who presents for not feeling well.  Started having some hoarseness of voice a couple days ago, but not sore throat.  Then developed a cough.  Then started having some sore throat.  No fevers, chills, sweats.  Some nasal congestion and runny nose and has had some post-nasal drip.  Has coughed up a little bit of yellow sputum.  No shortness of breath or wheezing.  No recent travel.  No known exposures.  No medications taken for sxs.  No n/v/d.  Eating okay.  No meds taken for sxs.  No skin rashes.  No ear pain.         has a past medical history of Granulomatous lung disease (H) (8/7/2012); High cholesterol; Insomnia (10/8/2013); Osteoarthritis of knee (10/8/2013); Osteopenia (7/31/2012); Other chronic pain (8/3/2015); and Renal cyst (8/7/2012). depression.  RA.  HTH.  PMR.   Social History     Social History     Marital status:      Spouse name: N/A     Number of children: N/A     Years of education: N/A     Social History Main Topics     Smoking status: Former Smoker     Quit date: 1/1/1998     Smokeless tobacco: Never Used     Alcohol use Yes     Drug use: No     Sexual activity: Not Currently     Partners: Male     Other Topics Concern     None     Social History Narrative     Family History   Problem Relation Age of Onset     CANCER Mother      CEREBROVASCULAR DISEASE Father        ALLERGIES:  Review of patient's allergies indicates no known allergies.    Current Outpatient Prescriptions   Medication     acetaminophen (TYLENOL) 500 MG tablet     alendronate (FOSAMAX) 70 MG tablet     aspirin 325 MG EC tablet     atenolol (TENORMIN) 25 MG tablet     Calcium-Vitamin D-Vitamin K (CALCIUM + D) 500-1000-40 MG-UNT-MCG CHEW     Cholecalciferol (VITAMIN D) 1000 UNITS capsule     citalopram (CELEXA) 20 MG tablet     folic acid (FOLVITE) 1 MG tablet     HYDROcodone-acetaminophen (NORCO) 5-325 MG per tablet     IBUPROFEN PO     ipratropium (ATROVENT) 0.06 % nasal  "spray     methotrexate sodium 2.5 MG TABS     Multiple Vitamins-Minerals (CENTRUM SILVER ADULT 50+ PO)     order for DME     predniSONE (DELTASONE) 1 MG tablet     predniSONE (DELTASONE) 1 MG tablet     predniSONE (DELTASONE) 5 MG tablet     senna-docusate (SENOKOT-S;PERICOLACE) 8.6-50 MG per tablet     triamcinolone (KENALOG) 0.1 % cream     No current facility-administered medications for this visit.          ROS:  ROS is done and is negative for general, constitutional, eye, ENT, Respiratory, cardiovascular, GI, , Skin, musculoskeletal except as noted elsewhere.      OBJECTIVE:  /56 (BP Location: Right arm, Patient Position: Sitting, Cuff Size: Adult Regular)  Pulse 62  Temp 98.6  F (37  C) (Tympanic)  Ht 4' 11\" (1.499 m)  Wt 150 lb 2 oz (68.1 kg)  SpO2 96%  BMI 30.32 kg/m2  GENERAL APPEARANCE: Alert, in no acute distress  EYES: normal  EARS: External ears normal. Canals clear. TM's normal.  NOSE:mild clear discharge and mildly inflamed mucosa  OROPHARYNX:normal  NECK:No adenopathy,masses or thyromegaly  RESP: normal and clear to auscultation  CV:regular rate and rhythm and no murmurs, clicks, or gallops  ABDOMEN: Abdomen soft, non-tender. BS normal. No masses, organomegaly  SKIN: no ulcers, lesions or rash  MUSCULOSKELETAL:Musculoskeletal normal      RESULTS  Results for orders placed or performed in visit on 04/21/18   Rapid strep screen   Result Value Ref Range    Specimen Description Throat     Rapid Strep A Screen       NEGATIVE: No Group A streptococcal antigen detected by immunoassay, await culture report.   .  No results found for this or any previous visit (from the past 48 hour(s)).    ASSESSMENT/PLAN:    ASSESSMENT / PLAN:  (R07.0) Throat pain  (primary encounter diagnosis)  Comment: negative rapid strep.  sxs c/w viral etiology  Plan: Rapid strep screen, Beta strep group A culture        I reviewed supportive care, expected course, and signs of concern.  Follow up as needed or if she " does not improve within 7 day(s) or if worsens in any way.  Reviewed red flag symptoms and is to go to the ER if experiences any of these.  Await strep culture and treat if positive.    (R09.81) Nasal congestion  Comment: c/w viral etiology, or possibly allergies  Plan: fluticasone (FLONASE) 50 MCG/ACT spray        Reviewed medication instructions and side effects. Follow up if experiences side effects.. I reviewed supportive care, expected course, and signs of concern.  Follow up as needed or if she does not improve within 7 day(s) or if worsens in any way.  Reviewed red flag symptoms and is to go to the ER if experiences any of these.      See [facility] for orders, medications, letters, patient instructions    Michelle Saravia M.D.

## 2018-04-21 NOTE — NURSING NOTE
"Chief Complaint   Patient presents with     Pharyngitis     Since Thursday. Cough is productive, ears feel sore, runny nose. Using aspirin for symptoms.       Initial /56 (BP Location: Right arm, Patient Position: Sitting, Cuff Size: Adult Regular)  Pulse 62  Temp 98.6  F (37  C) (Tympanic)  Ht 4' 11\" (1.499 m)  SpO2 96% Estimated body mass index is 30.54 kg/(m^2) as calculated from the following:    Height as of 1/24/18: 4' 11\" (1.499 m).    Weight as of 1/24/18: 151 lb 3.2 oz (68.6 kg).  Medication Reconciliation: finn LOPEZ, Certified Medical Assistant (AAMA)April 21, 2018 12:45 PM      "

## 2018-04-22 LAB
BACTERIA SPEC CULT: NORMAL
SPECIMEN SOURCE: NORMAL

## 2018-04-23 ENCOUNTER — OFFICE VISIT (OUTPATIENT)
Dept: FAMILY MEDICINE | Facility: CLINIC | Age: 83
End: 2018-04-23
Payer: COMMERCIAL

## 2018-04-23 ENCOUNTER — RADIANT APPOINTMENT (OUTPATIENT)
Dept: GENERAL RADIOLOGY | Facility: CLINIC | Age: 83
End: 2018-04-23
Attending: NURSE PRACTITIONER
Payer: COMMERCIAL

## 2018-04-23 VITALS
OXYGEN SATURATION: 94 % | TEMPERATURE: 99.2 F | HEART RATE: 67 BPM | SYSTOLIC BLOOD PRESSURE: 98 MMHG | DIASTOLIC BLOOD PRESSURE: 42 MMHG | BODY MASS INDEX: 29.43 KG/M2 | HEIGHT: 59 IN | WEIGHT: 146 LBS

## 2018-04-23 DIAGNOSIS — M06.4 INFLAMMATORY POLYARTHROPATHY (H): ICD-10-CM

## 2018-04-23 DIAGNOSIS — M35.3 PMR (POLYMYALGIA RHEUMATICA) (H): ICD-10-CM

## 2018-04-23 DIAGNOSIS — J98.6 ELEVATED HEMIDIAPHRAGM: ICD-10-CM

## 2018-04-23 DIAGNOSIS — J11.1 INFLUENZA-LIKE ILLNESS: ICD-10-CM

## 2018-04-23 DIAGNOSIS — J20.9 ACUTE BRONCHITIS WITH COEXISTING CONDITION REQUIRING PROPHYLACTIC TREATMENT: Primary | ICD-10-CM

## 2018-04-23 LAB
ALBUMIN SERPL-MCNC: 3.8 G/DL (ref 3.4–5)
ALP SERPL-CCNC: 82 U/L (ref 40–150)
ALT SERPL W P-5'-P-CCNC: 22 U/L (ref 0–50)
AST SERPL W P-5'-P-CCNC: 28 U/L (ref 0–45)
BASOPHILS # BLD AUTO: 0 10E9/L (ref 0–0.2)
BASOPHILS NFR BLD AUTO: 0.3 %
BILIRUB DIRECT SERPL-MCNC: 0.1 MG/DL (ref 0–0.2)
BILIRUB SERPL-MCNC: 0.6 MG/DL (ref 0.2–1.3)
CREAT SERPL-MCNC: 0.74 MG/DL (ref 0.52–1.04)
CRP SERPL-MCNC: 37.1 MG/L (ref 0–8)
DIFFERENTIAL METHOD BLD: NORMAL
EOSINOPHIL # BLD AUTO: 0.1 10E9/L (ref 0–0.7)
EOSINOPHIL NFR BLD AUTO: 1 %
ERYTHROCYTE [DISTWIDTH] IN BLOOD BY AUTOMATED COUNT: 14.8 % (ref 10–15)
ERYTHROCYTE [SEDIMENTATION RATE] IN BLOOD BY WESTERGREN METHOD: 10 MM/H (ref 0–30)
FLUAV+FLUBV AG SPEC QL: NEGATIVE
FLUAV+FLUBV AG SPEC QL: NEGATIVE
GFR SERPL CREATININE-BSD FRML MDRD: 75 ML/MIN/1.7M2
HCT VFR BLD AUTO: 40.4 % (ref 35–47)
HGB BLD-MCNC: 13.4 G/DL (ref 11.7–15.7)
LYMPHOCYTES # BLD AUTO: 1.4 10E9/L (ref 0.8–5.3)
LYMPHOCYTES NFR BLD AUTO: 20.4 %
MCH RBC QN AUTO: 32.1 PG (ref 26.5–33)
MCHC RBC AUTO-ENTMCNC: 33.2 G/DL (ref 31.5–36.5)
MCV RBC AUTO: 97 FL (ref 78–100)
MONOCYTES # BLD AUTO: 1 10E9/L (ref 0–1.3)
MONOCYTES NFR BLD AUTO: 15.6 %
NEUTROPHILS # BLD AUTO: 4.2 10E9/L (ref 1.6–8.3)
NEUTROPHILS NFR BLD AUTO: 62.7 %
PLATELET # BLD AUTO: 210 10E9/L (ref 150–450)
PROT SERPL-MCNC: 7.1 G/DL (ref 6.8–8.8)
RBC # BLD AUTO: 4.18 10E12/L (ref 3.8–5.2)
SPECIMEN SOURCE: NORMAL
WBC # BLD AUTO: 6.7 10E9/L (ref 4–11)

## 2018-04-23 PROCEDURE — 36415 COLL VENOUS BLD VENIPUNCTURE: CPT | Performed by: INTERNAL MEDICINE

## 2018-04-23 PROCEDURE — 82565 ASSAY OF CREATININE: CPT | Performed by: INTERNAL MEDICINE

## 2018-04-23 PROCEDURE — 80076 HEPATIC FUNCTION PANEL: CPT | Performed by: INTERNAL MEDICINE

## 2018-04-23 PROCEDURE — 87804 INFLUENZA ASSAY W/OPTIC: CPT | Performed by: NURSE PRACTITIONER

## 2018-04-23 PROCEDURE — 85025 COMPLETE CBC W/AUTO DIFF WBC: CPT | Performed by: INTERNAL MEDICINE

## 2018-04-23 PROCEDURE — 71046 X-RAY EXAM CHEST 2 VIEWS: CPT

## 2018-04-23 PROCEDURE — 86140 C-REACTIVE PROTEIN: CPT | Performed by: INTERNAL MEDICINE

## 2018-04-23 PROCEDURE — 99214 OFFICE O/P EST MOD 30 MIN: CPT | Performed by: NURSE PRACTITIONER

## 2018-04-23 PROCEDURE — 85652 RBC SED RATE AUTOMATED: CPT | Performed by: INTERNAL MEDICINE

## 2018-04-23 RX ORDER — AZITHROMYCIN 250 MG/1
TABLET, FILM COATED ORAL
Qty: 6 TABLET | Refills: 0 | Status: SHIPPED | OUTPATIENT
Start: 2018-04-23 | End: 2018-10-09

## 2018-04-23 ASSESSMENT — ANXIETY QUESTIONNAIRES
7. FEELING AFRAID AS IF SOMETHING AWFUL MIGHT HAPPEN: NOT AT ALL
1. FEELING NERVOUS, ANXIOUS, OR ON EDGE: NOT AT ALL
2. NOT BEING ABLE TO STOP OR CONTROL WORRYING: NOT AT ALL
IF YOU CHECKED OFF ANY PROBLEMS ON THIS QUESTIONNAIRE, HOW DIFFICULT HAVE THESE PROBLEMS MADE IT FOR YOU TO DO YOUR WORK, TAKE CARE OF THINGS AT HOME, OR GET ALONG WITH OTHER PEOPLE: SOMEWHAT DIFFICULT
5. BEING SO RESTLESS THAT IT IS HARD TO SIT STILL: NOT AT ALL
6. BECOMING EASILY ANNOYED OR IRRITABLE: SEVERAL DAYS
3. WORRYING TOO MUCH ABOUT DIFFERENT THINGS: NOT AT ALL
GAD7 TOTAL SCORE: 1

## 2018-04-23 ASSESSMENT — PATIENT HEALTH QUESTIONNAIRE - PHQ9: 5. POOR APPETITE OR OVEREATING: NOT AT ALL

## 2018-04-23 NOTE — PATIENT INSTRUCTIONS
[facility]    If you have any questions regarding to your visit please contact your care team:       Team Red:   Clinic Hours Telephone Number   Dr. Kelsey Cedillo, NP   7am-7pm  Monday - Thursday   7am-5pm  Fridays  [phone]  (Appointment scheduling available 24/7)    Questions about your visit?   Team Line  [phone]   [facility] [place] [facility]pm Monday-Friday Saturday-Sunday- 9am-5pm   [place] - 5pm-9pm Monday-Friday Saturday-Sunday- 9am-5pm  [phone] - [place]   [phone] - [place]       What options do I have for visits at the clinic other than the traditional office visit?  To expand how we care for you, many of our providers are utilizing electronic visits (e-visits) and telephone visits, when medically appropriate, for interactions with their patients rather than a visit in the clinic.   We also offer nurse visits for many medical concerns. Just like any other service, we will bill your insurance company for this type of visit based on time spent on the phone with your provider. Not all insurance companies cover these visits. Please check with your medical insurance if this type of visit is covered. You will be responsible for any charges that are not paid by your insurance.      E-visits via ValenTx:  generally incur a $35.00 fee.  Telephone visits:  Time spent on the phone: *charged based on time that is spent on the phone in increments of 10 minutes. Estimated cost:   5-10 mins $30.00   11-20 mins. $59.00   21-30 mins. $85.00     Use Zangt (secure email communication and access to your chart) to send your primary care provider a message or make an appointment. Ask someone on your Team how to sign up for ValenTx.  For a Price Quote for your services, please call our Consumer Price Line at [phone].      As always, Thank you for trusting us with your health care needs!    Discharged  by Shanita Soto MA.

## 2018-04-23 NOTE — PROGRESS NOTES
SUBJECTIVE:   Ailyn Trevino is a 82 year old female who presents to clinic today for the following health issues:      ED/UC Followup:    Facility:  [facility]  Date of visit: 04/21/2018  Reason for visit: Throat pain and Nasal congestion  Current Status: states still not feeling well, wants to make sure she doesn't have pneumonia     Patient presents for dry cough and sinus congestion ongoing for a period of 5 days. Symptoms have been persistent since onset and yesterday developed a fever of 100.4. Took sudafed and aspirin yesterday. Eating/drinking per baseline. Stooling and urinating normally.       Problem list and histories reviewed & adjusted, as indicated.  Additional history: as documented    Patient Active Problem List   Diagnosis     Advanced directives, counseling/discussion     Osteopenia     Palpitations     Hyperlipidemia LDL goal <130     Renal cyst     Granulomatous lung disease (H)     Health Care Home     Insomnia     Osteoarthritis of knee     Aortic valve disorder     Mitral valve disorder     Impaired fasting glucose     PMR (polymyalgia rheumatica) (H)     Encounter for long-term current use of medication     Other chronic pain     Major depressive disorder, single episode, mild (H)     Current chronic use of systemic steroids     Fatigue, unspecified type     Chronic pain of left knee     Venous stasis dermatitis of left lower extremity     Fecal urgency     Elevated hemidiaphragm     Past Surgical History:   Procedure Laterality Date     APPENDECTOMY  1951     CATARACT IOL, RT/LT  2010    bilateral      CHOLECYSTECTOMY  2010     KNEE SURGERY Left 01/2017     SURGICAL HISTORY OF -   12/13    bilateral YAG laser surgery of eyes       Social History   Substance Use Topics     Smoking status: Former Smoker     Quit date: 1/1/1998     Smokeless tobacco: Never Used     Alcohol use Yes     Family History   Problem Relation Age of Onset     CANCER Mother      CEREBROVASCULAR DISEASE  "Father            Reviewed and updated as needed this visit by clinical staff       Reviewed and updated as needed this visit by Provider         ROS:  Constitutional, HEENT, cardiovascular, pulmonary, gi and gu systems are negative, except as otherwise noted.    OBJECTIVE:     BP 98/42 (BP Location: Right arm, Patient Position: Chair, Cuff Size: Adult Regular)  Pulse 67  Temp 99.2  F (37.3  C) (Oral)  Ht 4' 11\" (1.499 m)  Wt 146 lb (66.2 kg)  SpO2 94%  BMI 29.49 kg/m2  Body mass index is 29.49 kg/(m^2).  GENERAL: healthy, alert and no distress  EYES: Eyes grossly normal to inspection, PERRL and conjunctivae and sclerae normal  HENT: normal cephalic/atraumatic, ear canals and TM's normal, nose and mouth without ulcers or lesions, rhinorrhea clear, oropharynx clear, oral mucous membranes moist and sinuses: maxillary tenderness on bilateral  NECK: no adenopathy, no asymmetry, masses, or scars and thyroid normal to palpation  RESP: lungs clear to auscultation - no rales, rhonchi or wheezes  CV: regular rate and rhythm, normal S1 S2, no S3 or S4, no murmur, click or rub, no peripheral edema and peripheral pulses strong  ABDOMEN: soft, nontender, no hepatosplenomegaly, no masses and bowel sounds normal    Diagnostic Test Results:  CBC- normal  Results for orders placed or performed in visit on 04/23/18 (from the past 24 hour(s))   Influenza A/B antigen   Result Value Ref Range    Influenza A/B Agn Specimen Nasopharyngeal     Influenza A Negative NEG^Negative    Influenza B Negative NEG^Negative       ASSESSMENT/PLAN:     1. Acute bronchitis with coexisting condition requiring prophylactic treatment  On methotrexate for PMR and will cover for opportunistic infection. Push fluids, rest.   - XR Chest 2 Views; Future  - Influenza A/B antigen  - azithromycin (ZITHROMAX) 250 MG tablet; Two tablets first day, then one tablet daily for four days.  Dispense: 6 tablet; Refill: 0  - CT Chest w/o Contrast; Future    2. " Elevated hemidiaphragm  New finding on CXR- not seen on previous CXR or CT. Recommend further imaging.  - CT Chest w/o Contrast; Future    3. PMR (polymyalgia rheumatica) (H)  As above      Follow up- schedule CT scan    BORA Rios CNP  [facility]

## 2018-04-23 NOTE — MR AVS SNAPSHOT
After Visit Summary   4/23/2018    Ailyn Trevino    MRN: [id number]           Patient Information     Date Of Birth          1935        Visit Information        Provider Department      4/23/2018 11:20 AM Leydi Cedillo APRN CNP [facility]        Today's Diagnoses     Acute bronchitis with coexisting condition requiring prophylactic treatment    -  1    Elevated hemidiaphragm          Care Instructions    [facility]    If you have any questions regarding to your visit please contact your care team:       Team Red:   Clinic Hours Telephone Number   Dr. Kelsey Cedillo, NP   7am-7pm  Monday - Thursday   7am-5pm  Fridays  [phone]  (Appointment scheduling available 24/7)    Questions about your visit?   Team Line  [phone]   Urgent Care - [place] and [facility] - 11am-9pm Monday-Friday Saturday-Sunday- 9am-5pm   [place] - 5pm-9pm Monday-Friday Saturday-Sunday- 9am-5pm  [phone] - [facility]  [phone] - [place]       What options do I have for visits at the clinic other than the traditional office visit?  To expand how we care for you, many of our providers are utilizing electronic visits (e-visits) and telephone visits, when medically appropriate, for interactions with their patients rather than a visit in the clinic.   We also offer nurse visits for many medical concerns. Just like any other service, we will bill your insurance company for this type of visit based on time spent on the phone with your provider. Not all insurance companies cover these visits. Please check with your medical insurance if this type of visit is covered. You will be responsible for any charges that are not paid by your insurance.      E-visits via BonzerDarg:  generally incur a $35.00 fee.  Telephone visits:  Time spent on the phone: *charged based on time that is spent on the phone in increments of 10  minutes. Estimated cost:   5-10 mins $30.00   11-20 mins. $59.00   21-30 mins. $85.00     Use Allvoiceshart (secure email communication and access to your chart) to send your primary care provider a message or make an appointment. Ask someone on your Team how to sign up for PrivateMarketst.  For a Price Quote for your services, please call our [employer] Price Line at [phone].      As always, Thank you for trusting us with your health care needs!    Discharged by Shanita Soto MA.            Follow-ups after your visit        Your next 10 appointments already scheduled     Apr 26, 2018 10:00 AM CDT   Return Visit with Dale Marquis MD   [facility] ([facility]    [address] [place] [address]  [place] [address] [phone]              Who to contact     If you have questions or need follow up information about today's clinic visit or your schedule please contact [facility] directly at [phone].  Normal or non-critical lab and imaging results will be communicated to you by MyChart, letter or phone within 4 business days after the clinic has received the results. If you do not hear from us within 7 days, please contact the clinic through Allvoiceshart or phone. If you have a critical or abnormal lab result, we will notify you by phone as soon as possible.  Submit refill requests through [employer]oughCare or call your pharmacy and they will forward the refill request to us. Please allow 3 business days for your refill to be completed.          Additional Information About Your Visit        Allvoiceshart Information     [employer] gives you secure access to your electronic health record. If you see a primary care provider, you can also send messages to your care team and make appointments. If you have questions, please call your primary care clinic.  If you do not have a primary care provider, please call [phone] and they will assist you.        Care EveryWhere ID     This is your Care EveryWhere ID.  "This could be used by other organizations to access your [facility] medical records  [id number]        Your Vitals Were     Pulse Temperature Height Pulse Oximetry BMI (Body Mass Index)       67 99.2  F (37.3  C) (Oral) 4' 11\" (1.499 m) 94% 29.49 kg/m2        Blood Pressure from Last 3 Encounters:   04/23/18 98/42   04/21/18 136/56   01/24/18 149/69    Weight from Last 3 Encounters:   04/23/18 146 lb (66.2 kg)   04/21/18 150 lb 2 oz (68.1 kg)   01/24/18 151 lb 3.2 oz (68.6 kg)              We Performed the Following     Influenza A/B antigen        Primary Care Provider Office Phone # Fax #    Maddi Church [phone] [address] [facility] [address]        Equal Access to Services     [facility]: Hadii aad ku hadasho Soomaali, waaxda luqadaha, qaybta kaalmada adeegyada, waxay skipin hayteddyn megha driscoll . So [facility] [phone].    ATENCIÓN: Si habla español, tiene a valdez disposición servicios gratuitos de asistencia lingüística. Rajesh al [phone].    We comply with applicable federal civil rights laws and [place] laws. We do not discriminate on the basis of race, color, national origin, age, disability, sex, sexual orientation, or gender identity.            Thank you!     Thank you for choosing [facility]  for your care. Our goal is always to provide you with excellent care. Hearing back from our patients is one way we can continue to improve our services. Please take a few minutes to complete the written survey that you may receive in the mail after your visit with us. Thank you!             Your Updated Medication List - Protect others around you: Learn how to safely use, store and throw away your medicines at www.disposemymeds.org.          This list is accurate as of 4/23/18 12:22 PM.  Always use your most recent med list.                   Brand Name Dispense Instructions for use Diagnosis    aspirin 325 MG EC tablet      Take 325 mg by mouth daily " Reported on 3/17/2017    Health Care Home, Status post total left knee replacement       atenolol 25 MG tablet    TENORMIN    90 tablet    TAKE 1 TABLET (25 MG) BY MOUTH DAILY    Benign essential hypertension       CALCIUM + D 500-1000-40 MG-UNT-MCG Chew   Generic drug:  Calcium-Vitamin D-Vitamin K      Take 1 tablet by mouth 2 times daily Reported on 4/20/2017        CENTRUM SILVER ADULT 50+ PO      Take 1 tablet by mouth daily Reported on 4/20/2017        citalopram 20 MG tablet    celeXA    90 tablet    TAKE 1 TABLET (20 MG) BY MOUTH DAILY    Major depressive disorder, single episode, moderate (H)       fluticasone 50 MCG/ACT spray    FLONASE    1 Bottle    Spray 2 sprays into both nostrils daily    Nasal congestion       folic acid 1 MG tablet    FOLVITE    100 tablet    Take 1 tablet (1 mg) by mouth daily    Inflammatory polyarthropathy (H)       IBUPROFEN PO           methotrexate sodium 2.5 MG Tabs     24 tablet    Take 15 mg by mouth once a week . Take all 6 tablets on the same day of each week.    Inflammatory polyarthropathy (H)       order for DME     1 Device    Equipment being ordered: 10-20 mm Hg knee high compression stockings    Venous stasis dermatitis of left lower extremity       senna-docusate 8.6-50 MG per tablet    SENOKOT-S;PERICOLACE     Take 2 tablets by mouth 2 times daily Reported on 4/20/2017    Health Care Home, Status post total left knee replacement       triamcinolone 0.1 % cream    KENALOG    45 g    Apply sparingly to affected area two times daily for 7 days.    Rash       TYLENOL 500 MG tablet   Generic drug:  acetaminophen      Take 2 tablets (1,000 mg) by mouth every 6 hours as needed (Max acetaminophen dose: 4000mg in 24 hrs.)    Health Care Home, Status post total left knee replacement       vitamin D 1000 units capsule      Take 1 capsule by mouth daily Reported on 4/20/2017

## 2018-04-24 ASSESSMENT — PATIENT HEALTH QUESTIONNAIRE - PHQ9: SUM OF ALL RESPONSES TO PHQ QUESTIONS 1-9: 4

## 2018-04-24 ASSESSMENT — ANXIETY QUESTIONNAIRES: GAD7 TOTAL SCORE: 1

## 2018-04-26 ENCOUNTER — OFFICE VISIT (OUTPATIENT)
Dept: RHEUMATOLOGY | Facility: CLINIC | Age: 83
End: 2018-04-26
Payer: COMMERCIAL

## 2018-04-26 ENCOUNTER — RADIANT APPOINTMENT (OUTPATIENT)
Dept: CT IMAGING | Facility: CLINIC | Age: 83
End: 2018-04-26
Attending: NURSE PRACTITIONER
Payer: COMMERCIAL

## 2018-04-26 VITALS
RESPIRATION RATE: 14 BRPM | HEART RATE: 61 BPM | DIASTOLIC BLOOD PRESSURE: 53 MMHG | HEIGHT: 59 IN | OXYGEN SATURATION: 95 % | SYSTOLIC BLOOD PRESSURE: 122 MMHG | BODY MASS INDEX: 29.64 KG/M2 | WEIGHT: 147 LBS

## 2018-04-26 DIAGNOSIS — M19.90 INFLAMMATORY ARTHRITIS: Primary | ICD-10-CM

## 2018-04-26 DIAGNOSIS — J20.9 ACUTE BRONCHITIS WITH COEXISTING CONDITION REQUIRING PROPHYLACTIC TREATMENT: ICD-10-CM

## 2018-04-26 DIAGNOSIS — J98.6 ELEVATED HEMIDIAPHRAGM: ICD-10-CM

## 2018-04-26 PROCEDURE — 71250 CT THORAX DX C-: CPT | Mod: TC

## 2018-04-26 PROCEDURE — 99213 OFFICE O/P EST LOW 20 MIN: CPT | Performed by: INTERNAL MEDICINE

## 2018-04-26 NOTE — PATIENT INSTRUCTIONS
Rheumatology    Dr. Dale Marquis         [facility]   (Monday)  [address]  [place] [address]       [facility]   (Tuesday)  [address]  [place] [address]    [facility]   (Wed., Thurs., and Friday)  [address] [place] [address]    Phone number: [phone]  Thank you for choosing [facility].  Mesha Nieves CMA

## 2018-04-26 NOTE — MR AVS SNAPSHOT
After Visit Summary   4/26/2018    Ailyn Trevino    MRN: [id number]           Patient Information     Date Of Birth          1935        Visit Information        Provider Department      4/26/2018 10:00 AM Dale Marquis MD [facility]        Care Instructions    Rheumatology    Dr. Dale Lopez [facility]   (Monday)  [address]  [place] [address]       [facility]   (Tuesday)  [address] [place] [address]    [facility]   (Wed., Thurs., and Friday)  [address] [place] [address]    Phone number: [phone]  Thank you for choosing [facility].  Mesha Nieves CMA            Follow-ups after your visit        Your next 10 appointments already scheduled     Apr 26, 2018 11:45 AM CDT   (Arrive by 11:30 AM)   CT CHEST W/O CONTRAST with BECT1   [facility] ([facility]    [address] [address] [phone]           Please bring any scans or X-rays taken at other hospitals, if similar tests were done. Also bring a list of your medicines, including vitamins, minerals and over-the-counter drugs. It is safest to leave personal items at home.  Be sure to tell your doctor:   If you have any allergies.   If there s any chance you are pregnant.   If you are breastfeeding.  You do not need to do anything special to prepare for this exam.  Please wear loose clothing, such as a sweat suit or jogging clothes. Avoid snaps, zippers and other metal. We may ask you to undress and put on a hospital gown.            Aug 08, 2018  1:40 PM CDT   Return Visit with MD Kahlil [place] [facility] Mojica ([facility])    [address] [id number] [phone]              Who to contact     If you have questions or need follow up information about today's clinic visit or your schedule please contact [facility] directly at  "[phone].  Normal or non-critical lab and imaging results will be communicated to you by MyChart, letter or phone within 4 business days after the clinic has received the results. If you do not hear from us within 7 days, please contact the clinic through RiffTraxhart or phone. If you have a critical or abnormal lab result, we will notify you by phone as soon as possible.  Submit refill requests through Everlane or call your pharmacy and they will forward the refill request to us. Please allow 3 business days for your refill to be completed.          Additional Information About Your Visit        RiffTrax[employer] Information     [facility] gives you secure access to your electronic health record. If you see a primary care provider, you can also send messages to your care team and make appointments. If you have questions, please call your primary care clinic.  If you do not have a primary care provider, please call [phone] and they will assist you.        Care EveryWhere ID     This is your Care EveryWhere ID. This could be used by other organizations to access your [facility] medical records  [id number]        Your Vitals Were     Pulse Respirations Height Pulse Oximetry BMI (Body Mass Index)       61 14 1.499 m (4' 11\") 95% 29.69 kg/m2        Blood Pressure from Last 3 Encounters:   04/26/18 122/53   04/23/18 98/42   04/21/18 136/56    Weight from Last 3 Encounters:   04/26/18 66.7 kg (147 lb)   04/23/18 66.2 kg (146 lb)   04/21/18 68.1 kg (150 lb 2 oz)              Today, you had the following     No orders found for display       Primary Care Provider Office Phone # Fax #    Maddi Marine [facility] [phone]       [address]  [place] [address]        Equal Access to Services     NILE MONZON : Cj Wang, tiana trujillo, qacoretta allenalayesha rebollar. So [facility] [phone].    ATENCIÓN: Si habla español, tiene a valdez disposición servicios " sharlene de asistencia lingüística. Rajesh siufentes [phone].    We comply with applicable federal civil rights laws and [place] laws. We do not discriminate on the basis of race, color, national origin, age, disability, sex, sexual orientation, or gender identity.            Thank you!     Thank you for choosing [facility]  for your care. Our goal is always to provide you with excellent care. Hearing back from our patients is one way we can continue to improve our services. Please take a few minutes to complete the written survey that you may receive in the mail after your visit with us. Thank you!             Your Updated Medication List - Protect others around you: Learn how to safely use, store and throw away your medicines at www.disposemymeds.org.          This list is accurate as of 4/26/18 10:23 AM.  Always use your most recent med list.                   Brand Name Dispense Instructions for use Diagnosis    aspirin 325 MG EC tablet      Take 325 mg by mouth daily Reported on 3/17/2017    Health Care Home, Status post total left knee replacement       atenolol 25 MG tablet    TENORMIN    90 tablet    TAKE 1 TABLET (25 MG) BY MOUTH DAILY    Benign essential hypertension       azithromycin 250 MG tablet    ZITHROMAX    6 tablet    Two tablets first day, then one tablet daily for four days.    Acute bronchitis with coexisting condition requiring prophylactic treatment       CALCIUM + D 500-1000-40 MG-UNT-MCG Chew   Generic drug:  Calcium-Vitamin D-Vitamin K      Take 1 tablet by mouth 2 times daily Reported on 4/20/2017        CENTRUM SILVER ADULT 50+ PO      Take 1 tablet by mouth daily Reported on 4/20/2017        citalopram 20 MG tablet    celeXA    90 tablet    TAKE 1 TABLET (20 MG) BY MOUTH DAILY    Major depressive disorder, single episode, moderate (H)       fluticasone 50 MCG/ACT spray    FLONASE    1 Bottle    Spray 2 sprays into both nostrils daily    Nasal congestion       folic acid 1  MG tablet    FOLVITE    100 tablet    Take 1 tablet (1 mg) by mouth daily    Inflammatory polyarthropathy (H)       IBUPROFEN PO           methotrexate sodium 2.5 MG Tabs     24 tablet    Take 15 mg by mouth once a week . Take all 6 tablets on the same day of each week.    Inflammatory polyarthropathy (H)       order for DME     1 Device    Equipment being ordered: 10-20 mm Hg knee high compression stockings    Venous stasis dermatitis of left lower extremity       senna-docusate 8.6-50 MG per tablet    SENOKOT-S;PERICOLACE     Take 2 tablets by mouth 2 times daily Reported on 4/20/2017    Health Care Home, Status post total left knee replacement       triamcinolone 0.1 % cream    KENALOG    45 g    Apply sparingly to affected area two times daily for 7 days.    Rash       TYLENOL 500 MG tablet   Generic drug:  acetaminophen      Take 2 tablets (1,000 mg) by mouth every 6 hours as needed (Max acetaminophen dose: 4000mg in 24 hrs.)    Health Care Home, Status post total left knee replacement       vitamin D 1000 units capsule      Take 1 capsule by mouth daily Reported on 4/20/2017

## 2018-04-26 NOTE — PROGRESS NOTES
"Rheumatology Clinic Visit      Ailyn rTevino MRN# [id number]   YOB: 1935 Age: 82 year old      Date of visit: 4/26/18   PCP: Dr. Maddi Church    Chief Complaint   Patient presents with:  Arthritis: Patient feels she is not any better than before    Assessment and Plan     1.  Inflammatory arthritis / PMR: Based on historical records, I agreed that she most likely had polymyalgia rheumatica. PMR was steroid responsive and she has been able to reduce her prednisone dose to 3 mg daily without issue, but then had a \"flare\" involving back pain that radiated around to include just over her bilateral lower rib cage. At the time of her flare in April 2017, the ESR and CRP were normal. She was also having hip pain at that time; she had left TKA performed in January 2017. Prednisone dose was increased at that time with resolution of her pain. I suspected that the pain she was having at that time was due to degenerative spine change rather than PMR.  11/1/2017 MRI of the T-spine showed minimal disc space narrowing at several levels in the mid and lower thoracic spine, minimal osteophyte formation or disc protrusions at several levels, but no stenosis. She has degenerative changes seen on a lumbar spine MRI that was performed at [facility], per a clinic note from [facility] orthopedics.  Prednisone was tapered off over time without worsening of her symptoms.  On 1/24/2018 she returned with bilateral wrist swelling and tenderness palpation; also some pain in the back of her hand; and continued neck/back pain. Inflammatory arthritis dx'd at that time; placed on MTX and prednisone; currently on MTX 15mg once weekly and no prednisone.  Overall doing better with MTX: no more morning stiffness,  strength reported to be improved; and no synovitis on exam.  She is concerned about the risks associated with MTX and so we reviewed them again in detail.  She wants to take one more month of MTX and then consider " "stopping; I advised her to continue until seen again in 3months but she says \"well I don't know\".  She has one more month on the current Rx and I asked that she notify me if she plans to continue; if she stops then I anticipate worsening symptoms.   - Continue methotrexate 15 mg once weekly  - Continue folic acid 1mg daily    Ms. Trevino verbalized agreement with and understanding of the rational for the diagnosis and treatment plan.  All questions were answered to best of my ability and the patient's satisfaction. Ms. Trevino was advised to contact the clinic with any questions that may arise after the clinic visit.      Thank you for involving me in the care of the patient    Return to clinic: 3 months      HPI   Ailyn Trevino is a 82 year old female with a past medical history significant for hyperlipidemia, granulomatous lung disease, aortic valve disorder, mitral valve disorder, impaired fasting glucose, polymyalgia rheumatica, and depression who presents for f/u of arthritis.     4/20/2015 rheumatology clinic note by Dr. Bud Hurst at the [facility] documents the patient does not have signs or symptoms of GCA. Significant myalgias in March 2015 in her shoulders and popliteal area, but not in the hip area. Cortisone shots in the hip in March 2015. Elevated CRP and mildly abnormal sedimentation rate. Morning stiffness for about 2 weeks. 2 hours to loosen up. Difficulty lifting her arms above her shoulders. CK was normal. Prednisone 20 mg daily \"helped about almost completely in 4 days' time\"    In 10/2017, Ms. Trevino reported that she was initially diagnosed with PMR and treated with steroids that were very effective. At that time, she reports having some difficulty raising her arms above her head. She says that she has always had some hip and knee pain that was thought to be secondary to her left knee osteoarthritis. She had a left total knee arthroplasty in January 2017. She " also reports having flat feet bilaterally. She has been reducing prednisone slowly over time, and was on prednisone 3 mg daily at that time. When she was evaluated by Dr. Christopher  on 4/20/2017 it was noted that she was having pain in her rib cage, back, and hips. She had just reduced prednisone and today she says at that time she was on 1 mg daily, so the dose was increased to 20 mg a day with resolution of her pain. She feels like she has still doing well while on prednisone 3 mg daily. She still has some thoracic back pain. No difficult or raising her arms above her head today. No difficulty standing up from a low seated position. No jaw claudication, scalp tenderness, headaches, or vision changes.she also reports having some right groin pain that she thinks is due to altered gait because of her knee issues. She also reports having lower back pain that was addressed at [facility] with an epidural steroid injection that was not effective. This was at the direction of her orthopedic surgeon who is at [facility] orth[facility]ics. She reports having had an MRI of her lumbar spine at [facility]. 8/1/2017 clinic note by Prosper Leonardo at [facility] orthopedics documents that an MRI showed multilevel degenerative changes namely L4-L5 central canal compromise and severe right lateral recess stenosis. Currently without hand pain, but she says that she has had some pain in her fingers from time to time. She denies having stiffness in her hands.     She then had an MRI of the thoracic spine showing degenerative changes of the thoracic spine    1/2018: she returned complaining of bilateral wrist pain, also some pain across the back of her hand.  Pain is worse in the morning with morning stiffness for at least one hour.  Stiffness improves with activity.  Pain in her hands improves with activity.  She continues to have pain in her lower back, upper back, mid back, and neck.  spine pain is worse with activity and  improves with rest.  She is following with an orthopedic surgeon for her back.      Today, she reports that she is doing poorly because she has a nonproductive cough but feels like she needs to cough something up.  She is going to have a chest CT later today.  No shortness of breath or chest pain.  Feels like she has chest congestion now.  Says that she is feeling better since using methotrexate.  She is off of prednisone.  Improved  strength bilaterally.  Hand pain, especially in the morning, has improved significantly.  No morning stiffness. No difficulty raising her arms above her head or standing up from a low seated position.  Denies jaw claudication, scalp tenderness, vision change, new headache, difficulty standing up from a chair, or difficulty raising her arms above her head.      Denies fevers, chills, nausea, vomiting, constipation, diarrhea. No abdominal pain. No chest pain/pressure, palpitations, or shortness of breath. No LE swelling. No neck pain. No oral or nasal sores.  No rash.      Tobacco:  None  EtOH: No more than 1 drink per day  Drugs:   Occupation: used to work as a , retired now    ROS   GEN: No fevers, chills, night sweats, or weight change  SKIN: No itching, rashes, sores  HEENT: No oral or nasal ulcers.  CV: No chest pain, pressure, palpitations, or dyspnea on exertion.  PULM: No SOB, wheeze, cough.  GI: No nausea, vomiting, constipation, diarrhea. No blood in stool. No abdominal pain.  : No blood in urine.  MSK: See HPI.  NEURO: See HPI  ENDO: No heat/cold intolerance.  EXT: No LE swelling  PSYCH: Negative    Active Problem List     Patient Active Problem List   Diagnosis     Advanced directives, counseling/discussion     Osteopenia     Palpitations     Hyperlipidemia LDL goal <130     Renal cyst     Granulomatous lung disease (H)     Health Care Home     Insomnia     Osteoarthritis of knee     Aortic valve disorder     Mitral valve disorder     Impaired fasting  glucose     PMR (polymyalgia rheumatica) (H)     Encounter for long-term current use of medication     Other chronic pain     Major depressive disorder, single episode, mild (H)     Current chronic use of systemic steroids     Fatigue, unspecified type     Chronic pain of left knee     Venous stasis dermatitis of left lower extremity     Fecal urgency     Elevated hemidiaphragm     Past Medical History     Past Medical History:   Diagnosis Date     Granulomatous lung disease (H) 8/7/2012     High cholesterol      Insomnia 10/8/2013    Benadryl gives her RLS     Osteoarthritis of knee 10/8/2013    Sees Dr. Whaley     Osteopenia 7/31/2012     Other chronic pain 8/3/2015    Patient is followed by KIARA RAINES for ongoing prescription of pain medication.  All refills should be approved by this provider, or covering partner.  Medication(s): Hydrocodone/APAP.  Maximum quantity per month: 30 Clinic visit frequency required: Q 3 months   Controlled substance agreement on file: Yes      Date(s): 8/3/15  Pain Clinic evaluation in the past: No  DIRE Total Score(s): No fl     Renal cyst 8/7/2012     Past Surgical History     Past Surgical History:   Procedure Laterality Date     APPENDECTOMY  1951     CATARACT IOL, RT/LT  2010    bilateral      CHOLECYSTECTOMY  2010     KNEE SURGERY Left 01/2017     SURGICAL HISTORY OF -   12/13    bilateral YAG laser surgery of eyes     Allergy   No Known Allergies  Current Medication List     Current Outpatient Prescriptions   Medication Sig     acetaminophen (TYLENOL) 500 MG tablet Take 2 tablets (1,000 mg) by mouth every 6 hours as needed (Max acetaminophen dose: 4000mg in 24 hrs.)     aspirin 325 MG EC tablet Take 325 mg by mouth daily Reported on 3/17/2017     atenolol (TENORMIN) 25 MG tablet TAKE 1 TABLET (25 MG) BY MOUTH DAILY     azithromycin (ZITHROMAX) 250 MG tablet Two tablets first day, then one tablet daily for four days.     Calcium-Vitamin D-Vitamin K (CALCIUM + D)  "500-1000-40 MG-UNT-MCG CHEW Take 1 tablet by mouth 2 times daily Reported on 4/20/2017     Cholecalciferol (VITAMIN D) 1000 UNITS capsule Take 1 capsule by mouth daily Reported on 4/20/2017     citalopram (CELEXA) 20 MG tablet TAKE 1 TABLET (20 MG) BY MOUTH DAILY     fluticasone (FLONASE) 50 MCG/ACT spray Spray 2 sprays into both nostrils daily     folic acid (FOLVITE) 1 MG tablet Take 1 tablet (1 mg) by mouth daily     IBUPROFEN PO      methotrexate sodium 2.5 MG TABS Take 15 mg by mouth once a week . Take all 6 tablets on the same day of each week.     Multiple Vitamins-Minerals (CENTRUM SILVER ADULT 50+ PO) Take 1 tablet by mouth daily Reported on 4/20/2017     order for DME Equipment being ordered: 10-20 mm Hg knee high compression stockings     senna-docusate (SENOKOT-S;PERICOLACE) 8.6-50 MG per tablet Take 2 tablets by mouth 2 times daily Reported on 4/20/2017     triamcinolone (KENALOG) 0.1 % cream Apply sparingly to affected area two times daily for 7 days.     No current facility-administered medications for this visit.        Social History   See HPI    Family History     Family History   Problem Relation Age of Onset     CANCER Mother      CEREBROVASCULAR DISEASE Father      Denies family history of autoimmune disease     Physical Exam     Temp Readings from Last 3 Encounters:   04/23/18 99.2  F (37.3  C) (Oral)   04/21/18 98.6  F (37  C) (Tympanic)   11/09/17 97.9  F (36.6  C) (Oral)     BP Readings from Last 5 Encounters:   04/26/18 122/53   04/23/18 98/42   04/21/18 136/56   01/24/18 149/69   11/09/17 126/54     Pulse Readings from Last 1 Encounters:   04/26/18 61     Resp Readings from Last 1 Encounters:   04/26/18 14     Estimated body mass index is 29.69 kg/(m^2) as calculated from the following:    Height as of this encounter: 1.499 m (4' 11\").    Weight as of this encounter: 66.7 kg (147 lb).    GEN: NAD  HEENT: MMM. No oral lesions. EOMI. Anicteric, noninjected sclera  CV: S1, S2. RRR. No " m/r/g.  PULM: CTA bilaterally. No w/c.  MSK: Mild swelling and tenderness palpation of the bilateral wrists.  Also tenderness palpation of the right second MCP and bilateral second PIPs without swelling.  Heberden's and Kecia's nodes present.  Elbows and shoulders without swelling or tenderness to palpation.  Normal range of motion of the shoulders.  She is able to raise her arms above her head without difficulty.   Bilateral hips nontender to direct palpation or with range of motion. Knees, ankles, and feet without swelling or tenderness to palpation. She is able to stand up unassisted from a chair without difficulty.   NEURO: UE and LE strengths 5/5 and equal bilaterally.   SKIN: No rash  EXT: No LE edema  PSYCH: Alert. Appropriate.    Labs / Imaging (select studies)   RF/CCP  Recent Labs   Lab Test  06/26/17   1854  04/20/15   1340  02/02/15   1115   CCPABY   --   <20  Interpretation:  Negative     --    CCPIGG  <1  Negative     --    --    RHF  <20   --   <20     CBC  Recent Labs   Lab Test  04/23/18   1054  01/24/18   0918  04/20/17   1513   WBC  6.7  6.3  10.0   RBC  4.18  4.20  4.19   HGB  13.4  13.3  13.4   HCT  40.4  39.8  40.2   MCV  97  95  96   RDW  14.8  12.7  12.8   PLT  210  253  268   MCH  32.1  31.7  32.0   MCHC  33.2  33.4  33.3   NEUTROPHIL  62.7  61.2  84.9   LYMPH  20.4  22.0  11.0   MONOCYTE  15.6  11.7  3.5   EOSINOPHIL  1.0  4.6  0.3   BASOPHIL  0.3  0.5  0.3   ANEU  4.2  3.9  8.5*   ALYM  1.4  1.4  1.1   LAURENCE  1.0  0.7  0.4   AEOS  0.1  0.3  0.0   ABAS  0.0  0.0  0.0     CMP  Recent Labs   Lab Test  04/23/18   1054  01/24/18   0918  06/26/17   1854  01/02/17   1503  10/04/16   1145   NA   --    --   142  139  138   POTASSIUM   --    --   4.1  3.8  4.1   CHLORIDE   --    --   108  103  107   CO2   --    --   28  27  25   ANIONGAP   --    --   6  9  6   GLC   --    --   81  89  79   BUN   --    --   16  15  16   CR  0.74  0.61  0.61  0.70  0.65   GFRESTIMATED  75  >90  >90  Non   American GFR Calc    80  88   GFRESTBLACK  >90  >90  >90  African American GFR Calc    >90   GFR Calc    >90   GFR Calc     ALEJANDRO   --    --   8.7  9.0  8.7   BILITOTAL  0.6  0.5  0.5   --    --    ALBUMIN  3.8  3.7  3.8   --    --    PROTTOTAL  7.1  6.8  6.9   --    --    ALKPHOS  82  78  62   --    --    AST  28  17  18   --   16   ALT  22  26  27   --   23     Calcium/VitaminD  Recent Labs   Lab Test  06/26/17   1854  01/02/17   1503  10/04/16   1145  02/05/16   1500   10/08/13   1031  08/01/12   1018   ALEJANDRO  8.7  9.0  8.7  9.1   < >  9.5  9.2   VITDT   --    --    --   39   --   49  35    < > = values in this interval not displayed.     ESR/CRP  Recent Labs   Lab Test  04/23/18   1054  01/24/18   0918  12/08/17   1532   SED  10  7  8   CRP  37.1*  6.3  5.4     Hepatitis B  Recent Labs   Lab Test  01/24/18   0918   HBCAB  Nonreactive   HEPBANG  Nonreactive     Hepatitis C  Recent Labs   Lab Test  01/24/18   0918   HCVAB  Nonreactive     Immunization History     Immunization History   Administered Date(s) Administered     Influenza (High Dose) 3 valent vaccine 10/08/2013, 10/22/2014, 10/05/2015, 10/04/2016, 09/08/2017     Influenza (IIV3) PF 10/26/2010, 10/24/2011, 10/23/2012     Pneumo Conj 13-V (2010&after) 11/03/2015     Pneumococcal 23 valent 12/16/2004     Tdap (Adacel,Boostrix) 07/21/2010     Zoster vaccine, live 06/24/2009          Chart documentation done in part with Dragon Voice recognition Software. Although reviewed after completion, some word and grammatical error may remain.    Dale Marquis MD

## 2018-04-26 NOTE — NURSING NOTE
"Chief Complaint   Patient presents with     Arthritis     Patient feels she is not any better than before       Initial /53  Pulse 61  Resp 14  Ht 1.499 m (4' 11\")  Wt 66.7 kg (147 lb)  SpO2 95%  BMI 29.69 kg/m2 Estimated body mass index is 29.69 kg/(m^2) as calculated from the following:    Height as of this encounter: 1.499 m (4' 11\").    Weight as of this encounter: 66.7 kg (147 lb).  BP completed using cuff size: regular         RAPID3 (0-30) Cumulative Score  18.0          RAPID3 Weighted Score (divide #4 by 3 and that is the weighted score)  6.0         "

## 2018-04-27 PROBLEM — R91.8 PULMONARY NODULES: Status: ACTIVE | Noted: 2018-04-27

## 2018-05-01 ENCOUNTER — TELEPHONE (OUTPATIENT)
Dept: INTERNAL MEDICINE | Facility: CLINIC | Age: 83
End: 2018-05-01

## 2018-05-01 NOTE — TELEPHONE ENCOUNTER
Incorrect letter sent to Patient.  She was advised to shred the letter.  Correct letter sent.  Ailyn notified of CT result.  Patient notified that CT is not recommended until next year.  CT order will be placed in the future and image schedulers will contact her to schedule.    Roly Sneed, CAROLYN  [facility] RN  Lung Nodule and ED Lab Result F/u RN  Epic pool (ED late result f/u RN): [id number]  FV INCIDENTAL RADIOLOGY F/U NURSES: [id number]  # [phone]

## 2018-05-01 NOTE — TELEPHONE ENCOUNTER
Patient called back stating that she received a letter from the lung nodule clinic recommending she schedule for a low dose CT scan  Patient asking if she would still need to complete the lung function test.  Advised her that she would still need to do the lung function test.    Will forward to lung nodule clinic  Looks like the letter does advise her to schedule a low dose CT scan but no orders are in chart.  Please contact patient to clarify    Keily Christopher RN

## 2018-05-01 NOTE — TELEPHONE ENCOUNTER
"Spoke with patient.   Results note read as written.   Patient verbalized understanding.   No further questions at this time.     \"Your CT scan shows the right diaphragm is elevated like it was on the x-ray. There are a few old calcifications in the lungs from an infection many years ago (we already knew this from an old CT scan), and there are a couple tiny spots in the lungs. I'll send a copy of your report to the [facility] nodule clinic for review, but I anticipate you won't need further follow up on these spots.     For the diaphragm being elevated, the next step is to complete some lung function tests to see if this is impairing your breathing. Please wait until your cough is gone and you are feeling well before you do this. I've placed a referral for the test to be done up at [facility]. You can call [phone] to schedule.\"       Patient verbalized understanding.   No further questions at this time.     Billie Kraus RN     "

## 2018-05-17 ENCOUNTER — TRANSFERRED RECORDS (OUTPATIENT)
Dept: HEALTH INFORMATION MANAGEMENT | Facility: CLINIC | Age: 83
End: 2018-05-17

## 2018-05-17 ENCOUNTER — TELEPHONE (OUTPATIENT)
Dept: INTERNAL MEDICINE | Facility: CLINIC | Age: 83
End: 2018-05-17

## 2018-05-17 DIAGNOSIS — Z96.659 STATUS POST TOTAL KNEE REPLACEMENT, UNSPECIFIED LATERALITY: Primary | ICD-10-CM

## 2018-05-17 RX ORDER — AMOXICILLIN 500 MG/1
2000 CAPSULE ORAL ONCE
Qty: 4 CAPSULE | Refills: 3 | Status: SHIPPED | OUTPATIENT
Start: 2018-05-17 | End: 2019-02-04

## 2018-05-17 NOTE — TELEPHONE ENCOUNTER
Patient notified that prescription for antibiotic was sent to pharmacy. Patient verbalized understanding, no further questions or concerns.    Christina Matthew RN

## 2018-05-17 NOTE — TELEPHONE ENCOUNTER
Reason for Call:  Other prescription    Detailed comments: Patient has a appointment on Monday with dentist and they want patient to take antibiotics before hand. Patient is requesting antibiotics.     Pharmacy: [facility] [place]    Phone Number Patient can be reached at: Home number on file [phone] (home)    Best Time: any    Can we leave a detailed message on this number? YES    Call taken on 5/17/2018 at 3:50 PM by Judd Nam

## 2018-05-21 ENCOUNTER — TELEPHONE (OUTPATIENT)
Dept: INTERNAL MEDICINE | Facility: CLINIC | Age: 83
End: 2018-05-21

## 2018-05-21 NOTE — TELEPHONE ENCOUNTER
Reason for Call:  Other call back    Detailed comments: Patient was ordered amoxicillin for a dentist appointment pharmacist is calling regarding a medication conflict with methotrexate. Please call and advise     Phone Number Patient can be reached at: [phone] / [facility]    Best Time: any    Can we leave a detailed message on this number? YES    Call taken on 5/21/2018 at 8:25 AM by Barbara James

## 2018-05-21 NOTE — TELEPHONE ENCOUNTER
Contacted pharmacy and they have already dispensed this. Pt just needs to be notified of provider's message. Attempted call back and a male answered. He will give pt the message to call back to the RN hotline.    Brandi Weeks RN  [facility]

## 2018-05-21 NOTE — TELEPHONE ENCOUNTER
Please advise on drug to drug reaction:    Amoxicillin and methotrexate.    Billie Kraus, RN, BSN, PHN

## 2018-05-24 NOTE — TELEPHONE ENCOUNTER
"Called patient and she stated that she already had her dental appointment and \"is this about the amoxicillin? I already talked about it with my dentist and everything is good for another 6 months. Thank you.\"  Patient hung up the phone before writer could finish explaining that it would be ok to take amoxicillin with the methotrexate if taking the amoxicillin for a 1 time dose for future dental procedures.    Keily Christopher RN    "

## 2018-06-19 DIAGNOSIS — M06.4 INFLAMMATORY POLYARTHROPATHY (H): ICD-10-CM

## 2018-06-20 RX ORDER — METHOTREXATE 2.5 MG/1
15 TABLET ORAL WEEKLY
Qty: 72 TABLET | Refills: 0 | Status: ON HOLD | OUTPATIENT
Start: 2018-06-20 | End: 2018-10-24

## 2018-06-20 NOTE — TELEPHONE ENCOUNTER
Rheumatology team: Please call to notify Ms. Trevino that a 90 day supply of methotrexate has been prescribed.    Dale Marquis MD  6/20/2018 6:14 AM

## 2018-08-08 DIAGNOSIS — F32.1 MAJOR DEPRESSIVE DISORDER, SINGLE EPISODE, MODERATE (H): ICD-10-CM

## 2018-08-08 RX ORDER — CITALOPRAM HYDROBROMIDE 20 MG/1
TABLET ORAL
Qty: 30 TABLET | Refills: 1 | Status: SHIPPED | OUTPATIENT
Start: 2018-08-08 | End: 2018-10-21

## 2018-08-08 NOTE — TELEPHONE ENCOUNTER
Pending Prescriptions:                       Disp   Refills    citalopram (CELEXA) 20 MG tablet [Pharmac*90 tab*1            Sig: TAKE 1 TABLET (20 MG) BY MOUTH DAILY    Routing refill request to provider for review/approval because:  Drug interaction warning.    Fouzia Newton RN

## 2018-08-13 DIAGNOSIS — M06.4 INFLAMMATORY POLYARTHROPATHY (H): ICD-10-CM

## 2018-08-13 NOTE — TELEPHONE ENCOUNTER
"Requested Prescriptions   Pending Prescriptions Disp Refills     folic acid (FOLVITE) 1 MG tablet 100 tablet 1    Last Written Prescription Date:  1/24/18  Last Fill Quantity: 100,  # refills: 1   Last Office Visit with G, P or [facility] prescribing provider:4/26/18     Future Office Visit:      Sig: Take 1 tablet (1 mg) by mouth daily    Vitamin Supplements (Adult) Protocol Passed    8/13/2018  1:11 PM       Passed - High dose Vitamin D not ordered       Passed - Recent (12 mo) or future (30 days) visit within the authorizing provider's specialty    Patient had office visit in the last 12 months or has a visit in the next 30 days with authorizing provider or within the authorizing provider's specialty.  See \"Patient Info\" tab in inbasket, or \"Choose Columns\" in Meds & Orders section of the refill encounter.              "

## 2018-08-14 RX ORDER — FOLIC ACID 1 MG/1
1 TABLET ORAL DAILY
Qty: 100 TABLET | Refills: 1 | Status: SHIPPED | OUTPATIENT
Start: 2018-08-14 | End: 2019-02-04

## 2018-09-05 ENCOUNTER — TRANSFERRED RECORDS (OUTPATIENT)
Dept: HEALTH INFORMATION MANAGEMENT | Facility: CLINIC | Age: 83
End: 2018-09-05

## 2018-10-08 DIAGNOSIS — M06.4 INFLAMMATORY POLYARTHROPATHY (H): ICD-10-CM

## 2018-10-08 NOTE — PROGRESS NOTES
[facility]  [address] [place] [id number]  [phone]  Dept: [phone]    PRE-OP EVALUATION:  Today's date: 10/9/2018    Ailyn Trevino (: 1935) presents for pre-operative evaluation assessment as requested by Dr. Peña.  She requires evaluation and anesthesia risk assessment prior to undergoing surgery/procedure for treatment of 10/24/18 .    Proposed Surgery/ Procedure: reconstruction left foot  Date of Surgery/ Procedure: 10/24/18  Time of Surgery/ Procedure: 6 am  Hospital/Surgical Facility: [facility]    Primary Physician: Maddi Church  Type of Anesthesia Anticipated: General    Patient has a Health Care Directive or Living Will:  NO    1. NO - Do you have a history of heart attack, stroke, stent, bypass or surgery on an artery in the head, neck, heart or legs?  2. NO - Do you ever have any pain or discomfort in your chest?  3. NO - Do you have a history of  Heart Failure?  4. NO - Are you troubled by shortness of breath when: walking on the level, up a slight hill or at night?  5. NO - Do you currently have a cold, bronchitis or other respiratory infection?  6. NO - Do you have a cough, shortness of breath or wheezing?  7. NO - Do you sometimes get pains in the calves of your legs when you walk?  8. NO - Do you or anyone in your family have previous history of blood clots?  9. NO - Do you or does anyone in your family have a serious bleeding problem such as prolonged bleeding following surgeries or cuts?  10. NO - Have you ever had problems with anemia or been told to take iron pills?  11. NO - Have you had any abnormal blood loss such as black, tarry or bloody stools, or abnormal vaginal bleeding?  12. NO - Have you ever had a blood transfusion?  13. NO - Have you or any of your relatives ever had problems with anesthesia?  14. NO - Do you have sleep apnea, excessive snoring or daytime drowsiness?  15. NO - Do you have any prosthetic heart  valves?  16. YES - DO YOU HAVE PROSTHETIC JOINTS? Left knee  17. NO - Is there any chance that you may be pregnant?    Opal Chamorro CNP     HPI:     HPI related to upcoming procedure: Patient will undergo left foot reconstruction due to pain and significant deformity.      DEPRESSION - Patient has a long history of Depression of moderate severity requiring medication for control with recent symptoms being stable..Current symptoms of depression include none.                                                                                                                                                                                    .    MEDICAL HISTORY:     Patient Active Problem List    Diagnosis Date Noted     Pulmonary nodules 04/27/2018     Priority: Medium     Currently managed with follow up imaging by [facility] result Team.         Inflammatory arthritis 04/26/2018     Priority: Medium     Elevated hemidiaphragm 04/23/2018     Priority: Medium     Fatigue, unspecified type 03/17/2017     Priority: Medium     Chronic pain of left knee 03/17/2017     Priority: Medium     Venous stasis dermatitis of left lower extremity 03/17/2017     Priority: Medium     Fecal urgency 03/17/2017     Priority: Medium     Current chronic use of systemic steroids 01/02/2017     Priority: Medium     Major depressive disorder, single episode, mild (H) 12/03/2015     Priority: Medium     Encounter for long-term current use of medication 08/03/2015     Priority: Medium     Problem list name updated by automated process. Provider to review       Other chronic pain 08/03/2015     Priority: Medium     Patient is followed by KIARA RAINES for ongoing prescription of pain medication.  All refills should be approved by this provider, or covering partner.    Medication(s): Hydrocodone/APAP.   Maximum quantity per month: 30  Clinic visit frequency required: Q 3 months     Controlled substance agreement on file: Yes        Date(s): 8/3/15    Pain Clinic evaluation in the past: No    DIRE Total Score(s):  No flowsheet data found.    Last [facility] website verification:  done on 8/3/15   [email or web address]       PMR (polymyalgia rheumatica) (H) 04/10/2015     Priority: Medium     Impaired fasting glucose 11/26/2014     Priority: Medium     Aortic valve disorder 03/19/2014     Priority: Medium     Problem list name updated by automated process. Provider to review       Mitral valve disorder 03/19/2014     Priority: Medium     Problem list name updated by automated process. Provider to review       Insomnia 10/08/2013     Priority: Medium     Benadryl gives her RLS  Trazodone gave her side effects  Tried Ambien in the past  Vicodin helps her back and for sleep       Osteoarthritis of knee 10/08/2013     Priority: Medium     Sees Dr. Whaley  Patient is followed by KIARA RAINES for ongoing prescription of narcotic pain medicine.  Med: Vicodin.   Maximum use per month: 30  Expected duration: lifelong  Narcotic agreement on file: YES  Clinic visit recommended: Q 3 months       Health Care Home 09/28/2012     Priority: Medium            DX V65.8 REPLACED WITH [facility] (04/08/2013)         Renal cyst 08/07/2012     Priority: Medium     Workup with multiple serial imaging reveals benign hemorrhagic cyst       Granulomatous lung disease (H) 08/07/2012     Priority: Medium     Multiple serial CT with no change in granuloma and splenic lesions       Advanced directives, counseling/discussion 07/31/2012     Priority: Medium     Discussed advance care planning with patient; information given to patient to review. 7/31/2012          Osteopenia 07/31/2012     Priority: Medium     Due to worsening bone density in 2016 she was started on Fosamax.       Palpitations 07/31/2012     Priority: Medium     Hyperlipidemia LDL goal <130 07/31/2012     Priority: Medium      Past Medical History:   Diagnosis Date     Granulomatous lung  disease (H) 8/7/2012     High cholesterol      Insomnia 10/8/2013    Benadryl gives her RLS     Osteoarthritis of knee 10/8/2013    Sees Dr. Whaley     Osteopenia 7/31/2012     Other chronic pain 8/3/2015    Patient is followed by KIARA RAINES for ongoing prescription of pain medication.  All refills should be approved by this provider, or covering partner.  Medication(s): Hydrocodone/APAP.  Maximum quantity per month: 30 Clinic visit frequency required: Q 3 months   Controlled substance agreement on file: Yes      Date(s): 8/3/15  Pain Clinic evaluation in the past: No  DIRE Total Score(s): No fl     Renal cyst 8/7/2012     Past Surgical History:   Procedure Laterality Date     APPENDECTOMY  1951     CATARACT IOL, RT/LT  2010    bilateral      CHOLECYSTECTOMY  2010     KNEE SURGERY Left 01/2017     SURGICAL HISTORY OF -   12/13    bilateral YAG laser surgery of eyes     Current Outpatient Prescriptions   Medication Sig Dispense Refill     acetaminophen (TYLENOL) 500 MG tablet Take 2 tablets (1,000 mg) by mouth every 6 hours as needed (Max acetaminophen dose: 4000mg in 24 hrs.)       aspirin 325 MG EC tablet Take 325 mg by mouth daily Reported on 3/17/2017       atenolol (TENORMIN) 25 MG tablet TAKE 1 TABLET (25 MG) BY MOUTH DAILY 90 tablet 2     Calcium-Vitamin D-Vitamin K (CALCIUM + D) 500-1000-40 MG-UNT-MCG CHEW Take 1 tablet by mouth 2 times daily Reported on 4/20/2017       Cholecalciferol (VITAMIN D) 1000 UNITS capsule Take 1 capsule by mouth daily Reported on 4/20/2017       citalopram (CELEXA) 20 MG tablet TAKE 1 TABLET (20 MG) BY MOUTH DAILY 30 tablet 1     folic acid (FOLVITE) 1 MG tablet Take 1 tablet (1 mg) by mouth daily 100 tablet 1     IBUPROFEN PO        methotrexate sodium 2.5 MG TABS Take 15 mg by mouth once a week . Take all 6 tablets on the same day of each week. 72 tablet 0     Multiple Vitamins-Minerals (CENTRUM SILVER ADULT 50+ PO) Take 1 tablet by mouth daily Reported on 4/20/2017        "order for DME Equipment being ordered: 10-20 mm Hg knee high compression stockings 1 Device 0     fluticasone (FLONASE) 50 MCG/ACT spray Spray 2 sprays into both nostrils daily (Patient not taking: Reported on 10/9/2018) 1 Bottle 1     senna-docusate (SENOKOT-S;PERICOLACE) 8.6-50 MG per tablet Take 2 tablets by mouth 2 times daily Reported on 4/20/2017       OTC products: None, except as noted above    No Known Allergies   Latex Allergy: NO    Social History   Substance Use Topics     Smoking status: Former Smoker     Quit date: 1/1/1998     Smokeless tobacco: Never Used     Alcohol use Yes     History   Drug Use No       REVIEW OF SYSTEMS:   Constitutional, neuro, ENT, endocrine, pulmonary, cardiac, gastrointestinal, genitourinary, musculoskeletal, integument and psychiatric systems are negative, except as otherwise noted.    EXAM:   /60  Pulse 67  Temp 97.6  F (36.4  C) (Oral)  Resp 20  Ht 4' 11\" (1.499 m)  Wt 151 lb 6.4 oz (68.7 kg)  SpO2 96%  BMI 30.58 kg/m2    GENERAL APPEARANCE: healthy, alert and no distress     EYES: EOMI, PERRL     HENT: ear canals and TM's normal and nose and mouth without ulcers or lesions     NECK: no adenopathy, no asymmetry, masses, or scars and thyroid normal to palpation     RESP: lungs clear to auscultation - no rales, rhonchi or wheezes     CV: regular rates and rhythm, normal S1 S2, no S3 or S4 and no murmur, click or rub     ABDOMEN:  soft, nontender, no HSM or masses and bowel sounds normal     MS: extremities normal- no gross deformities noted, no evidence of inflammation in joints, FROM in all extremities.     SKIN: no suspicious lesions or rashes     NEURO: Normal strength and tone, sensory exam grossly normal, mentation intact and speech normal     PSYCH: mentation appears normal. and affect normal/bright     LYMPHATICS: No cervical adenopathy    DIAGNOSTICS:     EKG: appears normal, NSR, normal axis, normal intervals, no acute ST/T changes c/w ischemia, no LVH " by voltage criteria, unchanged from previous tracings  Labs Resulted Today:   Results for orders placed or performed in visit on 10/09/18   Hemoglobin   Result Value Ref Range    Hemoglobin 12.6 11.7 - 15.7 g/dL   Creatinine   Result Value Ref Range    Creatinine 0.64 0.52 - 1.04 mg/dL    GFR Estimate 89 >60 mL/min/1.7m2    GFR Estimate If Black >90 >60 mL/min/1.7m2       Recent Labs   Lab Test  04/23/18   1054  01/24/18   0918  06/26/17   1854   01/02/17   1503  10/04/16   1145   HGB  13.4  13.3   --    < >  12.4   --    PLT  210  253   --    < >  247   --    INR   --    --    --    --   0.91   --    NA   --    --   142   --   139  138   POTASSIUM   --    --   4.1   --   3.8  4.1   CR  0.74  0.61  0.61   --   0.70  0.65   A1C   --    --    --    --    --   5.2    < > = values in this interval not displayed.        IMPRESSION:   Reason for surgery/procedure: left foot deformity  Diagnosis/reason for consult: Management of comorbid conditions and preoperative exam.      The proposed surgical procedure is considered INTERMEDIATE risk.    REVISED CARDIAC RISK INDEX  The patient has the following serious cardiovascular risks for perioperative complications such as (MI, PE, VFib and 3  AV Block):  No serious cardiac risks  INTERPRETATION: 0 risks: Class I (very low risk - 0.4% complication rate)    The patient has the following additional risks for perioperative complications:  No identified additional risks      ICD-10-CM    1. Preop general physical exam Z01.818 EKG 12-lead complete w/read - Clinics     Hemoglobin   2. Foot deformity, left M21.962 Hemoglobin   3. Major depressive disorder, single episode, mild (H) F32.0    4. High risk medication use Z79.899 Hemoglobin     Creatinine       RECOMMENDATIONS:     --Consult hospital rounder / IM to assist post-op medical management    Cardiovascular Risk  Performs 4 METs exercise without symptoms (Light housework (dusting, washing dishes)) .   Patient is already on a Beta  Blocker. Continue Betablocker therapy after surgery, using Beta blocker order set as necessary for NPO status.      --Patient is to take all scheduled medications on the day of surgery EXCEPT for modifications listed below.    Anticoagulant or Antiplatelet Medication Use  ASPIRIN: Discontinue ASA 7-10 days prior to procedure to reduce bleeding risk.  It should be resumed post-operatively.        Preoperative Joint replacement and Rheumatologic DMARD Use  The pending procedure is an intervertebral disc arthroplasty or a total joint arthroplasty at the hip (YANIRA), knee (TKA), ankle shoulder, wrist or elbow.    --Methotrexate: Continue in the perioperative period.      APPROVAL GIVEN to proceed with proposed procedure, without further diagnostic evaluation       Signed Electronically by: BORA Howell CNP    Copy of this evaluation report is provided to requesting physician.    [facility] Preop Guidelines    Revised Cardiac Risk Index

## 2018-10-08 NOTE — TELEPHONE ENCOUNTER
Requested Prescriptions   Pending Prescriptions Disp Refills     methotrexate sodium 2.5 MG TABS 72 tablet 0     Sig: Take 6 tablets (15 mg) by mouth once a week . Take all 6 tablets on the same day of each week.    There is no refill protocol information for this order        Last Written Prescription Date:  6/20/2018  Last Fill Quantity: 72,  # refills: 0   Last office visit: 4/26/2018 with prescribing provider:  Miguel A Casper   Future Office Visit:   Next 5 appointments (look out 90 days)     Oct 09, 2018 11:00 AM CDT   [facility] B[facility]A [facility] CNP   [facility]    [address] [facility]  [place] [address]   [phone]            Dec 19, 2018  8:40 AM CST   Return Visit with Dale Marquis MD   [facility]    [address] [facility]  [place] [address]   [phone]

## 2018-10-08 NOTE — PATIENT INSTRUCTIONS
Stop aspirin, ibuprofen, aleve, fish oil 7 days before surgery.   Tylenol is okay prior to surgery.  On the morning of surgery okay to take atenolol and citalopram with small sips of water prior to surgery.    Before Your Surgery      Call your surgeon if there is any change in your health. This includes signs of a cold or flu (such as a sore throat, runny nose, cough, rash or fever).    Do not smoke, drink alcohol or take over the counter medicine (unless your surgeon or primary care doctor tells you to) for the 24 hours before and after surgery.    If you take prescribed drugs: Follow your doctor s orders about which medicines to take and which to stop until after surgery.    Eating and drinking prior to surgery: follow the instructions from your surgeon    Take a shower or bath the night before surgery. Use the soap your surgeon gave you to gently clean your skin. If you do not have soap from your surgeon, use your regular soap. Do not shave or scrub the surgery site.  Wear clean pajamas and have clean sheets on your bed.     [facility]    If you have any questions regarding to your visit please contact your care team:     Team Pink:   Clinic Hours Telephone Number   Internal Medicine:  Dr. Maddi Chamorro, NP 7am-7pm  Monday - Thursday   7am-5pm  Fridays  [phone]  (Appointment scheduling available 24/7)   Urgent Care - [place] - 11am-9pm Monday-Friday Saturday-Sunday- 9am-5pm   [place] - 5pm-9pm Monday-Friday Saturday-Sunday- 9am-5pm  [phone] - [place]  [phone] - [place]       What options do I have for a visit other than an office visit? We offer electronic visits (e-visits) and telephone visits, when medically appropriate.  Please check with your medical insurance to see if these types of visits are covered, as you will be responsible for any charges that are not paid by your insurance.      You can use  [employer] (secure electronic communication) to access to your chart, send your primary care provider a message, or make an appointment. Ask a team member how to get started.     For a price quote for your services, please call our Consumer Price Line at [phone] or our Imaging Cost estimation line at [phone] (for imaging tests).  Amelia CAMPOS CMA

## 2018-10-08 NOTE — TELEPHONE ENCOUNTER
Routing refill request to provider for review/approval because:  Drug not on the [facility] refill protocol       Kim De Leon RN - BC

## 2018-10-09 ENCOUNTER — OFFICE VISIT (OUTPATIENT)
Dept: FAMILY MEDICINE | Facility: CLINIC | Age: 83
End: 2018-10-09
Payer: COMMERCIAL

## 2018-10-09 VITALS
HEIGHT: 59 IN | WEIGHT: 151.4 LBS | DIASTOLIC BLOOD PRESSURE: 60 MMHG | HEART RATE: 67 BPM | OXYGEN SATURATION: 96 % | BODY MASS INDEX: 30.52 KG/M2 | RESPIRATION RATE: 20 BRPM | SYSTOLIC BLOOD PRESSURE: 110 MMHG | TEMPERATURE: 97.6 F

## 2018-10-09 DIAGNOSIS — Z79.899 HIGH RISK MEDICATION USE: ICD-10-CM

## 2018-10-09 DIAGNOSIS — M21.962 FOOT DEFORMITY, LEFT: ICD-10-CM

## 2018-10-09 DIAGNOSIS — F32.0 MAJOR DEPRESSIVE DISORDER, SINGLE EPISODE, MILD (H): ICD-10-CM

## 2018-10-09 DIAGNOSIS — Z01.818 PREOP GENERAL PHYSICAL EXAM: Primary | ICD-10-CM

## 2018-10-09 LAB
CREAT SERPL-MCNC: 0.64 MG/DL (ref 0.52–1.04)
GFR SERPL CREATININE-BSD FRML MDRD: 89 ML/MIN/1.7M2
HGB BLD-MCNC: 12.6 G/DL (ref 11.7–15.7)

## 2018-10-09 PROCEDURE — 99215 OFFICE O/P EST HI 40 MIN: CPT | Performed by: NURSE PRACTITIONER

## 2018-10-09 PROCEDURE — 82565 ASSAY OF CREATININE: CPT | Performed by: NURSE PRACTITIONER

## 2018-10-09 PROCEDURE — 93000 ELECTROCARDIOGRAM COMPLETE: CPT | Performed by: NURSE PRACTITIONER

## 2018-10-09 PROCEDURE — 36415 COLL VENOUS BLD VENIPUNCTURE: CPT | Performed by: NURSE PRACTITIONER

## 2018-10-09 PROCEDURE — 85018 HEMOGLOBIN: CPT | Performed by: NURSE PRACTITIONER

## 2018-10-09 ASSESSMENT — PAIN SCALES - GENERAL: PAINLEVEL: MODERATE PAIN (4)

## 2018-10-09 NOTE — PROGRESS NOTES
Dear Ailyn,    Your recent test results are attached.      No anemia.    If you have any questions please feel free to contact [phone] or myself via [employer].    Sincerely,  Opal Chamorro, CNP

## 2018-10-09 NOTE — MR AVS SNAPSHOT
After Visit Summary   10/9/2018    Ailyn Trevino    MRN: [id number]           Patient Information     Date Of Birth          1935        Visit Information        Provider Department      10/9/2018 11:00 AM Opal Chamorro APRN [facility]        Today's Diagnoses     Preop general physical exam    -  1    Foot deformity, left        Major depressive disorder, single episode, mild (H)        High risk medication use          Care Instructions    Stop aspirin, ibuprofen, aleve, fish oil 7 days before surgery.   Tylenol is okay prior to surgery.  On the morning of surgery okay to take atenolol and citalopram with small sips of water prior to surgery.    Before Your Surgery      Call your surgeon if there is any change in your health. This includes signs of a cold or flu (such as a sore throat, runny nose, cough, rash or fever).    Do not smoke, drink alcohol or take over the counter medicine (unless your surgeon or primary care doctor tells you to) for the 24 hours before and after surgery.    If you take prescribed drugs: Follow your doctor s orders about which medicines to take and which to stop until after surgery.    Eating and drinking prior to surgery: follow the instructions from your surgeon    Take a shower or bath the night before surgery. Use the soap your surgeon gave you to gently clean your skin. If you do not have soap from your surgeon, use your regular soap. Do not shave or scrub the surgery site.  Wear clean pajamas and have clean sheets on your bed.     [facility]    If you have any questions regarding to your visit please contact your care team:     Team Pink:   Clinic Hours Telephone Number   Internal Medicine:  Dr. Maddi Chamorro NP 7am-7pm  Monday - Thursday   7am-5pm  Fridays  [phone]  (Appointment scheduling available 24/7)   Urgent [facility] - [facility] - 11am-9pm  Monday-Friday Saturday-Sunday- 9am-5pm   [place] - 5pm-9pm Monday-Friday Saturday-Sunday- 9am-5pm  [phone] - Lexy Gold  [phone] - [place]       What options do I have for a visit other than an office visit? We offer electronic visits (e-visits) and telephone visits, when medically appropriate.  Please check with your medical insurance to see if these types of visits are covered, as you will be responsible for any charges that are not paid by your insurance.      You can use [employer] (secure electronic communication) to access to your chart, send your primary care provider a message, or make an appointment. Ask a team member how to get started.     For a price quote for your services, please call our Consumer Price Line at [phone] or our Imaging Cost estimation line at [phone] (for imaging tests).  Amelia CAMPOS CMA            Follow-ups after your visit        Your next 10 appointments already scheduled     Oct 24, 2018   Procedure with Rufus Harden MD   [facility] (--)    [address]  [place] [address]   [phone]            Dec 19, 2018  8:40 AM CST   Return Visit with Dale Marquis MD   [facility] ([facility])    [address] [place] [id number] [phone]              Who to contact     If you have questions or need follow up information about today's clinic visit or your schedule please contact [facility] directly at [phone].  Normal or non-critical lab and imaging results will be communicated to you by [employer], letter or phone within 4 business days after the clinic has received the results. If you do not hear from us within 7 days, please contact the clinic through [employer] or phone. If you have a critical or abnormal lab result, we will notify you by phone as soon as possible.  Submit refill requests through [employer] or call your pharmacy and they will forward the refill request  "to us. Please allow 3 business days for your refill to be completed.          Additional Information About Your Visit        MyChart Information     [employer] gives you secure access to your electronic health record. If you see a primary care provider, you can also send messages to your care team and make appointments. If you have questions, please call your primary care clinic.  If you do not have a primary care provider, please call [phone] and they will assist you.        Care EveryWhere ID     This is your Care EveryWhere ID. This could be used by other organizations to access your [facility] medical records  [id number]        Your Vitals Were     Pulse Temperature Respirations Height Pulse Oximetry BMI (Body Mass Index)    67 97.6  F (36.4  C) (Oral) 20 4' 11\" (1.499 m) 96% 30.58 kg/m2       Blood Pressure from Last 3 Encounters:   10/09/18 110/60   04/26/18 122/53   04/23/18 98/42    Weight from Last 3 Encounters:   10/09/18 151 lb 6.4 oz (68.7 kg)   04/26/18 147 lb (66.7 kg)   04/23/18 146 lb (66.2 kg)              We Performed the Following     Creatinine     EKG 12-lead complete w/read - Clinics     Hemoglobin        Primary Care Provider Office Phone # Fax #    Maddi [place] [facility] [phone] [address] [facility] [address]        Equal Access to Services     [facility]: Hadii aad ku hadasho Soomaali, waaxda luqadaha, qaybta kaalmada adeegyada, ayesha driscoll . So [facility] [phone].    ATENCIÓN: Si habla español, tiene a valdez disposición servicios gratuitos de asistencia lingüística. Llame al [phone].    We comply with applicable federal civil rights laws and [place] laws. We do not discriminate on the basis of race, color, national origin, age, disability, sex, sexual orientation, or gender identity.            Thank you!     Thank you for choosing [facility]  for your care. Our goal is always to provide you with excellent " care. Hearing back from our patients is one way we can continue to improve our services. Please take a few minutes to complete the written survey that you may receive in the mail after your visit with us. Thank you!             Your Updated Medication List - Protect others around you: Learn how to safely use, store and throw away your medicines at www.disposemymeds.org.          This list is accurate as of 10/9/18 11:55 AM.  Always use your most recent med list.                   Brand Name Dispense Instructions for use Diagnosis    aspirin 325 MG EC tablet      Take 325 mg by mouth daily Reported on 3/17/2017    Health Care Home, Status post total left knee replacement       atenolol 25 MG tablet    TENORMIN    90 tablet    TAKE 1 TABLET (25 MG) BY MOUTH DAILY    Benign essential hypertension       CALCIUM + D 500-1000-40 MG-UNT-MCG Chew   Generic drug:  Calcium-Vitamin D-Vitamin K      Take 1 tablet by mouth 2 times daily Reported on 4/20/2017        CENTRUM SILVER ADULT 50+ PO      Take 1 tablet by mouth daily Reported on 4/20/2017        citalopram 20 MG tablet    celeXA    30 tablet    TAKE 1 TABLET (20 MG) BY MOUTH DAILY    Major depressive disorder, single episode, moderate (H)       fluticasone 50 MCG/ACT spray    FLONASE    1 Bottle    Spray 2 sprays into both nostrils daily    Nasal congestion       folic acid 1 MG tablet    FOLVITE    100 tablet    Take 1 tablet (1 mg) by mouth daily    Inflammatory polyarthropathy (H)       IBUPROFEN PO           methotrexate sodium 2.5 MG Tabs     72 tablet    Take 15 mg by mouth once a week . Take all 6 tablets on the same day of each week.    Inflammatory polyarthropathy (H)       order for DME     1 Device    Equipment being ordered: 10-20 mm Hg knee high compression stockings    Venous stasis dermatitis of left lower extremity       senna-docusate 8.6-50 MG per tablet    SENOKOT-S;PERICOLACE     Take 2 tablets by mouth 2 times daily Reported on 4/20/2017    Health  Care Home, Status post total left knee replacement       TYLENOL 500 MG tablet   Generic drug:  acetaminophen      Take 2 tablets (1,000 mg) by mouth every 6 hours as needed (Max acetaminophen dose: 4000mg in 24 hrs.)    Health Care Home, Status post total left knee replacement       vitamin D 1000 units capsule      Take 1 capsule by mouth daily Reported on 4/20/2017

## 2018-10-10 NOTE — TELEPHONE ENCOUNTER
Rheumatology team: Please call to notify Ms. Trevino that toxicity monitoring and clinic follow up is overdue.     Dale Marquis MD  10/10/2018 5:10 PM

## 2018-10-10 NOTE — PROGRESS NOTES
Dear Ailyn,    Your recent test results are attached.      Normal kidney function.    If you have any questions please feel free to contact [phone] or myself via [employer].    Sincerely,  Opal Chamorro, CNP

## 2018-10-11 RX ORDER — METHOTREXATE 2.5 MG/1
15 TABLET ORAL WEEKLY
Qty: 72 TABLET | Refills: 0 | Status: CANCELLED | OUTPATIENT
Start: 2018-10-11

## 2018-10-11 NOTE — TELEPHONE ENCOUNTER
Folic acid is not needed from a rheumatologic perspective if methotrexate is not being taken.    Dale Marquis MD  10/11/2018 4:50 PM

## 2018-10-11 NOTE — TELEPHONE ENCOUNTER
Contacted Pt explaining that she is overdue for toxicity lab monitoring and clinic follow up.  Pt has an appointment for 12/19/2018 @ 8:40AM .  Pt had no questions or concerns, agrees and understands.     Marta Lewis CMA  10/11/2018  8:17 AM

## 2018-10-15 NOTE — TELEPHONE ENCOUNTER
Informed patient that she does not need to take folic acid.  No further questions.    Prudencio Lu RN....10/15/2018 3:19 PM

## 2018-10-18 NOTE — DISCHARGE INSTRUCTIONS
"Post-Operative Instructions Foot Surgery Patients  Rufus Harden M.D.    Board Certified  Fellowship, Foot and Ankle Surgery  Member, American Academy of Orthopaedic Surgeons  American Orthopaedic Foot and Ankle Society    Direct Phone 9:00am to 5:00pm [phone]  After Hours/24 Hour On Call [phone]      Diet:  ? Take all prescribed medications with food   ? Narcotic pain medication can cause constipation  ? Avoid alcoholic beverages while taking pain medications   ? Increase dietary fiber, protein rich foods, fluids post operatively    Activity:  ? Elevate operative foot 90% of the time.  ? \"Swelling runs downhill\" - the more you elevate, the less permanent swelling you will experience  ? Post Op shoe/sandal must be on at all times with weight bearing  ? Unless told otherwise, you may put full weight on your foot  ? Walk with a flat foot  ? Use crutches/walker/cane as needed for balance and comfort  ? Do NOT walk on your heel, this pulls against possible pins/screws in your toes  ? You may be up to the bathroom, to the kitchen for meals and to change locations in the house.  ? You may do sit-ups (NOT BONE GRAFT PATIENTS), leg raises, upper body exercises  ? Every time you see a commercial on TV, change a web page or book page, perform ANKLE PUMPS  ? ANKLE PUMPS helps prevent a blood clot, pump swelling back to you heart  ? Adjust your immobilized foot/ankle to relieve pressure on your heel  ? Do not try to wiggle your toes    Special Care Instructions:     ? DO NOT CHANGE OR ALTER THE DRESSING  ? Keep your dressing(s) dry;    ? Sponge bath or wrap seal your foot (with shoe on) for shower  ? It is normal to have some incisional drainage  ? It is not unusual to have some \"numbness\" in foot and ankle following surgery  ? Smoking should be avoided.  It will interfere in your healing.  ? Check pins daily:  ? Do not push pin in if it comes out  ? Do not pull pin out if it goes in          Report to your " Doctor if any of the following occur:  ? Elevated temperature, 101o or higher  ? Increased pain unrelieved by pain medication and/or elevation  ? Tight/loose/wet dressing  ? Increased swelling  ? Calf pain  ? Pin drainage  ? Pin migration    Pin out over 1  from tip of toe to tip of white cap    White cap comes off    White cap is pushing on tip of toe (no metal showing)  ? For any shortness of breath, DIAL 911, go to the Emergency Room      Medications:  ? Take all of the following medications with food.    ? Aspirin/Ecotrin 325 mg.:  1 tab 1x/day  ? This is for blood clot prevention  ? If you have sensitive stomach, Ecotrin can be less irritating  ? If you experience any unusual bruising or bleeding or ringing in the ears, stop this medication and call us  ? Oxycodone  1-2 every 3-4 hours as needed for pain  ? You may take 1 tablet with plain Tylenol or Ibuprofen for less severe pain or to decrease nausea/mental effect  ?  Hydrocodone  1-2 every 3-4 hours as needed for pain  ? You may take 1 tablet with plain Tylenol or Ibuprofen for less severe pain or to decrease nausea/mental effects  ? Ibuprofen 1 every 6 hours as needed for inflammatory pain        Your Next Appointment:    ? Appt. scheduled for __ Wed 11/7/18___12:45 pm__________________________        Direct Phone 9:00am to 5:00pm [phone]    After Hours/24 Hour On Call [phone]

## 2018-10-21 DIAGNOSIS — F32.1 MAJOR DEPRESSIVE DISORDER, SINGLE EPISODE, MODERATE (H): ICD-10-CM

## 2018-10-22 RX ORDER — CITALOPRAM HYDROBROMIDE 20 MG/1
TABLET ORAL
Qty: 30 TABLET | Refills: 0 | Status: SHIPPED | OUTPATIENT
Start: 2018-10-22 | End: 2018-11-28

## 2018-10-22 NOTE — H&P (VIEW-ONLY)
[facility]  [address] [place] [id number]  [phone]  Dept: [phone]    PRE-OP EVALUATION:  Today's date: 10/9/2018    Ailyn Trevino (: 1935) presents for pre-operative evaluation assessment as requested by Dr. Peña.  She requires evaluation and anesthesia risk assessment prior to undergoing surgery/procedure for treatment of 10/24/18 .    Proposed Surgery/ Procedure: reconstruction left foot  Date of Surgery/ Procedure: 10/24/18  Time of Surgery/ Procedure: 6 am  Hospital/Surgical Facility: [facility]    Primary Physician: Maddi Church  Type of Anesthesia Anticipated: General    Patient has a Health Care Directive or Living Will:  NO    1. NO - Do you have a history of heart attack, stroke, stent, bypass or surgery on an artery in the head, neck, heart or legs?  2. NO - Do you ever have any pain or discomfort in your chest?  3. NO - Do you have a history of  Heart Failure?  4. NO - Are you troubled by shortness of breath when: walking on the level, up a slight hill or at night?  5. NO - Do you currently have a cold, bronchitis or other respiratory infection?  6. NO - Do you have a cough, shortness of breath or wheezing?  7. NO - Do you sometimes get pains in the calves of your legs when you walk?  8. NO - Do you or anyone in your family have previous history of blood clots?  9. NO - Do you or does anyone in your family have a serious bleeding problem such as prolonged bleeding following surgeries or cuts?  10. NO - Have you ever had problems with anemia or been told to take iron pills?  11. NO - Have you had any abnormal blood loss such as black, tarry or bloody stools, or abnormal vaginal bleeding?  12. NO - Have you ever had a blood transfusion?  13. NO - Have you or any of your relatives ever had problems with anesthesia?  14. NO - Do you have sleep apnea, excessive snoring or daytime drowsiness?  15. NO - Do you have any prosthetic heart  valves?  16. YES - DO YOU HAVE PROSTHETIC JOINTS? Left knee  17. NO - Is there any chance that you may be pregnant?    Opal Chamorro CNP     HPI:     HPI related to upcoming procedure: Patient will undergo left foot reconstruction due to pain and significant deformity.      DEPRESSION - Patient has a long history of Depression of moderate severity requiring medication for control with recent symptoms being stable..Current symptoms of depression include none.                                                                                                                                                                                    .    MEDICAL HISTORY:     Patient Active Problem List    Diagnosis Date Noted     Pulmonary nodules 04/27/2018     Priority: Medium     Currently managed with follow up imaging by [facility] result Team.         Inflammatory arthritis 04/26/2018     Priority: Medium     Elevated hemidiaphragm 04/23/2018     Priority: Medium     Fatigue, unspecified type 03/17/2017     Priority: Medium     Chronic pain of left knee 03/17/2017     Priority: Medium     Venous stasis dermatitis of left lower extremity 03/17/2017     Priority: Medium     Fecal urgency 03/17/2017     Priority: Medium     Current chronic use of systemic steroids 01/02/2017     Priority: Medium     Major depressive disorder, single episode, mild (H) 12/03/2015     Priority: Medium     Encounter for long-term current use of medication 08/03/2015     Priority: Medium     Problem list name updated by automated process. Provider to review       Other chronic pain 08/03/2015     Priority: Medium     Patient is followed by KIARA RAINES for ongoing prescription of pain medication.  All refills should be approved by this provider, or covering partner.    Medication(s): Hydrocodone/APAP.   Maximum quantity per month: 30  Clinic visit frequency required: Q 3 months     Controlled substance agreement on file: Yes        Date(s): 8/3/15    Pain Clinic evaluation in the past: No    DIRE Total Score(s):  No flowsheet data found.    Last [facility] website verification:  done on 8/3/15   [email or web address]       PMR (polymyalgia rheumatica) (H) 04/10/2015     Priority: Medium     Impaired fasting glucose 11/26/2014     Priority: Medium     Aortic valve disorder 03/19/2014     Priority: Medium     Problem list name updated by automated process. Provider to review       Mitral valve disorder 03/19/2014     Priority: Medium     Problem list name updated by automated process. Provider to review       Insomnia 10/08/2013     Priority: Medium     Benadryl gives her RLS  Trazodone gave her side effects  Tried Ambien in the past  Vicodin helps her back and for sleep       Osteoarthritis of knee 10/08/2013     Priority: Medium     Sees Dr. Whaley  Patient is followed by [facility] RAINES for ongoing prescription of narcotic pain medicine.  Med: Vicodin.   Maximum use per month: 30  Expected duration: lifelong  Narcotic agreement on file: YES  Clinic visit recommended: Q 3 months       Health Care Home 09/28/2012     Priority: Medium            DX V65.8 REPLACED WITH [facility] (04/08/2013)         Renal cyst 08/07/2012     Priority: Medium     Workup with multiple serial imaging reveals benign hemorrhagic cyst       Granulomatous lung disease (H) 08/07/2012     Priority: Medium     Multiple serial CT with no change in granuloma and splenic lesions       Advanced directives, counseling/discussion 07/31/2012     Priority: Medium     Discussed advance care planning with patient; information given to patient to review. 7/31/2012          Osteopenia 07/31/2012     Priority: Medium     Due to worsening bone density in 2016 she was started on Fosamax.       Palpitations 07/31/2012     Priority: Medium     Hyperlipidemia LDL goal <130 07/31/2012     Priority: Medium      Past Medical History:   Diagnosis Date     Granulomatous lung  disease (H) 8/7/2012     High cholesterol      Insomnia 10/8/2013    Benadryl gives her RLS     Osteoarthritis of knee 10/8/2013    Sees Dr. Whaley     Osteopenia 7/31/2012     Other chronic pain 8/3/2015    Patient is followed by [facility] RAINES for ongoing prescription of pain medication.  All refills should be approved by this provider, or covering partner.  Medication(s): Hydrocodone/APAP.  Maximum quantity per month: 30 Clinic visit frequency required: Q 3 months   Controlled substance agreement on file: Yes      Date(s): 8/3/15  Pain Clinic evaluation in the past: No  DIRE Total Score(s): No fl     Renal cyst 8/7/2012     Past Surgical History:   Procedure Laterality Date     APPENDECTOMY  1951     CATARACT IOL, RT/LT  2010    bilateral      CHOLECYSTECTOMY  2010     KNEE SURGERY Left 01/2017     SURGICAL HISTORY OF -   12/13    bilateral YAG laser surgery of eyes     Current Outpatient Prescriptions   Medication Sig Dispense Refill     acetaminophen (TYLENOL) 500 MG tablet Take 2 tablets (1,000 mg) by mouth every 6 hours as needed (Max acetaminophen dose: 4000mg in 24 hrs.)       aspirin 325 MG EC tablet Take 325 mg by mouth daily Reported on 3/17/2017       atenolol (TENORMIN) 25 MG tablet TAKE 1 TABLET (25 MG) BY MOUTH DAILY 90 tablet 2     Calcium-Vitamin D-Vitamin K (CALCIUM + D) 500-1000-40 MG-UNT-MCG CHEW Take 1 tablet by mouth 2 times daily Reported on 4/20/2017       Cholecalciferol (VITAMIN D) 1000 UNITS capsule Take 1 capsule by mouth daily Reported on 4/20/2017       citalopram (CELEXA) 20 MG tablet TAKE 1 TABLET (20 MG) BY MOUTH DAILY 30 tablet 1     folic acid (FOLVITE) 1 MG tablet Take 1 tablet (1 mg) by mouth daily 100 tablet 1     IBUPROFEN PO        methotrexate sodium 2.5 MG TABS Take 15 mg by mouth once a week . Take all 6 tablets on the same day of each week. 72 tablet 0     Multiple Vitamins-Minerals (CENTRUM SILVER ADULT 50+ PO) Take 1 tablet by mouth daily Reported on 4/20/2017        "order for DME Equipment being ordered: 10-20 mm Hg knee high compression stockings 1 Device 0     fluticasone (FLONASE) 50 MCG/ACT spray Spray 2 sprays into both nostrils daily (Patient not taking: Reported on 10/9/2018) 1 Bottle 1     senna-docusate (SENOKOT-S;PERICOLACE) 8.6-50 MG per tablet Take 2 tablets by mouth 2 times daily Reported on 4/20/2017       OTC products: None, except as noted above    No Known Allergies   Latex Allergy: NO    Social History   Substance Use Topics     Smoking status: Former Smoker     Quit date: 1/1/1998     Smokeless tobacco: Never Used     Alcohol use Yes     History   Drug Use No       REVIEW OF SYSTEMS:   Constitutional, neuro, ENT, endocrine, pulmonary, cardiac, gastrointestinal, genitourinary, musculoskeletal, integument and psychiatric systems are negative, except as otherwise noted.    EXAM:   /60  Pulse 67  Temp 97.6  F (36.4  C) (Oral)  Resp 20  Ht 4' 11\" (1.499 m)  Wt 151 lb 6.4 oz (68.7 kg)  SpO2 96%  BMI 30.58 kg/m2    GENERAL APPEARANCE: healthy, alert and no distress     EYES: EOMI, PERRL     HENT: ear canals and TM's normal and nose and mouth without ulcers or lesions     NECK: no adenopathy, no asymmetry, masses, or scars and thyroid normal to palpation     RESP: lungs clear to auscultation - no rales, rhonchi or wheezes     CV: regular rates and rhythm, normal S1 S2, no S3 or S4 and no murmur, click or rub     ABDOMEN:  soft, nontender, no HSM or masses and bowel sounds normal     MS: extremities normal- no gross deformities noted, no evidence of inflammation in joints, FROM in all extremities.     SKIN: no suspicious lesions or rashes     NEURO: Normal strength and tone, sensory exam grossly normal, mentation intact and speech normal     PSYCH: mentation appears normal. and affect normal/bright     LYMPHATICS: No cervical adenopathy    DIAGNOSTICS:     EKG: appears normal, NSR, normal axis, normal intervals, no acute ST/T changes c/w ischemia, no LVH " by voltage criteria, unchanged from previous tracings  Labs Resulted Today:   Results for orders placed or performed in visit on 10/09/18   Hemoglobin   Result Value Ref Range    Hemoglobin 12.6 11.7 - 15.7 g/dL   Creatinine   Result Value Ref Range    Creatinine 0.64 0.52 - 1.04 mg/dL    GFR Estimate 89 >60 mL/min/1.7m2    GFR Estimate If Black >90 >60 mL/min/1.7m2       Recent Labs   Lab Test  04/23/18   1054  01/24/18   0918  06/26/17   1854   01/02/17   1503  10/04/16   1145   HGB  13.4  13.3   --    < >  12.4   --    PLT  210  253   --    < >  247   --    INR   --    --    --    --   0.91   --    NA   --    --   142   --   139  138   POTASSIUM   --    --   4.1   --   3.8  4.1   CR  0.74  0.61  0.61   --   0.70  0.65   A1C   --    --    --    --    --   5.2    < > = values in this interval not displayed.        IMPRESSION:   Reason for surgery/procedure: left foot deformity  Diagnosis/reason for consult: Management of comorbid conditions and preoperative exam.      The proposed surgical procedure is considered INTERMEDIATE risk.    REVISED CARDIAC RISK INDEX  The patient has the following serious cardiovascular risks for perioperative complications such as (MI, PE, VFib and 3  AV Block):  No serious cardiac risks  INTERPRETATION: 0 risks: Class I (very low risk - 0.4% complication rate)    The patient has the following additional risks for perioperative complications:  No identified additional risks      ICD-10-CM    1. Preop general physical exam Z01.818 EKG 12-lead complete w/read - Clinics     Hemoglobin   2. Foot deformity, left M21.962 Hemoglobin   3. Major depressive disorder, single episode, mild (H) F32.0    4. High risk medication use Z79.899 Hemoglobin     Creatinine       RECOMMENDATIONS:     --Consult hospital rounder / IM to assist post-op medical management    Cardiovascular Risk  Performs 4 METs exercise without symptoms (Light housework (dusting, washing dishes)) .   Patient is already on a Beta  Blocker. Continue Betablocker therapy after surgery, using Beta blocker order set as necessary for NPO status.      --Patient is to take all scheduled medications on the day of surgery EXCEPT for modifications listed below.    Anticoagulant or Antiplatelet Medication Use  ASPIRIN: Discontinue ASA 7-10 days prior to procedure to reduce bleeding risk.  It should be resumed post-operatively.        Preoperative Joint replacement and Rheumatologic DMARD Use  The pending procedure is an intervertebral disc arthroplasty or a total joint arthroplasty at the hip (YANIRA), knee (TKA), ankle shoulder, wrist or elbow.    --Methotrexate: Continue in the perioperative period.      APPROVAL GIVEN to proceed with proposed procedure, without further diagnostic evaluation       Signed Electronically by: BORA Howell CNP    Copy of this evaluation report is provided to requesting physician.    [facility] Preop Guidelines    Revised Cardiac Risk Index

## 2018-10-24 ENCOUNTER — SURGERY (OUTPATIENT)
Age: 83
End: 2018-10-24
Payer: COMMERCIAL

## 2018-10-24 ENCOUNTER — HOSPITAL ENCOUNTER (OUTPATIENT)
Facility: CLINIC | Age: 83
Discharge: HOME | End: 2018-10-25
Attending: ORTHOPAEDIC SURGERY | Admitting: ORTHOPAEDIC SURGERY
Payer: COMMERCIAL

## 2018-10-24 ENCOUNTER — ANESTHESIA (OUTPATIENT)
Dept: SURGERY | Facility: CLINIC | Age: 83
End: 2018-10-24
Payer: COMMERCIAL

## 2018-10-24 ENCOUNTER — ANESTHESIA EVENT (OUTPATIENT)
Dept: SURGERY | Facility: CLINIC | Age: 83
End: 2018-10-24
Payer: COMMERCIAL

## 2018-10-24 DIAGNOSIS — M05.772 RHEUMATOID ARTHRITIS INVOLVING BOTH FEET WITH POSITIVE RHEUMATOID FACTOR (H): Primary | ICD-10-CM

## 2018-10-24 DIAGNOSIS — M05.771 RHEUMATOID ARTHRITIS INVOLVING BOTH FEET WITH POSITIVE RHEUMATOID FACTOR (H): Primary | ICD-10-CM

## 2018-10-24 PROBLEM — M06.9 RHEUMATOID ARTHRITIS, ADULT (H): Status: ACTIVE | Noted: 2018-10-24

## 2018-10-24 PROCEDURE — 27210794 ZZH OR GENERAL SUPPLY STERILE: Performed by: ORTHOPAEDIC SURGERY

## 2018-10-24 PROCEDURE — 40000935 ZZH STATISTIC OUTPATIENT (NON-OBS) EVE

## 2018-10-24 PROCEDURE — 25000128 H RX IP 250 OP 636: Performed by: ORTHOPAEDIC SURGERY

## 2018-10-24 PROCEDURE — 25000128 H RX IP 250 OP 636: Performed by: ANESTHESIOLOGY

## 2018-10-24 PROCEDURE — 71000012 ZZH RECOVERY PHASE 1 LEVEL 1 FIRST HR: Performed by: ORTHOPAEDIC SURGERY

## 2018-10-24 PROCEDURE — 25000128 H RX IP 250 OP 636: Performed by: NURSE ANESTHETIST, CERTIFIED REGISTERED

## 2018-10-24 PROCEDURE — 37000008 ZZH ANESTHESIA TECHNICAL FEE, 1ST 30 MIN: Performed by: ORTHOPAEDIC SURGERY

## 2018-10-24 PROCEDURE — 25000132 ZZH RX MED GY IP 250 OP 250 PS 637: Performed by: ORTHOPAEDIC SURGERY

## 2018-10-24 PROCEDURE — 40000170 ZZH STATISTIC PRE-PROCEDURE ASSESSMENT II: Performed by: ORTHOPAEDIC SURGERY

## 2018-10-24 PROCEDURE — 27110028 ZZH OR GENERAL SUPPLY NON-STERILE: Performed by: ORTHOPAEDIC SURGERY

## 2018-10-24 PROCEDURE — 71000013 ZZH RECOVERY PHASE 1 LEVEL 1 EA ADDTL HR: Performed by: ORTHOPAEDIC SURGERY

## 2018-10-24 PROCEDURE — 40000936 ZZH STATISTIC OUTPATIENT (NON-OBS) NIGHT

## 2018-10-24 PROCEDURE — 25000125 ZZHC RX 250: Performed by: ORTHOPAEDIC SURGERY

## 2018-10-24 PROCEDURE — 36000058 ZZH SURGERY LEVEL 3 EA 15 ADDTL MIN: Performed by: ORTHOPAEDIC SURGERY

## 2018-10-24 PROCEDURE — 36000056 ZZH SURGERY LEVEL 3 1ST 30 MIN: Performed by: ORTHOPAEDIC SURGERY

## 2018-10-24 PROCEDURE — C1713 ANCHOR/SCREW BN/BN,TIS/BN: HCPCS | Performed by: ORTHOPAEDIC SURGERY

## 2018-10-24 PROCEDURE — 40000934 ZZH STATISTIC OUTPATIENT (NON-OBS) DAY

## 2018-10-24 PROCEDURE — 25000125 ZZHC RX 250: Performed by: NURSE ANESTHETIST, CERTIFIED REGISTERED

## 2018-10-24 PROCEDURE — 37000009 ZZH ANESTHESIA TECHNICAL FEE, EACH ADDTL 15 MIN: Performed by: ORTHOPAEDIC SURGERY

## 2018-10-24 DEVICE — IMP SCR ACE CANC 4.0X34MM PT: Type: IMPLANTABLE DEVICE | Site: TOE | Status: FUNCTIONAL

## 2018-10-24 DEVICE — IMP WIRE KIRSCHNER 0.054X4" 78.2040: Type: IMPLANTABLE DEVICE | Site: TOE | Status: FUNCTIONAL

## 2018-10-24 RX ORDER — SODIUM CHLORIDE, SODIUM LACTATE, POTASSIUM CHLORIDE, CALCIUM CHLORIDE 600; 310; 30; 20 MG/100ML; MG/100ML; MG/100ML; MG/100ML
INJECTION, SOLUTION INTRAVENOUS CONTINUOUS
Status: DISCONTINUED | OUTPATIENT
Start: 2018-10-24 | End: 2018-10-24 | Stop reason: HOSPADM

## 2018-10-24 RX ORDER — NALOXONE HYDROCHLORIDE 0.4 MG/ML
.1-.4 INJECTION, SOLUTION INTRAMUSCULAR; INTRAVENOUS; SUBCUTANEOUS
Status: DISCONTINUED | OUTPATIENT
Start: 2018-10-24 | End: 2018-10-25 | Stop reason: HOSPADM

## 2018-10-24 RX ORDER — HYDROMORPHONE HYDROCHLORIDE 1 MG/ML
.3-.5 INJECTION, SOLUTION INTRAMUSCULAR; INTRAVENOUS; SUBCUTANEOUS EVERY 10 MIN PRN
Status: DISCONTINUED | OUTPATIENT
Start: 2018-10-24 | End: 2018-10-24 | Stop reason: HOSPADM

## 2018-10-24 RX ORDER — OXYCODONE HYDROCHLORIDE 5 MG/1
5-10 TABLET ORAL
Status: DISCONTINUED | OUTPATIENT
Start: 2018-10-24 | End: 2018-10-25 | Stop reason: HOSPADM

## 2018-10-24 RX ORDER — ONDANSETRON 4 MG/1
4 TABLET, ORALLY DISINTEGRATING ORAL EVERY 30 MIN PRN
Status: DISCONTINUED | OUTPATIENT
Start: 2018-10-24 | End: 2018-10-24 | Stop reason: HOSPADM

## 2018-10-24 RX ORDER — MULTIPLE VITAMINS W/ MINERALS TAB 9MG-400MCG
1 TAB ORAL DAILY
Status: DISCONTINUED | OUTPATIENT
Start: 2018-10-24 | End: 2018-10-25 | Stop reason: HOSPADM

## 2018-10-24 RX ORDER — MEPERIDINE HYDROCHLORIDE 25 MG/ML
12.5 INJECTION INTRAMUSCULAR; INTRAVENOUS; SUBCUTANEOUS
Status: DISCONTINUED | OUTPATIENT
Start: 2018-10-24 | End: 2018-10-24 | Stop reason: HOSPADM

## 2018-10-24 RX ORDER — FENTANYL CITRATE 50 UG/ML
INJECTION, SOLUTION INTRAMUSCULAR; INTRAVENOUS PRN
Status: DISCONTINUED | OUTPATIENT
Start: 2018-10-24 | End: 2018-10-24

## 2018-10-24 RX ORDER — OXYCODONE HYDROCHLORIDE 5 MG/1
5-10 TABLET ORAL
Qty: 50 TABLET | Refills: 0 | Status: SHIPPED | OUTPATIENT
Start: 2018-10-24 | End: 2019-07-26

## 2018-10-24 RX ORDER — NALOXONE HYDROCHLORIDE 0.4 MG/ML
.1-.4 INJECTION, SOLUTION INTRAMUSCULAR; INTRAVENOUS; SUBCUTANEOUS
Status: DISCONTINUED | OUTPATIENT
Start: 2018-10-24 | End: 2018-10-24 | Stop reason: HOSPADM

## 2018-10-24 RX ORDER — SODIUM CHLORIDE, SODIUM LACTATE, POTASSIUM CHLORIDE, CALCIUM CHLORIDE 600; 310; 30; 20 MG/100ML; MG/100ML; MG/100ML; MG/100ML
INJECTION, SOLUTION INTRAVENOUS CONTINUOUS
Status: DISCONTINUED | OUTPATIENT
Start: 2018-10-24 | End: 2018-10-25 | Stop reason: HOSPADM

## 2018-10-24 RX ORDER — FOLIC ACID 1 MG/1
1 TABLET ORAL DAILY
Status: DISCONTINUED | OUTPATIENT
Start: 2018-10-24 | End: 2018-10-25 | Stop reason: HOSPADM

## 2018-10-24 RX ORDER — ACETAMINOPHEN 325 MG/1
650 TABLET ORAL EVERY 6 HOURS PRN
Status: DISCONTINUED | OUTPATIENT
Start: 2018-10-24 | End: 2018-10-25 | Stop reason: HOSPADM

## 2018-10-24 RX ORDER — ATENOLOL 25 MG/1
25 TABLET ORAL DAILY
Status: DISCONTINUED | OUTPATIENT
Start: 2018-10-25 | End: 2018-10-25 | Stop reason: HOSPADM

## 2018-10-24 RX ORDER — ASPIRIN 325 MG/1
325 TABLET, FILM COATED ORAL DAILY
Qty: 60 EACH | Refills: 0 | Status: SHIPPED | OUTPATIENT
Start: 2018-10-24

## 2018-10-24 RX ORDER — LIDOCAINE 40 MG/G
CREAM TOPICAL
Status: DISCONTINUED | OUTPATIENT
Start: 2018-10-24 | End: 2018-10-25 | Stop reason: HOSPADM

## 2018-10-24 RX ORDER — DIAZEPAM 10 MG/2ML
2.5 INJECTION, SOLUTION INTRAMUSCULAR; INTRAVENOUS
Status: DISCONTINUED | OUTPATIENT
Start: 2018-10-24 | End: 2018-10-24 | Stop reason: HOSPADM

## 2018-10-24 RX ORDER — ONDANSETRON 4 MG/1
4 TABLET, ORALLY DISINTEGRATING ORAL EVERY 6 HOURS PRN
Status: DISCONTINUED | OUTPATIENT
Start: 2018-10-24 | End: 2018-10-25 | Stop reason: HOSPADM

## 2018-10-24 RX ORDER — ONDANSETRON 2 MG/ML
4 INJECTION INTRAMUSCULAR; INTRAVENOUS EVERY 30 MIN PRN
Status: DISCONTINUED | OUTPATIENT
Start: 2018-10-24 | End: 2018-10-24 | Stop reason: HOSPADM

## 2018-10-24 RX ORDER — ONDANSETRON 2 MG/ML
INJECTION INTRAMUSCULAR; INTRAVENOUS PRN
Status: DISCONTINUED | OUTPATIENT
Start: 2018-10-24 | End: 2018-10-24

## 2018-10-24 RX ORDER — BUPIVACAINE HYDROCHLORIDE 2.5 MG/ML
INJECTION, SOLUTION INFILTRATION; PERINEURAL PRN
Status: DISCONTINUED | OUTPATIENT
Start: 2018-10-24 | End: 2018-10-24 | Stop reason: HOSPADM

## 2018-10-24 RX ORDER — CEFAZOLIN SODIUM 1 G/3ML
1 INJECTION, POWDER, FOR SOLUTION INTRAMUSCULAR; INTRAVENOUS SEE ADMIN INSTRUCTIONS
Status: DISCONTINUED | OUTPATIENT
Start: 2018-10-24 | End: 2018-10-25

## 2018-10-24 RX ORDER — ACETAMINOPHEN 650 MG/1
650 SUPPOSITORY RECTAL EVERY 4 HOURS PRN
Status: DISCONTINUED | OUTPATIENT
Start: 2018-10-24 | End: 2018-10-24 | Stop reason: HOSPADM

## 2018-10-24 RX ORDER — DEXAMETHASONE SODIUM PHOSPHATE 4 MG/ML
INJECTION, SOLUTION INTRA-ARTICULAR; INTRALESIONAL; INTRAMUSCULAR; INTRAVENOUS; SOFT TISSUE PRN
Status: DISCONTINUED | OUTPATIENT
Start: 2018-10-24 | End: 2018-10-24

## 2018-10-24 RX ORDER — FENTANYL CITRATE 50 UG/ML
25-50 INJECTION, SOLUTION INTRAMUSCULAR; INTRAVENOUS
Status: DISCONTINUED | OUTPATIENT
Start: 2018-10-24 | End: 2018-10-24 | Stop reason: HOSPADM

## 2018-10-24 RX ORDER — FENTANYL CITRATE 50 UG/ML
25-100 INJECTION, SOLUTION INTRAMUSCULAR; INTRAVENOUS
Status: DISCONTINUED | OUTPATIENT
Start: 2018-10-24 | End: 2018-10-25 | Stop reason: HOSPADM

## 2018-10-24 RX ORDER — IBUPROFEN 600 MG/1
600 TABLET, FILM COATED ORAL EVERY 6 HOURS PRN
Status: DISCONTINUED | OUTPATIENT
Start: 2018-10-24 | End: 2018-10-25 | Stop reason: HOSPADM

## 2018-10-24 RX ORDER — IBUPROFEN 600 MG/1
600 TABLET, FILM COATED ORAL EVERY 6 HOURS PRN
Qty: 60 TABLET | Refills: 0 | Status: SHIPPED | OUTPATIENT
Start: 2018-10-24 | End: 2019-11-06

## 2018-10-24 RX ORDER — ONDANSETRON 2 MG/ML
4 INJECTION INTRAMUSCULAR; INTRAVENOUS EVERY 6 HOURS PRN
Status: DISCONTINUED | OUTPATIENT
Start: 2018-10-24 | End: 2018-10-25 | Stop reason: HOSPADM

## 2018-10-24 RX ORDER — ACETAMINOPHEN 325 MG/1
650 TABLET ORAL EVERY 6 HOURS PRN
Qty: 60 TABLET | Refills: 0 | Status: SHIPPED | OUTPATIENT
Start: 2018-10-24 | End: 2019-11-06

## 2018-10-24 RX ORDER — CEPHALEXIN 500 MG/1
500 CAPSULE ORAL 4 TIMES DAILY
Qty: 8 CAPSULE | Refills: 0 | Status: SHIPPED | OUTPATIENT
Start: 2018-10-24 | End: 2019-02-04

## 2018-10-24 RX ORDER — KETOROLAC TROMETHAMINE 30 MG/ML
INJECTION, SOLUTION INTRAMUSCULAR; INTRAVENOUS PRN
Status: DISCONTINUED | OUTPATIENT
Start: 2018-10-24 | End: 2018-10-24

## 2018-10-24 RX ORDER — PROPOFOL 10 MG/ML
INJECTION, EMULSION INTRAVENOUS CONTINUOUS PRN
Status: DISCONTINUED | OUTPATIENT
Start: 2018-10-24 | End: 2018-10-24

## 2018-10-24 RX ORDER — PROCHLORPERAZINE MALEATE 5 MG
5 TABLET ORAL EVERY 6 HOURS PRN
Status: DISCONTINUED | OUTPATIENT
Start: 2018-10-24 | End: 2018-10-25 | Stop reason: HOSPADM

## 2018-10-24 RX ORDER — CITALOPRAM HYDROBROMIDE 20 MG/1
20 TABLET ORAL DAILY
Status: DISCONTINUED | OUTPATIENT
Start: 2018-10-25 | End: 2018-10-25 | Stop reason: HOSPADM

## 2018-10-24 RX ORDER — SODIUM CHLORIDE, SODIUM LACTATE, POTASSIUM CHLORIDE, CALCIUM CHLORIDE 600; 310; 30; 20 MG/100ML; MG/100ML; MG/100ML; MG/100ML
INJECTION, SOLUTION INTRAVENOUS CONTINUOUS PRN
Status: DISCONTINUED | OUTPATIENT
Start: 2018-10-24 | End: 2018-10-24

## 2018-10-24 RX ORDER — KETOROLAC TROMETHAMINE 15 MG/ML
15 INJECTION, SOLUTION INTRAMUSCULAR; INTRAVENOUS EVERY 6 HOURS
Status: COMPLETED | OUTPATIENT
Start: 2018-10-24 | End: 2018-10-25

## 2018-10-24 RX ORDER — HYDROMORPHONE HYDROCHLORIDE 1 MG/ML
0.2 INJECTION, SOLUTION INTRAMUSCULAR; INTRAVENOUS; SUBCUTANEOUS
Status: DISCONTINUED | OUTPATIENT
Start: 2018-10-24 | End: 2018-10-25 | Stop reason: HOSPADM

## 2018-10-24 RX ORDER — CEFAZOLIN SODIUM 2 G/100ML
2 INJECTION, SOLUTION INTRAVENOUS EVERY 8 HOURS
Status: DISCONTINUED | OUTPATIENT
Start: 2018-10-24 | End: 2018-10-25 | Stop reason: HOSPADM

## 2018-10-24 RX ORDER — BUPIVACAINE HYDROCHLORIDE 7.5 MG/ML
INJECTION, SOLUTION INTRASPINAL PRN
Status: DISCONTINUED | OUTPATIENT
Start: 2018-10-24 | End: 2018-10-24

## 2018-10-24 RX ORDER — CEFAZOLIN SODIUM 2 G/100ML
2 INJECTION, SOLUTION INTRAVENOUS
Status: COMPLETED | OUTPATIENT
Start: 2018-10-24 | End: 2018-10-24

## 2018-10-24 RX ORDER — ASPIRIN 325 MG/1
325 TABLET, FILM COATED ORAL DAILY
Status: DISCONTINUED | OUTPATIENT
Start: 2018-10-24 | End: 2018-10-25 | Stop reason: HOSPADM

## 2018-10-24 RX ADMIN — CEFAZOLIN SODIUM 2 G: 2 INJECTION, SOLUTION INTRAVENOUS at 18:10

## 2018-10-24 RX ADMIN — CEFAZOLIN 1 G: 1 INJECTION, POWDER, FOR SOLUTION INTRAMUSCULAR; INTRAVENOUS at 09:44

## 2018-10-24 RX ADMIN — OXYCODONE HYDROCHLORIDE 5 MG: 5 TABLET ORAL at 14:14

## 2018-10-24 RX ADMIN — CEFAZOLIN SODIUM 2 G: 2 INJECTION, SOLUTION INTRAVENOUS at 07:42

## 2018-10-24 RX ADMIN — PHENYLEPHRINE HYDROCHLORIDE 50 MCG: 10 INJECTION, SOLUTION INTRAMUSCULAR; INTRAVENOUS; SUBCUTANEOUS at 08:24

## 2018-10-24 RX ADMIN — ONDANSETRON 4 MG: 2 INJECTION INTRAMUSCULAR; INTRAVENOUS at 09:08

## 2018-10-24 RX ADMIN — BUPIVACAINE HYDROCHLORIDE 15 ML: 2.5 INJECTION, SOLUTION EPIDURAL; INFILTRATION; INTRACAUDAL; PERINEURAL at 09:41

## 2018-10-24 RX ADMIN — KETOROLAC TROMETHAMINE 15 MG: 30 INJECTION, SOLUTION INTRAMUSCULAR at 09:43

## 2018-10-24 RX ADMIN — KETOROLAC TROMETHAMINE 15 MG: 15 INJECTION, SOLUTION INTRAMUSCULAR; INTRAVENOUS at 22:20

## 2018-10-24 RX ADMIN — SODIUM CHLORIDE, POTASSIUM CHLORIDE, SODIUM LACTATE AND CALCIUM CHLORIDE: 600; 310; 30; 20 INJECTION, SOLUTION INTRAVENOUS at 07:30

## 2018-10-24 RX ADMIN — FENTANYL CITRATE 50 MCG: 50 INJECTION, SOLUTION INTRAMUSCULAR; INTRAVENOUS at 07:32

## 2018-10-24 RX ADMIN — GENTAMICIN SULFATE 1000 ML: 40 INJECTION, SOLUTION INTRAMUSCULAR; INTRAVENOUS at 07:20

## 2018-10-24 RX ADMIN — SODIUM CHLORIDE, POTASSIUM CHLORIDE, SODIUM LACTATE AND CALCIUM CHLORIDE: 600; 310; 30; 20 INJECTION, SOLUTION INTRAVENOUS at 11:47

## 2018-10-24 RX ADMIN — OXYCODONE HYDROCHLORIDE 10 MG: 5 TABLET ORAL at 18:10

## 2018-10-24 RX ADMIN — MIDAZOLAM 1 MG: 1 INJECTION INTRAMUSCULAR; INTRAVENOUS at 07:33

## 2018-10-24 RX ADMIN — ACETAMINOPHEN 650 MG: 325 TABLET, FILM COATED ORAL at 12:00

## 2018-10-24 RX ADMIN — DEXAMETHASONE SODIUM PHOSPHATE 4 MG: 4 INJECTION, SOLUTION INTRA-ARTICULAR; INTRALESIONAL; INTRAMUSCULAR; INTRAVENOUS; SOFT TISSUE at 07:47

## 2018-10-24 RX ADMIN — BUPIVACAINE HYDROCHLORIDE IN DEXTROSE 12.75 MG: 7.5 INJECTION, SOLUTION SUBARACHNOID at 07:40

## 2018-10-24 RX ADMIN — KETOROLAC TROMETHAMINE 15 MG: 15 INJECTION, SOLUTION INTRAMUSCULAR; INTRAVENOUS at 16:07

## 2018-10-24 RX ADMIN — PROPOFOL 50 MCG/KG/MIN: 10 INJECTION, EMULSION INTRAVENOUS at 07:42

## 2018-10-24 RX ADMIN — ASPIRIN 325 MG: 325 TABLET, FILM COATED ORAL at 12:15

## 2018-10-24 RX ADMIN — SODIUM CHLORIDE, POTASSIUM CHLORIDE, SODIUM LACTATE AND CALCIUM CHLORIDE: 600; 310; 30; 20 INJECTION, SOLUTION INTRAVENOUS at 09:14

## 2018-10-24 NOTE — PLAN OF CARE
A&Ox4, VSS on RA. Capno WDL. Admitted to unit approx 12pm. C/o 4/10 pain managed with oral oxycodone. Tolerated regular diet, bowel sounds active. Incontinent x1, urinated on bedpan but urine spilled to ground - unable to measure. L foot elevated on wedge and kept warm with blankets. 2, 3, 4, 5 toes have pins, CDI. Ax1 WBAT with walker to bedside commode. IV LR infusing. Plan to DC tomorrow. Will continue to monitor.

## 2018-10-24 NOTE — IP AVS SNAPSHOT
MRN:[id number]                      After Visit Summary   10/24/2018    Ailyn Trevino    MRN: [id number]           Thank you!     Thank you for choosing [facility] for your care. Our goal is always to provide you with excellent care. Hearing back from our patients is one way we can continue to improve our services. Please take a few minutes to complete the written survey that you may receive in the mail after you visit with us. Thank you!        Patient Information     Date Of Birth          1935        Designated Caregiver       Most Recent Value    Caregiver    Will someone help with your care after discharge? yes    Name of designated caregiver DAUGHTER- MADDI  AND - ANGIE    Phone number of caregiver [phone]    Caregiver address [address] [place] [address]      About your hospital stay     You were admitted on:  October 24, 2018 You last received care in the:  [facility] Observation Unit    You were discharged on:  October 25, 2018       Who to Call     For medical emergencies, please call 911.  For non-urgent questions about your medical care, please call your primary care provider or clinic, [phone]  For questions related to your surgery, please call your surgery clinic        Attending Provider     Provider Specialty    Rufus Harden MD Orthopedics       Primary Care Provider Office Phone # Fax #    [facility] [phone]      Your next 10 appointments already scheduled     Dec 19, 2018  8:40 AM CST   Return [facility] with Dale Marquis MD   [facility]    [address]  [place] [address] [phone]              Further instructions from your care team       Post-Operative Instructions Foot Surgery Patients  Rufus Harden M.D.    Board Certified  Fellowship, Foot and Ankle Surgery  Member, American Academy of Orthopaedic Surgeons  American Orthopaedic Foot and Ankle  "Society    Direct Phone 9:00am to 5:00pm [phone]  After Hours/24 Hour On Call [phone]      Diet:  ? Take all prescribed medications with food   ? Narcotic pain medication can cause constipation  ? Avoid alcoholic beverages while taking pain medications   ? Increase dietary fiber, protein rich foods, fluids post operatively    Activity:  ? Elevate operative foot 90% of the time.  ? \"Swelling runs downhill\" - the more you elevate, the less permanent swelling you will experience  ? Post Op shoe/sandal must be on at all times with weight bearing  ? Unless told otherwise, you may put full weight on your foot  ? Walk with a flat foot  ? Use crutches/walker/cane as needed for balance and comfort  ? Do NOT walk on your heel, this pulls against possible pins/screws in your toes  ? You may be up to the bathroom, to the kitchen for meals and to change locations in the house.  ? You may do sit-ups (NOT BONE GRAFT PATIENTS), leg raises, upper body exercises  ? Every time you see a commercial on TV, change a web page or book page, perform ANKLE PUMPS  ? ANKLE PUMPS helps prevent a blood clot, pump swelling back to you heart  ? Adjust your immobilized foot/ankle to relieve pressure on your heel  ? Do not try to wiggle your toes    Special Care Instructions:     ? DO NOT CHANGE OR ALTER THE DRESSING  ? Keep your dressing(s) dry;    ? Sponge bath or wrap seal your foot (with shoe on) for shower  ? It is normal to have some incisional drainage  ? It is not unusual to have some \"numbness\" in foot and ankle following surgery  ? Smoking should be avoided.  It will interfere in your healing.  ? Check pins daily:  ? Do not push pin in if it comes out  ? Do not pull pin out if it goes in          Report to your Doctor if any of the following occur:  ? Elevated temperature, 101o or higher  ? Increased pain unrelieved by pain medication and/or elevation  ? Tight/loose/wet dressing  ? Increased swelling  ? Calf pain  ? Pin " "drainage  ? Pin migration    Pin out over 1  from tip of toe to tip of white cap    White cap comes off    White cap is pushing on tip of toe (no metal showing)  ? For any shortness of breath, DIAL 911, go to the Emergency Room      Medications:  ? Take all of the following medications with food.    ? Aspirin/Ecotrin 325 mg.:  1 tab 1x/day  ? This is for blood clot prevention  ? If you have sensitive stomach, Ecotrin can be less irritating  ? If you experience any unusual bruising or bleeding or ringing in the ears, stop this medication and call us  ? Oxycodone  1-2 every 3-4 hours as needed for pain  ? You may take 1 tablet with plain Tylenol or Ibuprofen for less severe pain or to decrease nausea/mental effect  ?  Hydrocodone  1-2 every 3-4 hours as needed for pain  ? You may take 1 tablet with plain Tylenol or Ibuprofen for less severe pain or to decrease nausea/mental effects  ? Ibuprofen 1 every 6 hours as needed for inflammatory pain        Your Next Appointment:    ? Appt. scheduled for __ Wed 11/7/18___12:45 pm__________________________        Direct Phone 9:00am to 5:00pm [phone]    After Hours/24 Hour On Call [phone]    Pending Results     No orders found for last 3 day(s).            Admission Information     Date & Time Provider Department Dept. Phone    10/24/2018 Rufus Harden MD [facility] Observation Unit [phone]      Your Vitals Were     Blood Pressure Pulse Temperature Respirations Height Weight    159/48 (BP Location: Right arm) 69 96.8  F (36  C) (Oral) 18 1.499 m (4' 11\") 66.7 kg (147 lb)    Pulse Oximetry BMI (Body Mass Index)                93% 29.69 kg/m2          [facility] Information     [employer] gives you secure access to your electronic health record. If you see a primary care provider, you can also send messages to your care team and make appointments. If you have questions, please call your primary care clinic.  If you do not have a primary care provider, please call " [phone] and they will assist you.        Care EveryWhere ID     This is your Care EveryWhere ID. This could be used by other organizations to access your [place] medical records  [id number]        Equal Access to Services     NILE MONZON : Hadii aad ku hadizaiahlinda Kathleen, waaxda luqadaha, qaybta kaalmada adesravan, ayesha roquenestor hyltongenny fisher americo muhammad. So [facility] [phone].    ATENCIÓN: Si habla español, tiene a valdez disposición servicios gratuitos de asistencia lingüística. Llame al [phone].    We comply with applicable federal civil rights laws and [place] laws. We do not discriminate on the basis of race, color, national origin, age, disability, sex, sexual orientation, or gender identity.               Review of your medicines      START taking        Dose / Directions    aspirin - buffered 325 MG Tabs tablet   Commonly known as:  ASCRIPTIN        Dose:  325 mg   Take 1 tablet (325 mg) by mouth daily   Quantity:  60 each   Refills:  0       cephALEXin 500 MG capsule   Commonly known as:  KEFLEX        Dose:  500 mg   Take 1 capsule (500 mg) by mouth 4 times daily for 2 days   Quantity:  8 capsule   Refills:  0       ibuprofen 600 MG tablet   Commonly known as:  ADVIL/MOTRIN        Dose:  600 mg   Take 1 tablet (600 mg) by mouth every 6 hours as needed for other (inflammatory pain)   Quantity:  60 tablet   Refills:  0       oxyCODONE IR 5 MG tablet   Commonly known as:  ROXICODONE        Dose:  5-10 mg   Take 1-2 tablets (5-10 mg) by mouth every 3 hours as needed   Quantity:  50 tablet   Refills:  0         CONTINUE these medicines which may have CHANGED, or have new prescriptions. If we are uncertain of the size of tablets/capsules you have at home, strength may be listed as something that might have changed.        Dose / Directions    * TYLENOL PO   This may have changed:  Another medication with the same name was added. Make sure you understand how and when to take each.        Dose:  650 mg    Take 650 mg by mouth 2 times daily as needed for mild pain or fever   Refills:  0       * acetaminophen 325 MG tablet   Commonly known as:  TYLENOL   This may have changed:  You were already taking a medication with the same name, and this prescription was added. Make sure you understand how and when to take each.        Dose:  650 mg   Take 2 tablets (650 mg) by mouth every 6 hours as needed for mild pain   Quantity:  60 tablet   Refills:  0       * Notice:  This list has 2 medication(s) that are the same as other medications prescribed for you. Read the directions carefully, and ask your doctor or other care provider to review them with you.      CONTINUE these medicines which have NOT CHANGED        Dose / Directions    atenolol 25 MG tablet   Commonly known as:  TENORMIN   Used for:  Benign essential hypertension        TAKE 1 TABLET (25 MG) BY MOUTH DAILY   Quantity:  90 tablet   Refills:  2       CENTRUM SILVER ADULT 50+ PO   Notes to Patient:  Resume taking as you do at home        Dose:  1 tablet   Take 1 tablet by mouth daily Reported on 4/20/2017   Refills:  0       citalopram 20 MG tablet   Commonly known as:  celeXA   Used for:  Major depressive disorder, single episode, moderate (H)   Notes to Patient:  Resume taking as you do at home        TAKE 1 TABLET BY MOUTH EVERY DAY   Quantity:  30 tablet   Refills:  0       folic acid 1 MG tablet   Commonly known as:  FOLVITE   Used for:  Inflammatory polyarthropathy (H)   Notes to Patient:  Resume taking as you do at home        Dose:  1 mg   Take 1 tablet (1 mg) by mouth daily   Quantity:  100 tablet   Refills:  1       order for DME   Used for:  Venous stasis dermatitis of left lower extremity        Equipment being ordered: 10-20 mm Hg knee high compression stockings   Quantity:  1 Device   Refills:  0         STOP taking     ALEVE PO           methotrexate sodium 2.5 MG Tabs                Where to get your medicines      These medications were sent to  [facility] [place], [place] - [address]  [address], [place] [address]     Phone:  [phone]     acetaminophen 325 MG tablet    aspirin - buffered 325 MG Tabs tablet    cephALEXin 500 MG capsule    ibuprofen 600 MG tablet         Some of these will need a paper prescription and others can be bought over the counter. Ask your nurse if you have questions.     Bring a paper prescription for each of these medications     oxyCODONE IR 5 MG tablet                Protect others around you: Learn how to safely use, store and throw away your medicines at www.disposemymeds.org.        ANTIBIOTIC INSTRUCTION     You've Been Prescribed an Antibiotic - Now What?  Your healthcare team thinks that you or your loved one might have an infection. Some infections can be treated with antibiotics, which are powerful, life-saving drugs. Like all medications, antibiotics have side effects and should only be used when necessary. There are some important things you should know about your antibiotic treatment.      Your healthcare team may run tests before you start taking an antibiotic.    Your team may take samples (e.g., from your blood, urine or other areas) to run tests to look for bacteria. These test can be important to determine if you need an antibiotic at all and, if you do, which antibiotic will work best.      Within a few days, your healthcare team might change or even stop your antibiotic.    Your team may start you on an antibiotic while they are working to find out what is making you sick.    Your team might change your antibiotic because test results show that a different antibiotic would be better to treat your infection.    In some cases, once your team has more information, they learn that you do not need an antibiotic at all. They may find out that you don't have an infection, or that the antibiotic you're taking won't work against your infection. For example, an infection caused by a  virus can't be treated with antibiotics. Staying on an antibiotic when you don't need it is more likely to be harmful than helpful.      You may experience side effects from your antibiotic.    Like all medications, antibiotics have side effects. Some of these can be serious.    Let you healthcare team know if you have any known allergies when you are admitted to the hospital.    One significant side effect of nearly all antibiotics is the risk of severe and sometimes deadly diarrhea caused by Clostridium difficile (C. Difficile). This occurs when a person takes antibiotics because some good germs are destroyed. Antibiotic use allows C. diificile to take over, putting patients at high risk for this serious infection.    As a patient or caregiver, it is important to understand your or your loved one's antibiotic treatment. It is especially important for caregivers to speak up when patients can't speak for themselves. Here are some important questions to ask your healthcare team.    What infection is this antibiotic treating and how do you know I have that infection?    What side effects might occur from this antibiotic?    How long will I need to take this antibiotic?    Is it safe to take this antibiotic with other medications or supplements (e.g., vitamins) that I am taking?     Are there any special directions I need to know about taking this antibiotic? For example, should I take it with food?    How will I be monitored to know whether my infection is responding to the antibiotic?    What tests may help to make sure the right antibiotic is prescribed for me?      Information provided by:  www.cdc.gov/getsmart  U.S. Department of Health and Human Services  Centers for disease Control and Prevention  National Center for Emerging and Zoonotic Infectious Diseases  Division of Healthcare Quality Promotion        Information about OPIOIDS     PRESCRIPTION OPIOIDS: WHAT YOU NEED TO KNOW   We gave you an opioid  (narcotic) pain medicine. It is important to manage your pain, but opioids are not always the best choice. You should first try all the other options your care team gave you. Take this medicine for as short a time (and as few doses) as possible.    Some activities can increase your pain, such as bandage changes or therapy sessions. It may help to take your pain medicine 30 to 60 minutes before these activities. Reduce your stress by getting enough sleep, working on hobbies you enjoy and practicing relaxation or meditation. Talk to your care team about ways to manage your pain beyond prescription opioids.    These medicines have risks:    DO NOT drive when on new or higher doses of pain medicine. These medicines can affect your alertness and reaction times, and you could be arrested for driving under the influence (DUI). If you need to use opioids long-term, talk to your care team about driving.    DO NOT operate heavy machinery    DO NOT do any other dangerous activities while taking these medicines.    DO NOT drink any alcohol while taking these medicines.     If the opioid prescribed includes acetaminophen, DO NOT take with any other medicines that contain acetaminophen. Read all labels carefully. Look for the word  acetaminophen  or  Tylenol.  Ask your pharmacist if you have questions or are unsure.    You can get addicted to pain medicines, especially if you have a history of addiction (chemical, alcohol or substance dependence). Talk to your care team about ways to reduce this risk.    All opioids tend to cause constipation. Drink plenty of water and eat foods that have a lot of fiber, such as fruits, vegetables, prune juice, apple juice and high-fiber cereal. Take a laxative (Miralax, milk of magnesia, Colace, Senna) if you don t move your bowels at least every other day. Other side effects include upset stomach, sleepiness, dizziness, throwing up, tolerance (needing more of the medicine to have the same  effect), physical dependence and slowed breathing.    Store your pills in a secure place, locked if possible. We will not replace any lost or stolen medicine. If you don t finish your medicine, please throw away (dispose) as directed by your pharmacist. The [place] Pollution Control Agency has more information about safe disposal: https://www.pca.[facility].[place].us/living-green/managing-unwanted-medications             Medication List: This is a list of all your medications and when to take them. Check marks below indicate your daily home schedule. Keep this list as a reference.      Medications           Morning Afternoon Evening Bedtime As Needed    aspirin - buffered 325 MG Tabs tablet   Commonly known as:  ASCRIPTIN   Take 1 tablet (325 mg) by mouth daily   Last time this was given:  325 mg on 10/25/2018 11:37 AM                Tomorrow 10/26                   atenolol 25 MG tablet   Commonly known as:  TENORMIN   TAKE 1 TABLET (25 MG) BY MOUTH DAILY   Last time this was given:  25 mg on 10/25/2018  8:37 AM            Tomorrow 10/26                       CENTRUM SILVER ADULT 50+ PO   Take 1 tablet by mouth daily Reported on 4/20/2017   Notes to Patient:  Resume taking as you do at home                                cephALEXin 500 MG capsule   Commonly known as:  KEFLEX   Take 1 capsule (500 mg) by mouth 4 times daily for 2 days                    Today 10/25       Tonight 10/25           citalopram 20 MG tablet   Commonly known as:  celeXA   TAKE 1 TABLET BY MOUTH EVERY DAY   Notes to Patient:  Resume taking as you do at home                                folic acid 1 MG tablet   Commonly known as:  FOLVITE   Take 1 tablet (1 mg) by mouth daily   Notes to Patient:  Resume taking as you do at home                                ibuprofen 600 MG tablet   Commonly known as:  ADVIL/MOTRIN   Take 1 tablet (600 mg) by mouth every 6 hours as needed for other (inflammatory pain)                            Available again  at 4pm       order for DME   Equipment being ordered: 10-20 mm Hg knee high compression stockings                                oxyCODONE IR 5 MG tablet   Commonly known as:  ROXICODONE   Take 1-2 tablets (5-10 mg) by mouth every 3 hours as needed   Last time this was given:  5 mg on 10/25/2018 11:37 AM                            Available again at 237pm       * TYLENOL PO   Take 650 mg by mouth 2 times daily as needed for mild pain or fever   Last time this was given:  650 mg on 10/24/2018 12:00 PM                         SEE BELOW       * acetaminophen 325 MG tablet   Commonly known as:  TYLENOL   Take 2 tablets (650 mg) by mouth every 6 hours as needed for mild pain   Last time this was given:  650 mg on 10/24/2018 12:00 PM                            Available again anytime       * Notice:  This list has 2 medication(s) that are the same as other medications prescribed for you. Read the directions carefully, and ask your doctor or other care provider to review them with you.

## 2018-10-24 NOTE — ANESTHESIA CARE TRANSFER NOTE
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    Anesthesia Type:   Spinal     Note:  Airway :Room Air  Patient transferred to:PACU  Comments: Pt awake and able to verbalize needs. Spontaneous respiration without difficulty. All monitors on and audible. Report given to PACU RN, Vital Signs Stable. Pt denies pain.Handoff Report: Identifed the Patient, Identified the Reponsible Provider, Reviewed the pertinent medical history, Discussed the surgical course, Reviewed Intra-OP anesthesia mangement and issues during anesthesia, Set expectations for post-procedure period and Allowed opportunity for questions and acknowledgement of understanding      Vitals: (Last set prior to Anesthesia Care Transfer)    CRNA VITALS  10/24/2018 0928 - 10/24/2018 1006      10/24/2018             Pulse: 67    SpO2: 94 %    Resp Rate (set): 10                Electronically Signed By: BORA Alvarez CRNA  October 24, 2018  10:06 AM

## 2018-10-24 NOTE — IP AVS SNAPSHOT
[facility] Observation [facility]    [address] [place] [id number]    Phone:  [phone]                                       After Visit Summary   10/24/2018    Ailyn Trevino    MRN: [id number]           After Visit Summary Signature Page     I have received my discharge instructions, and my questions have been answered. I have discussed any challenges I see with this plan with the nurse or doctor.    ..........................................................................................................................................  Patient/Patient Representative Signature      ..........................................................................................................................................  Patient Representative Print Name and Relationship to Patient    ..................................................               ................................................  Date                                   Time    ..........................................................................................................................................  Reviewed by Signature/Title    ...................................................              ..............................................  Date                                               Time          22EPIC Rev 08/18

## 2018-10-24 NOTE — ANESTHESIA PROCEDURE NOTES
Peripheral nerve/Neuraxial procedure note : intrathecal  Pre-Procedure  Performed by PARIS RON  Location: OR      Pre-Anesthestic Checklist: patient identified, IV checked, risks and benefits discussed, informed consent, monitors and equipment checked and pre-op evaluation    Timeout  Correct Patient: Yes   Correct Procedure: Yes       Correct Position: Yes     .   Procedure Documentation    .    Procedure:    Intrathecal.  Insertion Site:L3-4  (right paramedian approach)      Patient Prep;mask, sterile gloves, povidone-iodine 7.5% surgical scrub, patient draped.  .  Needle: Erika-Abram Spinal Needle (gauge): 24  Spinal/LP Needle Length (inches): 3.5 # of attempts: 3 and # of redirects:  Introducer used .       Assessment/Narrative  Paresthesias: No.  .  .  clear CSF fluid removed . Comments:  1% lidocaine to skin  No complications  Challenging placement with her height and scoliosis.  Midline os, left paramedian os.  R paramedian successful.

## 2018-10-24 NOTE — OP NOTE
Procedure Date: 10/24/2018      PREOPERATIVE DIAGNOSIS:  Rheumatoid arthritis, left forefoot with severe fixed hallux valgus deformity, dislocated lesser toe clawing 2, 3, 4, 5.      POSTOPERATIVE DIAGNOSIS:  Rheumatoid arthritis, left forefoot with severe fixed hallux valgus deformity, dislocated lesser toe clawing 2, 3, 4, 5.      SURGEON:  Rufus Harden MD      PROCEDURES:   1.  DuVries resection arthroplasty of lesser toes 2, 3, 4, 5 proximal interphalangeal joint.   2.  Extensor lengthening tenotomy with metatarsophalangeal capsulotomy, collateral ligament release and synovectomy, metatarsophalangeal joints, lesser toes 2, 3, 4, 5.   3.  Layo metatarsal head resection arthroplasty of lesser metatarsals 2, 3, 4, 5.   4.  Left first metatarsophalangeal arthrodesis.      DESCRIPTION OF PROCEDURE:  After adequate spinal anesthetic was obtained, the patient's left foot and ankle was prepped and draped in the usual sterile fashion.  Thigh tourniquet was utilized.  Limb was exsanguinated, tourniquet raised.  We directed our attention first to the dislocated fixed lesser toe clawing.  Transverse elliptical skin incision was made over the dorsal aspect of the second toe PIP joint, carried down through the subcutaneous tissue.  Ellipse of skin and extensor tendon removed.  Collateral ligaments were released.  Distal aspect of the proximal phalanx brought up through the wound and resected utilizing the Micro-Aire and microsagittal saw.  This provided good decompression of the fixed flexion deformity.  In identical fashion, the same procedure was performed on toes 3, 4 and 5.  We then turned our attention to the MTP dislocation.  Dorsal longitudinal skin incision was made in the second webspace, carried down through the subcutaneous tissue.  Extensor lengthening tenotomies were performed.  MP capsulotomy was performed.  McGlamry metatarsal head retractor releasing the collateral ligaments.  Synovectomy was performed  at the MTP joint.        Utilizing the Micro-Aire microsagittal saw, a Layo metatarsal head resection arthroplasty done.  This provided good decompression of the intractable plantar keratosis and allowed realignment of the second toe on the corresponding ray.  In identical fashion, the same procedures were performed on toes 3, 4, and 5.  Four and 5 were approached through a second dorsal longitudinal skin incision in the fourth webspace.  All wounds were then thoroughly irrigated with antibiotic solution.  Intramedullary K-wire was driven antegrade and then retrograde across the interphalangeal joints into the corresponding metatarsal.  Extensor tendons, collateral ligaments were repaired at both the MP and PIP joints with 2-0 Vicryl.  Subcutaneous tissue was closed with a 2-0 Vicryl, skin closed with 3-0 Prolene vertical mattress stitch.  Marcaine 0.25% instilled along the incision lines.      We then turned our attention to the severe hallux valgus and arthritic great toe.  A straight medial longitudinal skin incision was made over the first MTP joint, carried down through the subcutaneous tissue.  Capsule was incised in line with the skin incision and subperiosteally stripped off the medial and dorsal aspect of the joint.  Utilizing the Proteo[employer]ix truncated reamer system, the surfaces were prepared for arthrodesis.  Alignment pin was placed in the proximal phalanx.  Truncated reamer #1 and 2 were utilized to prepare the cup.  Excess bone was removed with a rongeur.  We then turned our attention to the first metatarsal.  Alignment guide was set at 5 degrees of valgus, 15 degrees of dorsiflexion.  Alignment pin was placed.  Truncated reamer #3 was utilized to prepare the cone.  Excess bone was removed with a rongeur.  Cup and cone were then found to coapt quite nicely.  With the toe held in the correct rotation, a guide pin from the Ace 4-0 screw set placed obliquely proximal to distal, 34 mm partially-threaded  cancellous lag screw was chosen, countersunk and placed.        There was excellent fixation and alignment.  Remaining osteophytes were removed.  Wound thoroughly irrigated with antibiotic solution.  Capsule closed with interrupted 2-0 Vicryl and skin closed with 3-0 Prolene vertical mattress stitch.  Marcaine 0.25% was instilled along the incision line.  Sterile Davey dressing was applied.  Tourniquet was released after 80 minutes and with good capillary refill.  The patient was taken to the recovery room in good condition.  Note, the degree of difficulty on this case was high secondary to the severe fixed deformity.         RENALDO SAUNDERS MD             D: 10/24/2018   T: 10/24/2018   MT: DAMION      Name:     SAVI ALFONSO   MRN:      [id number]        Account:        [id number]   :      1935           Procedure Date: 10/24/2018      Document: [id number]

## 2018-10-24 NOTE — ANESTHESIA PREPROCEDURE EVALUATION
Anesthesia Evaluation     . Pt has had prior anesthetic.     No history of anesthetic complications          ROS/MED HX    ENT/Pulmonary:     (+), . Other pulmonary disease elevated hemidiaphram.   (-) sleep apnea   Neurologic:       Cardiovascular:     (+) Dyslipidemia, ----. : . . . :. Irregular Heartbeat/Palpitations, . Previous cardiac testing Echodate:2018results: Final Conclusion   Normal left ventricular size with mild left ventricular hypertrophy. Normal left ventricular ejection fraction. No obvious regional wall motion/thickening abnormalities. Visually estimated ejection fraction is 55-60%. Normal right ventricular size and function. The aortic valve is trifleaflet with mild sclerosis but no stenosis. There is mild-moderate aortic insufficiency. Mild mitral and tricuspid regurgitation. PASP is 31 mmHg (assuming a RAP of 3 mmHg) Compared to prior study in 12/2016, there is no significant change. Estimated EF: 55-60%date: results: date: results: date: results:          METS/Exercise Tolerance:     Hematologic:         Musculoskeletal:   (+) , , other musculoskeletal- PMR, chronic steroids      GI/Hepatic:        (-) GERD   Renal/Genitourinary:         Endo:     (+) Obesity (BMI 30), .      Psychiatric:     (+) psychiatric history depression      Infectious Disease:         Malignancy:         Other:    (+) H/O Chronic Pain,                   Physical Exam  Normal systems: cardiovascular, pulmonary and dental    Airway   Mallampati: III  TM distance: >3 FB  Neck ROM: full    Dental     Cardiovascular       Pulmonary                     Anesthesia Plan      History & Physical Review  History and physical reviewed and following examination; no interval change.    ASA Status:  2 .    NPO Status:  > 8 hours    Plan for Spinal   PONV prophylaxis:  Ondansetron (or other 5HT-3) and Dexamethasone or Solumedrol       Postoperative Care  Postoperative pain management:  IV analgesics.      Consents  Anesthetic plan,  risks, benefits and alternatives discussed with:  Patient and Daughter/Son..                          .

## 2018-10-24 NOTE — PROGRESS NOTES
Admission medication history interview status for the 10/24/2018  admission is complete. See EPIC admission navigator for prior to admission medications     Medication history source reliability:Good    Medication history interview source(s):Patient    Medication history resources (including written lists, pill bottles, clinic record):Patient mailed in her medication list prior to surgery    Primary pharmacy.[facility]    Additional medication history information not noted on PTA med list : Patient was told to see her doctor before restarting Methotrexate.    Time spent in this activity: 40 minutes    Prior to Admission medications    Medication Sig Last Dose Taking? Auth Provider   Acetaminophen (TYLENOL PO) Take 650 mg by mouth 2 times daily as needed for mild pain or fever 10/17/2018 at PRN Yes Reported, Patient   atenolol (TENORMIN) 25 MG tablet TAKE 1 TABLET (25 MG) BY MOUTH DAILY 10/24/2018 at 0430 Yes Maddi Church MD   citalopram (CELEXA) 20 MG tablet TAKE 1 TABLET BY MOUTH EVERY DAY 10/24/2018 at 0430 Yes Preeti Bateman MD   Multiple Vitamins-Minerals (CENTRUM SILVER ADULT 50+ PO) Take 1 tablet by mouth daily Reported on 4/20/2017 10/23/2018 at AM Yes Reported, Patient   Naproxen Sodium (ALEVE PO) Take 440 mg by mouth 2 times daily as needed for moderate pain More than a week ago at PRN Yes Reported, Patient   folic acid (FOLVITE) 1 MG tablet Take 1 tablet (1 mg) by mouth daily 2 weeks ago  Dale Marquis MD   methotrexate sodium 2.5 MG TABS Take 15 mg by mouth once a week . Take all 6 tablets on the same day of each week. 2 weeks ago  Dale Marquis MD   order for DME Equipment being ordered: 10-20 mm Hg knee high compression stockings   Maddi Church MD

## 2018-10-24 NOTE — PROGRESS NOTES
SPIRITUAL HEALTH SERVICES Progress Note  [facility]    Pt was resting after her surgery and said that she is anxious about the long recovery.  She said that she has to keep her feet elevated for six weeks and will go home tomorrow.  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  Pt said that she is looking forward to being able to walk again, but is worried about the boredom of being off her feet for the next six weeks.        offered listening, support and prayer.      Pt said she has a good misty community (Rastafarian) and also lots of support from family and neighbors.  She said she is grateful for her surgeon, and that her feet and toes are now straight.  Pt requested communion, [facility] put in a request for Fr Quiroga to visit today.      No plan to follow - discharge tomorrow.      Rand Nj  Chaplain Resident

## 2018-10-24 NOTE — ANESTHESIA POSTPROCEDURE EVALUATION
Patient: Ailyn Trevino    Procedure(s):  LEFT FIRST METATARSOPHALANGEAL JOINT ARTHRODESIS  LESSER TOE RECONSTRUCTION SECOND, THIRD, FOURTH AND FIFTH TOES    Diagnosis:LEFT HALLUX RIGIDOUS WITH LESSER TOE DEFORMITY  Diagnosis Additional Information: No value filed.    Anesthesia Type:  Spinal    Note:  Anesthesia Post Evaluation    Patient location during evaluation: PACU  Patient participation: Able to fully participate in evaluation  Level of consciousness: awake and alert  Pain management: adequate  Airway patency: patent  Cardiovascular status: acceptable and hemodynamically stable  Respiratory status: acceptable and nasal cannula  Hydration status: euvolemic  PONV: none             Last vitals:  Vitals:    10/24/18 1200 10/24/18 1242 10/24/18 1345   BP: 151/51 132/64 128/45   Resp: 20 17 20   Temp: 37.1  C (98.8  F)     SpO2: 97% 95% 96%         Electronically Signed By: Juni Salmon MD  October 24, 2018  2:40 PM

## 2018-10-25 VITALS
HEART RATE: 69 BPM | TEMPERATURE: 96.8 F | WEIGHT: 147 LBS | OXYGEN SATURATION: 93 % | SYSTOLIC BLOOD PRESSURE: 159 MMHG | HEIGHT: 59 IN | RESPIRATION RATE: 18 BRPM | DIASTOLIC BLOOD PRESSURE: 48 MMHG | BODY MASS INDEX: 29.64 KG/M2

## 2018-10-25 LAB — GLUCOSE BLDC GLUCOMTR-MCNC: 84 MG/DL (ref 70–99)

## 2018-10-25 PROCEDURE — 25000132 ZZH RX MED GY IP 250 OP 250 PS 637: Performed by: ORTHOPAEDIC SURGERY

## 2018-10-25 PROCEDURE — 82962 GLUCOSE BLOOD TEST: CPT

## 2018-10-25 PROCEDURE — 40000934 ZZH STATISTIC OUTPATIENT (NON-OBS) DAY

## 2018-10-25 PROCEDURE — 25000128 H RX IP 250 OP 636: Performed by: ORTHOPAEDIC SURGERY

## 2018-10-25 RX ADMIN — OXYCODONE HYDROCHLORIDE 5 MG: 5 TABLET ORAL at 11:37

## 2018-10-25 RX ADMIN — CEFAZOLIN SODIUM 2 G: 2 INJECTION, SOLUTION INTRAVENOUS at 10:01

## 2018-10-25 RX ADMIN — ATENOLOL 25 MG: 25 TABLET ORAL at 08:37

## 2018-10-25 RX ADMIN — CEFAZOLIN SODIUM 2 G: 2 INJECTION, SOLUTION INTRAVENOUS at 02:14

## 2018-10-25 RX ADMIN — ASPIRIN 325 MG: 325 TABLET, FILM COATED ORAL at 11:37

## 2018-10-25 RX ADMIN — OXYCODONE HYDROCHLORIDE 5 MG: 5 TABLET ORAL at 08:37

## 2018-10-25 RX ADMIN — KETOROLAC TROMETHAMINE 15 MG: 15 INJECTION, SOLUTION INTRAMUSCULAR; INTRAVENOUS at 10:01

## 2018-10-25 RX ADMIN — KETOROLAC TROMETHAMINE 15 MG: 15 INJECTION, SOLUTION INTRAMUSCULAR; INTRAVENOUS at 04:16

## 2018-10-25 RX ADMIN — OXYCODONE HYDROCHLORIDE 5 MG: 5 TABLET ORAL at 02:27

## 2018-10-25 NOTE — PROGRESS NOTES
Per Dr Bar no walking without shoes. She walked twice in PM shift and once in night shift. Writer provided ortho shoes in the morning. AM nurse updated.

## 2018-10-25 NOTE — PLAN OF CARE
Problem: Surgery Nonspecified (Adult)  Goal: Signs and Symptoms of Listed Potential Problems Will be Absent, Minimized or Managed (Surgery Nonspecified)  Signs and symptoms of listed potential problems will be absent, minimized or managed by discharge/transition of care (reference Surgery Nonspecified (Adult) CPG).  Outcome: Therapy, progress toward functional goals as expected  A/OX4. On capno, VSS on RA. LLE ACE wrap w/ scant serosanguinous drainage, elevated on a wedge. LLE toes w/ pins, intact. Denies numbness or tingling . Pain managed with PRN oxy and stacy Toradol. Ambulating to the BR w/ A1+walker. IVF stopped, pt taking enough po. Adequate UOP.Plan is to discharge tomorrow.

## 2018-10-25 NOTE — PLAN OF CARE
Problem: Patient Care Overview  Goal: Plan of Care/Patient Progress Review  Outcome: Adequate for Discharge Date Met: 10/25/18    A&Ox4. Up independently. WBAT utilizing walker. Tolerating regular diet. VSS on RA. C/o 2/10 L foot pain, given scheduled toradol and prn oxycodone. L foot wrapped, dried drainage noted near toes otherwise dressing intact. Elevated on blue foam, and ortho shoe on during ambulation. 3 pins intact without migration. IS instructed and encouraged. Up to BR voiding adequately.     Pt given discharge instructions. Questions answered. Discharge medications given and reviewed with patient. Follow up appointment scheduled for 11/7, pt aware. Pt to DC home with dtr.

## 2018-10-25 NOTE — PROGRESS NOTES
Pod 1    Date of Service: October 25, 2018    Afeb, n/v intact  Reviewed findings at surg  Plan:  home, instructed

## 2018-10-25 NOTE — PLAN OF CARE
Problem: Patient Care Overview  Goal: Plan of Care/Patient Progress Review  Outcome: Improving  A/OX4. On capno, VSS on RA. LLE ACE wrap w/ scant serosanguinous drainage, elevated on a wedge. LLE toes w/ pins, intact. Denies numbness or tingling . Pain managed with PRN oxy and stacy Toradol. Ambulating to the BR w/ A1+walker. IV SL.Plan is to discharge tomorrow.

## 2018-11-05 DIAGNOSIS — I10 BENIGN ESSENTIAL HYPERTENSION: ICD-10-CM

## 2018-11-05 RX ORDER — ATENOLOL 25 MG/1
TABLET ORAL
Qty: 90 TABLET | Refills: 2 | Status: SHIPPED | OUTPATIENT
Start: 2018-11-05 | End: 2019-08-11

## 2018-11-05 NOTE — TELEPHONE ENCOUNTER
"Pending Prescriptions:                       Disp   Refills    atenolol (TENORMIN) 25 MG tablet [Pharmac*90 tab*2            Sig: TAKE 1 TABLET BY MOUTH EVERY DAY    Failed [facility] refill protocol, see below:  Requested Prescriptions   Pending Prescriptions Disp Refills     atenolol (TENORMIN) 25 MG tablet [Pharmacy Med Name: ATENOLOL 25 MG TABLET] 90 tablet 2     Sig: TAKE 1 TABLET BY MOUTH EVERY DAY    Beta-Blockers Protocol Failed    11/5/2018  7:50 AM       Failed - Blood pressure under 140/90 in past 12 months    BP Readings from Last 3 Encounters:   10/25/18 159/48   10/09/18 110/60   04/26/18 122/53                Passed - Patient is age 6 or older       Passed - Recent (12 mo) or future (30 days) visit within the authorizing provider's specialty    Patient had office visit in the last 12 months or has a visit in the next 30 days with authorizing provider or within the authorizing provider's specialty.  See \"Patient Info\" tab in inbasket, or \"Choose Columns\" in Meds & Orders section of the refill encounter.              Yesy Oleary RN    "

## 2018-11-20 ENCOUNTER — TRANSFERRED RECORDS (OUTPATIENT)
Dept: HEALTH INFORMATION MANAGEMENT | Facility: CLINIC | Age: 83
End: 2018-11-20

## 2018-11-28 DIAGNOSIS — F32.1 MAJOR DEPRESSIVE DISORDER, SINGLE EPISODE, MODERATE (H): ICD-10-CM

## 2018-11-29 RX ORDER — CITALOPRAM HYDROBROMIDE 20 MG/1
TABLET ORAL
Qty: 90 TABLET | Refills: 1 | Status: SHIPPED | OUTPATIENT
Start: 2018-11-29 | End: 2019-05-20

## 2018-11-29 ASSESSMENT — PATIENT HEALTH QUESTIONNAIRE - PHQ9: SUM OF ALL RESPONSES TO PHQ QUESTIONS 1-9: 0

## 2018-11-29 NOTE — TELEPHONE ENCOUNTER
PHQ-9 score:    PHQ-9 SCORE 11/29/2018   PHQ-9 Total Score 0     Prescription approved per [facility] Refill Protocol.    Brandi Weeks RN  [facility]

## 2018-11-29 NOTE — TELEPHONE ENCOUNTER
Spoke to patient and informed her of message. PHQ-9 completed.  Brandi RUBIO CMA ([facility])

## 2019-02-04 ENCOUNTER — TELEPHONE (OUTPATIENT)
Dept: FAMILY MEDICINE | Facility: CLINIC | Age: 84
End: 2019-02-04

## 2019-02-04 ENCOUNTER — OFFICE VISIT (OUTPATIENT)
Dept: FAMILY MEDICINE | Facility: CLINIC | Age: 84
End: 2019-02-04
Payer: COMMERCIAL

## 2019-02-04 VITALS
TEMPERATURE: 96.5 F | DIASTOLIC BLOOD PRESSURE: 64 MMHG | WEIGHT: 146 LBS | RESPIRATION RATE: 18 BRPM | HEART RATE: 78 BPM | OXYGEN SATURATION: 96 % | BODY MASS INDEX: 29.49 KG/M2 | SYSTOLIC BLOOD PRESSURE: 132 MMHG

## 2019-02-04 DIAGNOSIS — J01.01 ACUTE RECURRENT MAXILLARY SINUSITIS: Primary | ICD-10-CM

## 2019-02-04 PROCEDURE — 99213 OFFICE O/P EST LOW 20 MIN: CPT | Performed by: INTERNAL MEDICINE

## 2019-02-04 RX ORDER — AZITHROMYCIN 250 MG/1
TABLET, FILM COATED ORAL
Qty: 6 TABLET | Refills: 0 | Status: SHIPPED | OUTPATIENT
Start: 2019-02-04 | End: 2019-07-23

## 2019-02-04 NOTE — TELEPHONE ENCOUNTER
Reason for call:  Patient reporting a symptom    Symptom or request:  Sinus infection    Duration (how long have symptoms been present):  Since Friday     Have you been treated for this before? No    Additional comments:  Over the counter sudafed and wiley pot not helping.     Phone Number patient can be reached at:  Cell number on file:    Telephone Information:   Mobile [phone]       Best Time:   Any     Can we leave a detailed message on this number:  YES    Call taken on 2/4/2019 at 11:31 AM by Lanie Remy

## 2019-02-04 NOTE — PATIENT INSTRUCTIONS
"We discussed you symptoms , suggestive mostly of sinus congestion , can't exclude some component of sinus infection     Treatment plan     1. Azithromycin 5 day course   2. Guaifenesin [ mucinex ] 2 times a day - first, if not improving we can add the diphenhydrAMINE (BENADRYL)     \" next\" if necessary   3. DiphenhydrAMINE (BENADRYL) [ or Pseudoephedrine (Sudafed) ] several times a day  4. Depending on how things go you can continue with the SinuCleanse  Neti Pot use.    Lets hope this gets you're symptoms treated    [facility]    If you have any questions regarding to your visit please contact your care team:     Team Pink:   Clinic Hours Telephone Number   Internal Medicine:  Dr. Maddi Chamorro, NP 7am-7pm  Monday - Thursday   7am-5pm  Fridays  [phone]  (Appointment scheduling available 24/7)   [facility]gent [facility] - [place] - 11am-9pm Monday-Friday Saturday-Sunday- 9am-5pm   [place] - 5pm-9pm Monday-Friday Saturday-Sunday- 9am-5pm  [phone] - [place]  [phone] - [place]       What options do I have for a visit other than an office visit? We offer electronic visits (e-visits) and telephone visits, when medically appropriate.  Please check with your medical insurance to see if these types of visits are covered, as you will be responsible for any charges that are not paid by your insurance.      You can use Click With Me Now (secure electronic communication) to access to your chart, send your primary care provider a message, or make an appointment. Ask a team member how to get started.     For a price quote for your services, please call our Consumer Price Line at [phone] or our Imaging Cost estimation line at [phone] (for imaging tests).    Mary Lou Winn CMA  "

## 2019-02-04 NOTE — PROGRESS NOTES
SUBJECTIVE:   Ailyn Trevino is a 83 year old female who presents to clinic today for the following health issues:        ENT Symptoms             Symptoms: cc Present Absent Comment   Fever/Chills   x    Fatigue   x    Muscle Aches   x    Eye Irritation   x    Sneezing   x    Nasal Kevin/Drg  x     Sinus Pressure/Pain  x     Loss of smell   x    Dental pain   x    Sore Throat  x  First day   Swollen Glands   x    Ear Pain/Fullness   x    Cough  x     Wheeze   x    Chest Pain   x    Shortness of breath  x     Rash   x    Other   x      Symptom duration:  x5 days   Symptom severity:  moderate   Treatments tried:  sudafed, mucinex, sinus rinse   Contacts:  unknown        Her symptoms have smouldered for 5 days now. She completely denies the typical symptoms of sinus infection such as fever or chills or coughing or tooth pain or purulence with the mucus. On the other hand she has a distinct and noteworthy worsening of sinus drainage for this whole time. The SinuCleanse  Neti Pot and treatment with Pseudoephedrine (Sudafed) is just not helping;    Problem list and histories reviewed & adjusted, as indicated.  Additional history: as documented    Patient Active Problem List   Diagnosis     Advanced directives, counseling/discussion     Osteopenia     Palpitations     Hyperlipidemia LDL goal <130     Renal cyst     Granulomatous lung disease (H)     Health Care Home     Insomnia     Osteoarthritis of knee     Aortic valve disorder     Mitral valve disorder     Impaired fasting glucose     PMR (polymyalgia rheumatica) (H)     Encounter for long-term current use of medication     Other chronic pain     Major depressive disorder, single episode, mild (H)     Current chronic use of systemic steroids     Fatigue, unspecified type     Chronic pain of left knee     Venous stasis dermatitis of left lower extremity     Fecal urgency     Elevated hemidiaphragm     Inflammatory arthritis     Pulmonary nodules     Rheumatoid  arthritis, adult (H)     Past Surgical History:   Procedure Laterality Date     APPENDECTOMY       CATARACT IOL, RT/LT      bilateral      CHOLECYSTECTOMY       ORTHOPEDIC SURGERY      Left Knee Surgery 2017     RECONSTRUCT FOREFOOT WITH METATARSOPHALANGEAL (MTP) FUSION Left 10/24/2018    Procedure: LEFT FIRST METATARSOPHALANGEAL JOINT ARTHRODESIS;  Surgeon: Rufus Harden MD;  Location: [facility] OR     REPAIR HAMMER TOE Left 10/24/2018    Procedure: LESSER TOE RECONSTRUCTION SECOND, THIRD, FOURTH AND FIFTH TOES;  Surgeon: Rufus Harden MD;  Location: [facility] OR     SURGICAL HISTORY OF -       bilateral YAG laser surgery of eyes       Social History     Tobacco Use     Smoking status: Former Smoker     Last attempt to quit: 1998     Years since quittin.1     Smokeless tobacco: Never Used   Substance Use Topics     Alcohol use: Yes     Family History   Problem Relation Age of Onset     Cancer Mother      Cerebrovascular Disease Father          Current Outpatient Medications   Medication Sig Dispense Refill     Acetaminophen (TYLENOL PO) Take 650 mg by mouth 2 times daily as needed for mild pain or fever       acetaminophen (TYLENOL) 325 MG tablet Take 2 tablets (650 mg) by mouth every 6 hours as needed for mild pain 60 tablet 0     aspirin - buffered (ASCRIPTIN) 325 MG TABS tablet Take 1 tablet (325 mg) by mouth daily 60 each 0     atenolol (TENORMIN) 25 MG tablet TAKE 1 TABLET BY MOUTH EVERY DAY 90 tablet 2     azithromycin (ZITHROMAX) 250 MG tablet Two tablets first day, then one tablet daily for four days. 6 tablet 0     citalopram (CELEXA) 20 MG tablet TAKE 1 TABLET BY MOUTH EVERY DAY 90 tablet 1     ibuprofen (ADVIL/MOTRIN) 600 MG tablet Take 1 tablet (600 mg) by mouth every 6 hours as needed for other (inflammatory pain) 60 tablet 0     Multiple Vitamins-Minerals (CENTRUM SILVER ADULT 50+ PO) Take 1 tablet by mouth daily Reported on 2017       order for DME Equipment being  ordered: 10-20 mm Hg knee high compression stockings 1 Device 0     oxyCODONE IR (ROXICODONE) 5 MG tablet Take 1-2 tablets (5-10 mg) by mouth every 3 hours as needed (Patient not taking: Reported on 2/4/2019) 50 tablet 0     No Known Allergies  Recent Labs   Lab Test 10/09/18  1158 04/23/18  1054 01/24/18  0918 01/03/18  1000 10/23/17  1032 06/26/17  1854 01/02/17  1503 10/04/16  1145 02/05/16  1500 12/02/15  0901   A1C  --   --   --   --   --   --   --  5.2  --   --    LDL  --   --   --  134* 118*  --   --   --   --  90   HDL  --   --   --  53 57  --   --   --   --  64   TRIG  --   --   --  144 169*  --   --   --   --  88   ALT  --  22 26  --   --  27  --  23 18 30   CR 0.64 0.74 0.61  --   --  0.61 0.70 0.65 0.68 0.66   GFRESTIMATED 89 75 >90  --   --  >90  Non  GFR Calc   80 88 84 86   GFRESTBLACK >90 >90 >90  --   --  >90   GFR Calc   >90   GFR Calc   >90   GFR Calc   >90   GFR Calc   >90   GFR Calc     POTASSIUM  --   --   --   --   --  4.1 3.8 4.1 3.8 3.9   TSH  --   --   --   --   --  0.91  --   --  1.31  --       BP Readings from Last 3 Encounters:   02/04/19 132/64   10/25/18 159/48   10/09/18 110/60    Wt Readings from Last 3 Encounters:   02/04/19 66.2 kg (146 lb)   10/24/18 66.7 kg (147 lb)   10/09/18 68.7 kg (151 lb 6.4 oz)                  Labs reviewed in EPIC    Reviewed and updated as needed this visit by clinical staff  Allergies       Reviewed and updated as needed this visit by Provider         ROS:  Constitutional, HEENT, cardiovascular, pulmonary, gi and gu systems are negative, except as otherwise noted.    OBJECTIVE:                                                    /64   Pulse 78   Temp 96.5  F (35.8  C) (Oral)   Resp 18   Wt 66.2 kg (146 lb)   SpO2 96%   BMI 29.49 kg/m    Body mass index is 29.49 kg/m .  GENERAL APPEARANCE: healthy, alert and no distress  EYES: Eyes grossly normal  to inspection, PERRL and conjunctivae and sclerae normal  HENT: ear canals and TM's normal, nose and mouth without ulcers or lesions and nasal congestive voice quality with evidence of sinus congestion   NECK: no adenopathy, no asymmetry, masses, or scars and thyroid normal to palpation  RESP: lungs clear to auscultation - no rales, rhonchi or wheezes  CV: regular rates and rhythm, normal S1 S2, no S3 or S4 and no murmur, click or rub    Diagnostic test results:  Diagnostic Test Results:  No orders of the defined types were placed in this encounter.         ASSESSMENT/PLAN:                                                    1. Acute recurrent maxillary sinusitis  I was initially leaning more towards thinking of this as a sinus congestion condition due possibly to allergic rhinitis or vasomotor rhinitis but after some discussion with patient and in consideration of her symptoms I lean towards treatment with antibiotic - see patient after-visit summary for supportive measures reviewed , treatment plan   - azithromycin (ZITHROMAX) 250 MG tablet; Two tablets first day, then one tablet daily for four days.  Dispense: 6 tablet; Refill: 0      Follow up with Provider - depending on how things go      Miki Christopher MD  [facility]

## 2019-02-04 NOTE — TELEPHONE ENCOUNTER
Patient has felt sick since 2/1/19 with congestion, sore throat, headache and post nasal drip.  Patient is not sure if it is a sinus infection, advised patient she will need a visit for a prescription.  Visit scheduled.   Christina Matthew RN

## 2019-03-01 DIAGNOSIS — M06.4 INFLAMMATORY POLYARTHROPATHY (H): ICD-10-CM

## 2019-03-01 RX ORDER — FOLIC ACID 1 MG/1
1 TABLET ORAL DAILY
Qty: 100 TABLET | Refills: 1 | Status: SHIPPED | OUTPATIENT
Start: 2019-03-01 | End: 2019-07-26

## 2019-03-01 NOTE — TELEPHONE ENCOUNTER
Folic acid 1mg tab      Last Written Prescription Date:  3/1/19  Last Fill Quantity: 90,   # refills: 0  Last Office Visit: 2/4/19  Future Office visit:    Next 5 appointments (look out 90 days)    Apr 10, 2019  2:00 PM CDT  Return Visit with Dale Marquis MD  [facility] ([facility]) [address]  [place] [address]  [phone]           Routing refill request to provider for review/approval because:  Drug not on the [facility], [facility] or  Health refill protocol or controlled substance

## 2019-03-01 NOTE — TELEPHONE ENCOUNTER
"Routing refill request to provider for review/approval because:  Drug not active on patient's medication list      Requested Prescriptions   Pending Prescriptions Disp Refills     folic acid (FOLVITE) 1 MG tablet 100 tablet 1     Sig: Take 1 tablet (1 mg) by mouth daily    Vitamin Supplements (Adult) Protocol Failed - 3/1/2019 10:14 AM       Failed - Recent (12 mo) or future (30 days) visit within the authorizing provider's specialty    Patient had office visit in the last 12 months or has a visit in the next 30 days with authorizing provider or within the authorizing provider's specialty.  See \"Patient Info\" tab in inbasket, or \"Choose Columns\" in Meds & Orders section of the refill encounter.             Failed - Medication is active on med list       Passed - High dose Vitamin D not ordered          Brandi Weeks RN  [facility]    "

## 2019-05-20 DIAGNOSIS — F32.1 MAJOR DEPRESSIVE DISORDER, SINGLE EPISODE, MODERATE (H): ICD-10-CM

## 2019-05-20 RX ORDER — CITALOPRAM HYDROBROMIDE 20 MG/1
20 TABLET ORAL DAILY
Qty: 30 TABLET | Refills: 0 | Status: SHIPPED | OUTPATIENT
Start: 2019-05-20 | End: 2019-06-24

## 2019-06-24 DIAGNOSIS — F32.1 MAJOR DEPRESSIVE DISORDER, SINGLE EPISODE, MODERATE (H): ICD-10-CM

## 2019-06-26 RX ORDER — CITALOPRAM HYDROBROMIDE 20 MG/1
TABLET ORAL
Qty: 30 TABLET | Refills: 1 | Status: SHIPPED | OUTPATIENT
Start: 2019-06-26 | End: 2019-07-15

## 2019-06-26 ASSESSMENT — PATIENT HEALTH QUESTIONNAIRE - PHQ9: SUM OF ALL RESPONSES TO PHQ QUESTIONS 1-9: 1

## 2019-06-26 NOTE — TELEPHONE ENCOUNTER
Spoke with patient and she completed the PHQ-9 over the phone. PHQ-9 score was a one.  Lavonne Zhao, CMA

## 2019-07-15 DIAGNOSIS — F32.1 MAJOR DEPRESSIVE DISORDER, SINGLE EPISODE, MODERATE (H): ICD-10-CM

## 2019-07-15 NOTE — TELEPHONE ENCOUNTER
Reason for call:  Medication   If this is a refill request, has the caller requested the refill from the pharmacy already? Yes  Will the patient be using a [facility]? No  Name of the pharmacy and phone number for the current request: [facility] [phone]    Name of the medication requested: citalopram    Other request: please call, patient is out of medication    Phone number to reach patient:  Home number on file [phone] (home)    Best Time:  Before 4:30 pm    Can we leave a detailed message on this number?  YES

## 2019-07-18 NOTE — TELEPHONE ENCOUNTER
Left patient VM to return call to schedule physical and depression check  Ibeth Quiles CMA on 7/18/2019 at 7:59 AM

## 2019-07-19 RX ORDER — CITALOPRAM HYDROBROMIDE 20 MG/1
20 TABLET ORAL DAILY
Qty: 30 TABLET | Refills: 0 | Status: SHIPPED | OUTPATIENT
Start: 2019-07-19 | End: 2019-07-26

## 2019-07-19 NOTE — PROGRESS NOTES
SUBJECTIVE:   Ailyn Trevino is a 83 year old female who presents for Preventive Visit.  Are you in the first 12 months of your Medicare coverage?  No    History of Present Illness        Mental Health Follow-up:  Patient presents to follow-up on Depression & Anxiety.Patient's depression since last visit has been:  Medium  The patient is not having other symptoms associated with depression.  Patient's anxiety since last visit has been:  Medium  The patient is not having other symptoms associated with anxiety.  Any significant life events: other (Patient states she is taking care of  with dementia )  Patient is not feeling anxious or having panic attacks.  Patient has no concerns about alcohol or drug use.     Social History  Tobacco Use    Smoking status: Former Smoker      Quit date: 1998      Years since quittin.5    Smokeless tobacco: Never Used  Alcohol use: Yes  Drug use: No      Today's PHQ-9         PHQ-9 Total Score:         PHQ-9 Q9 Thoughts of better off dead/self-harm past 2 weeks :       Thoughts of suicide or self harm:      Self-harm Plan:        Self-harm Action:          Safety concerns for self or others:           Hyperlipidemia:  She presents for follow up of hyperlipidemia.  She is not taking medication to lower cholesterol. She is not having myalgia or other side effects to statin medications.She is not reporting shortness of breath, increased sweating or nausea with activity, left-sided neck or arm pain, chest pain or pressure, or pain in calves when walking 1-2 blocks.    She eats 2-3 servings of fruits and vegetables daily.She consumes 0 sweetened beverage(s) daily.  [unfilled] is taking medications regularly.    She is not taking prescribed medications regularly due to None.  Healthy Habits:    In general, how would you rate your overall health?  Good    Frequency of exercise:  None    Duration of exercise:  Less than 15 minutes    Do you usually eat at least 4 servings of  "fruit and vegetables a day, include whole grains    & fiber and avoid regularly eating high fat or \"junk\" foods?  No    Taking medications regularly:  0    Barriers to taking medications:  None    Medication side effects:  None    Ability to successfully perform activities of daily living:  No assistance needed    Home Safety:  No safety concerns identified    Hearing Impairment:  Feel that people are mumbling or not speaking clearly    In the past 6 months, have you been bothered by leaking of urine?  No    In general, how would you rate your overall mental or emotional health?  Good      PHQ-2 Total Score:    Additional concerns today:  Yes (Lightheadedness, stiff neck for about 3 weeks )    Do you feel safe in your environment? Yes    Do you have a Health Care Directive? Yes: Advance Directive has been received and scanned.     She had some problems with light headedness.  She was off her citalopram and since she restarted she feels better but still present.  It isn't vertigo.  She can't find any triggers or what makes it better.  Her balance is off since her foot surgery.  Patient denies any exertional chest pain, dyspnea, palpitations, syncope, orthopnea, edema or paroxysmal nocturnal dyspnea. She has had a stiff neck and couldn't move it much.  It is better now too.  No fever/chills or night sweats. She has been using ibuprofen or aspirin for this.  Sometimes she will get a headache as well. She has had friends who have  and this is stressful for her.      The ASCVD Risk score (Lata KYLE Jr., et al., 2013) failed to calculate for the following reasons:    The 2013 ASCVD risk score is only valid for ages 40 to 79     Fall risk  Fallen 2 or more times in the past year?: No  Any fall with injury in the past year?: No    Cognitive Screening   1) Repeat 3 items (Leader, Season, Table)    2) Clock draw: NORMAL  3) 3 item recall: Recalls 3 objects  Results: 3 items recalled: COGNITIVE IMPAIRMENT LESS " LIKELY    Mini-CogTM Copyright [employer] [place]. Licensed by the author for use in [facility]; reprinted with permission (zara@.[email or web address]). All rights reserved.      Do you have sleep apnea, excessive snoring or daytime drowsiness?: no    Reviewed and updated as needed this visit by clinical staff  Tobacco  Allergies  Meds  Problems  Med Hx  Surg Hx  Fam Hx  Soc Hx          Reviewed and updated as needed this visit by Provider  Tobacco  Allergies  Meds  Problems  Med Hx  Surg Hx  Fam Hx        Social History     Tobacco Use     Smoking status: Former Smoker     Last attempt to quit: 1998     Years since quittin.5     Smokeless tobacco: Never Used   Substance Use Topics     Alcohol use: Yes     If you drink alcohol do you typically have >3 drinks per day or >7 drinks per week? No    No flowsheet data found.            Current providers sharing in care for this patient include:   Patient Care Team:  Maddi Church MD as PCP - General (Internal Medicine)  Bud Hurst MD as MD (Rheumatology)  Silvia Sarmiento, RN as   Miki Christopher MD as Assigned PCP    The following health maintenance items are reviewed in Epic and correct as of today:  Health Maintenance   Topic Date Due     ZOSTER IMMUNIZATION (2 of 3) 2009     MEDICARE ANNUAL WELLNESS VISIT  2015     FALL RISK ASSESSMENT  2019     INFLUENZA VACCINE (1) 2019     PHQ-9  2019     DTAP/TDAP/TD IMMUNIZATION (2 - Td) 2020     ADVANCE CARE PLANNING  2024     DEPRESSION ACTION PLAN  Completed     PNEUMOCOCCAL IMMUNIZATION 65+ LOW/MEDIUM RISK  Completed     IPV IMMUNIZATION  Aged Out     MENINGITIS IMMUNIZATION  Aged Out     DEXA  Discontinued     Labs reviewed in EPIC      Review of Systems   ROS: 10 point ROS neg other than the symptoms noted above in the HPI.     OBJECTIVE:   /54 (BP Location: Left arm, Cuff Size: Adult Regular)   Pulse 61   Temp 97.3  F (36.3  C)  "(Oral)   Resp 16   Ht 1.509 m (4' 11.41\")   Wt 68 kg (150 lb)   SpO2 94%   BMI 29.88 kg/m   Estimated body mass index is 29.88 kg/m  as calculated from the following:    Height as of this encounter: 1.509 m (4' 11.41\").    Weight as of this encounter: 68 kg (150 lb).  Physical Exam   Constitutional: She is oriented to person, place, and time. She appears well-developed and well-nourished. No distress.   HENT:   Right Ear: Tympanic membrane and external ear normal.   Left Ear: Tympanic membrane and external ear normal.   Nose: Nose normal.   Mouth/Throat: Oropharynx is clear and moist. No oropharyngeal exudate.   Eyes: Pupils are equal, round, and reactive to light. Conjunctivae are normal. Right eye exhibits no discharge. Left eye exhibits no discharge.   Neck: Neck supple. No tracheal deviation present.   Cardiovascular: Normal rate, regular rhythm, S1 normal, S2 normal, normal heart sounds and normal pulses. Exam reveals no S3, no S4 and no friction rub.   No murmur heard.  Pulmonary/Chest: Effort normal and breath sounds normal. No respiratory distress. She has no wheezes. She has no rales. Right breast exhibits no mass, no nipple discharge and no tenderness. Left breast exhibits no mass, no nipple discharge and no tenderness.   Abdominal: Soft. Bowel sounds are normal. She exhibits no mass. There is no hepatosplenomegaly. There is no tenderness.   Musculoskeletal: Normal range of motion. She exhibits deformity (bilateral flat feet). She exhibits no edema.   No pain to palpation over the c spine or paraspinous muscles bilateral   Lymphadenopathy:     She has no cervical adenopathy.   Neurological: She is alert and oriented to person, place, and time. She has normal strength and normal reflexes. She exhibits normal muscle tone.   Skin: Skin is warm and dry. No rash noted.   Psychiatric: She has a normal mood and affect. Judgment and thought content normal. Cognition and memory are normal.         Diagnostic " Test Results:  Labs reviewed in Epic  Results for orders placed or performed in visit on 07/23/19   CRP inflammation   Result Value Ref Range    CRP Inflammation 6.3 0.0 - 8.0 mg/L   Lipid panel reflex to direct LDL Fasting   Result Value Ref Range    Cholesterol 241 (H) <200 mg/dL    Triglycerides 157 (H) <150 mg/dL    HDL Cholesterol 50 >49 mg/dL    LDL Cholesterol Calculated 160 (H) <100 mg/dL    Non HDL Cholesterol 191 (H) <130 mg/dL   **Basic metabolic panel FUTURE anytime   Result Value Ref Range    Sodium 141 133 - 144 mmol/L    Potassium 4.1 3.4 - 5.3 mmol/L    Chloride 109 94 - 109 mmol/L    Carbon Dioxide 27 20 - 32 mmol/L    Anion Gap 5 3 - 14 mmol/L    Glucose 101 (H) 70 - 99 mg/dL    Urea Nitrogen 20 7 - 30 mg/dL    Creatinine 0.67 0.52 - 1.04 mg/dL    GFR Estimate 81 >60 mL/min/[1.73_m2]    GFR Estimate If Black >90 >60 mL/min/[1.73_m2]    Calcium 8.8 8.5 - 10.1 mg/dL   **ESR FUTURE anytime   Result Value Ref Range    Sed Rate 7 0 - 30 mm/h   Hepatic panel (Albumin, ALT, AST, Bili, Alk Phos, TP)   Result Value Ref Range    Bilirubin Direct 0.1 0.0 - 0.2 mg/dL    Bilirubin Total 0.5 0.2 - 1.3 mg/dL    Albumin 3.9 3.4 - 5.0 g/dL    Protein Total 7.2 6.8 - 8.8 g/dL    Alkaline Phosphatase 101 40 - 150 U/L    ALT 27 0 - 50 U/L    AST 21 0 - 45 U/L   **CBC with platelets FUTURE anytime   Result Value Ref Range    WBC 5.8 4.0 - 11.0 10e9/L    RBC Count 4.44 3.8 - 5.2 10e12/L    Hemoglobin 14.2 11.7 - 15.7 g/dL    Hematocrit 42.3 35.0 - 47.0 %    MCV 95 78 - 100 fl    MCH 32.0 26.5 - 33.0 pg    MCHC 33.6 31.5 - 36.5 g/dL    RDW 12.8 10.0 - 15.0 %    Platelet Count 257 150 - 450 10e9/L       ASSESSMENT / PLAN:   1. Routine general medical examination at a health care facility      2. Hyperlipidemia LDL goal <130  No need for statins.  Diet changes     3. Granulomatous lung disease (H)  Asymptomatic     4. Osteopenia, unspecified location  Patient doesn't want to do a dexa as she would refuse treatment anyway  "    5. Grief    - MENTAL HEALTH REFERRAL  - Adult; Outpatient Treatment; Individual/Couples/Family/Group Therapy/Health Psychology; [facility] [phone]; We will contact you to schedule the appointment or please call with any questions    6. 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  Has seen rheum and declined treatment after side effects     7. Major depressive disorder, single episode, moderate (H)    - citalopram (CELEXA) 20 MG tablet; Take 1 tablet (20 mg) by mouth daily  Dispense: 90 tablet; Refill: 3    Neck pain - appears to be muscular. Improving.  Per patient instructions.     End of Life Planning:  Patient currently has an advanced directive: Yes.  Practitioner is supportive of decision.    COUNSELING:  Reviewed preventive health counseling, as reflected in patient instructions    Estimated body mass index is 29.88 kg/m  as calculated from the following:    Height as of this encounter: 1.509 m (4' 11.41\").    Weight as of this encounter: 68 kg (150 lb).    Weight management plan: Discussed healthy diet and exercise guidelines     reports that she quit smoking about 21 years ago. She has never used smokeless tobacco.      Appropriate preventive services were discussed with this patient, including applicable screening as appropriate for cardiovascular disease, diabetes, osteopenia/osteoporosis, and glaucoma.  As appropriate for age/gender, discussed screening for colorectal cancer, prostate cancer, breast cancer, and cervical cancer. Checklist reviewing preventive services available has been given to the patient.    Reviewed patients plan of care and provided an AVS. The Basic Care Plan (routine screening as documented in Health Maintenance) for Ailyn meets the Care Plan requirement. This Care Plan has been established and reviewed with the Patient.    Counseling Resources:  ATP IV Guidelines  Pooled Cohorts Equation " Calculator  Breast Cancer Risk Calculator  FRAX Risk Assessment  ICSI Preventive Guidelines  Dietary Guidelines for Americans, 2010  Referly's MyPlate  ASA Prophylaxis  Lung CA Screening    Maddi Church MD  [facility]    Identified Health Risks:    Patient Instructions   Lidocaine patches for the neck.  If no improvement then consider seeing physical therapy.     Make an appointment with a therapist to discuss your grief.

## 2019-07-19 NOTE — TELEPHONE ENCOUNTER
"Routing refill request to provider for review/approval because:  Drug interaction warning        Requested Prescriptions   Pending Prescriptions Disp Refills     citalopram (CELEXA) 20 MG tablet 30 tablet 0     Sig: Take 1 tablet (20 mg) by mouth daily       SSRIs Protocol Passed - 7/19/2019  7:46 AM        Passed - PHQ-9 score less than 5 in past 6 months     Please review last PHQ-9 score.           Passed - Medication is active on med list        Passed - Patient is age 18 or older        Passed - No active pregnancy on record        Passed - No positive pregnancy test in last 12 months        Passed - Recent (6 mo) or future (30 days) visit within the authorizing provider's specialty     Patient had office visit in the last 6 months or has a visit in the next 30 days with authorizing provider or within the authorizing provider's specialty.  See \"Patient Info\" tab in inbasket, or \"Choose Columns\" in Meds & Orders section of the refill encounter.            Edna Duncan RN  "

## 2019-07-22 ENCOUNTER — TELEPHONE (OUTPATIENT)
Dept: FAMILY MEDICINE | Facility: CLINIC | Age: 84
End: 2019-07-22

## 2019-07-22 DIAGNOSIS — E78.5 HYPERLIPIDEMIA LDL GOAL <130: ICD-10-CM

## 2019-07-22 DIAGNOSIS — M85.80 OSTEOPENIA, UNSPECIFIED LOCATION: ICD-10-CM

## 2019-07-22 DIAGNOSIS — M06.4 INFLAMMATORY POLYARTHROPATHY (H): Primary | ICD-10-CM

## 2019-07-22 DIAGNOSIS — R73.01 IMPAIRED FASTING GLUCOSE: ICD-10-CM

## 2019-07-22 NOTE — TELEPHONE ENCOUNTER
Patient has scheduled a lab appt for tomorrow am, wants to make sure orders are in system as she has a physical on Friday. Would like a call back from care team.

## 2019-07-23 ENCOUNTER — ALLIED HEALTH/NURSE VISIT (OUTPATIENT)
Dept: FAMILY MEDICINE | Facility: CLINIC | Age: 84
End: 2019-07-23

## 2019-07-23 ENCOUNTER — ALLIED HEALTH/NURSE VISIT (OUTPATIENT)
Dept: NURSING | Facility: CLINIC | Age: 84
End: 2019-07-23
Payer: COMMERCIAL

## 2019-07-23 VITALS — HEART RATE: 68 BPM | SYSTOLIC BLOOD PRESSURE: 122 MMHG | DIASTOLIC BLOOD PRESSURE: 60 MMHG

## 2019-07-23 VITALS
TEMPERATURE: 98.1 F | OXYGEN SATURATION: 96 % | HEART RATE: 74 BPM | DIASTOLIC BLOOD PRESSURE: 64 MMHG | SYSTOLIC BLOOD PRESSURE: 129 MMHG

## 2019-07-23 DIAGNOSIS — Z01.30 BP CHECK: Primary | ICD-10-CM

## 2019-07-23 DIAGNOSIS — M06.4 INFLAMMATORY POLYARTHROPATHY (H): ICD-10-CM

## 2019-07-23 DIAGNOSIS — R73.01 IMPAIRED FASTING GLUCOSE: ICD-10-CM

## 2019-07-23 DIAGNOSIS — E78.5 HYPERLIPIDEMIA LDL GOAL <130: ICD-10-CM

## 2019-07-23 DIAGNOSIS — R42 DIZZINESS: ICD-10-CM

## 2019-07-23 DIAGNOSIS — M85.80 OSTEOPENIA, UNSPECIFIED LOCATION: ICD-10-CM

## 2019-07-23 DIAGNOSIS — R42 DIZZINESS: Primary | ICD-10-CM

## 2019-07-23 LAB
ALBUMIN SERPL-MCNC: 3.9 G/DL (ref 3.4–5)
ALP SERPL-CCNC: 101 U/L (ref 40–150)
ALT SERPL W P-5'-P-CCNC: 27 U/L (ref 0–50)
ANION GAP SERPL CALCULATED.3IONS-SCNC: 5 MMOL/L (ref 3–14)
AST SERPL W P-5'-P-CCNC: 21 U/L (ref 0–45)
BILIRUB DIRECT SERPL-MCNC: 0.1 MG/DL (ref 0–0.2)
BILIRUB SERPL-MCNC: 0.5 MG/DL (ref 0.2–1.3)
BUN SERPL-MCNC: 20 MG/DL (ref 7–30)
CALCIUM SERPL-MCNC: 8.8 MG/DL (ref 8.5–10.1)
CHLORIDE SERPL-SCNC: 109 MMOL/L (ref 94–109)
CHOLEST SERPL-MCNC: 241 MG/DL
CO2 SERPL-SCNC: 27 MMOL/L (ref 20–32)
CREAT SERPL-MCNC: 0.67 MG/DL (ref 0.52–1.04)
CRP SERPL-MCNC: 6.3 MG/L (ref 0–8)
ERYTHROCYTE [DISTWIDTH] IN BLOOD BY AUTOMATED COUNT: 12.8 % (ref 10–15)
ERYTHROCYTE [SEDIMENTATION RATE] IN BLOOD BY WESTERGREN METHOD: 7 MM/H (ref 0–30)
GFR SERPL CREATININE-BSD FRML MDRD: 81 ML/MIN/{1.73_M2}
GLUCOSE SERPL-MCNC: 101 MG/DL (ref 70–99)
HCT VFR BLD AUTO: 42.3 % (ref 35–47)
HDLC SERPL-MCNC: 50 MG/DL
HGB BLD-MCNC: 14.2 G/DL (ref 11.7–15.7)
LDLC SERPL CALC-MCNC: 160 MG/DL
MCH RBC QN AUTO: 32 PG (ref 26.5–33)
MCHC RBC AUTO-ENTMCNC: 33.6 G/DL (ref 31.5–36.5)
MCV RBC AUTO: 95 FL (ref 78–100)
NONHDLC SERPL-MCNC: 191 MG/DL
PLATELET # BLD AUTO: 257 10E9/L (ref 150–450)
POTASSIUM SERPL-SCNC: 4.1 MMOL/L (ref 3.4–5.3)
PROT SERPL-MCNC: 7.2 G/DL (ref 6.8–8.8)
RBC # BLD AUTO: 4.44 10E12/L (ref 3.8–5.2)
SODIUM SERPL-SCNC: 141 MMOL/L (ref 133–144)
TRIGL SERPL-MCNC: 157 MG/DL
WBC # BLD AUTO: 5.8 10E9/L (ref 4–11)

## 2019-07-23 PROCEDURE — 99207 ZZC NO CHARGE NURSE ONLY: CPT | Performed by: INTERNAL MEDICINE

## 2019-07-23 PROCEDURE — 80061 LIPID PANEL: CPT | Performed by: INTERNAL MEDICINE

## 2019-07-23 PROCEDURE — 80048 BASIC METABOLIC PNL TOTAL CA: CPT | Performed by: INTERNAL MEDICINE

## 2019-07-23 PROCEDURE — 36415 COLL VENOUS BLD VENIPUNCTURE: CPT | Performed by: INTERNAL MEDICINE

## 2019-07-23 PROCEDURE — 80076 HEPATIC FUNCTION PANEL: CPT | Performed by: INTERNAL MEDICINE

## 2019-07-23 PROCEDURE — 85027 COMPLETE CBC AUTOMATED: CPT | Performed by: INTERNAL MEDICINE

## 2019-07-23 PROCEDURE — 85652 RBC SED RATE AUTOMATED: CPT | Performed by: INTERNAL MEDICINE

## 2019-07-23 PROCEDURE — 86140 C-REACTIVE PROTEIN: CPT | Performed by: INTERNAL MEDICINE

## 2019-07-23 PROCEDURE — 99207 ZZC NO CHARGE NURSE ONLY: CPT

## 2019-07-23 NOTE — PROGRESS NOTES
Ailyn Trevino was evaluated at [facility] on July 23, 2019 at which time her blood pressure was:    BP Readings from Last 3 Encounters:   07/23/19 122/60   02/04/19 132/64   10/25/18 159/48     Pulse Readings from Last 3 Encounters:   07/23/19 68   02/04/19 78   10/24/18 69       Reviewed lifestyle modifications for blood pressure control and reduction: including making healthy food choices, managing weight, getting regular exercise, smoking cessation, reducing alcohol consumption, monitoring blood pressure regularly.     Symptoms: Dizziness, light- headedness for 2 days     BP Goal:< 140/90 mmHg    BP Assessment:  BP at goal    Potential Reasons for BP too high: NA - Not applicable    BP Follow-Up Plan: Referral to PCP    Recommendation to Provider: She had not taken her medication yet today but has been dizzy and light headed for two days.     Note completed by: Thank you,  Loren Pike, PharmD  [facility]  [phone]

## 2019-07-23 NOTE — PROGRESS NOTES
Ailyn Trevino is a 83 year old female who presents with dizziness and lightheadedness for the past few days. Has never really had this before. Reports it is worse in the morning and tries to get up slowly to prevent a lot of dizziness. Reports it is mostly mild and doesn't feel like she's going to fall. Patient has not been taking medication as prescribed. Patient did not take atenolol this morning and has not been taking Celexa as prescribed for a couple weeks. Reports she has been off Celexa for a couple of weeks because she thought the medication wouldn't be refilled until she is seen by Dr. Church. Rx was sent on 7/19/19 but patient was unaware so she has been off medication. Patient states she has had a stiff neck for the past couple of weeks and intermittent headaches and has been taking aspirin and trying exercises to reduce pain/stiffness.   Denies CP, SOB, weakness, changes in fluid intake, nausea/vomting, diarrhea, earaches, fatigue or other negative symptoms.    NURSING ASSESSMENT:  Description:  Dizziness/lightheadedness  Onset/duration:  A couple days ago  Precip. factors:  Unsure  Associated symptoms:  Headache, stiffneck   Improves/worsens symptoms:  Usually worse in the morning, getting up slowly helps.  Last exam/Treatment:  2/4/2019  Allergies: No Known Allergies    MEDICATIONS:   Taking medication(s) as prescribed? No   Taking over the counter medication(s?) Yes  Any medication side effects? No significant side effects    Any barriers to taking medication(s) as prescribed?  Yes   Medication(s) improving/managing symptoms?  N/A  Medication reconciliation completed: Yes    NURSING PLAN: Huddle with provider, plan includes restart Celexa, take Atenolol, come to scheduled appointment on Friday. Add CBC and Hepatic panel to labs that were just ordered today.    RECOMMENDED DISPOSITION: See above.  Will comply with recommendation: Yes  If further questions/concerns or if symptoms do not improve, worsen  or new symptoms develop, call your PCP or [facility] as soon as possible.    Guideline used:  Telephone Triage Protocols for Nurses, Fifth Edition, Erin Lara RN

## 2019-07-26 ENCOUNTER — OFFICE VISIT (OUTPATIENT)
Dept: INTERNAL MEDICINE | Facility: CLINIC | Age: 84
End: 2019-07-26
Payer: COMMERCIAL

## 2019-07-26 VITALS
HEART RATE: 61 BPM | DIASTOLIC BLOOD PRESSURE: 54 MMHG | SYSTOLIC BLOOD PRESSURE: 132 MMHG | BODY MASS INDEX: 30.24 KG/M2 | WEIGHT: 150 LBS | RESPIRATION RATE: 16 BRPM | HEIGHT: 59 IN | OXYGEN SATURATION: 94 % | TEMPERATURE: 97.3 F

## 2019-07-26 DIAGNOSIS — E78.5 HYPERLIPIDEMIA LDL GOAL <130: ICD-10-CM

## 2019-07-26 DIAGNOSIS — M54.2 NECK PAIN: ICD-10-CM

## 2019-07-26 DIAGNOSIS — F43.21 GRIEF: ICD-10-CM

## 2019-07-26 DIAGNOSIS — Z00.00 ROUTINE GENERAL MEDICAL EXAMINATION AT A HEALTH CARE FACILITY: Primary | ICD-10-CM

## 2019-07-26 DIAGNOSIS — M05.771 RHEUMATOID ARTHRITIS INVOLVING BOTH FEET WITH POSITIVE RHEUMATOID FACTOR (H): ICD-10-CM

## 2019-07-26 DIAGNOSIS — F32.1 MAJOR DEPRESSIVE DISORDER, SINGLE EPISODE, MODERATE (H): ICD-10-CM

## 2019-07-26 DIAGNOSIS — J84.10 GRANULOMATOUS LUNG DISEASE (H): ICD-10-CM

## 2019-07-26 DIAGNOSIS — M05.772 RHEUMATOID ARTHRITIS INVOLVING BOTH FEET WITH POSITIVE RHEUMATOID FACTOR (H): ICD-10-CM

## 2019-07-26 DIAGNOSIS — M85.80 OSTEOPENIA, UNSPECIFIED LOCATION: ICD-10-CM

## 2019-07-26 PROCEDURE — 99213 OFFICE O/P EST LOW 20 MIN: CPT | Mod: 25 | Performed by: INTERNAL MEDICINE

## 2019-07-26 PROCEDURE — G0439 PPPS, SUBSEQ VISIT: HCPCS | Performed by: INTERNAL MEDICINE

## 2019-07-26 PROCEDURE — 99207 C PAF COMPLETED  NO CHARGE: CPT | Performed by: INTERNAL MEDICINE

## 2019-07-26 RX ORDER — CITALOPRAM HYDROBROMIDE 20 MG/1
20 TABLET ORAL DAILY
Qty: 90 TABLET | Refills: 3 | Status: SHIPPED | OUTPATIENT
Start: 2019-07-26 | End: 2020-07-16

## 2019-07-26 ASSESSMENT — ACTIVITIES OF DAILY LIVING (ADL): CURRENT_FUNCTION: NO ASSISTANCE NEEDED

## 2019-07-26 ASSESSMENT — MIFFLIN-ST. JEOR: SCORE: 1047.52

## 2019-07-26 ASSESSMENT — ANXIETY QUESTIONNAIRES
IF YOU CHECKED OFF ANY PROBLEMS ON THIS QUESTIONNAIRE, HOW DIFFICULT HAVE THESE PROBLEMS MADE IT FOR YOU TO DO YOUR WORK, TAKE CARE OF THINGS AT HOME, OR GET ALONG WITH OTHER PEOPLE: NOT DIFFICULT AT ALL
3. WORRYING TOO MUCH ABOUT DIFFERENT THINGS: SEVERAL DAYS
2. NOT BEING ABLE TO STOP OR CONTROL WORRYING: NOT AT ALL
7. FEELING AFRAID AS IF SOMETHING AWFUL MIGHT HAPPEN: SEVERAL DAYS
6. BECOMING EASILY ANNOYED OR IRRITABLE: SEVERAL DAYS
1. FEELING NERVOUS, ANXIOUS, OR ON EDGE: SEVERAL DAYS
GAD7 TOTAL SCORE: 4
5. BEING SO RESTLESS THAT IT IS HARD TO SIT STILL: NOT AT ALL

## 2019-07-26 ASSESSMENT — PATIENT HEALTH QUESTIONNAIRE - PHQ9
SUM OF ALL RESPONSES TO PHQ QUESTIONS 1-9: 3
5. POOR APPETITE OR OVEREATING: NOT AT ALL

## 2019-07-26 ASSESSMENT — PAIN SCALES - GENERAL: PAINLEVEL: MODERATE PAIN (4)

## 2019-07-26 NOTE — PATIENT INSTRUCTIONS
Lidocaine patches for the neck.  If no improvement then consider seeing physical therapy.     Make an appointment with a therapist to discuss your grief.

## 2019-07-27 ASSESSMENT — ANXIETY QUESTIONNAIRES: GAD7 TOTAL SCORE: 4

## 2019-08-11 DIAGNOSIS — I10 BENIGN ESSENTIAL HYPERTENSION: ICD-10-CM

## 2019-08-12 RX ORDER — ATENOLOL 25 MG/1
TABLET ORAL
Qty: 90 TABLET | Refills: 2 | Status: SHIPPED | OUTPATIENT
Start: 2019-08-12 | End: 2020-03-10

## 2019-11-04 ENCOUNTER — TELEPHONE (OUTPATIENT)
Dept: FAMILY MEDICINE | Facility: CLINIC | Age: 84
End: 2019-11-04

## 2019-11-04 NOTE — TELEPHONE ENCOUNTER
Team Pink--  There are some openings available with Jessica this week. Can you see if the patient would like to take one of those openings, or are they wanting to see PCP only?

## 2019-11-04 NOTE — TELEPHONE ENCOUNTER
Reason for Call:  Other appointment    Detailed comments: Wants to be seen sometime this week asap, Sore neck for three weeks    Phone Number Patient can be reached at: Home number on file [phone] (home) and Mobile number on file [phone] (mobile)    Best Time: any    Can we leave a detailed message on this number? YES    Call taken on 11/4/2019 at 10:15 AM by Karan Quiles

## 2019-11-04 NOTE — TELEPHONE ENCOUNTER
Called patient and made appointment for this week Wednesday at 3:30pm.      Silvia DALAL CMA ([facility])

## 2019-11-06 ENCOUNTER — HEALTH MAINTENANCE LETTER (OUTPATIENT)
Age: 84
End: 2019-11-06

## 2019-11-06 ENCOUNTER — OFFICE VISIT (OUTPATIENT)
Dept: INTERNAL MEDICINE | Facility: CLINIC | Age: 84
End: 2019-11-06
Payer: COMMERCIAL

## 2019-11-06 ENCOUNTER — ANCILLARY PROCEDURE (OUTPATIENT)
Dept: GENERAL RADIOLOGY | Facility: CLINIC | Age: 84
End: 2019-11-06
Attending: INTERNAL MEDICINE
Payer: COMMERCIAL

## 2019-11-06 VITALS
TEMPERATURE: 98.5 F | HEART RATE: 88 BPM | RESPIRATION RATE: 16 BRPM | HEIGHT: 59 IN | DIASTOLIC BLOOD PRESSURE: 56 MMHG | BODY MASS INDEX: 29.72 KG/M2 | WEIGHT: 147.4 LBS | SYSTOLIC BLOOD PRESSURE: 156 MMHG | OXYGEN SATURATION: 97 %

## 2019-11-06 DIAGNOSIS — N89.8 VAGINAL ODOR: ICD-10-CM

## 2019-11-06 DIAGNOSIS — I15.8 OTHER SECONDARY HYPERTENSION: ICD-10-CM

## 2019-11-06 DIAGNOSIS — M54.2 NECK PAIN: Primary | ICD-10-CM

## 2019-11-06 LAB
SPECIMEN SOURCE: NORMAL
WET PREP SPEC: NORMAL

## 2019-11-06 PROCEDURE — 99214 OFFICE O/P EST MOD 30 MIN: CPT | Performed by: INTERNAL MEDICINE

## 2019-11-06 PROCEDURE — 87210 SMEAR WET MOUNT SALINE/INK: CPT | Performed by: INTERNAL MEDICINE

## 2019-11-06 PROCEDURE — 72040 X-RAY EXAM NECK SPINE 2-3 VW: CPT

## 2019-11-06 RX ORDER — TIZANIDINE 2 MG/1
1-2 TABLET ORAL 3 TIMES DAILY PRN
Qty: 30 TABLET | Refills: 3 | Status: SHIPPED | OUTPATIENT
Start: 2019-11-06 | End: 2020-02-10

## 2019-11-06 ASSESSMENT — PAIN SCALES - GENERAL: PAINLEVEL: EXTREME PAIN (9)

## 2019-11-06 ASSESSMENT — MIFFLIN-ST. JEOR: SCORE: 1030.72

## 2019-11-06 NOTE — PROGRESS NOTES
"Subjective     Ailyn Trevino is a 84 year old female who presents to clinic today for the following health issues:    HPI       For 1 month she has had a stiff neck and a headache on the very top.  She has problems turning her head as both sides of the neck hurt.  Goes down the sides of the neck but stops before the shoulder blades.  No injury.  She has tried different over the counter creams, exercise, and some leftover narcotics )2 at night).  No loss of arm strength.    No fever/chills or night sweats.       She complains of some vaginal odor.  No itching.  No intercourse          Reviewed and updated as needed this visit by Provider  Tobacco  Allergies  Meds  Problems  Med Hx  Surg Hx  Fam Hx         Review of Systems    ROS: 4 point ROS neg other than the symptoms noted above in the HPI.        Objective    BP (!) 156/56   Pulse 88   Temp 98.5  F (36.9  C) (Oral)   Resp 16   Ht 1.509 m (4' 11.41\")   Wt 66.9 kg (147 lb 6.4 oz)   SpO2 97%   BMI 29.36 kg/m    Body mass index is 29.36 kg/m .  Physical Exam   GENERAL APPEARANCE: healthy, alert and no distress  RESP: lungs clear to auscultation - no rales, rhonchi or wheezes  CV: regular rates and rhythm and normal S1 S2, no S3 or S4  PSYCH: mentation appears normal. and affect normal/bright  Mild pain to palpation of the lower cervical spine.  No paraspinous muscle pain.  Reduced lateral rotation of the next bilateral.  Normal strength in the bilateral upper extremity and 2+ dtr in the biceps bilateral     Diagnostic Test Results:  Labs reviewed in Epic  Results for orders placed or performed in visit on 11/06/19   Wet prep     Status: None   Result Value Ref Range    Specimen Description Vagina     Wet Prep No Trichomonas seen     Wet Prep No clue cells seen     Wet Prep No yeast seen     Wet Prep Moderate  WBC'S seen              Assessment & Plan     1. Neck pain  Likely musculoskeletal.  Low dose muscle relaxant.  No narcotics.    - tiZANidine " (ZANAFLEX) 2 MG tablet; Take 0.5-1 tablets (1-2 mg) by mouth 3 times daily as needed for muscle spasms  Dispense: 30 tablet; Refill: 3  - XR Cervical Spine 2/3 Views  - [facility] REFERRAL; Future    2. Vaginal odor  Unclear etiology   - Wet prep    3. Other secondary hypertension  Per patient instructions.          Patient Instructions   Acetaminophen (Tylenol) 1000 mg three times daily.  Zanaflex 1/2 to 1 tab three times daily as needed (muscle relaxant)  Use heat on the neck and head.  Schedule with physical therapy.  Have your blood pressure checked at the pharmacy.         Return in about 2 weeks (around 11/20/2019) for neck pain.    Maddi Church MD  [facility]

## 2019-11-06 NOTE — PATIENT INSTRUCTIONS
Acetaminophen (Tylenol) 1000 mg three times daily.  Zanaflex 1/2 to 1 tab three times daily as needed (muscle relaxant)  Use heat on the neck and head.  Schedule with physical therapy.  Have your blood pressure checked at the pharmacy.

## 2019-11-07 NOTE — RESULT ENCOUNTER NOTE
There is arthritis in the neck as we suspected.  The padding between the bones is narrowing as well.  No fractures or other concerns with the bones are seen.  Continue with physical therapy.      An incidental finding is plaque build up in the carotid arteries.  I would like a carotid ultrasound for further evaluation.    Dr. Church    Carotid ultrasound - indication carotid atherosclerosis

## 2019-11-08 ENCOUNTER — TELEPHONE (OUTPATIENT)
Dept: FAMILY MEDICINE | Facility: CLINIC | Age: 84
End: 2019-11-08

## 2019-11-08 DIAGNOSIS — I65.29 CAROTID ATHEROSCLEROSIS: Primary | ICD-10-CM

## 2019-11-08 NOTE — TELEPHONE ENCOUNTER
Reason for call:  Results   Name of test or procedure: Xray of neck and vaginal swab  Date of test or procedure: 11-6-19  Location of test or procedure: [facility]    Additional comments: please call with results    Phone number to reach patient:  Home number on file [phone] (home)    Best Time:  any    Can we leave a detailed message on this number?  No

## 2019-11-08 NOTE — TELEPHONE ENCOUNTER
Patient notified of Provider's message as written.  Patient verbalized understanding.    US order was already placed  Call the [facility] at [phone] to schedule at one of our locations    Keily Christopher RN      Normal vaginal swab.    There is arthritis in the neck as we suspected.  The padding between the bones is narrowing as well.  No fractures or other concerns with the bones are seen.  Continue with physical therapy.      An incidental finding is plaque build up in the carotid arteries.  I would like a carotid ultrasound for further evaluation.    Dr. Church    Carotid ultrasound - indication carotid atherosclerosis

## 2019-11-12 ENCOUNTER — THERAPY VISIT (OUTPATIENT)
Dept: PHYSICAL THERAPY | Facility: CLINIC | Age: 84
End: 2019-11-12
Attending: INTERNAL MEDICINE
Payer: COMMERCIAL

## 2019-11-12 DIAGNOSIS — M54.2 NECK PAIN: ICD-10-CM

## 2019-11-12 PROCEDURE — 97161 PT EVAL LOW COMPLEX 20 MIN: CPT | Mod: GP | Performed by: PHYSICAL THERAPIST

## 2019-11-12 PROCEDURE — 97110 THERAPEUTIC EXERCISES: CPT | Mod: GP | Performed by: PHYSICAL THERAPIST

## 2019-11-12 PROCEDURE — 97112 NEUROMUSCULAR REEDUCATION: CPT | Mod: GP | Performed by: PHYSICAL THERAPIST

## 2019-11-12 NOTE — PROGRESS NOTES
[facility] Initial Evaluation  Subjective:  The history is provided by the patient.   Type of problem:  Cervical spine   Condition occurred with:  Insidious onset. This is a new condition   Problem details: Pain began about 5 weeks ago.   Patient reports pain:  Cervical right side, cervical left side, central cervical spine and upper cervical spine. Radiates to:  Shoulder left, shoulder right and head. Associated symptoms:  Loss of motion/stiffness and headache. Symptoms are exacerbated by driving, sitting, lying down, looking up or down, rotating head and carrying and relieved by heat, rest and muscle relaxants.    Where condition occurred: for unknown reasons.  and reported as 8/10 on pain scale. General health as reported by patient is good. Pertinent medical history includes:  High blood pressure.    Surgeries include:  Orthopedic surgery (L TKA, left foot).  Current medications:  High blood pressure medication and muscle relaxants.     Pain is described as aching and sharp  Pain timing: no pattern. Since onset symptoms are gradually worsening.      Patient is retired.   Barriers include:  None as reported by patient.  Red flags:  None as reported by patient.                      Objective:  Standing Alignment:    Cervical/Thoracic:  Forward head  Shoulder/UE:  Protracted scapula L, protracted scapula R and rounded shoulders                  Flexibility/Screens:         Spine:  Decreased left spine flexibility:  Upper Trap and Levator    Decreased right spine flexibility:  Upper Trap and Levator                  Cervical/Thoracic Evaluation    AROM:  AROM Cervical:    Flexion:            WNL  Extension:       WNL  Rotation:         Left: decreased 50%     Right: decreased 25%  Side Bend:      Left: decreased 25%     Right:  Decreased 25%      Headaches: cervical  Cervical Myotomes:  normal                        Cervical Palpation:    Tenderness present at Left:    Rhomboids; Upper Trap;  Levator; Erector Spinae and Suboccipitals  Tenderness present at Right:    Rhomboids; Upper Trap; Levator; Erector Spinae and Suboccipitals               Shoulder Evaluation:  ROM:  AROM:  normal                                                                             General     ROS    Assessment/Plan:    Patient is a 84 year old female with cervical complaints.    Patient has the following significant findings with corresponding treatment plan.                Diagnosis 1:  Neck pain  Pain -  hot/cold therapy, manual therapy, self management, education, directional preference exercise and home program  Decreased ROM/flexibility - manual therapy, therapeutic exercise and home program  Decreased strength - therapeutic exercise, therapeutic activities and home program    Therapy Evaluation Codes:   1) History comprised of:   Personal factors that impact the plan of care:      None.    Comorbidity factors that impact the plan of care are:      None.     Medications impacting care: None.  2) Examination of Body Systems comprised of:   Body structures and functions that impact the plan of care:      Cervical spine, Shoulder and Thoracic Spine.   Activity limitations that impact the plan of care are:      Cooking, Driving, Dressing, Sitting, Stairs, Standing, Walking and Sleeping.  3) Clinical presentation characteristics are:   Stable/Uncomplicated.  4) Decision-Making    Low complexity using standardized patient assessment instrument and/or measureable assessment of functional outcome.  Cumulative Therapy Evaluation is: Low complexity.    Previous and current functional limitations:  (See Goal Flow Sheet for this information)    Short term and Long term goals: (See Goal Flow Sheet for this information)     Communication ability:  Patient appears to be able to clearly communicate and understand verbal and written communication and follow directions correctly.  Treatment Explanation - The following has been discussed  with the patient:   RX ordered/plan of care  Anticipated outcomes  Possible risks and side effects  This patient would benefit from PT intervention to resume normal activities.   Rehab potential is good.    Frequency:  1 X week, once daily  Duration:  for 8 weeks  Discharge Plan:  Achieve all LTG.  Independent in home treatment program.  Reach maximal therapeutic benefit.    Please refer to the daily flowsheet for treatment today, total treatment time and time spent performing 1:1 timed codes.

## 2019-11-15 ENCOUNTER — ALLIED HEALTH/NURSE VISIT (OUTPATIENT)
Dept: FAMILY MEDICINE | Facility: CLINIC | Age: 84
End: 2019-11-15

## 2019-11-15 VITALS — DIASTOLIC BLOOD PRESSURE: 58 MMHG | SYSTOLIC BLOOD PRESSURE: 148 MMHG

## 2019-11-15 DIAGNOSIS — Z01.30 BP CHECK: Primary | ICD-10-CM

## 2019-11-15 PROCEDURE — 99207 ZZC NO CHARGE NURSE ONLY: CPT | Performed by: INTERNAL MEDICINE

## 2019-11-15 NOTE — PROGRESS NOTES
Ailyn Trevino was evaluated at [facility] on November 15, 2019 at which time her blood pressure was:    BP Readings from Last 3 Encounters:   11/15/19 (!) 148/58   11/06/19 (!) 156/56   07/26/19 132/54     Pulse Readings from Last 3 Encounters:   11/06/19 88   07/26/19 61   07/23/19 74       Reviewed lifestyle modifications for blood pressure control and reduction: including making healthy food choices, managing weight, getting regular exercise, smoking cessation, reducing alcohol consumption, monitoring blood pressure regularly.     Symptoms: None    BP Goal:< 140/90 mmHg    BP Assessment:  BP at goal    Potential Reasons for BP too high: NA - Not applicable    BP Follow-Up Plan: Referral to PCP    Recommendation to Provider: Patient has had severe neck pain and start physical therapy this week.  Patient has follow up appt with Dr. Church next Wednesday.  Would recommend increasing BP meds if pain is not controlled and monitored closely.     Note completed by: Thank you,  Loren Pike, PharmD  [facility]  [phone]

## 2019-11-20 ENCOUNTER — OFFICE VISIT (OUTPATIENT)
Dept: INTERNAL MEDICINE | Facility: CLINIC | Age: 84
End: 2019-11-20
Payer: COMMERCIAL

## 2019-11-20 VITALS
HEART RATE: 60 BPM | WEIGHT: 146.6 LBS | BODY MASS INDEX: 29.56 KG/M2 | HEIGHT: 59 IN | TEMPERATURE: 96.9 F | RESPIRATION RATE: 20 BRPM | OXYGEN SATURATION: 98 % | SYSTOLIC BLOOD PRESSURE: 102 MMHG | DIASTOLIC BLOOD PRESSURE: 62 MMHG

## 2019-11-20 DIAGNOSIS — M54.2 NECK PAIN: Primary | ICD-10-CM

## 2019-11-20 DIAGNOSIS — I10 ESSENTIAL HYPERTENSION: ICD-10-CM

## 2019-11-20 PROCEDURE — 99213 OFFICE O/P EST LOW 20 MIN: CPT | Performed by: INTERNAL MEDICINE

## 2019-11-20 ASSESSMENT — PAIN SCALES - GENERAL: PAINLEVEL: SEVERE PAIN (7)

## 2019-11-20 ASSESSMENT — MIFFLIN-ST. JEOR: SCORE: 1027.09

## 2019-11-20 NOTE — PATIENT INSTRUCTIONS
Consider acupuncture.  I agree with massage.    Reduce your stress.  Keep the appointment for your carotid ultrasound.    No change in blood pressure medication.

## 2019-11-20 NOTE — PROGRESS NOTES
"Subjective     Ailyn Trevino is a 84 year old female who presents to clinic today for the following health issues:    Patient is here today for neck pain she was seen on 11/6/2019 for this issue she states it has gotten a little better but not where she would like it to be. She still rated her pain at a 7 out of 10 today.      Patient has neck pain.  Has done one session of physical therapy.  Was prescribed muscle relaxant.  She has had an xray with arthritic changes and incidental carotid plaque.  Ultrasound ordered.     Blood pressure has been elevated with the pain.  Has been on lisinopril in the past and stopped in 2017 after blood pressure dropped when anemic post surgery.  She is on atenolol    No side effects with the muscle relaxant.  She has a bilateral neck pain that goes up the head.    Has stress at home as well.      She is having some jaw pain and saw a dentist for this that thought she has tmj.     She has a massage as well.          Reviewed and updated as needed this visit by Provider  Tobacco  Allergies  Meds  Problems  Med Hx  Surg Hx  Fam Hx       Review of Systems    ROS: 4 point ROS neg other than the symptoms noted above in the HPI.        Objective    /62   Pulse 60   Temp 96.9  F (36.1  C) (Oral)   Resp 20   Ht 1.509 m (4' 11.41\")   Wt 66.5 kg (146 lb 9.6 oz)   SpO2 98%   BMI 29.20 kg/m    Body mass index is 29.2 kg/m .  Physical Exam   GENERAL APPEARANCE: healthy, alert and no distress  RESP: lungs clear to auscultation - no rales, rhonchi or wheezes  CV: regular rates and rhythm and normal S1 S2, no S3 or S4  PSYCH: mentation appears normal. and affect normal/bright  Mild pain to palpation along the c spine but definitely at the occiput of the scalp and paraspinous muscles of the c spine     Diagnostic Test Results:  Labs reviewed in Epic        Assessment & Plan     1. Neck pain  Per patient instructions.   - [facility]; Future    2. " Essential hypertension  Per patient instructions. Blood pressure is fine at home.        BMI:         Patient Instructions   Consider acupuncture.  I agree with massage.    Reduce your stress.  Keep the appointment for your carotid ultrasound.    No change in blood pressure medication.      Return in about 3 months (around 2/20/2020) for neck pain.    Maddi Church MD  [facility]

## 2019-11-22 ENCOUNTER — THERAPY VISIT (OUTPATIENT)
Dept: PHYSICAL THERAPY | Facility: CLINIC | Age: 84
End: 2019-11-22
Payer: COMMERCIAL

## 2019-11-22 DIAGNOSIS — M54.2 NECK PAIN: ICD-10-CM

## 2019-11-22 PROCEDURE — 97110 THERAPEUTIC EXERCISES: CPT | Mod: GP | Performed by: PHYSICAL THERAPIST

## 2019-11-22 PROCEDURE — 97140 MANUAL THERAPY 1/> REGIONS: CPT | Mod: GP | Performed by: PHYSICAL THERAPIST

## 2019-11-22 PROCEDURE — 97112 NEUROMUSCULAR REEDUCATION: CPT | Mod: GP | Performed by: PHYSICAL THERAPIST

## 2019-11-25 ENCOUNTER — ANCILLARY PROCEDURE (OUTPATIENT)
Dept: ULTRASOUND IMAGING | Facility: CLINIC | Age: 84
End: 2019-11-25
Attending: INTERNAL MEDICINE
Payer: COMMERCIAL

## 2019-11-25 DIAGNOSIS — I65.29 CAROTID ATHEROSCLEROSIS: ICD-10-CM

## 2019-11-25 PROCEDURE — 93880 EXTRACRANIAL BILAT STUDY: CPT

## 2019-11-29 ENCOUNTER — THERAPY VISIT (OUTPATIENT)
Dept: PHYSICAL THERAPY | Facility: CLINIC | Age: 84
End: 2019-11-29
Payer: COMMERCIAL

## 2019-11-29 DIAGNOSIS — M54.2 NECK PAIN: ICD-10-CM

## 2019-11-29 PROCEDURE — 97112 NEUROMUSCULAR REEDUCATION: CPT | Mod: GP | Performed by: PHYSICAL THERAPIST

## 2019-11-29 PROCEDURE — 97110 THERAPEUTIC EXERCISES: CPT | Mod: GP | Performed by: PHYSICAL THERAPIST

## 2019-11-29 PROCEDURE — 97140 MANUAL THERAPY 1/> REGIONS: CPT | Mod: GP | Performed by: PHYSICAL THERAPIST

## 2019-12-05 ENCOUNTER — THERAPY VISIT (OUTPATIENT)
Dept: PHYSICAL THERAPY | Facility: CLINIC | Age: 84
End: 2019-12-05
Payer: COMMERCIAL

## 2019-12-05 DIAGNOSIS — M54.2 NECK PAIN: ICD-10-CM

## 2019-12-05 PROCEDURE — 97140 MANUAL THERAPY 1/> REGIONS: CPT | Mod: GP | Performed by: PHYSICAL THERAPIST

## 2019-12-05 PROCEDURE — 97112 NEUROMUSCULAR REEDUCATION: CPT | Mod: GP | Performed by: PHYSICAL THERAPIST

## 2019-12-05 PROCEDURE — 97110 THERAPEUTIC EXERCISES: CPT | Mod: GP | Performed by: PHYSICAL THERAPIST

## 2019-12-12 ENCOUNTER — THERAPY VISIT (OUTPATIENT)
Dept: PHYSICAL THERAPY | Facility: CLINIC | Age: 84
End: 2019-12-12
Payer: COMMERCIAL

## 2019-12-12 DIAGNOSIS — M54.2 NECK PAIN: ICD-10-CM

## 2019-12-12 PROCEDURE — 97110 THERAPEUTIC EXERCISES: CPT | Mod: GP | Performed by: PHYSICAL THERAPIST

## 2019-12-12 PROCEDURE — 97112 NEUROMUSCULAR REEDUCATION: CPT | Mod: GP | Performed by: PHYSICAL THERAPIST

## 2019-12-12 PROCEDURE — 97140 MANUAL THERAPY 1/> REGIONS: CPT | Mod: GP | Performed by: PHYSICAL THERAPIST

## 2020-01-10 ENCOUNTER — ANCILLARY PROCEDURE (OUTPATIENT)
Dept: CT IMAGING | Facility: CLINIC | Age: 85
End: 2020-01-10
Attending: NURSE PRACTITIONER
Payer: COMMERCIAL

## 2020-01-10 PROCEDURE — 71250 CT THORAX DX C-: CPT | Mod: TC

## 2020-01-13 ENCOUNTER — TELEPHONE (OUTPATIENT)
Dept: FAMILY MEDICINE | Facility: CLINIC | Age: 85
End: 2020-01-13

## 2020-01-13 NOTE — TELEPHONE ENCOUNTER
Patient notified of providers message as written.  Patient verbalized understanding, no further questions or concerns.    Christina Matthew RN

## 2020-01-13 NOTE — TELEPHONE ENCOUNTER
Left message for patient to call RN hotline [phone].   See TE.     Edna Lara RN  ------    Notes recorded by Leydi Cedillo APRN CNP on 1/13/2020 at 7:44 AM CST  Please call if patient does not view results.  Your CT scan findings are stable.      ROSANNA Navarro RN

## 2020-02-10 ENCOUNTER — OFFICE VISIT (OUTPATIENT)
Dept: FAMILY MEDICINE | Facility: CLINIC | Age: 85
End: 2020-02-10
Payer: COMMERCIAL

## 2020-02-10 VITALS
RESPIRATION RATE: 14 BRPM | HEIGHT: 59 IN | WEIGHT: 146 LBS | HEART RATE: 68 BPM | OXYGEN SATURATION: 94 % | BODY MASS INDEX: 29.43 KG/M2 | TEMPERATURE: 98.6 F | DIASTOLIC BLOOD PRESSURE: 68 MMHG | SYSTOLIC BLOOD PRESSURE: 136 MMHG

## 2020-02-10 DIAGNOSIS — M54.2 CERVICALGIA: Primary | ICD-10-CM

## 2020-02-10 DIAGNOSIS — R51.9 ACUTE NONINTRACTABLE HEADACHE, UNSPECIFIED HEADACHE TYPE: ICD-10-CM

## 2020-02-10 DIAGNOSIS — I10 ESSENTIAL HYPERTENSION: ICD-10-CM

## 2020-02-10 PROCEDURE — 99214 OFFICE O/P EST MOD 30 MIN: CPT | Performed by: NURSE PRACTITIONER

## 2020-02-10 RX ORDER — TIZANIDINE 2 MG/1
1-2 TABLET ORAL 3 TIMES DAILY PRN
Qty: 30 TABLET | Refills: 3 | Status: SHIPPED | OUTPATIENT
Start: 2020-02-10 | End: 2020-06-17

## 2020-02-10 ASSESSMENT — PATIENT HEALTH QUESTIONNAIRE - PHQ9: SUM OF ALL RESPONSES TO PHQ QUESTIONS 1-9: 3

## 2020-02-10 ASSESSMENT — MIFFLIN-ST. JEOR: SCORE: 1024.23

## 2020-02-10 NOTE — PROGRESS NOTES
Subjective     Ailyn Trevino is a 84 year old female who presents to clinic today for the following health issues:    History of Present Illness        Hypertension: She presents for follow up of hypertension.  She does check blood pressure  regularly outside of the clinic. Outside blood pressures have been over 140/90. She does not follow a low salt diet.     She eats 0-1 servings of fruits and vegetables daily.She consumes 1 sweetened beverage(s) daily.She exercises with enough effort to increase her heart rate 9 or less minutes per day.  She exercises with enough effort to increase her heart rate 3 or less days per week.   She is taking medications regularly.     Patient notes increased headaches.  She has recent neck pain and headaches will start from neck and radiate up.  She takes aspirin and tizanidine for pain.  She notes that her neck crackles all the time.  She also notes two recent eye hemorrhages and wonders if it is related.  She has tried physical therapy without improvement.  She denies dizziness, vision changes, weakness.  She notes that her brother had brain CA and she is concerned that this is what is causing her headache.          Patient Active Problem List   Diagnosis     Advanced directives, counseling/discussion     Osteopenia     Palpitations     Hyperlipidemia LDL goal <130     Renal cyst     Granulomatous lung disease (H)     Health Care Home     Insomnia     Osteoarthritis of knee     Aortic valve disorder     Mitral valve disorder     Impaired fasting glucose     PMR (polymyalgia rheumatica) (H)     Encounter for long-term current use of medication     Other chronic pain     Major depressive disorder, single episode, mild (H)     Current chronic use of systemic steroids     Fatigue, unspecified type     Chronic pain of left knee     Venous stasis dermatitis of left lower extremity     Fecal urgency     Elevated hemidiaphragm     Inflammatory arthritis     Pulmonary nodules      Rheumatoid arthritis, adult (H)     Neck pain     Essential hypertension     Past Surgical History:   Procedure Laterality Date     APPENDECTOMY       CATARACT IOL, RT/LT      bilateral      CHOLECYSTECTOMY       ORTHOPEDIC SURGERY      Left Knee Surgery 2017     RECONSTRUCT FOREFOOT WITH METATARSOPHALANGEAL (MTP) FUSION Left 10/24/2018    Procedure: LEFT FIRST METATARSOPHALANGEAL JOINT ARTHRODESIS;  Surgeon: Rufus Harden MD;  Location: [facility] OR     REPAIR HAMMER TOE Left 10/24/2018    Procedure: LESSER TOE RECONSTRUCTION SECOND, THIRD, FOURTH AND FIFTH TOES;  Surgeon: Rufus Harden MD;  Location: [facility] OR     SURGICAL HISTORY OF -       bilateral YAG laser surgery of eyes       Social History     Tobacco Use     Smoking status: Former Smoker     Last attempt to quit: 1998     Years since quittin.1     Smokeless tobacco: Never Used   Substance Use Topics     Alcohol use: Yes     Family History   Problem Relation Age of Onset     Cancer Mother      Cerebrovascular Disease Father          Current Outpatient Medications   Medication Sig Dispense Refill     Acetaminophen (TYLENOL PO) Take 650 mg by mouth 2 times daily as needed for mild pain or fever       aspirin - buffered (ASCRIPTIN) 325 MG TABS tablet Take 1 tablet (325 mg) by mouth daily 60 each 0     atenolol (TENORMIN) 25 MG tablet TAKE 1 TABLET BY MOUTH EVERY DAY 90 tablet 2     citalopram (CELEXA) 20 MG tablet Take 1 tablet (20 mg) by mouth daily 90 tablet 3     Multiple Vitamins-Minerals (CENTRUM SILVER ADULT 50+ PO) Take 1 tablet by mouth daily Reported on 2017       tiZANidine (ZANAFLEX) 2 MG tablet Take 0.5-1 tablets (1-2 mg) by mouth 3 times daily as needed for muscle spasms 30 tablet 3     No Known Allergies  BP Readings from Last 3 Encounters:   02/10/20 136/68   19 102/62   11/15/19 (!) 148/58    Wt Readings from Last 3 Encounters:   02/10/20 66.2 kg (146 lb)   19 66.5 kg (146 lb 9.6 oz)   19 66.9  "kg (147 lb 6.4 oz)                      Reviewed and updated as needed this visit by Provider  Tobacco  Allergies  Meds  Problems  Med Hx  Surg Hx  Fam Hx         Review of Systems   ROS COMP: Constitutional, HEENT, cardiovascular, pulmonary, gi and gu systems are negative, except as otherwise noted.      Objective    /68   Pulse 68   Temp 98.6  F (37  C) (Oral)   Resp 14   Ht 1.509 m (4' 11.4\")   Wt 66.2 kg (146 lb)   SpO2 94%   BMI 29.09 kg/m    Body mass index is 29.09 kg/m .  Physical Exam   GENERAL: healthy, alert and no distress  EYES: Eyes grossly normal to inspection, PERRL and conjunctivae and sclerae normal  RESP: lungs clear to auscultation - no rales, rhonchi or wheezes  CV: regular rate and rhythm, normal S1 S2, no S3 or S4, no murmur, click or rub, no peripheral edema and peripheral pulses strong  MS: Tenderness to palpation of left paracervical muscles, full range of motion of neck with pain present.  NEURO: Normal strength and tone, sensory exam grossly normal, proprioception normal, mentation intact, speech normal, cranial nerves 2-12 intact, Romberg normal and rapid alternating movements normal    Diagnostic Test Results:  none         Assessment & Plan     1. Cervicalgia  Consider injection pending MRI results.  - MR Cervical Spine w/o Contrast; Future  - tiZANidine (ZANAFLEX) 2 MG tablet; Take 0.5-1 tablets (1-2 mg) by mouth 3 times daily as needed for muscle spasms  Dispense: 30 tablet; Refill: 3    2. Acute nonintractable headache, unspecified headache type  Likely a tension type headache, but will rule out any additional pathology.  - MR Brain w/o Contrast; Future    3. Essential hypertension  Stable.  Continue current treatment plan and medications.        BMI:   Estimated body mass index is 29.09 kg/m  as calculated from the following:    Height as of this encounter: 1.509 m (4' 11.4\").    Weight as of this encounter: 66.2 kg (146 lb).               Return in about 5 " months (around 7/24/2020) for Physical Exam, with PCP..    BORA Howell CNP  [facility]

## 2020-02-13 ENCOUNTER — ANCILLARY PROCEDURE (OUTPATIENT)
Dept: MRI IMAGING | Facility: CLINIC | Age: 85
End: 2020-02-13
Attending: NURSE PRACTITIONER
Payer: COMMERCIAL

## 2020-02-13 DIAGNOSIS — R51.9 ACUTE NONINTRACTABLE HEADACHE, UNSPECIFIED HEADACHE TYPE: ICD-10-CM

## 2020-02-13 DIAGNOSIS — M54.2 CERVICALGIA: ICD-10-CM

## 2020-02-13 PROCEDURE — 70551 MRI BRAIN STEM W/O DYE: CPT | Mod: TC

## 2020-02-13 PROCEDURE — 72141 MRI NECK SPINE W/O DYE: CPT | Mod: TC

## 2020-02-14 DIAGNOSIS — M54.2 CERVICALGIA: Primary | ICD-10-CM

## 2020-02-14 NOTE — RESULT ENCOUNTER NOTE
Dear Ailyn,    Your recent test results are attached.      No acute findings on brain MRI.  There is some chronic small vessel disease noted.  This is an age related change.    If you have any questions please feel free to contact [phone] or myself via MyChart.    Sincerely,  Opal Chamorro, CNP

## 2020-03-08 DIAGNOSIS — I10 BENIGN ESSENTIAL HYPERTENSION: ICD-10-CM

## 2020-03-10 RX ORDER — ATENOLOL 25 MG/1
TABLET ORAL
Qty: 90 TABLET | Refills: 2 | Status: SHIPPED | OUTPATIENT
Start: 2020-03-10 | End: 2021-02-22

## 2020-03-17 PROBLEM — M54.2 NECK PAIN: Status: RESOLVED | Noted: 2019-11-12 | Resolved: 2020-03-17

## 2020-03-17 NOTE — PROGRESS NOTES
Subjective:  HPI  Physical Exam                    Objective:  System    Physical Exam    General     ROS    Assessment/Plan:    DISCHARGE REPORT    Progress reporting period is from 11/12/19 to 12/12/19.     SUBJECTIVE  Subjective: Overall feels a little bit of improvement. Exercises going well.    Current Pain level: 2/10   Initial Pain level: 8/10   Changes in function: Yes, see goal flow sheet for change in function   Adverse reactions: None;   ,     Patient has failed to return to therapy so current objective findings are unknown.  The subjective and objective information are from the last SOAP note on this patient.    OBJECTIVE  Objective: Turns body vs head when turning to talk in clinic.       ASSESSMENT/PLAN  Diagnosis 1:  Neck pain  Pain -  hot/cold therapy, manual therapy, self management, education, directional preference exercise and home program  Decreased ROM/flexibility - manual therapy, therapeutic exercise and home program  Decreased strength - therapeutic exercise, therapeutic activities and home program  STG/LTGs have been met or progress has been made towards goals:  Yes (See Goal flow sheet completed today.)  Assessment of Progress: The patient has not returned to therapy. Current status is unknown.  Self Management Plans:  Patient has been instructed in a home treatment program.  Patient  has been instructed in self management of symptoms.  Ailyn continues to require the following intervention to meet STG and LTG's: PT intervention is no longer required to meet STG/LTG.  The patient failed to complete scheduled/ordered appointments so current information is unknown.  We will discharge this patient from PT.    Recommendations:  This patient is ready to be discharged from therapy and continue their home treatment program.    Please refer to the daily flowsheet for treatment today, total treatment time and time spent performing 1:1 timed codes.

## 2020-04-02 ENCOUNTER — TRANSFERRED RECORDS (OUTPATIENT)
Dept: HEALTH INFORMATION MANAGEMENT | Facility: CLINIC | Age: 85
End: 2020-04-02

## 2020-05-26 ENCOUNTER — TELEPHONE (OUTPATIENT)
Dept: PALLIATIVE MEDICINE | Facility: CLINIC | Age: 85
End: 2020-05-26

## 2020-05-26 NOTE — TELEPHONE ENCOUNTER
LVM to schedule Evaluation for procedure.    Fatou MICHEL    [facility]

## 2020-06-15 DIAGNOSIS — M54.2 CERVICALGIA: ICD-10-CM

## 2020-06-16 NOTE — TELEPHONE ENCOUNTER
Routing refill request to provider for review/approval because:  Drug not on the [facility] refill protocol   Yumiko Castellano RN

## 2020-06-17 RX ORDER — TIZANIDINE 2 MG/1
1-2 TABLET ORAL 3 TIMES DAILY PRN
Qty: 30 TABLET | Refills: 3 | Status: SHIPPED | OUTPATIENT
Start: 2020-06-17 | End: 2020-10-19

## 2020-07-16 ENCOUNTER — TELEPHONE (OUTPATIENT)
Dept: INTERNAL MEDICINE | Facility: CLINIC | Age: 85
End: 2020-07-16

## 2020-07-16 DIAGNOSIS — F32.1 MAJOR DEPRESSIVE DISORDER, SINGLE EPISODE, MODERATE (H): ICD-10-CM

## 2020-07-16 RX ORDER — CITALOPRAM HYDROBROMIDE 20 MG/1
TABLET ORAL
Qty: 90 TABLET | Refills: 0 | Status: SHIPPED | OUTPATIENT
Start: 2020-07-16 | End: 2020-09-13

## 2020-07-16 ASSESSMENT — PATIENT HEALTH QUESTIONNAIRE - PHQ9: SUM OF ALL RESPONSES TO PHQ QUESTIONS 1-9: 0

## 2020-07-16 NOTE — TELEPHONE ENCOUNTER
Medication is being filled for 1 time refill only due to:  Due for PHQ9- please complete   Will also be due for annual wellness around 7/26/2020- please offer to schedule (medicare wellness can be video visits or OV)     Keily Bellamy RN

## 2020-07-16 NOTE — TELEPHONE ENCOUNTER
Spoke to patient and PHQ9 completed. Annual exam scheduled for October.  Brandi RUBIO CMA ([facility])

## 2020-08-18 DIAGNOSIS — F32.1 MAJOR DEPRESSIVE DISORDER, SINGLE EPISODE, MODERATE (H): ICD-10-CM

## 2020-08-18 RX ORDER — CITALOPRAM HYDROBROMIDE 20 MG/1
TABLET ORAL
Qty: 90 TABLET | Refills: 0 | OUTPATIENT
Start: 2020-08-18

## 2020-08-18 NOTE — TELEPHONE ENCOUNTER
citalopram (CELEXA) 20 MG tablet  90 tablet  0  7/16/2020   No    Sig: TAKE 1 TABLET BY MOUTH EVERY DAY    Sent to pharmacy as: Citalopram Hydrobromide 20 MG Oral Tablet (celeXA)    Class: E-Prescribe    Order: [id number]    E-Prescribing Status: Receipt confirmed by pharmacy (7/16/2020  8:09 AM CDT)      Edna Lara RN

## 2020-09-13 DIAGNOSIS — F32.1 MAJOR DEPRESSIVE DISORDER, SINGLE EPISODE, MODERATE (H): ICD-10-CM

## 2020-09-13 RX ORDER — CITALOPRAM HYDROBROMIDE 20 MG/1
TABLET ORAL
Qty: 90 TABLET | Refills: 0 | Status: SHIPPED | OUTPATIENT
Start: 2020-09-13 | End: 2020-10-19

## 2020-10-19 ENCOUNTER — OFFICE VISIT (OUTPATIENT)
Dept: INTERNAL MEDICINE | Facility: CLINIC | Age: 85
End: 2020-10-19
Payer: COMMERCIAL

## 2020-10-19 VITALS
HEIGHT: 58 IN | TEMPERATURE: 98.5 F | DIASTOLIC BLOOD PRESSURE: 68 MMHG | WEIGHT: 142 LBS | HEART RATE: 64 BPM | SYSTOLIC BLOOD PRESSURE: 114 MMHG | OXYGEN SATURATION: 96 % | RESPIRATION RATE: 19 BRPM | BODY MASS INDEX: 29.81 KG/M2

## 2020-10-19 DIAGNOSIS — E78.5 HYPERLIPIDEMIA LDL GOAL <130: ICD-10-CM

## 2020-10-19 DIAGNOSIS — I10 BENIGN ESSENTIAL HYPERTENSION: ICD-10-CM

## 2020-10-19 DIAGNOSIS — M54.2 CERVICALGIA: ICD-10-CM

## 2020-10-19 DIAGNOSIS — Z00.00 MEDICARE ANNUAL WELLNESS VISIT, SUBSEQUENT: Primary | ICD-10-CM

## 2020-10-19 DIAGNOSIS — J84.10 GRANULOMATOUS LUNG DISEASE (H): ICD-10-CM

## 2020-10-19 DIAGNOSIS — Z51.81 ENCOUNTER FOR THERAPEUTIC DRUG MONITORING: ICD-10-CM

## 2020-10-19 DIAGNOSIS — F32.1 MAJOR DEPRESSIVE DISORDER, SINGLE EPISODE, MODERATE (H): ICD-10-CM

## 2020-10-19 PROBLEM — M06.9 RHEUMATOID ARTHRITIS, ADULT (H): Status: RESOLVED | Noted: 2018-10-24 | Resolved: 2020-10-19

## 2020-10-19 LAB
ALT SERPL W P-5'-P-CCNC: 27 U/L (ref 0–50)
ANION GAP SERPL CALCULATED.3IONS-SCNC: 9 MMOL/L (ref 3–14)
BUN SERPL-MCNC: 21 MG/DL (ref 7–30)
CALCIUM SERPL-MCNC: 9 MG/DL (ref 8.5–10.1)
CHLORIDE SERPL-SCNC: 106 MMOL/L (ref 94–109)
CO2 SERPL-SCNC: 24 MMOL/L (ref 20–32)
CREAT SERPL-MCNC: 0.74 MG/DL (ref 0.52–1.04)
GFR SERPL CREATININE-BSD FRML MDRD: 74 ML/MIN/{1.73_M2}
GLUCOSE SERPL-MCNC: 90 MG/DL (ref 70–99)
LDLC SERPL DIRECT ASSAY-MCNC: 158 MG/DL
POTASSIUM SERPL-SCNC: 4 MMOL/L (ref 3.4–5.3)
SODIUM SERPL-SCNC: 139 MMOL/L (ref 133–144)

## 2020-10-19 PROCEDURE — 80048 BASIC METABOLIC PNL TOTAL CA: CPT | Performed by: INTERNAL MEDICINE

## 2020-10-19 PROCEDURE — 99397 PER PM REEVAL EST PAT 65+ YR: CPT | Performed by: INTERNAL MEDICINE

## 2020-10-19 PROCEDURE — 36415 COLL VENOUS BLD VENIPUNCTURE: CPT | Performed by: INTERNAL MEDICINE

## 2020-10-19 PROCEDURE — 84460 ALANINE AMINO (ALT) (SGPT): CPT | Performed by: INTERNAL MEDICINE

## 2020-10-19 PROCEDURE — 99214 OFFICE O/P EST MOD 30 MIN: CPT | Mod: 25 | Performed by: INTERNAL MEDICINE

## 2020-10-19 PROCEDURE — 83721 ASSAY OF BLOOD LIPOPROTEIN: CPT | Performed by: INTERNAL MEDICINE

## 2020-10-19 RX ORDER — ESCITALOPRAM OXALATE 10 MG/1
10 TABLET ORAL DAILY
Qty: 30 TABLET | Refills: 3 | Status: SHIPPED | OUTPATIENT
Start: 2020-10-19 | End: 2020-11-09

## 2020-10-19 RX ORDER — CITALOPRAM HYDROBROMIDE 20 MG/1
TABLET ORAL
Qty: 90 TABLET | Refills: 0 | OUTPATIENT
Start: 2020-10-19

## 2020-10-19 RX ORDER — TIZANIDINE 2 MG/1
1 TABLET ORAL
Qty: 30 TABLET | Refills: 3 | Status: SHIPPED | OUTPATIENT
Start: 2020-10-19 | End: 2021-02-09

## 2020-10-19 ASSESSMENT — MIFFLIN-ST. JEOR: SCORE: 983.11

## 2020-10-19 ASSESSMENT — PAIN SCALES - GENERAL: PAINLEVEL: NO PAIN (0)

## 2020-10-19 ASSESSMENT — PATIENT HEALTH QUESTIONNAIRE - PHQ9: SUM OF ALL RESPONSES TO PHQ QUESTIONS 1-9: 4

## 2020-10-19 ASSESSMENT — ACTIVITIES OF DAILY LIVING (ADL): CURRENT_FUNCTION: NO ASSISTANCE NEEDED

## 2020-10-19 NOTE — PATIENT INSTRUCTIONS
Tetanus shot at the pharmacy.  Shingles shot next year at the pharmacy.  Stop Celexa.   Start Lexapro 10 mg daily.  Take 1/2 tablet of Tizanidine at bedtime.

## 2020-10-19 NOTE — PROGRESS NOTES
"SUBJECTIVE:   Ailyn Trevino is a 85 year old female who presents for Preventive Visit.    Patient has been advised of split billing requirements and indicates understanding: Yes   Are you in the first 12 months of your Medicare coverage?  No    Healthy Habits:    In general, how would you rate your overall health?  Good    Frequency of exercise:  None    Duration of exercise:  Less than 15 minutes    Do you usually eat at least 4 servings of fruit and vegetables a day, include whole grains    & fiber and avoid regularly eating high fat or \"junk\" foods?  Yes    Taking medications regularly:  Yes    Barriers to taking medications:  None    Medication side effects:  None    Ability to successfully perform activities of daily living:  No assistance needed    Home Safety:  No safety concerns identified    Hearing Impairment:  No hearing concerns (Has hearing aids)    In the past 6 months, have you been bothered by leaking of urine?  No    In general, how would you rate your overall mental or emotional health?  Poor      PHQ-2 Total Score:    Additional concerns today:  Yes    Do you feel safe in your environment? Yes    Have you ever done Advance Care Planning? (For example, a Health Directive, POLST, or a discussion with a medical provider or your loved ones about your wishes): No, advance care planning information given to patient to review.  Advanced care planning was discussed at today's visit.    Fall risk  Fallen 2 or more times in the past year?: No  Any fall with injury in the past year?: No    Cognitive Screening   1) Repeat 3 items (Leader, Season, Table)    2) Clock draw: NORMAL  3) 3 item recall: Recalls 3 objects   Results: 3 items recalled: COGNITIVE IMPAIRMENT LESS LIKELY    Mini-CogTM Copyright TIFAFNI Dale. Licensed by the author for use in [facility]; reprinted with permission (zara@.[facility]). All rights reserved.      Do you have sleep apnea, excessive snoring or daytime drowsiness?: " no    Reviewed and updated as needed this visit by clinical staff  Tobacco  Allergies  Meds  Problems  Med Hx  Surg Hx  Fam Hx  Soc Hx          Reviewed and updated as needed this visit by Provider  Tobacco  Allergies  Meds  Problems  Med Hx  Surg Hx  Fam Hx         Social History     Tobacco Use     Smoking status: Former Smoker     Quit date: 1998     Years since quittin.8     Smokeless tobacco: Never Used   Substance Use Topics     Alcohol use: Yes   If you drink alcohol do you typically have >3 drinks per day or >7 drinks per week? No  No flowsheet data found.    Hyperlipidemia Follow-Up    Are you regularly taking any medication or supplement to lower your cholesterol?   No    Are you having muscle aches or other side effects that you think could be caused by your cholesterol lowering medication?  No    Hypertension Follow-up    Do you check your blood pressure regularly outside of the clinic? No     Are you following a low salt diet? No    Are your blood pressures ever more than 140 on the top number (systolic) OR more   than 90 on the bottom number (diastolic), for example 140/90? No    Depression Followup    How are you doing with your depression since your last visit? Worsened -  is now in memory care     Are you having other symptoms that might be associated with depression? Yes:   see phq9    Have you had a significant life event?  OTHER: Husbands health      Are you feeling anxious or having panic attacks?   Yes:       Do you have any concerns with your use of alcohol or other drugs? No  Social History     Tobacco Use     Smoking status: Former Smoker     Quit date: 1998     Years since quittin.8     Smokeless tobacco: Never Used   Substance Use Topics     Alcohol use: Yes     Drug use: No     PHQ 2019 2/10/2020 2020   PHQ-9 Total Score 3 3 0   Q9: Thoughts of better off dead/self-harm past 2 weeks Not at all Not at all Not at all     ELISEO-7 SCORE 2017  "4/23/2018 7/26/2019   Total Score 2 1 4     Last PHQ-9 7/16/2020   1.  Little interest or pleasure in doing things 0   2.  Feeling down, depressed, or hopeless 0   3.  Trouble falling or staying asleep, or sleeping too much 0   4.  Feeling tired or having little energy 0   5.  Poor appetite or overeating 0   6.  Feeling bad about yourself 0   7.  Trouble concentrating 0   8.  Moving slowly or restless 0   Q9: Thoughts of better off dead/self-harm past 2 weeks 0   PHQ-9 Total Score 0   Difficulty at work, home, or with people Not difficult at all         Suicide Assessment Five-step Evaluation and Treatment (SAFE-T)      Current providers sharing in care for this patient include:   Patient Care Team:  Maddi Church MD as PCP - General (Internal Medicine)  Bud Hurst MD as MD (Rheumatology)  Silvia Sarmiento RN as   Maddi Church MD as Assigned PCP    The following health maintenance items are reviewed in Epic and correct as of today:  Health Maintenance   Topic Date Due     ZOSTER IMMUNIZATION (2 of 3) 07/30/2009     DTAP/TDAP/TD IMMUNIZATION (2 - Td) 07/21/2020     FALL RISK ASSESSMENT  07/26/2020     PHQ-9  01/16/2021     MEDICARE ANNUAL WELLNESS VISIT  10/19/2021     ADVANCE CARE PLANNING  07/26/2024     DEPRESSION ACTION PLAN  Completed     INFLUENZA VACCINE  Completed     Pneumococcal Vaccine: 65+ Years  Completed     Pneumococcal Vaccine: Pediatrics (0 to 5 Years) and At-Risk Patients (6 to 64 Years)  Aged Out     IPV IMMUNIZATION  Aged Out     MENINGITIS IMMUNIZATION  Aged Out     HEPATITIS B IMMUNIZATION  Aged Out     DEXA  Discontinued     Lab work is in process      Review of Systems   ROS: 10 point ROS neg other than the symptoms noted above in the HPI.     OBJECTIVE:   /68 (BP Location: Left arm, Cuff Size: Adult Regular)   Pulse 64   Temp 98.5  F (36.9  C) (Oral)   Resp 19   Ht 1.48 m (4' 10.27\")   Wt 64.4 kg (142 lb)   SpO2 96%   BMI 29.41 kg/m   " "Estimated body mass index is 29.41 kg/m  as calculated from the following:    Height as of this encounter: 1.48 m (4' 10.27\").    Weight as of this encounter: 64.4 kg (142 lb).  Physical Exam  GENERAL APPEARANCE: healthy, alert and no distress  EYES: Eyes grossly normal to inspection, PERRL and conjunctivae and sclerae normal  HENT: ear canals and TM's normal and nose and mouth without ulcers or lesions  NECK: no adenopathy, no asymmetry, masses, or scars and thyroid normal to palpation  RESP: lungs clear to auscultation - no rales, rhonchi or wheezes  BREAST: normal without masses, tenderness or nipple discharge and no palpable axillary masses or adenopathy  CV: regular rates and rhythm and normal S1 S2, no S3 or S4  LYMPHATICS: normal ant/post cervical and supraclavicular nodes  ABDOMEN: soft, nontender, without hepatosplenomegaly or masses and bowel sounds normal  MS: extremities normal- no gross deformities noted  SKIN: no suspicious lesions or rashes  NEURO: Normal strength and tone, mentation intact and speech normal  PSYCH: mentation appears normal and affect normal/bright but moments of tearfulness  No edema   1+ posterior tibial pulses bilateral          Diagnostic Test Results:  Labs reviewed in Epic    ASSESSMENT / PLAN:   1. Medicare annual wellness visit, subsequent      2. Granulomatous lung disease (H)  Lung exam normal and is asymptomatic     3. Major depressive disorder, single episode, moderate (H)  Poor control due to situational problems   - escitalopram (LEXAPRO) 10 MG tablet; Take 1 tablet (10 mg) by mouth daily  Dispense: 30 tablet; Refill: 3    4. Benign essential hypertension  Well controlled with medications without side effects.   - Basic metabolic panel    5. Encounter for therapeutic drug monitoring    - ALT    6. Hyperlipidemia LDL goal <130    - LDL cholesterol direct    Patient has been advised of split billing requirements and indicates understanding: Yes  COUNSELING:  Reviewed " "preventive health counseling, as reflected in patient instructions    Estimated body mass index is 29.41 kg/m  as calculated from the following:    Height as of this encounter: 1.48 m (4' 10.27\").    Weight as of this encounter: 64.4 kg (142 lb).        She reports that she quit smoking about 22 years ago. She has never used smokeless tobacco.      Appropriate preventive services were discussed with this patient, including applicable screening as appropriate for cardiovascular disease, diabetes, osteopenia/osteoporosis, and glaucoma.  As appropriate for age/gender, discussed screening for colorectal cancer, prostate cancer, breast cancer, and cervical cancer. Checklist reviewing preventive services available has been given to the patient.    Reviewed patients plan of care and provided an AVS. The Basic Care Plan (routine screening as documented in Health Maintenance) for Ailyn meets the Care Plan requirement. This Care Plan has been established and reviewed with the Patient.    Counseling Resources:  ATP IV Guidelines  Pooled Cohorts Equation Calculator  Breast Cancer Risk Calculator  Breast Cancer: Medication to Reduce Risk  FRAX Risk Assessment  ICSI Preventive Guidelines  Dietary Guidelines for Americans, 2010  USDA's MyPlate  ASA Prophylaxis  Lung CA Screening    Maddi Church MD  [facility]    Identified Health Risks:    Patient Instructions   Tetanus shot at the pharmacy.  Shingles shot next year at the pharmacy.  Stop Celexa.   Start Lexapro 10 mg daily.  Take 1/2 tablet of Tizanidine at bedtime.   "

## 2020-10-20 NOTE — RESULT ENCOUNTER NOTE
Normal electrolytes. Normal kidney function. Normal liver blood test. Cholesterol is elevated and cholesterol medication is recommended.  Rosuvastatin 5 mg daily would be a good medication with a low side effect profile. Would you like to try that?  Thanks,  Dr. Church

## 2020-11-06 NOTE — PROGRESS NOTES
"Ailyn Trevino is a 85 year old female who is being evaluated via a billable telephone visit.    The patient has been notified of following:   \"This telephone visit will be conducted via a call between you and your physician/provider. We have found that certain health care needs can be provided without the need for a physical exam. This service lets us provide the care you need with a short phone conversation. If a prescription is necessary we can send it directly to your pharmacy. If lab work is needed we can place an order for that and you can then stop by our lab to have the test done at a later time.  Telephone visits are billed at different rates depending on your insurance coverage. During this emergency period, for some insurers they may be billed the same as an in-person visit.  Please reach out to your insurance provider with any questions.  If during the course of the call the physician/provider feels a telephone visit is not appropriate, you will not be charged for this service.\"    Patient has given verbal consent for Telephone visit?  Yes    What phone number would you like to be contacted at? [phone]    How would you like to obtain your AVS? MyChart  Start 12:24 PM    Subjective   Ailyn Trevino is a 85 year old female who presents via phone visit today for the following health issues:  HPI     Medication Followup of LEXAPRO    Taking Medication as prescribed: no    Side Effects:  None    Medication Helping Symptoms:  None   She never did take the lexapro as she was worried about the side effects.  shei     Tizanidine works well for her at night.     Labs with elevated LDL.               Review of Systems    ROS: 4 point ROS neg other than the symptoms noted above in the HPI.         Objective          Vitals:  No vitals were obtained today due to virtual visit.    healthy, alert and no distress  PSYCH: Alert and oriented times 3; coherent speech, normal   rate and volume, able to articulate " logical thoughts, able   to abstract reason, no tangential thoughts, no hallucinations   or delusions  Her affect is normal  RESP: No cough, no audible wheezing, able to talk in full sentences  Remainder of exam unable to be completed due to telephone visits            Assessment/Plan:    Assessment & Plan     Major depressive disorder, single episode, moderate (H)  Discussed with patient that it is ok that she didn't take the lexapro but that it is very likely she would do well without side effects since she is already on this class of medication.    - citalopram (CELEXA) 20 MG tablet; Take 1 tablet (20 mg) by mouth daily    Hyperlipidemia LDL goal <130  No need for statin at this time.  Discussed with patient dietary changes.           There are no Patient Instructions on file for this visit.     Return in about 6 months (around 5/9/2021) for Medication check.    Maddi Church MD  [facility]  Stop 12:32 PM   Phone call duration:  8 minutes

## 2020-11-09 ENCOUNTER — VIRTUAL VISIT (OUTPATIENT)
Dept: INTERNAL MEDICINE | Facility: CLINIC | Age: 85
End: 2020-11-09
Payer: COMMERCIAL

## 2020-11-09 DIAGNOSIS — E78.5 HYPERLIPIDEMIA LDL GOAL <130: ICD-10-CM

## 2020-11-09 DIAGNOSIS — F32.1 MAJOR DEPRESSIVE DISORDER, SINGLE EPISODE, MODERATE (H): Primary | ICD-10-CM

## 2020-11-09 PROCEDURE — 99213 OFFICE O/P EST LOW 20 MIN: CPT | Mod: 95 | Performed by: INTERNAL MEDICINE

## 2020-11-09 RX ORDER — CITALOPRAM HYDROBROMIDE 20 MG/1
20 TABLET ORAL DAILY
Start: 2020-11-09 | End: 2021-02-22

## 2020-11-25 ENCOUNTER — VIRTUAL VISIT (OUTPATIENT)
Dept: URGENT CARE | Facility: CLINIC | Age: 85
End: 2020-11-25
Payer: COMMERCIAL

## 2020-11-25 ENCOUNTER — VIRTUAL VISIT (OUTPATIENT)
Dept: URGENT CARE | Facility: CLINIC | Age: 85
End: 2020-11-25

## 2020-11-25 ENCOUNTER — TELEPHONE (OUTPATIENT)
Dept: INTERNAL MEDICINE | Facility: CLINIC | Age: 85
End: 2020-11-25

## 2020-11-25 DIAGNOSIS — Z20.822 EXPOSURE TO COVID-19 VIRUS: Primary | ICD-10-CM

## 2020-11-25 DIAGNOSIS — Z53.9 NO SHOW: Primary | ICD-10-CM

## 2020-11-25 PROCEDURE — 99213 OFFICE O/P EST LOW 20 MIN: CPT | Mod: 95 | Performed by: EMERGENCY MEDICINE

## 2020-11-25 NOTE — TELEPHONE ENCOUNTER
Patient called nurse line and states that she thinks that she had a recent exposure to covid-19. Her son was over last Friday and has since found out that he tested positive. She does not have any symptoms currently. Notified Ailyn of recommendations from the CDC to consider getting testing done and to quarantine (see CDC website for full recommendations). She would like to do a visit with provider. Scheduled for virtual telephone visit, kathryn Solorio.

## 2020-11-26 NOTE — PROGRESS NOTES
HPI: Patient is an 85-year-old female who was exposed to her son-in-law last Friday who tested positive for Covid.  He was working in the garage without a mask and she did interact with him out there in that garage.  She has no cough, fever, shortness of breath or any other Covid symptoms.      ROS: See HPI otherwise normal.    No Known Allergies   Current Outpatient Medications   Medication Sig Dispense Refill     Acetaminophen (TYLENOL PO) Take 650 mg by mouth 2 times daily as needed for mild pain or fever       aspirin - buffered (ASCRIPTIN) 325 MG TABS tablet Take 1 tablet (325 mg) by mouth daily 60 each 0     atenolol (TENORMIN) 25 MG tablet TAKE 1 TABLET BY MOUTH EVERY DAY 90 tablet 2     citalopram (CELEXA) 20 MG tablet Take 1 tablet (20 mg) by mouth daily       Multiple Vitamins-Minerals (CENTRUM SILVER ADULT 50+ PO) Take 1 tablet by mouth daily Reported on 4/20/2017       tiZANidine (ZANAFLEX) 2 MG tablet Take 0.5 tablets (1 mg) by mouth nightly as needed for muscle spasms 30 tablet 3         PE: No acute distress.  Comfortable and nondyspneic sounding on the phone.        TREATMENT: None      ASSESSMENT: Covid exposure.  Asymptomatic.      DIAGNOSIS: Covid exposure.      PLAN: Covid PCR ordered.  Testing team to follow.  Follow-up with PCP as needed.      Time: 10 minutes

## 2020-11-27 DIAGNOSIS — Z20.822 EXPOSURE TO COVID-19 VIRUS: ICD-10-CM

## 2020-11-27 PROCEDURE — U0003 INFECTIOUS AGENT DETECTION BY NUCLEIC ACID (DNA OR RNA); SEVERE ACUTE RESPIRATORY SYNDROME CORONAVIRUS 2 (SARS-COV-2) (CORONAVIRUS DISEASE [COVID-19]), AMPLIFIED PROBE TECHNIQUE, MAKING USE OF HIGH THROUGHPUT TECHNOLOGIES AS DESCRIBED BY CMS-2020-01-R: HCPCS | Performed by: EMERGENCY MEDICINE

## 2020-11-28 LAB
SARS-COV-2 RNA SPEC QL NAA+PROBE: NOT DETECTED
SPECIMEN SOURCE: NORMAL

## 2020-11-30 ENCOUNTER — TELEPHONE (OUTPATIENT)
Dept: INTERNAL MEDICINE | Facility: CLINIC | Age: 85
End: 2020-11-30

## 2020-11-30 NOTE — TELEPHONE ENCOUNTER
Pt called RN hotline requesting results from covid testing done last week in our pharmacy. Advised that results were negative. Pt had no further questions or concerns.    Brandi Weeks RN  [facility]

## 2021-01-20 NOTE — TELEPHONE ENCOUNTER
I'm not sure what is going on here.  It isn't on her med list anymore.  Can you clarify with her?  Dr. Church     Doctor's Office

## 2021-02-09 DIAGNOSIS — F32.1 MAJOR DEPRESSIVE DISORDER, SINGLE EPISODE, MODERATE (H): ICD-10-CM

## 2021-02-09 DIAGNOSIS — M54.2 CERVICALGIA: ICD-10-CM

## 2021-02-09 RX ORDER — TIZANIDINE 2 MG/1
1 TABLET ORAL
Qty: 90 TABLET | Refills: 1 | Status: SHIPPED | OUTPATIENT
Start: 2021-02-09 | End: 2021-06-04

## 2021-02-09 RX ORDER — ESCITALOPRAM OXALATE 10 MG/1
TABLET ORAL
Qty: 90 TABLET | Refills: 1 | Status: SHIPPED | OUTPATIENT
Start: 2021-02-09 | End: 2021-05-16

## 2021-02-11 ENCOUNTER — IMMUNIZATION (OUTPATIENT)
Dept: NURSING | Facility: CLINIC | Age: 86
End: 2021-02-11
Payer: COMMERCIAL

## 2021-02-11 PROCEDURE — 91300 PR COVID VAC PFIZER DIL RECON 30 MCG/0.3 ML IM: CPT

## 2021-02-11 PROCEDURE — 0001A PR COVID VAC PFIZER DIL RECON 30 MCG/0.3 ML IM: CPT

## 2021-02-12 ENCOUNTER — DOCUMENTATION ONLY (OUTPATIENT)
Dept: LAB | Facility: CLINIC | Age: 86
End: 2021-02-12

## 2021-02-17 NOTE — PROGRESS NOTES
She had all her bloodwork in the fall and didn't start the crestor so I don't think she needs any checked now. Please notify.

## 2021-02-22 ENCOUNTER — OFFICE VISIT (OUTPATIENT)
Dept: INTERNAL MEDICINE | Facility: CLINIC | Age: 86
End: 2021-02-22
Payer: COMMERCIAL

## 2021-02-22 VITALS
WEIGHT: 148 LBS | OXYGEN SATURATION: 96 % | DIASTOLIC BLOOD PRESSURE: 64 MMHG | SYSTOLIC BLOOD PRESSURE: 128 MMHG | BODY MASS INDEX: 30.65 KG/M2 | TEMPERATURE: 98.9 F | HEART RATE: 69 BPM

## 2021-02-22 DIAGNOSIS — I10 BENIGN ESSENTIAL HYPERTENSION: ICD-10-CM

## 2021-02-22 DIAGNOSIS — M19.90 INFLAMMATORY ARTHRITIS: ICD-10-CM

## 2021-02-22 DIAGNOSIS — Z51.81 ENCOUNTER FOR THERAPEUTIC DRUG MONITORING: ICD-10-CM

## 2021-02-22 DIAGNOSIS — R60.0 PERIPHERAL EDEMA: ICD-10-CM

## 2021-02-22 DIAGNOSIS — M54.2 CERVICALGIA: Primary | ICD-10-CM

## 2021-02-22 DIAGNOSIS — F32.1 MAJOR DEPRESSIVE DISORDER, SINGLE EPISODE, MODERATE (H): ICD-10-CM

## 2021-02-22 LAB
ERYTHROCYTE [DISTWIDTH] IN BLOOD BY AUTOMATED COUNT: 12.3 % (ref 10–15)
ERYTHROCYTE [SEDIMENTATION RATE] IN BLOOD BY WESTERGREN METHOD: 7 MM/H (ref 0–30)
HCT VFR BLD AUTO: 38.7 % (ref 35–47)
HGB BLD-MCNC: 13.1 G/DL (ref 11.7–15.7)
MCH RBC QN AUTO: 32 PG (ref 26.5–33)
MCHC RBC AUTO-ENTMCNC: 33.9 G/DL (ref 31.5–36.5)
MCV RBC AUTO: 95 FL (ref 78–100)
PLATELET # BLD AUTO: 228 10E9/L (ref 150–450)
RBC # BLD AUTO: 4.09 10E12/L (ref 3.8–5.2)
WBC # BLD AUTO: 6.9 10E9/L (ref 4–11)

## 2021-02-22 PROCEDURE — 85027 COMPLETE CBC AUTOMATED: CPT | Performed by: INTERNAL MEDICINE

## 2021-02-22 PROCEDURE — 99214 OFFICE O/P EST MOD 30 MIN: CPT | Performed by: INTERNAL MEDICINE

## 2021-02-22 PROCEDURE — 85652 RBC SED RATE AUTOMATED: CPT | Performed by: INTERNAL MEDICINE

## 2021-02-22 PROCEDURE — 80076 HEPATIC FUNCTION PANEL: CPT | Performed by: INTERNAL MEDICINE

## 2021-02-22 PROCEDURE — 82565 ASSAY OF CREATININE: CPT | Performed by: INTERNAL MEDICINE

## 2021-02-22 PROCEDURE — 84443 ASSAY THYROID STIM HORMONE: CPT | Performed by: INTERNAL MEDICINE

## 2021-02-22 PROCEDURE — 86140 C-REACTIVE PROTEIN: CPT | Performed by: INTERNAL MEDICINE

## 2021-02-22 PROCEDURE — 36415 COLL VENOUS BLD VENIPUNCTURE: CPT | Performed by: INTERNAL MEDICINE

## 2021-02-22 RX ORDER — ATENOLOL 25 MG/1
25 TABLET ORAL DAILY
Qty: 90 TABLET | Refills: 3 | Status: SHIPPED | OUTPATIENT
Start: 2021-02-22 | End: 2022-02-01

## 2021-02-22 NOTE — PROGRESS NOTES
Assessment & Plan     Major depressive disorder, single episode, moderate (H)  Well controlled with medications without side effects.   - TSH with free T4 reflex    Benign essential hypertension  Well controlled with medications without side effects.   - atenolol (TENORMIN) 25 MG tablet; Take 1 tablet (25 mg) by mouth daily  - TSH with free T4 reflex    Cervicalgia  Patient is interested in an injection,  No radicular pain   - PAIN MANAGEMENT REFERRAL; Future    Encounter for therapeutic drug monitoring    - Creatinine  - Hepatic panel  - CRP inflammation  - Erythrocyte sedimentation rate auto  - CBC with platelets    Inflammatory arthritis  Is off methotrexate for unclear reason in 2018.  Suspect it was removed for surgery and then never restarted?  Didn't follow up with rheumatology in 2018 as well.  Will restart methotrexate and have her follow up with Dr. Marquis if labs are normal   - Creatinine  - Hepatic panel  - CRP inflammation  - Erythrocyte sedimentation rate auto  - CBC with platelets  - Rheumatology Referral; Future  - TSH with free T4 reflex    Peripheral edema  Per patient instructions.   - TSH with free T4 reflex      32 minutes spent on the date of the encounter doing chart review, history and exam, documentation and further activities as noted above       Patient Instructions   Ok to see the pain clinic for injection March 18th or later.  If your labs are fine then I can restart the methotrexate and you can follow up with Dr. Marquis.  Ok to wear compression socks.  Lower the amount of sodium in your diet.       Return in about 3 months (around 5/22/2021) for Routine Visit with Dr. Cazares.    Maddi Church MD  [facility] COS[facility] Robertson is a 85 year old who presents for the following health issues     HPI     She has bilateral wrist and finger.    She continues to have neck pain.  zanaflex helps her at night. No radiating pain.  Crunching in the neck.      Hypertension Follow-up      Do you check your blood pressure regularly outside of the clinic? Yes     Are you following a low salt diet? Yes    Are your blood pressures ever more than 140 on the top number (systolic) OR more   than 90 on the bottom number (diastolic), for example 140/90? No      How many servings of fruits and vegetables do you eat daily?  2-3    On average, how many sweetened beverages do you drink each day (Examples: soda, juice, sweet tea, etc.  Do NOT count diet or artificially sweetened beverages)?   0    How many days per week do you exercise enough to make your heart beat faster? 3 or less    How many minutes a day do you exercise enough to make your heart beat faster? 9 or less    How many days per week do you miss taking your medication? 0        Review of Systems    ROS: 10 point ROS neg other than the symptoms noted above in the HPI.       Objective    /64   Pulse 69   Temp 98.9  F (37.2  C) (Oral)   Wt 67.1 kg (148 lb)   SpO2 96%   BMI 30.65 kg/m    Body mass index is 30.65 kg/m .  Physical Exam   GENERAL APPEARANCE: healthy, alert and no distress  NECK: no adenopathy, no asymmetry, masses, or scars and thyroid normal to palpation  RESP: lungs clear to auscultation - no rales, rhonchi or wheezes  CV: regular rates and rhythm and normal S1 S2, no S3 or S4  ABDOMEN:  soft, nontender, no HSM or masses and bowel sounds normal  SKIN: no suspicious lesions or rashes  PSYCH: mentation appears normal. and affect normal/bright  Swelling noted in bilateral wrists, positive hand squeeze test bilateral mcp's  Trace to 1+ edema bilateral     Orders Only on 11/27/2020   Component Date Value Ref Range Status     COVID-19 Virus PCR to [facility] - So* 11/27/2020 Nasopharyngeal   Final     COVID-19 Virus PCR to [facility] - Re* 11/27/2020 Not Detected   Final    Comment: Collection of multiple specimens from the same patient may be necessary to   detect the virus. The possibility of a false  negative should be considered if   the patient's recent exposure or clinical presentation suggests 2019 nCOV   infection and diagnostic tests for other causes of illness are negative.   Repeat testing may be considered in this setting.  Patient sample was heat inactivated and amplified using the HDPCR SARS-CoV-2   assay ([employer] The HDPCRTM SARS-CoV-2 assay is a reverse   transcription real-time polymerase chain reaction (qRT-PCR) test intended for   the qualitative detection of nucleic acid  from SARS-CoV-2 in human nasopharyngeal swabs, oropharyngeal swabs, anterior   nasal swabs, mid-turbinate nasal swabs as well as nasal aspirate, nasal wash,   and bronchoalveolar lavage (BAL) specimens from individuals who are suspected   of COVID-19 by their healthcare provider.  A negative result does not rule out the presence of real-time PCR inhibitors   in the sp                           ecimen or COVID-19 RNA in concentrations below the limit of detection   of the assay. The possibility of a false negative should be considered if the   patients recent exposure or clinical presentation suggests COVID-19.   Additional testing or repeat testing requires consultation with the   laboratory.  Nasopharyngeal specimen is the preferred choice for swab-based SARS CoV2   testing. When collection of a nasopharyngeal swab is not possible the   following are acceptable alternatives:  an oropharyngeal (OP) specimen collected by a healthcare professional, or a   nasal mid-turbinate (NMT) swab collected by a healthcare professional or by   onsite self-collection (using a flocked tapered swab), or an anterior nares   specimen collected by a healthcare professional or by onsite self-collection   (using a round foam swab). (Centers for Disease Control)  Testing performed by [facility] ([facility]) [address] [facility]  [address]  The test performance characteristics were determined by [facility]. It has not been   cleared or approved by the FDA.  The laboratory is regulated under the Clinical Laboratory Improvement   Amendments of 1988 (CLIA-88) as qualified to perform high-complexity testing.   This test is used for clinical purposes. It should not be regarded as   investigational or for research.

## 2021-02-22 NOTE — PATIENT INSTRUCTIONS
Ok to see the pain clinic for injection March 18th or later.  If your labs are fine then I can restart the methotrexate and you can follow up with Dr. Marquis.  Ok to wear compression socks.  Lower the amount of sodium in your diet.

## 2021-02-23 LAB
ALBUMIN SERPL-MCNC: 4.1 G/DL (ref 3.4–5)
ALP SERPL-CCNC: 82 U/L (ref 40–150)
ALT SERPL W P-5'-P-CCNC: 26 U/L (ref 0–50)
AST SERPL W P-5'-P-CCNC: 21 U/L (ref 0–45)
BILIRUB DIRECT SERPL-MCNC: <0.1 MG/DL (ref 0–0.2)
BILIRUB SERPL-MCNC: 0.3 MG/DL (ref 0.2–1.3)
CREAT SERPL-MCNC: 0.7 MG/DL (ref 0.52–1.04)
CRP SERPL-MCNC: 4.2 MG/L (ref 0–8)
GFR SERPL CREATININE-BSD FRML MDRD: 79 ML/MIN/{1.73_M2}
PROT SERPL-MCNC: 7.1 G/DL (ref 6.8–8.8)
TSH SERPL DL<=0.005 MIU/L-ACNC: 1.97 MU/L (ref 0.4–4)

## 2021-02-24 ENCOUNTER — TELEPHONE (OUTPATIENT)
Dept: INTERNAL MEDICINE | Facility: CLINIC | Age: 86
End: 2021-02-24

## 2021-02-24 NOTE — TELEPHONE ENCOUNTER
I called Ailyn about her labs.  Her labs are normal.  Exam consistent with synovitis in the bilateral wrists and mcps.  She had been diagnosed with inflammatory arthritis in the past.  She was seeing Dr. Marquis in 2018 but stopped her methotrexate as she wanted to drink alcohol and didn't return for a follow up.  She states she was called and scheduled to see Rheumatology in [place] but would prefer Dr. Marquis in [place].  She is wanting to discuss other medication options instead of methotrexate and prednisone.  Will see if Dr. Marquis can see her sooner.    Dr. Church

## 2021-02-24 NOTE — TELEPHONE ENCOUNTER
RN: Please see if Ailyn Trevino would like a video (preferred) or telephone visit on 3/1/2021 at 9:40AM with me. I have already put her on the schedule to hold the spot.     Dale Marquis MD  2/24/2021 4:53 PM

## 2021-02-24 NOTE — RESULT ENCOUNTER NOTE
Normal thyroid. Normal kidney function. Normal liver blood test. Normal inflammation test.  Normal blood count.

## 2021-02-25 NOTE — TELEPHONE ENCOUNTER
Called and spoke with patient who confirmed the video visit on 3/1/21 with Dr. Marquis.    Prudencio CORLEY RN....2/25/2021 8:40 AM

## 2021-03-01 ENCOUNTER — VIRTUAL VISIT (OUTPATIENT)
Dept: RHEUMATOLOGY | Facility: CLINIC | Age: 86
End: 2021-03-01
Attending: INTERNAL MEDICINE
Payer: COMMERCIAL

## 2021-03-01 ENCOUNTER — VIRTUAL VISIT (OUTPATIENT)
Dept: PALLIATIVE MEDICINE | Facility: CLINIC | Age: 86
End: 2021-03-01
Payer: COMMERCIAL

## 2021-03-01 DIAGNOSIS — M54.2 CERVICALGIA: ICD-10-CM

## 2021-03-01 DIAGNOSIS — Z79.899 HIGH RISK MEDICATION USE: ICD-10-CM

## 2021-03-01 DIAGNOSIS — M19.90 INFLAMMATORY ARTHRITIS: Primary | ICD-10-CM

## 2021-03-01 PROCEDURE — 99214 OFFICE O/P EST MOD 30 MIN: CPT | Mod: 95 | Performed by: PAIN MEDICINE

## 2021-03-01 PROCEDURE — 99214 OFFICE O/P EST MOD 30 MIN: CPT | Mod: 95 | Performed by: INTERNAL MEDICINE

## 2021-03-01 RX ORDER — FOLIC ACID 1 MG/1
1 TABLET ORAL DAILY
Qty: 100 TABLET | Refills: 3 | Status: SHIPPED | OUTPATIENT
Start: 2021-03-01 | End: 2021-09-27

## 2021-03-01 RX ORDER — METHOTREXATE 2.5 MG/1
15 TABLET ORAL WEEKLY
Qty: 24 TABLET | Refills: 3 | Status: SHIPPED | OUTPATIENT
Start: 2021-03-01 | End: 2021-06-01

## 2021-03-01 ASSESSMENT — PAIN SCALES - GENERAL: PAINLEVEL: MODERATE PAIN (4)

## 2021-03-01 NOTE — PATIENT INSTRUCTIONS
----------------------------------------------------------------  Clinic Number:  [phone]     Call with any questions about your care and for scheduling assistance.     Calls are returned Monday through Friday between 8 AM and 4:30 PM. We usually get back to you within 2 business days depending on the issue/request.     We believe regular attendance is key to your success in our program!      Any time you are unable to keep your appointment we ask that you call us at least 24 hours in advance to cancel.This will allow us to offer the appointment time to another patient.     Multiple missed appointments may lead to dismissal from the clinic.

## 2021-03-01 NOTE — PATIENT INSTRUCTIONS
RHEUMATOLOGY    Dr. Dale Marquis    [facility]  [address] [place] [address]    Our new phone number for Rheumatology is [phone], this number will be able to help you schedule appointments for Dr. Marquis or if you have any message you would like sent to us.    Thank you for choosing [facility]!  Mesha Nieves [facility] [address] Rheumatology

## 2021-03-01 NOTE — PROGRESS NOTES
Ailyn is a 85 year old who is being evaluated via a billable telephone visit.      What phone number would you like to be contacted at? [phone]  How would you like to obtain your AVS? Mail copy  Arline Perera NADIA CHAN [facility]   Pain Management Center    Phone call duration: 30 minutes    [facility] Consultation    Date of visit: 3/1/2021    Reason for consultation:    Primary Care Provider is Maddi Church.  Pain medications are being prescribed byn/a    Please see the [facility] [place] health questionnaire which the patient completed and reviewed with me in detail.    Chief Complaint:    Chief Complaint   Patient presents with     Pain     MME prescribed prior to seeing patient:  Current MME:    Pain history:  Reestab care with rheum today. Restarted methotrex  Hx of Inflammatory arthritis   Ailyn Trevino is a 85 year old female who first started having problems with pain >1-2yrs ago  Denies any inciting event  The pain is worse in the morning  Did pt with minimal benefit  The pain is mainly mid/upper neck  The pain is bilateral   The pain is varies in nature   The pain varies in severity  The pain is a deep ache   The pain is occasionally sharp   Denies any radiation to her UE   Denies any numbnesss tingling burning      The pain is worse ext and rotation  Worse in the morning  The pain is slightly better with heat  Tried some topical creams    Of note the pt hears cracking in her neck   Denies any weakness  BENEFit with zanaflex at night. Only takes a half at night    Has apt for 2nd vaccine dose    Overall the pain sig affects her quality of life    pT INTERESTED in conservative therapy  Pain rating: intensity  Averages 5/10 on a 0-10 scale.  **    Current treatments include:  zanaflex    Previous medication treatments included:  n/a    Other treatments have included:  Ailyn Trevino has not been seen at a pain clinic in the past.    PT: n  Chiropractic:  nn  Acupuncture: n  TENs Unit: n  Injections: n    Past Medical History:  Past Medical History:   Diagnosis Date     Aortic valve disorder      Elevated hemidiaphragm      Granulomatous lung disease (H) 8/7/2012     High cholesterol      Insomnia 10/8/2013    Benadryl gives her RLS     Mitral valve disorder      Osteoarthritis of knee 10/8/2013    Sees Dr. Whaley     Osteopenia 7/31/2012     Other chronic pain 8/3/2015    Patient is followed by KIARA RAINES for ongoing prescription of pain medication.  All refills should be approved by this provider, or covering partner.  Medication(s): Hydrocodone/APAP.  Maximum quantity per month: 30 Clinic visit frequency required: Q 3 months   Controlled substance agreement on file: Yes      Date(s): 8/3/15  Pain Clinic evaluation in the past: No  DIRE Total Score(s): No fl     Polymyalgia rheumatica (H)      Pulmonary nodules      Renal cyst 8/7/2012     Past Surgical History:  Past Surgical History:   Procedure Laterality Date     APPENDECTOMY  1951     CATARACT IOL, RT/LT  2010    bilateral      CHOLECYSTECTOMY  2010     ORTHOPEDIC SURGERY      Left Knee Surgery 1/2017     RECONSTRUCT FOREFOOT WITH METATARSOPHALANGEAL (MTP) FUSION Left 10/24/2018    Procedure: LEFT FIRST METATARSOPHALANGEAL JOINT ARTHRODESIS;  Surgeon: Rufus Harden MD;  Location:  OR     REPAIR HAMMER TOE Left 10/24/2018    Procedure: LESSER TOE RECONSTRUCTION SECOND, THIRD, FOURTH AND FIFTH TOES;  Surgeon: Rufus Harden MD;  Location:  OR     SURGICAL HISTORY OF -   12/13    bilateral YAG laser surgery of eyes     Medications:  Current Outpatient Medications   Medication Sig Dispense Refill     Acetaminophen (TYLENOL PO) Take 650 mg by mouth 2 times daily as needed for mild pain or fever       aspirin - buffered (ASCRIPTIN) 325 MG TABS tablet Take 1 tablet (325 mg) by mouth daily 60 each 0     atenolol (TENORMIN) 25 MG tablet Take 1 tablet (25 mg) by mouth daily 90 tablet 3      escitalopram (LEXAPRO) 10 MG tablet TAKE 1 TABLET BY MOUTH EVERY DAY (Patient not taking: Reported on 3/1/2021) 90 tablet 1     folic acid (FOLVITE) 1 MG tablet Take 1 tablet (1 mg) by mouth daily 100 tablet 3     methotrexate sodium 2.5 MG TABS Take 6 tablets (15 mg) by mouth once a week . Take all 6 tablets on the same day of each week. 24 tablet 3     Multiple Vitamins-Minerals (CENTRUM SILVER ADULT 50+ PO) Take 1 tablet by mouth daily Reported on 4/20/2017       tiZANidine (ZANAFLEX) 2 MG tablet TAKE 0.5 TABLETS (1 MG) BY MOUTH NIGHTLY AS NEEDED FOR MUSCLE SPASMS 90 tablet 1     Allergies:   No Known Allergies  Social History:  Home situation: alonw    History of chemical dependency treatment: no    Family history:  Family History   Problem Relation Age of Onset     Cancer Mother      Cerebrovascular Disease Father      Family history of headaches:no    Review of Systems:  Skin: negative  Eyes: negative  Ears/Nose/Throat: negative  Respiratory: No shortness of breath, dyspnea on exertion, cough, or hemoptysis  Cardiovascular: negative  Gastrointestinal: negative  Genitourinary: negative  Musculoskeletal: negative  Neurologic: negative  Psychiatric: negative  Hematologic/Lymphatic/Immunologic: negative  Endocrine: negative    Physical Exam:  There were no vitals filed for this visit.  Exam:  No acute distress speaking in full sentences    Diagnostic tests:    C1-C2: Minimal pannus formation. No stenosis.     C2-C3: Normal.     C3-C4: Broad-based disc bulge and mild facet hypertrophy is present  causing some mild bilateral neural foraminal stenosis but no central  canal stenosis.     C4-C5: Posterior osteophyte formation disc bulge and facet hypertrophy  is present causing some mild to moderate central canal stenosis and  mild bilateral neural foraminal stenosis.     C5-C6: Posterior osteophyte formation disc bulge and facet hypertrophy  is present causing moderate central canal stenosis and mild to  moderate  bilateral neural foraminal stenosis.     C6-C7: There is minimal central disc osteophyte complex causing mild  central canal stenosis but no neural foraminal stenosis.     C7-T1: Normal.     Paraspinal soft tissues: Unremarkable as visualized.                                                                      IMPRESSION: Mild to moderate multilevel degenerative disc and facet  disease most advanced at C5-C6 where there is moderate central canal  stenosis and mild to moderate bilateral neural foraminal stenosis.  There is also mild to moderate central canal stenosis and mild  bilateral neural foraminal stenosis at C4-C5.           Assessment/Plan:  Ailyn Trevino is a 85 year old female who presents with the complaints of axial neck pain  Ailyn was seen today for pain.    Diagnoses and all orders for this visit:    Cervicalgia  -     PAIN MANAGEMENT REFERRAL         - Further procedures recommended:   - Would strongly consider a bilateral cervical facet joint injection vs MEDIAL BRANCH BLOCK/rfa   - Will contact if interested   - Medication Management:    - Will work with rheumatology on optimizing regimen before considering the procedure  - Physical Therapy: continue home regimen   *   - Follow up: 1 month deep phone call to reports progress      The total TIME spent on this patient on the day of the appointment was 30 minutes.   Time spent preparing to see the patient (reviewing records and tests)  T  Time spent ordering tests, medications, procedures and referrals  Time spent Referring and communicating with other healthcare professionals  Documenting clinical information in Epic    Prashanth Uribe MD  [facility]  This note was created with voice recognition software, and while reviewed for accuracy, typos may remain.

## 2021-03-01 NOTE — PROGRESS NOTES
"Ailyn Trevino is a 85 year old year old female who is being evaluated via a billable telephone visit.      What phone number would you like to be contacted at? [phone]   How would you like to obtain your AVS? Mail a copy         Rheumatology Telephone/Telehealth  Visit      Ailyn Trevino MRN# [id number]   YOB: 1935 Age: 85 year old      Date of visit: 3/01/21   PCP: Dr. Maddi Church    Chief Complaint   Patient presents with:  Inflammatory polyarthropathy    Assessment and Plan     1.  Inflammatory arthritis / PMR: I initially evaluated in 2017 when she transferred care from Dr. Hurst.  Based on historical records, I agreed that she most likely had polymyalgia rheumatica. PMR was steroid responsive and she has been able to reduce her prednisone dose to 3 mg daily without issue, but then had a \"flare\" involving back pain that radiated around to include just over her bilateral lower rib cage. At the time of her flare in April 2017, the ESR and CRP were normal. She was also having hip pain at that time; she had left TKA performed in January 2017. Prednisone dose was increased at that time with resolution of her pain. I suspected that the pain she was having at that time was due to degenerative spine change rather than PMR.  11/1/2017 MRI of the T-spine showed minimal disc space narrowing at several levels in the mid and lower thoracic spine, minimal osteophyte formation or disc protrusions at several levels, but no stenosis. She has degenerative changes [facility] from [facility].  Prednisone was tapered off over time without worsening of her symptoms.  On 1/24/2018 she returned with bilateral wrist swelling and tenderness to palpation; also some pain in the back of her hand; and continued neck/back pain. Inflammatory arthritis dx'd at that time and was started on MTX and prednisone; was on MTX 15mg once " weekly and no prednisone when last seen and was doing well.  When on methotrexate she noted no stiffness and improved  strength.  She then decided to stop methotrexate and was last seen in this clinic in 2018.  Today, she reports pain and stiffness at her wrists, and reduced  strength.  Synovitis noted by her PCP (see Dr. Church's 2/22/2021 clinic note).  We had a thorough discussion about her diagnosis, the articular and extra-articular manifestations, and the rationale for treatment.  We discussed methotrexate, leflunomide, sulfasalazine, hydroxychloroquine, and biologic DMARDs for treatment options; we also discussed the option of not treating.  After thorough discussion she was in agreement to start methotrexate.   Chronic illness, progressive.    - Start methotrexate 15 mg once weekly  - Start folic acid 1mg daily  - Labs monthly [id number]: CBC, Cr, Hepatic Panel  - Labs in 3 months: CBC, Creatinine, Hepatic Panel, ESR, CRP    High risk medication requiring intensive toxicity monitoring at least quarterly: labs ordered include CBC, Creatinine, Hepatic panel to monitor for cytopenia and hepatotoxicity; checking creatinine as it affects clearance of medication.     # Methotrexate Risks and Benefits: The risks and benefits of methotrexate were discussed in detail and the patient verbalized understanding.  The risks discussed include, but are not limited to, the risk for hypersensitivity, anaphylaxis, anaphylactoid reactions, infections, bone marrow suppression, renal toxicity, hepatotoxicity, pulmonary toxicity, malignancy, impaired fertility, GI upset, alopecia, and oral and nasal sores.  Folic acid supplementation is recommended during methotrexate therapy to help prevent some of the side effects. Pregnancy prevention and planning was discussed; it is recommended that women of childbearing potential use reliable contraception during therapy.  The risks of taking both methotrexate and alcohol were reviewed;  complete alcohol avoidance was discussed.  Routine laboratory monitoring is required during methotrexate therapy. Taking MTX once weekly, all within a 24 hour period was stressed and the patient verbalized this instruction back to me.  I encouraged reviewing the package insert and asking any questions about the medication.    # At this time the COVID-19 vaccine (currently available from [employer]) is recommended based on our knowledge of this vaccine and vaccines in the past.  Based on the data for the mRNA COVID-19 vaccines available in the [place], there is no preference for one COVID-19 vaccine over another. Note that there is no data currently available to specifically establish safety and efficacy for this vaccine in immunocompromised people.    Medication(s) that need adjustment around the time of COVID-19 vaccination:  - Methotrexate should be held 1 week after each COVID19 vaccine dose    # This is a virtual visit to reduce the risk of COVID-19 exposure during this current pandemic.      # Considered to be at high risk of complications from the COVID-19 virus.  It is recommended to limit contact with other people and if possible to work remotely or provide a leave of absence to reduce the risk for COVID-19.      Total minutes spent in evaluation with patient, documentation, , and review of pertinent studies and chart notes: 25       Ms. Trevino verbalized agreement with and understanding of the rational for the diagnosis and treatment plan.  All questions were answered to best of my ability and the patient's satisfaction. Ms. Trevino was advised to contact the clinic with any questions that may arise after the clinic visit.      Thank you for involving me in the care of the patient    Return to clinic: 3 months      HPI   Ailyn Trevino is a 85 year old female with a past medical history significant for hyperlipidemia, granulomatous lung disease, aortic valve disorder, mitral  "valve disorder, impaired fasting glucose, polymyalgia rheumatica, and depression who presents for f/u of arthritis.     4/20/2015 rheumatology clinic note by Dr. Bud Hurst at the [facility] documents the patient does not have signs or symptoms of GCA. Significant myalgias in March 2015 in her shoulders and popliteal area, but not in the hip area. Cortisone shots in the hip in March 2015. Elevated CRP and mildly abnormal sedimentation rate. Morning stiffness for about 2 weeks. 2 hours to loosen up. Difficulty lifting her arms above her shoulders. CK was normal. Prednisone 20 mg daily \"helped about almost completely in 4 days' time\"    In 10/2017, Ms. Trevino reported that she was initially diagnosed with PMR and treated with steroids that were very effective. At that time, she reports having some difficulty raising her arms above her head. She says that she has always had some hip and knee pain that was thought to be secondary to her left knee osteoarthritis. She had a left total knee arthroplasty in January 2017. She also reports having flat feet bilaterally. She has been reducing prednisone slowly over time, and was on prednisone 3 mg daily at that time. When she was evaluated by Dr. Christopher  on 4/20/2017 it was noted that she was having pain in her rib cage, back, and hips. She had just reduced prednisone and today she says at that time she was on 1 mg daily, so the dose was increased to 20 mg a day with resolution of her pain. She feels like she has still doing well while on prednisone 3 mg daily. She still has some thoracic back pain. No difficult or raising her arms above her head today. No difficulty standing up from a low seated position. No jaw claudication, scalp tenderness, headaches, or vision changes.she also reports having some right groin pain that she thinks is due to altered gait because of her knee issues. She also reports having lower back pain that was addressed at " [facility] imaging with an epidural steroid injection that was not effective. This was at the direction of her orthopedic surgeon who is at [facility] orthopedics. She reports having had an MRI of her lumbar spine at [facility]. 8/1/2017 clinic note by Prosper Leonardo at [facility] orthopedics documents that an MRI showed multilevel degenerative changes namely L4-L5 central canal compromise and severe right lateral recess stenosis. Currently without hand pain, but she says that she has had some pain in her fingers from time to time. She denies having stiffness in her hands.     She then had an MRI of the thoracic spine showing degenerative changes of the thoracic spine    1/2018: she returned complaining of bilateral wrist pain, also some pain across the back of her hand.  Pain is worse in the morning with morning stiffness for at least one hour.  Stiffness improves with activity.  Pain in her hands improves with activity.  She continues to have pain in her lower back, upper back, mid back, and neck.  spine pain is worse with activity and improves with rest.  She is following with an orthopedic surgeon for her back.      4/26/2018, she reported that she is doing poorly because she has a nonproductive cough but feels like she needs to cough something up.  She is going to have a chest CT later today.  No shortness of breath or chest pain.  Feels like she has chest congestion now.  Says that she is feeling better since using methotrexate.  She is off of prednisone.  Improved  strength bilaterally.  Hand pain, especially in the morning, has improved significantly.  No morning stiffness. No difficulty raising her arms above her head or standing up from a low seated position.  Denies jaw claudication, scalp tenderness, vision change, new headache, difficulty standing up from a chair, or difficulty raising her arms above her head.      Today, 3/1/2021: Not doing as well as she was doing previously when on  methotrexate.  She said that she stopped taking methotrexate because she was concerned about potential side effects.  Now with swelling in her wrists and across her MCPs that is worse in the morning and improved with time and activity.  Morning stiffness for at least 1 hour at the wrists.  Reduced  strength bilaterally.  She recalls that these were symptoms she had in the past that improved with methotrexate.  She notes drinking 4 ounces of wine per day.    Denies fevers, chills, nausea, vomiting, constipation, diarrhea. No abdominal pain. No chest pain/pressure, palpitations, or shortness of breath. No LE swelling. No neck pain. No oral or nasal sores.  No rash.      Tobacco:  None  EtOH: No more than 1 drink per day: 4 ounce glass of wine  Drugs:   Occupation: used to work as a , retired now    ROS   12 point review of system was completed and negative except as noted in the HPI     Active Problem List     Patient Active Problem List   Diagnosis     Advanced directives, counseling/discussion     Osteopenia     Palpitations     Hyperlipidemia LDL goal <130     Renal cyst     Granulomatous lung disease (H)     Health Care Home     Insomnia     Osteoarthritis of knee     Aortic valve disorder     Mitral valve disorder     Encounter for long-term current use of medication     Other chronic pain     Major depressive disorder, single episode, mild (H)     Current chronic use of systemic steroids     Fatigue, unspecified type     Chronic pain of left knee     Venous stasis dermatitis of left lower extremity     Fecal urgency     Elevated hemidiaphragm     Inflammatory arthritis     Pulmonary nodules     Essential hypertension     Past Medical History     Past Medical History:   Diagnosis Date     Aortic valve disorder      Elevated hemidiaphragm      Granulomatous lung disease (H) 8/7/2012     High cholesterol      Insomnia 10/8/2013    Benadryl gives her RLS     Mitral valve disorder       Osteoarthritis of knee 10/8/2013    Sees Dr. Whaley     Osteopenia 7/31/2012     Other chronic pain 8/3/2015    Patient is followed by KIARA RAINES for ongoing prescription of pain medication.  All refills should be approved by this provider, or covering partner.  Medication(s): Hydrocodone/APAP.  Maximum quantity per month: 30 Clinic visit frequency required: Q 3 months   Controlled substance agreement on file: Yes      Date(s): 8/3/15  Pain Clinic evaluation in the past: No  DIRE Total Score(s): No fl     Polymyalgia rheumatica (H)      Pulmonary nodules      Renal cyst 8/7/2012     Past Surgical History     Past Surgical History:   Procedure Laterality Date     APPENDECTOMY  1951     CATARACT IOL, RT/LT  2010    bilateral      CHOLECYSTECTOMY  2010     ORTHOPEDIC SURGERY      Left Knee Surgery 1/2017     RECONSTRUCT FOREFOOT WITH METATARSOPHALANGEAL (MTP) FUSION Left 10/24/2018    Procedure: LEFT FIRST METATARSOPHALANGEAL JOINT ARTHRODESIS;  Surgeon: Rufus Harden MD;  Location: [facility] OR     REPAIR HAMMER TOE Left 10/24/2018    Procedure: LESSER TOE RECONSTRUCTION SECOND, THIRD, FOURTH AND FIFTH TOES;  Surgeon: Rufus Harden MD;  Location: [facility] OR     SURGICAL HISTORY OF -   12/13    bilateral YAG laser surgery of eyes     Allergy   No Known Allergies  Current Medication List     Current Outpatient Medications   Medication Sig     Acetaminophen (TYLENOL PO) Take 650 mg by mouth 2 times daily as needed for mild pain or fever     aspirin - buffered (ASCRIPTIN) 325 MG TABS tablet Take 1 tablet (325 mg) by mouth daily     atenolol (TENORMIN) 25 MG tablet Take 1 tablet (25 mg) by mouth daily     Multiple Vitamins-Minerals (CENTRUM SILVER ADULT 50+ PO) Take 1 tablet by mouth daily Reported on 4/20/2017     tiZANidine (ZANAFLEX) 2 MG tablet TAKE 0.5 TABLETS (1 MG) BY MOUTH NIGHTLY AS NEEDED FOR MUSCLE SPASMS     escitalopram (LEXAPRO) 10 MG tablet TAKE 1 TABLET BY MOUTH EVERY DAY (Patient not taking: Reported  "on 3/1/2021)     No current facility-administered medications for this visit.        Social History   See HPI    Family History     Family History   Problem Relation Age of Onset     Cancer Mother      Cerebrovascular Disease Father      Denies family history of autoimmune disease     Physical Exam     Temp Readings from Last 3 Encounters:   02/22/21 98.9  F (37.2  C) (Oral)   10/19/20 98.5  F (36.9  C) (Oral)   02/10/20 98.6  F (37  C) (Oral)     BP Readings from Last 5 Encounters:   02/22/21 128/64   10/19/20 114/68   02/10/20 136/68   11/20/19 102/62   11/15/19 (!) 148/58     Pulse Readings from Last 1 Encounters:   02/22/21 69     Resp Readings from Last 1 Encounters:   10/19/20 19     Estimated body mass index is 30.65 kg/m  as calculated from the following:    Height as of 10/19/20: 1.48 m (4' 10.27\").    Weight as of 2/22/21: 67.1 kg (148 lb).    GEN: alert and no distress  PSYCH: Alert; coherent speech, normal rate and volume, able to articulate logical thoughts, able   to abstract reason, no tangential thoughts. Normal affect.   RESP: No cough, no audible wheezing, able to talk in full sentences  Remainder of exam unable to be completed due to telephone visits      Labs / Imaging (select studies)   RF/CCP  Recent Labs   Lab Test 06/26/17  1854 04/20/15  1340 02/02/15  1115   CCPABY  --  <20  Interpretation:  Negative    --    CCPIGG <1  Negative    --   --    RHF <20  --  <20     CBC  Recent Labs   Lab Test 02/22/21  1634 07/23/19  1035 10/09/18  1158 04/23/18  1054 01/24/18  0918 04/20/17  1513   WBC 6.9 5.8  --  6.7 6.3 10.0   RBC 4.09 4.44  --  4.18 4.20 4.19   HGB 13.1 14.2 12.6 13.4 13.3 13.4   HCT 38.7 42.3  --  40.4 39.8 40.2   MCV 95 95  --  97 95 96   RDW 12.3 12.8  --  14.8 12.7 12.8    257  --  210 253 268   MCH 32.0 32.0  --  32.1 31.7 32.0   MCHC 33.9 33.6  --  33.2 33.4 33.3   NEUTROPHIL  --   --   --  62.7 61.2 84.9   LYMPH  --   --   --  20.4 22.0 11.0   MONOCYTE  --   --   --  15.6 " 11.7 3.5   EOSINOPHIL  --   --   --  1.0 4.6 0.3   BASOPHIL  --   --   --  0.3 0.5 0.3   ANEU  --   --   --  4.2 3.9 8.5*   ALYM  --   --   --  1.4 1.4 1.1   LAURENCE  --   --   --  1.0 0.7 0.4   AEOS  --   --   --  0.1 0.3 0.0   ABAS  --   --   --  0.0 0.0 0.0     CMP  Recent Labs   Lab Test 02/22/21  1634 10/19/20  1433 07/23/19  1035 04/23/18  1054 04/23/18  1054 06/26/17  1854 06/26/17  1854   NA  --  139 141  --   --   --  142   POTASSIUM  --  4.0 4.1  --   --   --  4.1   CHLORIDE  --  106 109  --   --   --  108   CO2  --  24 27  --   --   --  28   ANIONGAP  --  9 5  --   --   --  6   GLC  --  90 101*  --   --   --  81   BUN  --  21 20  --   --   --  16   CR 0.70 0.74 0.67   < > 0.74   < > 0.61   GFRESTIMATED 79 74 81   < > 75   < > >90  Non  GFR Calc     GFRESTBLACK >90 86 >90   < > >90   < > >90  African American GFR Calc     ALEJANDRO  --  9.0 8.8  --   --   --  8.7   BILITOTAL 0.3  --  0.5  --  0.6   < > 0.5   ALBUMIN 4.1  --  3.9  --  3.8   < > 3.8   PROTTOTAL 7.1  --  7.2  --  7.1   < > 6.9   ALKPHOS 82  --  101  --  82   < > 62   AST 21  --  21  --  28   < > 18   ALT 26 27 27  --  22   < > 27    < > = values in this interval not displayed.     Calcium/VitaminD  Recent Labs   Lab Test 10/19/20  1433 07/23/19  1035 06/26/17  1854 02/05/16  1500 02/05/16  1500 10/08/13  1031 10/08/13  1031   ALEJANDRO 9.0 8.8 8.7   < > 9.1   < > 9.5   VITDT  --   --   --   --  39  --  49    < > = values in this interval not displayed.     ESR/CRP  Recent Labs   Lab Test 02/22/21  1634 07/23/19  1035 04/23/18  1054   SED 7 7 10   CRP 4.2 6.3 37.1*     Hepatitis B  Recent Labs   Lab Test 01/24/18  0918   HBCAB Nonreactive   HEPBANG Nonreactive     Hepatitis C  Recent Labs   Lab Test 01/24/18  0918   HCVAB Nonreactive     Immunization History     Immunization History   Administered Date(s) Administered     COVID-19,PF,Pfizer 02/11/2021     FLU 6-35 months 09/25/2009     Flu, Unspecified 10/04/2016     Influenza (High Dose) 3  valent vaccine 10/26/2010, 10/08/2013, 10/22/2014, 10/05/2015, 10/04/2016, 09/08/2017, 10/09/2018, 10/01/2019     Influenza (IIV3) PF 10/13/2006, 10/31/2008, 10/26/2010, 10/24/2011, 10/23/2012, 10/08/2020     Influenza, Quad, High Dose, Pf, 65yr + 10/08/2020     Pneumo Conj 13-V (2010&after) 11/03/2015     Pneumococcal 23 valent 12/16/2004     TD (ADULT, 7+) 07/21/2010     Tdap (Adacel,Boostrix) 07/21/2010     Zoster vaccine, live 06/04/2009          Chart documentation done in part with Dragon Voice recognition Software. Although reviewed after completion, some word and grammatical error may remain.    Phone call duration with patient (in minutes): 22    Location of patient: [place], [place]   Location of provider: [place]    Dale Marquis MD

## 2021-03-04 ENCOUNTER — IMMUNIZATION (OUTPATIENT)
Dept: NURSING | Facility: CLINIC | Age: 86
End: 2021-03-04
Attending: FAMILY MEDICINE
Payer: COMMERCIAL

## 2021-03-04 PROCEDURE — 0002A PR COVID VAC PFIZER DIL RECON 30 MCG/0.3 ML IM: CPT

## 2021-03-04 PROCEDURE — 91300 PR COVID VAC PFIZER DIL RECON 30 MCG/0.3 ML IM: CPT

## 2021-03-22 ENCOUNTER — TRANSFERRED RECORDS (OUTPATIENT)
Dept: HEALTH INFORMATION MANAGEMENT | Facility: CLINIC | Age: 86
End: 2021-03-22

## 2021-04-13 ENCOUNTER — TRANSFERRED RECORDS (OUTPATIENT)
Dept: HEALTH INFORMATION MANAGEMENT | Facility: CLINIC | Age: 86
End: 2021-04-13

## 2021-04-23 DIAGNOSIS — M19.90 INFLAMMATORY ARTHRITIS: ICD-10-CM

## 2021-04-26 NOTE — TELEPHONE ENCOUNTER
Routing refill request to provider for review/approval because:  Drug not on the [facility] refill protocol           Pending Prescriptions:                       Disp   Refills    methotrexate sodium 2.5 MG TABS            24 tab*3        Sig: Take 6 tablets (15 mg) by mouth once a week . Take           all 6 tablets on the same day of each week.        Arslan Golden RN

## 2021-04-27 RX ORDER — METHOTREXATE 2.5 MG/1
15 TABLET ORAL WEEKLY
Qty: 24 TABLET | Refills: 2 | Status: CANCELLED | OUTPATIENT
Start: 2021-04-27

## 2021-04-28 NOTE — TELEPHONE ENCOUNTER
Rheumatology team: Please call MsMaryam Uriel and her pharmacy to check on the refill request for methotrexate.  Based on last refill date and quantity a refill should not yet be needed.    Dale Marquis MD  4/27/2021 10:21 PM

## 2021-04-28 NOTE — TELEPHONE ENCOUNTER
Called pharmacy, old prescription number was called in patient has refills.  Mesha Nieves CMA Rheumatology  4/28/2021 8:45 AM

## 2021-05-05 ENCOUNTER — TELEPHONE (OUTPATIENT)
Dept: FAMILY MEDICINE | Facility: CLINIC | Age: 86
End: 2021-05-05

## 2021-05-05 DIAGNOSIS — F41.1 GENERALIZED ANXIETY DISORDER: ICD-10-CM

## 2021-05-05 DIAGNOSIS — G47.00 INSOMNIA, UNSPECIFIED TYPE: Primary | ICD-10-CM

## 2021-05-05 NOTE — TELEPHONE ENCOUNTER
Routing to provider to review and advise.    * Pt transitioning care now to Dr. Cazares. Would like recommendations for medications to help with sleep due to anxiety (either over the counter or prescription). First appointment with Dr. Cazares is scheduled for 6/4. *    Spoke with patient about anxiety and trouble sleeping. She stated she is experiencing a lot of anxiety in her life right now. She said she will fall asleep but wakes up about 4 hours later and cannot go back to sleep due to her anxiety and worrying. She is wanted to have a medication prescribed to help her sleep. Pt aware she needs appointment before getting prescription sleep medication but asked about over the counter options.    When asked if she has tried anything to help relax and sleep, she stated she has taken a muscle relaxer that she is prescribed for her neck. She said she has not tried any medications over the counter to help with sleep. She asked for recommendations but was advised that writer is not able to make those recommendations and does not know what would be best with her current medications that she is taking. Writer offered if she wanted to contact her pharmacy before hearing back from our clinic she could see if they can help advise on over the counter medications. Pt acknowledged.    Patient is transitioning to now see Dr. Cazares but has not had an appointment with her yet but has once scheduled. Writer looked to see if a phone visit option was available with Dr. Cazares, she was booked until later this month. Patient also reported she recevntly started retaking Lexapro after being off it for a while.

## 2021-05-05 NOTE — TELEPHONE ENCOUNTER
Reason for Call:  Other call back    Detailed comments: Patient calling states she is having trouble sleeping due to anxiety and is wondering if there is something she can try without having to be seen. Please advise. Thank you     Phone Number Patient can be reached at: Home number on file [phone] (home)    Best Time: ASAP    Can we leave a detailed message on this number? YES    Call taken on 5/5/2021 at 3:34 PM by Mary Sam

## 2021-05-06 RX ORDER — HYDROXYZINE HYDROCHLORIDE 25 MG/1
TABLET, FILM COATED ORAL
Qty: 30 TABLET | Refills: 1 | Status: SHIPPED | OUTPATIENT
Start: 2021-05-06 | End: 2021-06-07

## 2021-05-06 NOTE — TELEPHONE ENCOUNTER
Left message on voicemail advising patient to return call at [phone].    Alyson JEWELLN, RN  [facility], [place]

## 2021-05-06 NOTE — TELEPHONE ENCOUNTER
I Sent Rx hydroxyzine for her to try.   Take one at night as needed.  Can help with anxiety/sleep.  Is not habit forming.    I look forward to our upcoming visit next month!

## 2021-05-07 NOTE — TELEPHONE ENCOUNTER
Left detailed message on patient's identified voice mail.  Advised patient to call [phone] at her earliest convenience and speak to one of the nurses.    Sent [employer] message.

## 2021-05-11 NOTE — TELEPHONE ENCOUNTER
"Received call from patient. She requested follow up regarding medication for anxiety/sleep. Relayed provider's message as follows. She verbalized understanding.    \"I Sent Rx hydroxyzine for her to try.   Take one at night as needed.  Can help with anxiety/sleep.  Is not habit forming.    I look forward to our upcoming visit next month!\"    JOSÉ LUIS York RN  [facility], [place]  "

## 2021-05-15 DIAGNOSIS — F32.1 MAJOR DEPRESSIVE DISORDER, SINGLE EPISODE, MODERATE (H): ICD-10-CM

## 2021-05-16 RX ORDER — ESCITALOPRAM OXALATE 10 MG/1
TABLET ORAL
Qty: 90 TABLET | Refills: 1 | Status: SHIPPED | OUTPATIENT
Start: 2021-05-16 | End: 2021-06-04

## 2021-05-21 DIAGNOSIS — M19.90 INFLAMMATORY ARTHRITIS: ICD-10-CM

## 2021-05-26 DIAGNOSIS — Z79.899 HIGH RISK MEDICATION USE: ICD-10-CM

## 2021-05-26 DIAGNOSIS — M19.90 INFLAMMATORY ARTHRITIS: ICD-10-CM

## 2021-05-26 LAB
ALBUMIN SERPL-MCNC: 3.8 G/DL (ref 3.4–5)
ALP SERPL-CCNC: 69 U/L (ref 40–150)
ALT SERPL W P-5'-P-CCNC: 28 U/L (ref 0–50)
AST SERPL W P-5'-P-CCNC: 21 U/L (ref 0–45)
BASOPHILS # BLD AUTO: 0 10E9/L (ref 0–0.2)
BASOPHILS NFR BLD AUTO: 0.7 %
BILIRUB DIRECT SERPL-MCNC: 0.1 MG/DL (ref 0–0.2)
BILIRUB SERPL-MCNC: 0.4 MG/DL (ref 0.2–1.3)
CREAT SERPL-MCNC: 0.88 MG/DL (ref 0.52–1.04)
CRP SERPL-MCNC: <2.9 MG/L (ref 0–8)
DIFFERENTIAL METHOD BLD: NORMAL
EOSINOPHIL # BLD AUTO: 0.2 10E9/L (ref 0–0.7)
EOSINOPHIL NFR BLD AUTO: 3.9 %
ERYTHROCYTE [DISTWIDTH] IN BLOOD BY AUTOMATED COUNT: 14.8 % (ref 10–15)
ERYTHROCYTE [SEDIMENTATION RATE] IN BLOOD BY WESTERGREN METHOD: 6 MM/H (ref 0–30)
GFR SERPL CREATININE-BSD FRML MDRD: 60 ML/MIN/{1.73_M2}
HCT VFR BLD AUTO: 41.5 % (ref 35–47)
HGB BLD-MCNC: 13.9 G/DL (ref 11.7–15.7)
LYMPHOCYTES # BLD AUTO: 1.1 10E9/L (ref 0.8–5.3)
LYMPHOCYTES NFR BLD AUTO: 18.8 %
MCH RBC QN AUTO: 32.7 PG (ref 26.5–33)
MCHC RBC AUTO-ENTMCNC: 33.5 G/DL (ref 31.5–36.5)
MCV RBC AUTO: 98 FL (ref 78–100)
MONOCYTES # BLD AUTO: 0.4 10E9/L (ref 0–1.3)
MONOCYTES NFR BLD AUTO: 6.9 %
NEUTROPHILS # BLD AUTO: 4.2 10E9/L (ref 1.6–8.3)
NEUTROPHILS NFR BLD AUTO: 69.7 %
PLATELET # BLD AUTO: 261 10E9/L (ref 150–450)
PROT SERPL-MCNC: 6.5 G/DL (ref 6.8–8.8)
RBC # BLD AUTO: 4.25 10E12/L (ref 3.8–5.2)
WBC # BLD AUTO: 6 10E9/L (ref 4–11)

## 2021-05-26 PROCEDURE — 85025 COMPLETE CBC W/AUTO DIFF WBC: CPT | Performed by: INTERNAL MEDICINE

## 2021-05-26 PROCEDURE — 86140 C-REACTIVE PROTEIN: CPT | Performed by: INTERNAL MEDICINE

## 2021-05-26 PROCEDURE — 85652 RBC SED RATE AUTOMATED: CPT | Performed by: INTERNAL MEDICINE

## 2021-05-26 PROCEDURE — 80076 HEPATIC FUNCTION PANEL: CPT | Performed by: INTERNAL MEDICINE

## 2021-05-26 PROCEDURE — 82565 ASSAY OF CREATININE: CPT | Performed by: INTERNAL MEDICINE

## 2021-05-26 PROCEDURE — 36415 COLL VENOUS BLD VENIPUNCTURE: CPT | Performed by: INTERNAL MEDICINE

## 2021-05-26 RX ORDER — METHOTREXATE 2.5 MG/1
15 TABLET ORAL WEEKLY
Qty: 24 TABLET | Refills: 3 | OUTPATIENT
Start: 2021-05-26

## 2021-05-26 NOTE — TELEPHONE ENCOUNTER
Called patient who said she did not request a 90 day supply and does not want one. I advised her that she is due for labs for Dr. Marquis and scheduled her a lab appointment today at 1:30 PM.    Prudencio CORLEY RN....5/26/2021 12:03 PM

## 2021-06-01 ENCOUNTER — TELEPHONE (OUTPATIENT)
Dept: RHEUMATOLOGY | Facility: CLINIC | Age: 86
End: 2021-06-01

## 2021-06-01 DIAGNOSIS — M19.90 INFLAMMATORY ARTHRITIS: Primary | ICD-10-CM

## 2021-06-01 DIAGNOSIS — Z79.899 HIGH RISK MEDICATION USE: ICD-10-CM

## 2021-06-01 RX ORDER — METHOTREXATE 2.5 MG/1
15 TABLET ORAL WEEKLY
Qty: 24 TABLET | Refills: 3 | Status: SHIPPED | OUTPATIENT
Start: 2021-06-01 | End: 2021-09-27

## 2021-06-01 NOTE — TELEPHONE ENCOUNTER
RN: Please call to notify Ailyn Trevino that the labs completed on 5/26/2021 did not show evidence of medication toxicity.  Therefore, I have refilled methotrexate.  If she would like to have a telephone visit with me prior to her now scheduled appointment on 9/27/2021 then please assist her with scheduling a telephone visit.  Labs are needed again in 3 months.     1. Inflammatory arthritis  - methotrexate sodium 2.5 MG TABS; Take 6 tablets (15 mg) by mouth once a week . Take all 6 tablets on the same day of each week.  Dispense: 24 tablet; Refill: 3  - CBC with platelets differential; Future  - Creatinine; Future  - Erythrocyte sedimentation rate auto; Future  - CRP inflammation; Future  - Hepatic panel; Future    2. High risk medication use  - CBC with platelets differential; Future  - Creatinine; Future  - Erythrocyte sedimentation rate auto; Future  - CRP inflammation; Future  - Hepatic panel; Future    Dale Marquis MD  6/1/2021 4:15 PM

## 2021-06-01 NOTE — TELEPHONE ENCOUNTER
Health Call Center    Phone Message    May a detailed message be left on voicemail: yes, please call cell  for Pt is in the process of moving.    Reason for Call: Other: Pt unable to make it to appt on 6/2/21   Pt is in the process of moving and is unable to make it to her appt for tomorrow, 6/2/21.  Pt was rescheduled but was not able to get in another appt until 9/27/21.     Pt has concerns about getting refills on her medication? Will this cause a problem? Could Dr. Marquis do phone visit with her in the mean time to go over her labs?    Please advise and call the Pt back.    Action Taken: Message routed to:  Other: [facility]

## 2021-06-02 NOTE — TELEPHONE ENCOUNTER
Called and discussed Dr. Marquis's message below.   Patient verbalized understanding and has no questions.    Prudencio CORLEY RN....6/2/2021 10:45 AM

## 2021-06-04 ENCOUNTER — OFFICE VISIT (OUTPATIENT)
Dept: INTERNAL MEDICINE | Facility: CLINIC | Age: 86
End: 2021-06-04
Payer: COMMERCIAL

## 2021-06-04 VITALS
TEMPERATURE: 97.9 F | HEART RATE: 66 BPM | HEIGHT: 59 IN | OXYGEN SATURATION: 97 % | SYSTOLIC BLOOD PRESSURE: 138 MMHG | WEIGHT: 142 LBS | BODY MASS INDEX: 28.63 KG/M2 | DIASTOLIC BLOOD PRESSURE: 59 MMHG

## 2021-06-04 DIAGNOSIS — Z23 NEED FOR IMMUNIZATION AGAINST TETANUS ALONE: ICD-10-CM

## 2021-06-04 DIAGNOSIS — F33.1 MODERATE EPISODE OF RECURRENT MAJOR DEPRESSIVE DISORDER (H): Primary | ICD-10-CM

## 2021-06-04 DIAGNOSIS — J84.10 GRANULOMATOUS LUNG DISEASE (H): ICD-10-CM

## 2021-06-04 PROCEDURE — 90714 TD VACC NO PRESV 7 YRS+ IM: CPT | Performed by: INTERNAL MEDICINE

## 2021-06-04 PROCEDURE — 90471 IMMUNIZATION ADMIN: CPT | Performed by: INTERNAL MEDICINE

## 2021-06-04 PROCEDURE — 99215 OFFICE O/P EST HI 40 MIN: CPT | Mod: 25 | Performed by: INTERNAL MEDICINE

## 2021-06-04 ASSESSMENT — MIFFLIN-ST. JEOR: SCORE: 994.74

## 2021-06-04 ASSESSMENT — PATIENT HEALTH QUESTIONNAIRE - PHQ9: SUM OF ALL RESPONSES TO PHQ QUESTIONS 1-9: 16

## 2021-06-04 NOTE — PROGRESS NOTES
"    Assessment & Plan       Moderate episode of recurrent major depressive disorder (H)  Declines meds  Discussed at length.  Agreeable to  referral for counselling.   - MENTAL HEALTH REFERRAL  - Adult; Outpatient Treatment; Individual/Couples/Family/Group Therapy/Health Psychology; [facility]: [facility] [phone]; We will contact you to schedule the appointment or please call with any questions    Need for immunization against tetanus alone     - TD (ADULT, 7+) PRESERVE FREE    Granulomatous lung disease (H)  History of.  Seen by pulm.     Inflammatory arthritis.   Sees Rheum.  Controlled with methotrexate.    Recent labs wnl.       41 minutes spent on the date of the encounter doing chart review, history and exam, documentation and further activities per the note       BMI:   Estimated body mass index is 28.68 kg/m  as calculated from the following:    Height as of this encounter: 1.499 m (4' 11\").    Weight as of this encounter: 64.4 kg (142 lb).       Depression Screening Follow Up    PHQ 6/4/2021   PHQ-9 Total Score 16   Q9: Thoughts of better off dead/self-harm past 2 weeks Several days                 Follow Up    Follow Up Actions Taken  Referred patient back to mental health provider    Discussed the following ways the patient can remain in a safe environment:  j       Return in about 4 months (around 10/4/2021).    Arlyn Cazares MD  [facility] [place]      ==================================================  ====================================================    Subjective   Ailyn is a 85 year old recently  F who presents for the following health issues     HPI     Establish care     84 y/o F here for med check, BETH.      H/o PMR/inflammatory arthritis (MTX use), L TKA, mild MDD, granulomatous lung disease, HTN, moderate aortic and mitral valve insufficiency-stable    Stressor:  She is moving to [place] next week.     She has MDD symptoms, has not been " "taking this anymore.  Does not want meds.      Stressors:   7 months ago.. dog ..   Has been hard for her..     see 21 -- hydroxyzine trial for sleep    Does not like the hangover.         LDCT annual screen - not eligible due to quit smoking more than 15Y ago!!      New Patient/Transfer of Care    Review of Systems   Constitutional, HEENT, cardiovascular, pulmonary, gi and gu systems are negative, except as otherwise noted.      Objective    /59 (BP Location: Right arm, Patient Position: Sitting, Cuff Size: Adult Regular)   Pulse 66   Temp 97.9  F (36.6  C) (Oral)   Ht 1.499 m (4' 11\")   Wt 64.4 kg (142 lb)   SpO2 97%   BMI 28.68 kg/m    There is no height or weight on file to calculate BMI.     Physical Exam     GENERAL: healthy, alert and no distress  EYES: Eyes grossly normal to inspection, PERRL and conjunctivae and sclerae normal  RESP: lungs clear to auscultation - no rales, rhonchi or wheezes  CV: regular rate and rhythm, normal S1 S2, no S3 or S4, no murmur, click or rub, no peripheral edema and peripheral pulses strong  MS: no gross musculoskeletal defects noted, no edema  NEURO: Normal strength and tone, mentation intact and speech normal  PSYCH: mentation appears normal, affect normal/appropriate.     No results found for this or any previous visit (from the past 24 hour(s)).            "

## 2021-06-04 NOTE — NURSING NOTE
Prior to immunization administration, verified patients identity using patient s name and date of birth. Please see Immunization Activity for additional information.     Screening Questionnaire for Adult Immunization    Are you sick today?   No   Do you have allergies to medications, food, a vaccine component or latex?   No   Have you ever had a serious reaction after receiving a vaccination?   No   Do you have a long-term health problem with heart, lung, kidney, or metabolic disease (e.g., diabetes), asthma, a blood disorder, no spleen, complement component deficiency, a cochlear implant, or a spinal fluid leak?  Are you on long-term aspirin therapy?   No   Do you have cancer, leukemia, HIV/AIDS, or any other immune system problem?   No   Do you have a parent, brother, or sister with an immune system problem?   No   In the past 3 months, have you taken medications that affect  your immune system, such as prednisone, other steroids, or anticancer drugs; drugs for the treatment of rheumatoid arthritis, Crohn s disease, or psoriasis; or have you had radiation treatments?   No   Have you had a seizure, or a brain or other nervous system problem?   No   During the past year, have you received a transfusion of blood or blood    products, or been given immune (gamma) globulin or antiviral drug?   No   For women: Are you pregnant or is there a chance you could become       pregnant during the next month?   No   Have you received any vaccinations in the past 4 weeks?   No     Immunization questionnaire answers were all negative.        Per orders of Dr. garcia, injection of Td given by Tahira Kohli CMA. Patient instructed to remain in clinic for 15 minutes afterwards, and to report any adverse reaction to me immediately.       Screening performed by Taihra Kohli CMA on 6/4/2021 at 2:14 PM.

## 2021-06-04 NOTE — PATIENT INSTRUCTIONS
Mental Health will call you.       Consider [employer] series via Pharmacy    Return to clinic October 2021 for annual physical.

## 2021-06-22 ENCOUNTER — TELEPHONE (OUTPATIENT)
Dept: FAMILY MEDICINE | Facility: CLINIC | Age: 86
End: 2021-06-22

## 2021-06-22 DIAGNOSIS — M54.2 CERVICALGIA: ICD-10-CM

## 2021-06-23 RX ORDER — TIZANIDINE 2 MG/1
1 TABLET ORAL
Qty: 90 TABLET | Refills: 1 | Status: SHIPPED | OUTPATIENT
Start: 2021-06-23 | End: 2021-09-07

## 2021-06-23 NOTE — TELEPHONE ENCOUNTER
Spoke to patient and she stated she takes it as needed.   Brandi RUBIO CMA ([facility])

## 2021-06-23 NOTE — TELEPHONE ENCOUNTER
MA- Please clarify if patient is currently taking tizanidine and requested refill.     Medication not active. tiZANidine (ZANAFLEX) 2 MG tablet was discontinued by Dr. Cazares 06/04/2021. Reason: none

## 2021-07-06 ENCOUNTER — NURSE TRIAGE (OUTPATIENT)
Dept: FAMILY MEDICINE | Facility: CLINIC | Age: 86
End: 2021-07-06

## 2021-07-06 NOTE — TELEPHONE ENCOUNTER
Reason for call:  Other   Patient called regarding (reason for call): appointment  Additional comments: Pt having some medical concerns, would like to schedule with PCP, please call if availability to fit into schedule    Phone number to reach patient:  Cell number on file:    Telephone Information:   Mobile [phone]       Best Time:  any    Can we leave a detailed message on this number?  YES    Travel screening: Not Applicable

## 2021-07-07 NOTE — TELEPHONE ENCOUNTER
Spoke with pt. States she is due to be seen in October. Has been having certain drainage in her head. Has a discomfort in her lung. Sometimes in 1 side and sometimes in the other side. Has had symptoms for a couple of months. No cough. No fever. Has headaches because she has issues with her neck, but not sure if this is due to that. No facial pressure or pain. Has nasal drainage that goes down her throat. Feels like it effects the way she talks. No difficulty breathing. No wheezing. No SOB. Sometimes has palpitations with her heart. Recently moved and has been under a lot of stress. PCP is out of office until 7/13. Pt would prefer to see Opal Miguel A Casper CNP as she has seen her before. Pt declined appt for today with another provider. Opal, ok to schedule pt at 11am tomorrow in virtual slot for in person appt? Please advise.    Brandi Weeks RN  [facility]    Reason for Disposition    Age > 60 years    Additional Information    Nasal discharge present > 10 days    Negative: Sinus congestion (pressure, fullness) present > 10 days    Negative: Fever present > 3 days (72 hours)    Negative: Fever returns after gone for over 24 hours and symptoms worse or not improved    Negative: Sinus pain (not just congestion) and fever    Negative: Earache    Negative: SEVERE sinus pain    Negative: Severe headache    Negative: Redness or swelling on the cheek, forehead, or around the eye    Negative: Fever > 103 F (39.4 C)    Negative: Fever > 101 F (38.3 C) and over 60 years of age    Negative: Fever > 100.0 F (37.8 C) and has diabetes mellitus or a weak immune system (e.g., HIV positive, cancer chemotherapy, organ transplant, splenectomy, chronic steroids)    Negative: Fever > 100.0 F (37.8 C) and bedridden (e.g., nursing home patient, stroke, chronic illness, recovering from surgery)    Negative: SEVERE headache and has fever    Negative: Patient sounds very sick or weak to the triager    Negative:  Difficulty breathing, and not from stuffy nose (e.g., not relieved by cleaning out the nose)    Negative: Sounds like a life-threatening emergency to the triager    Negative: Passed out (i.e., fainted, collapsed and was not responding)    Negative: Shock suspected (e.g., cold/pale/clammy skin, too weak to stand, low BP, rapid pulse)    Negative: Difficult to awaken or acting confused (e.g., disoriented, slurred speech)    Negative: Visible sweat on face or sweat dripping down face    Negative: Unable to walk, or can only walk with assistance (e.g., requires support)    Negative: Received SHOCK from implantable cardiac defibrillator and has persisting symptoms (i.e., palpitations, lightheadedness)    Negative: Dizziness, lightheadedness, or weakness and heart beating very rapidly (e.g., > 140 / minute)    Negative: Dizziness, lightheadedness, or weakness and heart beating very slowly (e.g., < 50 / minute)    Negative: Sounds like a life-threatening emergency to the triager    Negative: Chest pain    Negative: Difficulty breathing    Negative: Dizziness, lightheadedness, or weakness    Negative: Heart beating very rapidly (e.g., > 140 / minute) and present now (Exception: during exercise)    Negative: Heart beating very slowly (e.g., < 50 / minute) (Exception: athlete)    Negative: Wearing a 'Holter monitor' or 'cardiac event monitor'    Negative: Received SHOCK from implantable cardiac defibrillator (and now feels well)    Negative: New or worsened shortness of breath with activity (dyspnea on exertion)    Negative: Patient sounds very sick or weak to the triager    Negative: History of heart disease (i.e., heart attack, bypass surgery, angina, angioplasty)    Negative: Skipped or extra beat(s) and occurs 4 or more times per minute    Negative: Skipped or extra beat(s) and increases with exercise or exertion    Negative: Heart beating very rapidly (e.g., > 140 / minute) and not present now (Exception: during  exercise)    Protocols used: HEART RATE AND HEARTBEAT [id number], SINUS PAIN AND CONGESTION-A-OH

## 2021-07-07 NOTE — TELEPHONE ENCOUNTER
Left detailed message on patient's identified voice mail.  Advised patient to call [phone] at her earliest convenience.  Let patient know that she has been scheduled for an office visit.

## 2021-07-08 ENCOUNTER — ANCILLARY PROCEDURE (OUTPATIENT)
Dept: GENERAL RADIOLOGY | Facility: CLINIC | Age: 86
End: 2021-07-08
Attending: NURSE PRACTITIONER
Payer: COMMERCIAL

## 2021-07-08 ENCOUNTER — OFFICE VISIT (OUTPATIENT)
Dept: FAMILY MEDICINE | Facility: CLINIC | Age: 86
End: 2021-07-08
Payer: COMMERCIAL

## 2021-07-08 VITALS
HEART RATE: 61 BPM | DIASTOLIC BLOOD PRESSURE: 50 MMHG | WEIGHT: 141 LBS | BODY MASS INDEX: 28.48 KG/M2 | OXYGEN SATURATION: 98 % | TEMPERATURE: 97.7 F | SYSTOLIC BLOOD PRESSURE: 120 MMHG

## 2021-07-08 DIAGNOSIS — R10.13 EPIGASTRIC PAIN: Primary | ICD-10-CM

## 2021-07-08 DIAGNOSIS — R10.13 EPIGASTRIC PAIN: ICD-10-CM

## 2021-07-08 DIAGNOSIS — R00.2 PALPITATIONS: ICD-10-CM

## 2021-07-08 DIAGNOSIS — J34.89 SINUS DRAINAGE: ICD-10-CM

## 2021-07-08 LAB
ALBUMIN SERPL-MCNC: 3.9 G/DL (ref 3.4–5)
ALP SERPL-CCNC: 78 U/L (ref 40–150)
ALT SERPL W P-5'-P-CCNC: 30 U/L (ref 0–50)
ANION GAP SERPL CALCULATED.3IONS-SCNC: 6 MMOL/L (ref 3–14)
AST SERPL W P-5'-P-CCNC: 23 U/L (ref 0–45)
BASOPHILS # BLD AUTO: 0 10E9/L (ref 0–0.2)
BASOPHILS NFR BLD AUTO: 0.4 %
BILIRUB SERPL-MCNC: 0.7 MG/DL (ref 0.2–1.3)
BUN SERPL-MCNC: 14 MG/DL (ref 7–30)
CALCIUM SERPL-MCNC: 9.2 MG/DL (ref 8.5–10.1)
CHLORIDE SERPL-SCNC: 108 MMOL/L (ref 94–109)
CO2 SERPL-SCNC: 26 MMOL/L (ref 20–32)
CREAT SERPL-MCNC: 0.8 MG/DL (ref 0.52–1.04)
DIFFERENTIAL METHOD BLD: ABNORMAL
EOSINOPHIL # BLD AUTO: 0.2 10E9/L (ref 0–0.7)
EOSINOPHIL NFR BLD AUTO: 2.8 %
ERYTHROCYTE [DISTWIDTH] IN BLOOD BY AUTOMATED COUNT: 13.8 % (ref 10–15)
GFR SERPL CREATININE-BSD FRML MDRD: 67 ML/MIN/{1.73_M2}
GLUCOSE SERPL-MCNC: 94 MG/DL (ref 70–99)
HCT VFR BLD AUTO: 39.4 % (ref 35–47)
HGB BLD-MCNC: 13.6 G/DL (ref 11.7–15.7)
LIPASE SERPL-CCNC: 92 U/L (ref 73–393)
LYMPHOCYTES # BLD AUTO: 1.3 10E9/L (ref 0.8–5.3)
LYMPHOCYTES NFR BLD AUTO: 16.7 %
MCH RBC QN AUTO: 34.1 PG (ref 26.5–33)
MCHC RBC AUTO-ENTMCNC: 34.5 G/DL (ref 31.5–36.5)
MCV RBC AUTO: 99 FL (ref 78–100)
MONOCYTES # BLD AUTO: 0.7 10E9/L (ref 0–1.3)
MONOCYTES NFR BLD AUTO: 8.8 %
NEUTROPHILS # BLD AUTO: 5.6 10E9/L (ref 1.6–8.3)
NEUTROPHILS NFR BLD AUTO: 71.3 %
PLATELET # BLD AUTO: 260 10E9/L (ref 150–450)
POTASSIUM SERPL-SCNC: 4.7 MMOL/L (ref 3.4–5.3)
PROT SERPL-MCNC: 7.1 G/DL (ref 6.8–8.8)
RBC # BLD AUTO: 3.99 10E12/L (ref 3.8–5.2)
SODIUM SERPL-SCNC: 140 MMOL/L (ref 133–144)
WBC # BLD AUTO: 7.9 10E9/L (ref 4–11)

## 2021-07-08 PROCEDURE — 83690 ASSAY OF LIPASE: CPT | Performed by: NURSE PRACTITIONER

## 2021-07-08 PROCEDURE — 99214 OFFICE O/P EST MOD 30 MIN: CPT | Performed by: NURSE PRACTITIONER

## 2021-07-08 PROCEDURE — 80053 COMPREHEN METABOLIC PANEL: CPT | Performed by: NURSE PRACTITIONER

## 2021-07-08 PROCEDURE — 71046 X-RAY EXAM CHEST 2 VIEWS: CPT | Performed by: RADIOLOGY

## 2021-07-08 PROCEDURE — 85025 COMPLETE CBC W/AUTO DIFF WBC: CPT | Performed by: NURSE PRACTITIONER

## 2021-07-08 PROCEDURE — 36415 COLL VENOUS BLD VENIPUNCTURE: CPT | Performed by: NURSE PRACTITIONER

## 2021-07-08 NOTE — TELEPHONE ENCOUNTER
Spoke with pt. Notified her of appt scheduled for today. Pt confirmed that she will be at appt.    Brandi Weeks RN  [facility]

## 2021-07-08 NOTE — PROGRESS NOTES
Assessment & Plan     Epigastric pain  Patient to start omeprazole, but will check labs and CXR given location.  - Comprehensive metabolic panel (BMP + Alb, Alk Phos, ALT, AST, Total. Bili, TP)  - CBC with platelets and differential  - Lipase  - omeprazole (PRILOSEC) 20 MG DR capsule; Take 1 capsule (20 mg) by mouth daily  - XR Chest 2 Views; Future    Sinus drainage  Possibly related to reflux.  Will closely monitor.    Palpitations    - Leadless EKG Monitor 3 to 7 Days; Future    Ordering of each unique test  Prescription drug management             Return for Pending test results. We will call with results..    BORA Howell CNP  [facility] COSME Robertson is a 85 year old who presents for the following health issues     HPI     States she has been having heart palpatations, fatigue, and pain under her ribs, sinus drainage    Patient notes sinus drainage, discomfort under her rib cage bilaterally, fatigue.  Pain is persistent and will improve if she takes her bra off and belches.  Symptoms are not worse after eating.  She notes post nasal drainage.  She feels her heart is racing at times.  She denies shortness of breath, chest pain.  She lost her , moved, and is trying to sell her home.    Review of Systems   Constitutional, HEENT, cardiovascular, pulmonary, gi and gu systems are negative, except as otherwise noted.      Objective    /50   Pulse 61   Temp 97.7  F (36.5  C) (Oral)   Wt 64 kg (141 lb)   SpO2 98%   BMI 28.48 kg/m    Body mass index is 28.48 kg/m .  Physical Exam   GENERAL: healthy, alert and no distress  EYES: Eyes grossly normal to inspection, PERRL and conjunctivae and sclerae normal  HENT: ear canals and TM's normal, nose and mouth without ulcers or lesions  NECK: no adenopathy, no asymmetry, masses, or scars and thyroid normal to palpation  RESP: lungs clear to auscultation - no rales, rhonchi or wheezes  CV: regular rate and rhythm,  normal S1 S2, no S3 or S4, no murmur, click or rub, no peripheral edema and peripheral pulses strong  ABDOMEN: tenderness epigastric, no organomegaly or masses, bowel sounds normal and no palpable or pulsatile masses  MS: no gross musculoskeletal defects noted, no edema

## 2021-07-09 NOTE — RESULT ENCOUNTER NOTE
Dear Ailyn,    Your recent test results are attached.      Your labs are normal.    If you have any questions please feel free to contact [phone] or myself via [employer].    Sincerely,  Opal Chamorro, CNP

## 2021-07-09 NOTE — RESULT ENCOUNTER NOTE
Dear Ailyn,    Your recent test results are attached.      Normal chest x-ray.    If you have any questions please feel free to contact [phone] or myself via [employer].    Sincerely,  Opal Chamorro, CNP

## 2021-07-23 ENCOUNTER — TELEPHONE (OUTPATIENT)
Dept: FAMILY MEDICINE | Facility: CLINIC | Age: 86
End: 2021-07-23

## 2021-07-23 NOTE — TELEPHONE ENCOUNTER
Letter mailed to patient with lab and chest x-ray results.  Patient called and informed.  Patient also informed of message regarding zio results and also message regarding seeking emergency treatment if needed per triage message below. Maude Munoz,

## 2021-07-23 NOTE — TELEPHONE ENCOUNTER
All results were sent to patient via [employer].  Please mail results with result notes to patient.    Zio results are still pending and it may take an additional 7-10 days for those to return.  Please advise patient.    I agree with RN's instructions for emergency treatment.    Opal Chamorro, CNP

## 2021-07-23 NOTE — TELEPHONE ENCOUNTER
"Patient calling, says she saw Opal Berman saw her 7/8/21, chest x-ray, labs done.   She did not hear back regarding her labs, x-ray, or Zio Patch results.     I see results attached to labs and X-ray, chest x-ray normal.   Lab just says \"labs attached\".   Appears they are normal and advised patient the same.    Her bigger concern is she is continuing to have palpitations on a daily basis, says she is not dizzy, fainting, short of breath, or having chest pain but feels a bit fatigued/\"washed out\".    Advised if she has palpitations that don't resolve as usual or she has chest pain, dizziness, trouble breathing, feeling faint then she needs to be seen in ER right away.    I advised Zio patches are taking longer than normal to come back.   I believe provider can ask team MA to reach out to Zio patch company if a particular patient's reading needs to be expedited.         Patient verbalized understanding of red flags for possible ER visit.    Routed to provider who saw patient to address:    1) lab results letter needs to be created and mailed    2)  ?expedite Zio patch results      [place] RN  [facility]      "

## 2021-07-29 DIAGNOSIS — R00.2 PALPITATIONS: ICD-10-CM

## 2021-07-30 NOTE — RESULT ENCOUNTER NOTE
Ailyn Trevino    The EKG monitor was within normal limits.  Please see Opal Garcia if symptoms continue.     Sincerely,     JACKY DIEGO M.D.    (Opal Ravi is out of office this week)

## 2021-09-07 DIAGNOSIS — M54.2 CERVICALGIA: ICD-10-CM

## 2021-09-07 RX ORDER — TIZANIDINE 2 MG/1
1 TABLET ORAL
Qty: 90 TABLET | Refills: 1 | Status: SHIPPED | OUTPATIENT
Start: 2021-09-07 | End: 2022-06-23

## 2021-09-07 NOTE — TELEPHONE ENCOUNTER
Reason for Call:  Other prescription    Detailed comments:  Needs refill for     tiZANidine (ZANAFLEX) 2 MG tablet    Pharmacy [facility] # [phone]      Phone Number Patient can be reached at: Home number on file [phone] (home)    Best Time: Any time    Can we leave a detailed message on this number? YES    Call taken on 9/7/2021 at 1:55 PM by Terri Esparza

## 2021-09-19 ENCOUNTER — HEALTH MAINTENANCE LETTER (OUTPATIENT)
Age: 86
End: 2021-09-19

## 2021-09-27 ENCOUNTER — ANCILLARY PROCEDURE (OUTPATIENT)
Dept: GENERAL RADIOLOGY | Facility: CLINIC | Age: 86
End: 2021-09-27
Attending: INTERNAL MEDICINE
Payer: COMMERCIAL

## 2021-09-27 ENCOUNTER — LAB (OUTPATIENT)
Dept: LAB | Facility: CLINIC | Age: 86
End: 2021-09-27
Payer: COMMERCIAL

## 2021-09-27 ENCOUNTER — OFFICE VISIT (OUTPATIENT)
Dept: RHEUMATOLOGY | Facility: CLINIC | Age: 86
End: 2021-09-27
Payer: COMMERCIAL

## 2021-09-27 VITALS
WEIGHT: 141 LBS | DIASTOLIC BLOOD PRESSURE: 65 MMHG | HEIGHT: 59 IN | OXYGEN SATURATION: 97 % | HEART RATE: 76 BPM | BODY MASS INDEX: 28.43 KG/M2 | SYSTOLIC BLOOD PRESSURE: 153 MMHG

## 2021-09-27 DIAGNOSIS — M19.90 INFLAMMATORY ARTHRITIS: ICD-10-CM

## 2021-09-27 DIAGNOSIS — Z78.0 POST-MENOPAUSAL: ICD-10-CM

## 2021-09-27 DIAGNOSIS — M85.80 OSTEOPENIA, UNSPECIFIED LOCATION: ICD-10-CM

## 2021-09-27 DIAGNOSIS — Z13.820 OSTEOPOROSIS SCREENING: ICD-10-CM

## 2021-09-27 DIAGNOSIS — M19.90 INFLAMMATORY ARTHRITIS: Primary | ICD-10-CM

## 2021-09-27 DIAGNOSIS — Z79.899 HIGH RISK MEDICATION USE: ICD-10-CM

## 2021-09-27 DIAGNOSIS — M54.2 NECK PAIN: ICD-10-CM

## 2021-09-27 LAB
BASOPHILS # BLD AUTO: 0 10E3/UL (ref 0–0.2)
BASOPHILS NFR BLD AUTO: 0 %
CALCIUM SERPL-MCNC: 9.7 MG/DL (ref 8.5–10.1)
CREAT SERPL-MCNC: 0.73 MG/DL (ref 0.52–1.04)
CRP SERPL-MCNC: 4 MG/L (ref 0–8)
DEPRECATED CALCIDIOL+CALCIFEROL SERPL-MC: 40 UG/L (ref 20–75)
EOSINOPHIL # BLD AUTO: 0.2 10E3/UL (ref 0–0.7)
EOSINOPHIL NFR BLD AUTO: 4 %
ERYTHROCYTE [DISTWIDTH] IN BLOOD BY AUTOMATED COUNT: 13.6 % (ref 10–15)
ERYTHROCYTE [SEDIMENTATION RATE] IN BLOOD BY WESTERGREN METHOD: 8 MM/HR (ref 0–30)
GFR SERPL CREATININE-BSD FRML MDRD: 75 ML/MIN/1.73M2
HCT VFR BLD AUTO: 40.1 % (ref 35–47)
HGB BLD-MCNC: 13.5 G/DL (ref 11.7–15.7)
LYMPHOCYTES # BLD AUTO: 0.9 10E3/UL (ref 0.8–5.3)
LYMPHOCYTES NFR BLD AUTO: 15 %
MCH RBC QN AUTO: 33.8 PG (ref 26.5–33)
MCHC RBC AUTO-ENTMCNC: 33.7 G/DL (ref 31.5–36.5)
MCV RBC AUTO: 100 FL (ref 78–100)
MONOCYTES # BLD AUTO: 0.6 10E3/UL (ref 0–1.3)
MONOCYTES NFR BLD AUTO: 10 %
NEUTROPHILS # BLD AUTO: 4.3 10E3/UL (ref 1.6–8.3)
NEUTROPHILS NFR BLD AUTO: 71 %
PLATELET # BLD AUTO: 251 10E3/UL (ref 150–450)
PTH-INTACT SERPL-MCNC: 36 PG/ML (ref 18–80)
RBC # BLD AUTO: 4 10E6/UL (ref 3.8–5.2)
WBC # BLD AUTO: 6 10E3/UL (ref 4–11)

## 2021-09-27 PROCEDURE — 86140 C-REACTIVE PROTEIN: CPT

## 2021-09-27 PROCEDURE — 82310 ASSAY OF CALCIUM: CPT

## 2021-09-27 PROCEDURE — 85652 RBC SED RATE AUTOMATED: CPT

## 2021-09-27 PROCEDURE — 85025 COMPLETE CBC W/AUTO DIFF WBC: CPT

## 2021-09-27 PROCEDURE — 72050 X-RAY EXAM NECK SPINE 4/5VWS: CPT | Performed by: RADIOLOGY

## 2021-09-27 PROCEDURE — 82565 ASSAY OF CREATININE: CPT

## 2021-09-27 PROCEDURE — 83970 ASSAY OF PARATHORMONE: CPT

## 2021-09-27 PROCEDURE — 82306 VITAMIN D 25 HYDROXY: CPT

## 2021-09-27 PROCEDURE — 99214 OFFICE O/P EST MOD 30 MIN: CPT | Performed by: INTERNAL MEDICINE

## 2021-09-27 PROCEDURE — 36415 COLL VENOUS BLD VENIPUNCTURE: CPT

## 2021-09-27 RX ORDER — METHOTREXATE 2.5 MG/1
15 TABLET ORAL WEEKLY
Qty: 78 TABLET | Refills: 0 | Status: SHIPPED | OUTPATIENT
Start: 2021-09-27 | End: 2022-01-10

## 2021-09-27 RX ORDER — FOLIC ACID 1 MG/1
1 TABLET ORAL DAILY
Qty: 90 TABLET | Refills: 2 | Status: SHIPPED | OUTPATIENT
Start: 2021-09-27 | End: 2022-01-24

## 2021-09-27 ASSESSMENT — MIFFLIN-ST. JEOR: SCORE: 985.2

## 2021-09-27 NOTE — PROGRESS NOTES
"  Rheumatology Clinic Visit      Ailyn Trevino MRN# [id number]   YOB: 1935 Age: 86 year old      Date of visit: 9/27/21   PCP: Dr. Maddi Church    Chief Complaint   Patient presents with:  Inflammatory arthritis    Assessment and Plan     1.  Inflammatory arthritis / PMR: I initially evaluated in 2017 when she transferred care from Dr. Hurst.  Based on historical records, I agreed that she most likely had polymyalgia rheumatica. PMR was steroid responsive and she has been able to reduce her prednisone dose to 3 mg daily without issue, but then had a \"flare\" involving back pain that radiated around to include just over her bilateral lower rib cage. At the time of her flare in April 2017, the ESR and CRP were normal. She was also having hip pain at that time; she had left TKA performed in January 2017. Prednisone dose was increased at that time with resolution of her pain. I suspected that the pain she was having at that time was due to degenerative spine change rather than PMR.  11/1/2017 MRI of the T-spine showed minimal disc space narrowing at several levels in the mid and lower thoracic spine, minimal osteophyte formation or disc protrusions at several levels, but no stenosis. She has degenerative changes seen on [facility] from [facility].  Prednisone was tapered off over time without worsening of her symptoms.  On 1/24/2018 she returned with bilateral wrist swelling and tenderness to palpation; also some pain in the back of her hand; and continued neck/back pain. Inflammatory arthritis dx'd at that time and was started on MTX and prednisone; was on MTX 15mg once weekly and no prednisone; doing well when seen on 4/26/2018.   When on methotrexate she noted no stiffness and improved  strength.  She then decided to stop methotrexate.  Not seen between 4/26/2018 and 3/1/2021; restarted methotrexate on 3/1/2021. " Since March 2021 she cancelled follow-up appointment and is now being seen today, 9/27/2021.  She continues on methotrexate and is doing well.  Chronic illness, stable.    - Continue methotrexate 15 mg once weekly  - Continue folic acid 1mg daily  - Labs today and every 8-12 weeks: CBC, Creatinine, Hepatic Panel, ESR, CRP    High risk medication requiring intensive toxicity monitoring at least quarterly: labs ordered include CBC, Creatinine, Hepatic panel to monitor for cytopenia and hepatotoxicity; checking creatinine as it affects clearance of medication.     2.  Osteoporosis screening: Osteopenia in 2015 DEXA.  Postmenopausal.  - Labs today: Calcium, vitamin D, PTH  - DEXA ordered; advised that she call to schedule    3.  Neck pain in the setting of inflammatory arthritis: Discussed the rationale for checking x-rays today to assess for instability.  Advised against chiropractic manipulation of her cervical spine.  She says that she is already following with her PCP for management of chronic nonradiating neck pain.  Chronic illness  - Cervical spine x-ray, including flex/ext to assess for instability    - COVID-19: has received the Pfizer COVID-19 vaccine on 2/11/2021 and 3/4/2021    A single additional dose of the Pfizer or Moderna COVID-19 vaccine is recommended at least 28 days after the completion of the 2-dose mRNA vaccine series for patients receiving any immunosuppressive or immunomodulatory therapy. Attempts should be made to match the additional mRNA dose type to the type given in the mRNA primary series; however, if that is not feasible, a booster dose with the alternative mRNA vaccine is permitted.    Based on our current understanding (and this may change over time as we learn more), medications should be adjusted as noted below, if disease activity allows:  - NSAIDs and Acetaminophen: hold for 24 hours prior to vaccination if able to do so  - Methotrexate should be held for 1-2 weeks after the mRNA  "COVID-19 booster vaccine     Total minutes spent in evaluation with patient, documentation, , and review of pertinent studies and chart notes: 23        Ms. Trevino verbalized agreement with and understanding of the rational for the diagnosis and treatment plan.  All questions were answered to best of my ability and the patient's satisfaction. Ms. Trevino was advised to contact the clinic with any questions that may arise after the clinic visit.      Thank you for involving me in the care of the patient    Return to clinic: 3-4 months      HPI   Ailyn Trevino is a 86 year old female with a past medical history significant for hyperlipidemia, granulomatous lung disease, aortic valve disorder, mitral valve disorder, impaired fasting glucose, polymyalgia rheumatica, and depression who presents for f/u of arthritis.     4/20/2015 rheumatology clinic note by Dr. Bud Hurst at the [facility] documents the patient does not have signs or symptoms of GCA. Significant myalgias in March 2015 in her shoulders and popliteal area, but not in the hip area. Cortisone shots in the hip in March 2015. Elevated CRP and mildly abnormal sedimentation rate. Morning stiffness for about 2 weeks. 2 hours to loosen up. Difficulty lifting her arms above her shoulders. CK was normal. Prednisone 20 mg daily \"helped about almost completely in 4 days' time\"    In 10/2017, Ms. Trevino reported that she was initially diagnosed with PMR and treated with steroids that were very effective. At that time, she reports having some difficulty raising her arms above her head. She says that she has always had some hip and knee pain that was thought to be secondary to her left knee osteoarthritis. She had a left total knee arthroplasty in January 2017. She also reports having flat feet bilaterally. She has been reducing prednisone slowly over time, and was on prednisone 3 mg daily at that time. When she was evaluated " by Dr. Christopher  on 4/20/2017 it was noted that she was having pain in her rib cage, back, and hips. She had just reduced prednisone and today she says at that time she was on 1 mg daily, so the dose was increased to 20 mg a day with resolution of her pain. She feels like she has still doing well while on prednisone 3 mg daily. She still has some thoracic back pain. No difficult or raising her arms above her head today. No difficulty standing up from a low seated position. No jaw claudication, scalp tenderness, headaches, or vision changes.she also reports having some right groin pain that she thinks is due to altered gait because of her knee issues. She also reports having lower back pain that was addressed at [facility] with an epidural steroid injection that was not effective. This was at the direction of her orthopedic surgeon who is at [facility]. She reports having had an MRI of her lumbar spine at [facility]. 8/1/2017 clinic note by Prosper Leonardo at [facility] documents that an MRI showed multilevel degenerative changes namely L4-L5 central canal compromise and severe right lateral recess stenosis. Currently without hand pain, but she says that she has had some pain in her fingers from time to time. She denies having stiffness in her hands.     She then had an MRI of the thoracic spine showing degenerative changes of the thoracic spine    1/2018: she returned complaining of bilateral wrist pain, also some pain across the back of her hand.  Pain is worse in the morning with morning stiffness for at least one hour.  Stiffness improves with activity.  Pain in her hands improves with activity.  She continues to have pain in her lower back, upper back, mid back, and neck.  spine pain is worse with activity and improves with rest.  She is following with an orthopedic surgeon for her back.      4/26/2018, she reported that she is doing poorly because she has a nonproductive  cough but feels like she needs to cough something up.  She is going to have a chest CT later today.  No shortness of breath or chest pain.  Feels like she has chest congestion now.  Says that she is feeling better since using methotrexate.  She is off of prednisone.  Improved  strength bilaterally.  Hand pain, especially in the morning, has improved significantly.  No morning stiffness. No difficulty raising her arms above her head or standing up from a low seated position.  Denies jaw claudication, scalp tenderness, vision change, new headache, difficulty standing up from a chair, or difficulty raising her arms above her head.      3/1/2021: Not doing as well as she was doing previously when on methotrexate.  She said that she stopped taking methotrexate because she was concerned about potential side effects.  Now with swelling in her wrists and across her MCPs that is worse in the morning and improved with time and activity.  Morning stiffness for at least 1 hour at the wrists.  Reduced  strength bilaterally.  She recalls that these were symptoms she had in the past that improved with methotrexate.  She notes drinking 4 ounces of wine per day.    Today, 2021: Currently doing well on methotrexate.  No joint pain or swelling.  No morning stiffness or gelling phenomenon.  She says that her   last October and she has now moved to [place] so she is adjusting to these changes.  Otherwise doing well.  Mild chronic neck pain that is worse with activity and improves with rest; nonradiating    Denies fevers, chills, nausea, vomiting, constipation, diarrhea. No abdominal pain. No chest pain/pressure, palpitations, or shortness of breath. No LE swelling.  No oral or nasal sores.  No rash.      Tobacco:  None  EtOH: No more than 1 drink per day: 4 ounce glass of wine  Drugs:   Occupation: used to work as a , retired now    ROS   12 point review of system was completed and negative  except as noted in the HPI     Active Problem List     Patient Active Problem List   Diagnosis     Advanced directives, counseling/discussion     Osteopenia     Palpitations     Hyperlipidemia LDL goal <130     Renal cyst     Granulomatous lung disease (H)     Health Care Home     Insomnia     S/P total knee arthroplasty, left     Aortic valve disorder     Mitral valve disorder     Encounter for long-term current use of medication     Major depressive disorder, single episode, mild (H)     Current chronic use of systemic steroids     Fatigue, unspecified type     Chronic pain of left knee     Venous stasis dermatitis of left lower extremity     Fecal urgency     Elevated hemidiaphragm     Inflammatory arthritis     Pulmonary nodules     Hypertension goal BP (blood pressure) < 140/90     Past Medical History     Past Medical History:   Diagnosis Date     Aortic valve disorder      Elevated hemidiaphragm      Granulomatous lung disease (H) 8/7/2012     High cholesterol      Insomnia 10/8/2013    Benadryl gives her RLS     Mitral valve disorder      Osteoarthritis of knee 10/8/2013    Sees Dr. Whaley     Osteopenia 7/31/2012     Other chronic pain 8/3/2015    Patient is followed by KIARA RAINES for ongoing prescription of pain medication.  All refills should be approved by this provider, or covering partner.  Medication(s): Hydrocodone/APAP.  Maximum quantity per month: 30 Clinic visit frequency required: Q 3 months   Controlled substance agreement on file: Yes      Date(s): 8/3/15  Pain Clinic evaluation in the past: No  DIRE Total Score(s): No fl     Polymyalgia rheumatica (H)      Pulmonary nodules      Renal cyst 8/7/2012     Past Surgical History     Past Surgical History:   Procedure Laterality Date     APPENDECTOMY  1951     CATARACT IOL, RT/LT  2010    bilateral      CHOLECYSTECTOMY  2010     ORTHOPEDIC SURGERY      Left Knee Surgery 1/2017     RECONSTRUCT FOREFOOT WITH METATARSOPHALANGEAL (MTP) FUSION  Left 10/24/2018    Procedure: LEFT FIRST METATARSOPHALANGEAL JOINT ARTHRODESIS;  Surgeon: Rufus Harden MD;  Location: [facility] OR     REPAIR HAMMER TOE Left 10/24/2018    Procedure: LESSER TOE RECONSTRUCTION SECOND, THIRD, FOURTH AND FIFTH TOES;  Surgeon: Rufus Harden MD;  Location:  OR     SURGICAL HISTORY OF -   12/13    bilateral YAG laser surgery of eyes     Allergy   No Known Allergies  Current Medication List     Current Outpatient Medications   Medication Sig     Acetaminophen (TYLENOL PO) Take 650 mg by mouth 2 times daily as needed for mild pain or fever     aspirin - buffered (ASCRIPTIN) 325 MG TABS tablet Take 1 tablet (325 mg) by mouth daily     atenolol (TENORMIN) 25 MG tablet Take 1 tablet (25 mg) by mouth daily     folic acid (FOLVITE) 1 MG tablet Take 1 tablet (1 mg) by mouth daily     hydrOXYzine (ATARAX) 25 MG tablet 1/2 - 1 TAB AT BEDTIME AS NEEDED FOR SLEEP     methotrexate sodium 2.5 MG TABS Take 6 tablets (15 mg) by mouth once a week . Take all 6 tablets on the same day of each week.     Multiple Vitamins-Minerals (CENTRUM SILVER ADULT 50+ PO) Take 1 tablet by mouth daily Reported on 4/20/2017     omeprazole (PRILOSEC) 20 MG DR capsule Take 1 capsule (20 mg) by mouth daily     tiZANidine (ZANAFLEX) 2 MG tablet Take 0.5 tablets (1 mg) by mouth nightly as needed for muscle spasms     No current facility-administered medications for this visit.       Social History   See HPI    Family History     Family History   Problem Relation Age of Onset     Cancer Mother      Cerebrovascular Disease Father      Denies family history of autoimmune disease     Physical Exam     Temp Readings from Last 3 Encounters:   07/08/21 97.7  F (36.5  C) (Oral)   06/04/21 97.9  F (36.6  C) (Oral)   02/22/21 98.9  F (37.2  C) (Oral)     BP Readings from Last 5 Encounters:   09/27/21 (!) 171/69   07/08/21 120/50   06/04/21 138/59   02/22/21 128/64   10/19/20 114/68     Pulse Readings from Last 1 Encounters:  "  09/27/21 76     Resp Readings from Last 1 Encounters:   10/19/20 19     Estimated body mass index is 28.48 kg/m  as calculated from the following:    Height as of this encounter: 1.499 m (4' 11\").    Weight as of this encounter: 64 kg (141 lb).    GEN: NAD.  HEENT:  Anicteric, noninjected sclera. No obvious external lesions of the ear and nose. Hearing intact.  CV: S1, S2. RRR. No m/r/g  PULM: No increased work of breathing. CTA bilaterally   MSK: MCPs, PIPs, DIPs without swelling or tenderness to palpation.  Wrists without swelling or tenderness to palpation.  Elbows and shoulders without swelling or tenderness to palpation.   Knees, ankles, and MTPs without swelling or tenderness to palpation.    SKIN: No rash or jaundice seen  PSYCH: Alert. Appropriate.      Labs / Imaging (select studies)     RF/CCP  Recent Labs   Lab Test 06/26/17  1854 04/20/15  1340 02/02/15  1115   CCPABY  --  <20  Interpretation:  Negative    --    CCPIGG <1  Negative    --   --    RHF <20  --  <20     CBC  Recent Labs   Lab Test 07/08/21  1156 05/26/21  1328 02/22/21  1634 10/09/18  1158 04/23/18  1054   WBC 7.9 6.0 6.9   < > 6.7   RBC 3.99 4.25 4.09   < > 4.18   HGB 13.6 13.9 13.1   < > 13.4   HCT 39.4 41.5 38.7   < > 40.4   MCV 99 98 95   < > 97   RDW 13.8 14.8 12.3   < > 14.8    261 228   < > 210   MCH 34.1* 32.7 32.0   < > 32.1   MCHC 34.5 33.5 33.9   < > 33.2   NEUTROPHIL 71.3 69.7  --   --  62.7   LYMPH 16.7 18.8  --   --  20.4   MONOCYTE 8.8 6.9  --   --  15.6   EOSINOPHIL 2.8 3.9  --   --  1.0   BASOPHIL 0.4 0.7  --   --  0.3   ANEU 5.6 4.2  --   --  4.2   ALYM 1.3 1.1  --   --  1.4   LAURENCE 0.7 0.4  --   --  1.0   AEOS 0.2 0.2  --   --  0.1   ABAS 0.0 0.0  --   --  0.0    < > = values in this interval not displayed.     CMP  Recent Labs   Lab Test 07/08/21  1156 05/26/21  1328 02/22/21  1634 10/19/20  1433 10/19/20  1433 07/23/19  1035 07/23/19  1035     --   --   --  139  --  141   POTASSIUM 4.7  --   --   --  4.0  " --  4.1   CHLORIDE 108  --   --   --  106  --  109   CO2 26  --   --   --  24  --  27   ANIONGAP 6  --   --   --  9  --  5   GLC 94  --   --   --  90  --  101*   BUN 14  --   --   --  21  --  20   CR 0.80 0.88 0.70   < > 0.74   < > 0.67   GFRESTIMATED 67 60* 79   < > 74   < > 81   GFRESTBLACK 78 69 >90   < > 86   < > >90   ALEJANDRO 9.2  --   --   --  9.0  --  8.8   BILITOTAL 0.7 0.4 0.3  --   --    < > 0.5   ALBUMIN 3.9 3.8 4.1  --   --    < > 3.9   PROTTOTAL 7.1 6.5* 7.1  --   --    < > 7.2   ALKPHOS 78 69 82  --   --    < > 101   AST 23 21 21  --   --    < > 21   ALT 30 28 26   < > 27   < > 27    < > = values in this interval not displayed.     Calcium/VitaminD  Recent Labs   Lab Test 07/08/21  1156 10/19/20  1433 07/23/19  1035 10/04/16  1145 02/05/16  1500 11/18/14  0937 10/08/13  1031   ALEJANDRO 9.2 9.0 8.8   < > 9.1   < > 9.5   VITDT  --   --   --   --  39  --  49    < > = values in this interval not displayed.     ESR/CRP  Recent Labs   Lab Test 05/26/21  1328 02/22/21  1634 07/23/19  1035   SED 6 7 7   CRP <2.9 4.2 6.3     Hepatitis B  Recent Labs   Lab Test 01/24/18  0918   HBCAB Nonreactive   HEPBANG Nonreactive     Hepatitis C  Recent Labs   Lab Test 01/24/18  0918   HCVAB Nonreactive     Immunization History     Immunization History   Administered Date(s) Administered     COVID-19,PF,Pfizer 02/11/2021, 03/04/2021     FLU 6-35 months 09/25/2009     Flu, Unspecified 10/04/2016     Influenza (High Dose) 3 valent vaccine 10/26/2010, 10/08/2013, 10/22/2014, 10/05/2015, 10/04/2016, 09/08/2017, 10/09/2018, 10/01/2019     Influenza (IIV3) PF 10/13/2006, 10/31/2008, 10/26/2010, 10/24/2011, 10/23/2012, 10/08/2020     Influenza, Quad, High Dose, Pf, 65yr+ (Fluzone HD) 10/08/2020     Pneumo Conj 13-V (2010&after) 11/03/2015     Pneumococcal 23 valent 12/16/2004     TD (ADULT, 7+) 07/21/2010, 06/04/2021     Tdap (Adacel,Boostrix) 07/21/2010     Zoster vaccine, live 06/04/2009          Chart documentation done in part with  [employer] Voice recognition Software. Although reviewed after completion, some word and grammatical error may remain.    Dale Marquis MD

## 2021-09-27 NOTE — PATIENT INSTRUCTIONS
RHEUMATOLOGY    Dr. Dale Marquis    [facility] [place]  [address] [place] [address]  [place] [address]  Phone number: [phone]  Fax number: [phone]    Thank you for choosing [facility]!    Mesha Nieves CMA Rheumatology    ----------------      A single additional dose of the Pfizer or Moderna COVID-19 vaccine is recommended at least 28 days after the completion of the 2-dose mRNA vaccine series for patients receiving any immunosuppressive or immunomodulatory therapy. Attempts should be made to match the additional mRNA dose type to the type given in the mRNA primary series; however, if that is not feasible, a booster dose with the alternative mRNA vaccine is permitted.    Based on our current understanding (and this may change over time as we learn more), medications should be adjusted as noted below, if disease activity allows:  - NSAIDs and Acetaminophen: hold for 24 hours prior to vaccination if able to do so  - Methotrexate should be held for 1-2 weeks after the mRNA COVID-19 booster vaccine     For your DEXA scan (Bone scan) please call and schedule your appointment at:    [facility])  [facility]   [phone]

## 2021-10-11 ENCOUNTER — TELEPHONE (OUTPATIENT)
Dept: RHEUMATOLOGY | Facility: CLINIC | Age: 86
End: 2021-10-11

## 2021-10-11 NOTE — TELEPHONE ENCOUNTER
[facility]    Phone Message    May a detailed message be left on voicemail: no     Reason for Call: Requesting Results   Name/type of test: lab/xray results, patient would like results mailed to her home address  Date of test: 9/27  Was test done at a location other than [facility] (Please fill in the location if not [facility])?: No

## 2022-01-06 ENCOUNTER — LAB (OUTPATIENT)
Dept: LAB | Facility: CLINIC | Age: 87
End: 2022-01-06
Payer: COMMERCIAL

## 2022-01-06 DIAGNOSIS — M19.90 INFLAMMATORY ARTHRITIS: ICD-10-CM

## 2022-01-06 DIAGNOSIS — Z79.899 HIGH RISK MEDICATION USE: ICD-10-CM

## 2022-01-06 LAB
ALBUMIN SERPL-MCNC: 4 G/DL (ref 3.4–5)
ALP SERPL-CCNC: 84 U/L (ref 40–150)
ALT SERPL W P-5'-P-CCNC: 27 U/L (ref 0–50)
AST SERPL W P-5'-P-CCNC: 18 U/L (ref 0–45)
BASOPHILS # BLD AUTO: 0.1 10E3/UL (ref 0–0.2)
BASOPHILS NFR BLD AUTO: 1 %
BILIRUB DIRECT SERPL-MCNC: 0.1 MG/DL (ref 0–0.2)
BILIRUB SERPL-MCNC: 0.5 MG/DL (ref 0.2–1.3)
CREAT SERPL-MCNC: 0.69 MG/DL (ref 0.52–1.04)
CRP SERPL-MCNC: 3.8 MG/L (ref 0–8)
EOSINOPHIL # BLD AUTO: 0.3 10E3/UL (ref 0–0.7)
EOSINOPHIL NFR BLD AUTO: 5 %
ERYTHROCYTE [DISTWIDTH] IN BLOOD BY AUTOMATED COUNT: 13.4 % (ref 10–15)
ERYTHROCYTE [SEDIMENTATION RATE] IN BLOOD BY WESTERGREN METHOD: 8 MM/HR (ref 0–30)
GFR SERPL CREATININE-BSD FRML MDRD: 84 ML/MIN/1.73M2
HCT VFR BLD AUTO: 40.5 % (ref 35–47)
HGB BLD-MCNC: 13.6 G/DL (ref 11.7–15.7)
LYMPHOCYTES # BLD AUTO: 1.3 10E3/UL (ref 0.8–5.3)
LYMPHOCYTES NFR BLD AUTO: 24 %
MCH RBC QN AUTO: 33.7 PG (ref 26.5–33)
MCHC RBC AUTO-ENTMCNC: 33.6 G/DL (ref 31.5–36.5)
MCV RBC AUTO: 100 FL (ref 78–100)
MONOCYTES # BLD AUTO: 0.6 10E3/UL (ref 0–1.3)
MONOCYTES NFR BLD AUTO: 11 %
NEUTROPHILS # BLD AUTO: 3.2 10E3/UL (ref 1.6–8.3)
NEUTROPHILS NFR BLD AUTO: 59 %
PLATELET # BLD AUTO: 248 10E3/UL (ref 150–450)
PROT SERPL-MCNC: 6.6 G/DL (ref 6.8–8.8)
RBC # BLD AUTO: 4.04 10E6/UL (ref 3.8–5.2)
WBC # BLD AUTO: 5.5 10E3/UL (ref 4–11)

## 2022-01-06 PROCEDURE — 80076 HEPATIC FUNCTION PANEL: CPT

## 2022-01-06 PROCEDURE — 85025 COMPLETE CBC W/AUTO DIFF WBC: CPT

## 2022-01-06 PROCEDURE — 86140 C-REACTIVE PROTEIN: CPT

## 2022-01-06 PROCEDURE — 82565 ASSAY OF CREATININE: CPT

## 2022-01-06 PROCEDURE — 36415 COLL VENOUS BLD VENIPUNCTURE: CPT

## 2022-01-06 PROCEDURE — 85652 RBC SED RATE AUTOMATED: CPT

## 2022-01-09 ENCOUNTER — HEALTH MAINTENANCE LETTER (OUTPATIENT)
Age: 87
End: 2022-01-09

## 2022-01-10 DIAGNOSIS — M19.90 INFLAMMATORY ARTHRITIS: ICD-10-CM

## 2022-01-10 RX ORDER — METHOTREXATE 2.5 MG/1
15 TABLET ORAL WEEKLY
Qty: 78 TABLET | Refills: 0 | Status: SHIPPED | OUTPATIENT
Start: 2022-01-10 | End: 2022-04-26

## 2022-01-10 NOTE — TELEPHONE ENCOUNTER
Medication:   Methotrexate 2.5 mg  Last written on:   9/27/2021  Quantity:   78    Refills:   0    Last office visit:   9/27/2021  Next office visit:   2/1/2022  Last labs:   1/6/2022    Mesha Nieves CMA Rheumatology  1/10/2022 9:35 AM

## 2022-01-24 ENCOUNTER — OFFICE VISIT (OUTPATIENT)
Dept: RHEUMATOLOGY | Facility: CLINIC | Age: 87
End: 2022-01-24
Payer: COMMERCIAL

## 2022-01-24 VITALS
SYSTOLIC BLOOD PRESSURE: 136 MMHG | BODY MASS INDEX: 28.35 KG/M2 | WEIGHT: 140.6 LBS | HEIGHT: 59 IN | HEART RATE: 65 BPM | DIASTOLIC BLOOD PRESSURE: 80 MMHG | OXYGEN SATURATION: 96 %

## 2022-01-24 DIAGNOSIS — Z79.899 HIGH RISK MEDICATION USE: ICD-10-CM

## 2022-01-24 DIAGNOSIS — M19.90 INFLAMMATORY ARTHRITIS: Primary | ICD-10-CM

## 2022-01-24 PROCEDURE — 99214 OFFICE O/P EST MOD 30 MIN: CPT | Performed by: INTERNAL MEDICINE

## 2022-01-24 RX ORDER — FOLIC ACID 1 MG/1
1 TABLET ORAL DAILY
Qty: 90 TABLET | Refills: 2 | Status: SHIPPED | OUTPATIENT
Start: 2022-01-24 | End: 2022-10-20

## 2022-01-24 ASSESSMENT — MIFFLIN-ST. JEOR: SCORE: 983.39

## 2022-01-24 NOTE — PATIENT INSTRUCTIONS
RHEUMATOLOGY    Dr. Dale Marquis    [address]  [place] [address]  Phone number: [phone]  Fax number: [phone]      Thank you for choosing [facility]!    Mesha Nieves CMA Rheumatology    --------------    A fourth mRNA COVID-19 vaccination is recommended to be on or shortly after 4/19/2022.  Please hold methotrexate for 2 doses after the COVID-19 vaccination.

## 2022-01-24 NOTE — NURSING NOTE
Blood pressure rechecked after visit    136/80  Mesha Nieves CMA Rheumatology  1/24/2022 11:22 AM                                RAPID3 (0-30) Cumulative Score  13.2          RAPID3 Weighted Score (divide #4 by 3 and that is the weighted score)  4.4

## 2022-01-24 NOTE — PROGRESS NOTES
"  Rheumatology Clinic Visit      Ailyn Trevino MRN# [id number]   YOB: 1935 Age: 86 year old      Date of visit: 1/24/22   PCP: Dr. Arlyn Cazares    Chief Complaint   Patient presents with:  Inflammatory arthritis    Assessment and Plan     1.  Inflammatory arthritis / PMR: I initially evaluated in 2017 when she transferred care from Dr. Hurst.  Based on historical records, I agreed that she most likely had polymyalgia rheumatica. PMR was steroid responsive and she has been able to reduce her prednisone dose to 3 mg daily without issue, but then had a \"flare\" involving back pain that radiated around to include just over her bilateral lower rib cage. At the time of her flare in April 2017, the ESR and CRP were normal. She was also having hip pain at that time; she had left TKA performed in January 2017. Prednisone dose was increased at that time with resolution of her pain. I suspected that the pain she was having at that time was due to degenerative spine change rather than PMR.  11/1/2017 MRI of the T-spine showed minimal disc space narrowing at several levels in the mid and lower thoracic spine, minimal osteophyte formation or disc protrusions at several levels, but no stenosis. She has degenerative changes seen on a lumbar spine MRI [facility], per a clinic note from [facility] orthopedics.  Prednisone was tapered off over time without worsening of her symptoms.  On 1/24/2018 she returned with bilateral wrist swelling and tenderness to palpation; also some pain in the back of her hand; and continued neck/back pain. Inflammatory arthritis dx'd at that time and was started on MTX and prednisone; was on MTX 15mg once weekly and no prednisone; doing well when seen on 4/26/2018.   When on methotrexate she noted no stiffness and improved  strength.  She then decided to stop methotrexate.  Not seen between 4/26/2018 and 3/1/2021; restarted methotrexate on 3/1/2021 " but then was lost to follow-up until 9/27/2021.  She continues on methotrexate and is doing well with regard to inflammatory arthritis.  Chronic illness, stable.    - Continue methotrexate 15 mg once weekly  - Continue folic acid 1mg daily  - Labs in late March: CBC, Creatinine, Hepatic Panel  - Labs in late June: CBC, Creatinine, Hepatic Panel, ESR, CRP    High risk medication requiring intensive toxicity monitoring at least quarterly: labs ordered include CBC, Creatinine, Hepatic panel to monitor for cytopenia and hepatotoxicity; checking creatinine as it affects clearance of medication.     2.  Hand osteoarthritis: Large Heberden's nodes.  We had a discussion today about osteoarthritis and how was different from an inflammatory arthritis.     3.  Osteoporosis screening: Osteopenia in 2015 DEXA.  Postmenopausal.  Previously advised and ordered DEXA; reminded her to call to schedule.  - DEXA ordered previously; advised that she call to schedule    4.  Vaccinations: Vaccinations reviewed with Ms. Trevino.  Risks and benefits of vaccinations were discussed.    - Influenza: up to date  - [id number]: up to date  - [id number]: up to date  - Shingrix: Advised receiving  - COVID-19: has received the Pfizer COVID-19 vaccination on 2/11/2021, 3/4/2021, and 11/19/2021; a fourth mRNA COVID-19 vaccination is advised to be received on or shortly after 4/19/2022 and to hold methotrexate for 2 weeks afterward.    Total minutes spent in evaluation with patient, documentation, , and review of pertinent studies and chart notes: 18    Ms. Trevino verbalized agreement with and understanding of the rational for the diagnosis and treatment plan.  All questions were answered to best of my ability and the patient's satisfaction. Ms. Trevino was advised to contact the clinic with any questions that may arise after the clinic visit.      Thank you for involving me in the care of the patient    Return to clinic: 3-4  "months      HPI   Ailyn Trevino is a 86 year old female with a past medical history significant for hyperlipidemia, granulomatous lung disease, aortic valve disorder, mitral valve disorder, impaired fasting glucose, polymyalgia rheumatica, and depression who presents for f/u of arthritis.     4/20/2015 rheumatology clinic note by Dr. Bud Hurst at the [facility] documents the patient does not have signs or symptoms of GCA. Significant myalgias in March 2015 in her shoulders and popliteal area, but not in the hip area. Cortisone shots in the hip in March 2015. Elevated CRP and mildly abnormal sedimentation rate. Morning stiffness for about 2 weeks. 2 hours to loosen up. Difficulty lifting her arms above her shoulders. CK was normal. Prednisone 20 mg daily \"helped about almost completely in 4 days' time\"    In 10/2017, Ms. Trevino reported that she was initially diagnosed with PMR and treated with steroids that were very effective. At that time, she reports having some difficulty raising her arms above her head. She says that she has always had some hip and knee pain that was thought to be secondary to her left knee osteoarthritis. She had a left total knee arthroplasty in January 2017. She also reports having flat feet bilaterally. She has been reducing prednisone slowly over time, and was on prednisone 3 mg daily at that time. When she was evaluated by Dr. Christopher  on 4/20/2017 it was noted that she was having pain in her rib cage, back, and hips. She had just reduced prednisone and today she says at that time she was on 1 mg daily, so the dose was increased to 20 mg a day with resolution of her pain. She feels like she has still doing well while on prednisone 3 mg daily. She still has some thoracic back pain. No difficult or raising her arms above her head today. No difficulty standing up from a low seated position. No jaw claudication, scalp tenderness, headaches, or vision changes.she " also reports having some right groin pain that she thinks is due to altered gait because of her knee issues. She also reports having lower back pain that was addressed at [facility] imaging with an epidural steroid injection that was not effective. This was at the direction of her orthopedic surgeon who is at [facility] orthopedics. She reports having had an MRI of her lumbar spine at [facility]. 8/1/2017 clinic note by Prosper Leonardo at [facility] orthopedics documents that an MRI showed multilevel degenerative changes namely L4-L5 central canal compromise and severe right lateral recess stenosis. Currently without hand pain, but she says that she has had some pain in her fingers from time to time. She denies having stiffness in her hands.     She then had an MRI of the thoracic spine showing degenerative changes of the thoracic spine    1/2018: she returned complaining of bilateral wrist pain, also some pain across the back of her hand.  Pain is worse in the morning with morning stiffness for at least one hour.  Stiffness improves with activity.  Pain in her hands improves with activity.  She continues to have pain in her lower back, upper back, mid back, and neck.  spine pain is worse with activity and improves with rest.  She is following with an orthopedic surgeon for her back.      4/26/2018, she reported that she is doing poorly because she has a nonproductive cough but feels like she needs to cough something up.  She is going to have a chest CT later today.  No shortness of breath or chest pain.  Feels like she has chest congestion now.  Says that she is feeling better since using methotrexate.  She is off of prednisone.  Improved  strength bilaterally.  Hand pain, especially in the morning, has improved significantly.  No morning stiffness. No difficulty raising her arms above her head or standing up from a low seated position.  Denies jaw claudication, scalp tenderness, vision change, new  headache, difficulty standing up from a chair, or difficulty raising her arms above her head.      3/1/2021: Not doing as well as she was doing previously when on methotrexate.  She said that she stopped taking methotrexate because she was concerned about potential side effects.  Now with swelling in her wrists and across her MCPs that is worse in the morning and improved with time and activity.  Morning stiffness for at least 1 hour at the wrists.  Reduced  strength bilaterally.  She recalls that these were symptoms she had in the past that improved with methotrexate.  She notes drinking 4 ounces of wine per day.    2021: Currently doing well on methotrexate.  No joint pain or swelling.  No morning stiffness or gelling phenomenon.  She says that her   last October and she has now moved to [place] so she is adjusting to these changes.  Otherwise doing well.  Mild chronic neck pain that is worse with activity and improves with rest; nonradiating.    Today, 2022: Currently doing well with regard to inflammatory arthritis.  Tolerating methotrexate well.  No joint pain or stiffness except for the left foot that has been operated on twice and she follows with podiatry for this.  No joint swelling.    Denies fevers, chills, nausea, vomiting, constipation, diarrhea. No abdominal pain. No chest pain/pressure, palpitations, or shortness of breath. No LE swelling.  No oral or nasal sores.  No rash.      Tobacco:  None  EtOH: No more than 1 drink per day: 4 ounce glass of wine  Drugs:   Occupation: used to work as a , retired now    ROS   12 point review of system was completed and negative except as noted in the HPI     Active Problem List     Patient Active Problem List   Diagnosis     Advanced directives, counseling/discussion     Osteopenia     Palpitations     Hyperlipidemia LDL goal <130     Renal cyst     Granulomatous lung disease (H)     Health Care Home     Insomnia     S/P  total knee arthroplasty, left     Aortic valve disorder     Mitral valve disorder     Encounter for long-term current use of medication     Major depressive disorder, single episode, mild (H)     Current chronic use of systemic steroids     Fatigue, unspecified type     Chronic pain of left knee     Venous stasis dermatitis of left lower extremity     Fecal urgency     Elevated hemidiaphragm     Inflammatory arthritis     Pulmonary nodules     Hypertension goal BP (blood pressure) < 140/90     Past Medical History     Past Medical History:   Diagnosis Date     Aortic valve disorder      Elevated hemidiaphragm      Granulomatous lung disease (H) 8/7/2012     High cholesterol      Insomnia 10/8/2013    Benadryl gives her RLS     Mitral valve disorder      Osteoarthritis of knee 10/8/2013    Sees Dr. Whaley     Osteopenia 7/31/2012     Other chronic pain 8/3/2015    Patient is followed by KIARA RAINES for ongoing prescription of pain medication.  All refills should be approved by this provider, or covering partner.  Medication(s): Hydrocodone/APAP.  Maximum quantity per month: 30 Clinic visit frequency required: Q 3 months   Controlled substance agreement on file: Yes      Date(s): 8/3/15  Pain Clinic evaluation in the past: No  DIRE Total Score(s): No fl     Polymyalgia rheumatica (H)      Pulmonary nodules      Renal cyst 8/7/2012     Past Surgical History     Past Surgical History:   Procedure Laterality Date     APPENDECTOMY  1951     CATARACT IOL, RT/LT  2010    bilateral      CHOLECYSTECTOMY  2010     ORTHOPEDIC SURGERY      Left Knee Surgery 1/2017     RECONSTRUCT FOREFOOT WITH METATARSOPHALANGEAL (MTP) FUSION Left 10/24/2018    Procedure: LEFT FIRST METATARSOPHALANGEAL JOINT ARTHRODESIS;  Surgeon: Rufus Harden MD;  Location:  OR     REPAIR HAMMER TOE Left 10/24/2018    Procedure: LESSER TOE RECONSTRUCTION SECOND, THIRD, FOURTH AND FIFTH TOES;  Surgeon: Rufus Harden MD;  Location:  OR      SURGICAL HISTORY OF -   12/13    bilateral YAG laser surgery of eyes     Allergy   No Known Allergies  Current Medication List     Current Outpatient Medications   Medication Sig     Acetaminophen (TYLENOL PO) Take 650 mg by mouth 2 times daily as needed for mild pain or fever     atenolol (TENORMIN) 25 MG tablet Take 1 tablet (25 mg) by mouth daily     folic acid (FOLVITE) 1 MG tablet Take 1 tablet (1 mg) by mouth daily     Multiple Vitamins-Minerals (CENTRUM SILVER ADULT 50+ PO) Take 1 tablet by mouth daily Reported on 4/20/2017     tiZANidine (ZANAFLEX) 2 MG tablet Take 0.5 tablets (1 mg) by mouth nightly as needed for muscle spasms     aspirin - buffered (ASCRIPTIN) 325 MG TABS tablet Take 1 tablet (325 mg) by mouth daily     hydrOXYzine (ATARAX) 25 MG tablet 1/2 - 1 TAB AT BEDTIME AS NEEDED FOR SLEEP (Patient not taking: Reported on 1/24/2022)     methotrexate sodium 2.5 MG TABS Take 6 tablets (15 mg) by mouth once a week . Take all 6 tablets on the same day of each week.  Labs required every 3 months (Patient not taking: Reported on 1/24/2022)     omeprazole (PRILOSEC) 20 MG DR capsule Take 1 capsule (20 mg) by mouth daily (Patient not taking: Reported on 1/24/2022)     No current facility-administered medications for this visit.       Social History   See HPI    Family History     Family History   Problem Relation Age of Onset     Cancer Mother      Cerebrovascular Disease Father      Denies family history of autoimmune disease     Physical Exam     Temp Readings from Last 3 Encounters:   07/08/21 97.7  F (36.5  C) (Oral)   06/04/21 97.9  F (36.6  C) (Oral)   02/22/21 98.9  F (37.2  C) (Oral)     BP Readings from Last 5 Encounters:   01/24/22 136/80   09/27/21 (!) 153/65   07/08/21 120/50   06/04/21 138/59   02/22/21 128/64     Pulse Readings from Last 1 Encounters:   01/24/22 65     Resp Readings from Last 1 Encounters:   10/19/20 19     Estimated body mass index is 28.4 kg/m  as calculated from the  "following:    Height as of this encounter: 1.499 m (4' 11\").    Weight as of this encounter: 63.8 kg (140 lb 9.6 oz).    GEN: NAD.  HEENT:  Anicteric, noninjected sclera. No obvious external lesions of the ear and nose. Hearing intact.  CV: S1, S2. RRR. No m/r/g  PULM: No increased work of breathing. CTA bilaterally   MSK: MCPs, PIPs, DIPs without swelling or tenderness to palpation.  Large Heberden's nodes.  Wrists without swelling or tenderness to palpation.  Elbows and shoulders without swelling or tenderness to palpation.   Knees, ankles, and MTPs without swelling or tenderness to palpation.    SKIN: No rash or jaundice seen  PSYCH: Alert. Appropriate.      Labs / Imaging (select studies)     RF/CCP  Recent Labs   Lab Test 06/26/17  1854 04/20/15  1340 02/02/15  1115   CCPABY  --  <20  Interpretation:  Negative    --    CCPIGG <1  Negative    --   --    RHF <20  --  <20     CBC  Recent Labs   Lab Test 01/06/22  1022 09/27/21  0939 07/08/21  1156 05/26/21  1328 10/09/18  1158 04/23/18  1054   WBC 5.5 6.0 7.9 6.0   < > 6.7   RBC 4.04 4.00 3.99 4.25   < > 4.18   HGB 13.6 13.5 13.6 13.9   < > 13.4   HCT 40.5 40.1 39.4 41.5   < > 40.4    100 99 98   < > 97   RDW 13.4 13.6 13.8 14.8   < > 14.8    251 260 261   < > 210   MCH 33.7* 33.8* 34.1* 32.7   < > 32.1   MCHC 33.6 33.7 34.5 33.5   < > 33.2   NEUTROPHIL 59 71 71.3 69.7  --  62.7   LYMPH 24 15 16.7 18.8  --  20.4   MONOCYTE 11 10 8.8 6.9  --  15.6   EOSINOPHIL 5 4 2.8 3.9  --  1.0   BASOPHIL 1 0 0.4 0.7  --  0.3   ANEU  --   --  5.6 4.2  --  4.2   ALYM  --   --  1.3 1.1  --  1.4   LAURENCE  --   --  0.7 0.4  --  1.0   AEOS  --   --  0.2 0.2  --  0.1   ABAS  --   --  0.0 0.0  --  0.0   ANEUTAUTO 3.2 4.3  --   --   --   --    ALYMPAUTO 1.3 0.9  --   --   --   --    AMONOAUTO 0.6 0.6  --   --   --   --    AEOSAUTO 0.3 0.2  --   --   --   --    ABSBASO 0.1 0.0  --   --   --   --     < > = values in this interval not displayed.     CMP  Recent Labs   Lab Test " 01/06/22  1022 09/27/21  0938 07/08/21  1156 05/26/21  1328 02/22/21  1634 10/19/20  1433 07/23/19  1035   NA  --   --  140  --   --  139 141   POTASSIUM  --   --  4.7  --   --  4.0 4.1   CHLORIDE  --   --  108  --   --  106 109   CO2  --   --  26  --   --  24 27   ANIONGAP  --   --  6  --   --  9 5   GLC  --   --  94  --   --  90 101*   BUN  --   --  14  --   --  21 20   CR 0.69 0.73 0.80 0.88 0.70 0.74 0.67   GFRESTIMATED 84 75 67 60* 79 74 81   GFRESTBLACK  --   --  78 69 >90 86 >90   ALEJANDRO  --  9.7 9.2  --   --  9.0 8.8   BILITOTAL 0.5  --  0.7 0.4 0.3  --  0.5   ALBUMIN 4.0  --  3.9 3.8 4.1  --  3.9   PROTTOTAL 6.6*  --  7.1 6.5* 7.1  --  7.2   ALKPHOS 84  --  78 69 82  --  101   AST 18  --  23 21 21  --  21   ALT 27  --  30 28 26 27 27     Calcium/VitaminD  Recent Labs   Lab Test 09/27/21  0938 07/08/21  1156 10/19/20  1433 10/04/16  1145 02/05/16  1500   ALEJANDRO 9.7 9.2 9.0   < > 9.1   VITDT 40  --   --   --  39    < > = values in this interval not displayed.     ESR/CRP  Recent Labs   Lab Test 01/06/22  1022 09/27/21  0938 05/26/21  1328   SED 8 8 6   CRP 3.8 4.0 <2.9       Hepatitis B  Recent Labs   Lab Test 01/24/18 0918   HBCAB Nonreactive   HEPBANG Nonreactive     Hepatitis C  Recent Labs   Lab Test 01/24/18 0918   HCVAB Nonreactive     Immunization History     Immunization History   Administered Date(s) Administered     COVID-19,PF,Pfizer (12+ Yrs) 02/11/2021, 03/04/2021     FLU 6-35 months 09/25/2009     Flu, Unspecified 10/04/2016     Influenza (High Dose) 3 valent vaccine 10/26/2010, 10/08/2013, 10/22/2014, 10/05/2015, 10/04/2016, 09/08/2017, 10/09/2018, 10/01/2019     Influenza (IIV3) PF 10/13/2006, 10/31/2008, 10/26/2010, 10/24/2011, 10/23/2012, 10/08/2020     Influenza, Quad, High Dose, Pf, 65yr+ (Fluzone HD) 10/08/2020, 09/27/2021     Pneumo Conj 13-V (2010&after) 11/03/2015     Pneumococcal 23 valent 12/16/2004     TD (ADULT, 7+) 07/21/2010, 06/04/2021     Tdap (Adacel,Boostrix) 07/21/2010      Zoster vaccine, live 06/04/2009          Chart documentation done in part with Dragon Voice recognition Software. Although reviewed after completion, some word and grammatical error may remain.    Dale Marquis MD

## 2022-02-01 ENCOUNTER — OFFICE VISIT (OUTPATIENT)
Dept: INTERNAL MEDICINE | Facility: CLINIC | Age: 87
End: 2022-02-01
Payer: COMMERCIAL

## 2022-02-01 VITALS
DIASTOLIC BLOOD PRESSURE: 62 MMHG | OXYGEN SATURATION: 96 % | HEART RATE: 59 BPM | TEMPERATURE: 98.3 F | WEIGHT: 140 LBS | SYSTOLIC BLOOD PRESSURE: 150 MMHG | BODY MASS INDEX: 28.28 KG/M2

## 2022-02-01 DIAGNOSIS — M35.3 PMR (POLYMYALGIA RHEUMATICA) (H): ICD-10-CM

## 2022-02-01 DIAGNOSIS — N64.4 BREAST PAIN, LEFT: ICD-10-CM

## 2022-02-01 DIAGNOSIS — F41.1 GENERALIZED ANXIETY DISORDER: ICD-10-CM

## 2022-02-01 DIAGNOSIS — N89.8 VAGINAL IRRITATION: ICD-10-CM

## 2022-02-01 DIAGNOSIS — F33.1 MODERATE EPISODE OF RECURRENT MAJOR DEPRESSIVE DISORDER (H): ICD-10-CM

## 2022-02-01 DIAGNOSIS — E78.5 HYPERLIPIDEMIA LDL GOAL <130: ICD-10-CM

## 2022-02-01 DIAGNOSIS — M19.90 INFLAMMATORY ARTHRITIS: ICD-10-CM

## 2022-02-01 DIAGNOSIS — I10 HYPERTENSION GOAL BP (BLOOD PRESSURE) < 140/90: Primary | ICD-10-CM

## 2022-02-01 LAB
CHOLEST SERPL-MCNC: 252 MG/DL
FASTING STATUS PATIENT QL REPORTED: YES
HDLC SERPL-MCNC: 61 MG/DL
LDLC SERPL CALC-MCNC: 163 MG/DL
NONHDLC SERPL-MCNC: 191 MG/DL
TRIGL SERPL-MCNC: 138 MG/DL

## 2022-02-01 PROCEDURE — 96127 BRIEF EMOTIONAL/BEHAV ASSMT: CPT | Mod: 59 | Performed by: INTERNAL MEDICINE

## 2022-02-01 PROCEDURE — 99215 OFFICE O/P EST HI 40 MIN: CPT | Performed by: INTERNAL MEDICINE

## 2022-02-01 PROCEDURE — 80061 LIPID PANEL: CPT | Performed by: INTERNAL MEDICINE

## 2022-02-01 PROCEDURE — 36415 COLL VENOUS BLD VENIPUNCTURE: CPT | Performed by: INTERNAL MEDICINE

## 2022-02-01 RX ORDER — TRIAMCINOLONE ACETONIDE 1 MG/G
CREAM TOPICAL 2 TIMES DAILY PRN
Qty: 45 G | Refills: 1 | Status: SHIPPED | OUTPATIENT
Start: 2022-02-01

## 2022-02-01 RX ORDER — ATENOLOL 25 MG/1
25 TABLET ORAL DAILY
Qty: 90 TABLET | Refills: 3 | Status: SHIPPED | OUTPATIENT
Start: 2022-02-01 | End: 2023-03-02

## 2022-02-01 ASSESSMENT — PATIENT HEALTH QUESTIONNAIRE - PHQ9: SUM OF ALL RESPONSES TO PHQ QUESTIONS 1-9: 9

## 2022-02-01 NOTE — PATIENT INSTRUCTIONS
Your Pfizer booster is due on or after 4/19/22    Mammogram (soon)    Okay to continue using tizanidine for sleep//neck    Triamcinolone as needed for irritation    Recheck BP at home:  Keep diary.  Goal is less than 150/90      Return to clinic Spring for annual physical

## 2022-02-01 NOTE — PROGRESS NOTES
"  Assessment & Plan     Hypertension goal BP (blood pressure) < 140/90  BP is a bit elevated  I asked her to keep track via home monitor, reassess  Consider adding med next visit vs changing BP goals.   - atenolol (TENORMIN) 25 MG tablet; Take 1 tablet (25 mg) by mouth daily    Inflammatory arthritis  PMR (polymyalgia rheumatica) (H)  She wanted to discuss.  She has been on methotrexate for years.  She is doing well.   She has some anxiety about this, vague.  Needs reassurance.      Generalized anxiety disorder   I think this is a big issue for her and may cause some perservation about symptoms.   Will address at next visit.   Moderate episode of recurrent major depressive disorder (H)  See above*    Breast pain, left   exam is negative.   She is worried we may be missing cancer.   - MA Diagnostic Digital Bilateral; Future    Vaginal irritation   has discussed her subjective sense of malodor with PCP for years, work ups negative.   She can try some symptoms thinks like baby powder to keep area dry for example.    - triamcinolone (KENALOG) 0.1 % external cream; Apply topically 2 times daily as needed for irritation //itching    Hyperlipidemia LDL goal <130     - Lipid panel reflex to direct LDL Fasting; Future  - Lipid panel reflex to direct LDL Fasting      I spent a total of 40 minutes on the day of the visit.   Time spent doing chart review, history and exam, documentation and further activities per the note         BMI:   Estimated body mass index is 28.28 kg/m  as calculated from the following:    Height as of 1/24/22: 1.499 m (4' 11\").    Weight as of this encounter: 63.5 kg (140 lb).       Depression Screening Follow Up    PHQ 2/1/2022   PHQ-9 Total Score 9   Q9: Thoughts of better off dead/self-harm past 2 weeks Several days             Follow Up  Return in about 3 months (around 5/1/2022) for Physical Exam.    Arlyn Cazares MD  [facility] " "[place]    ==========================================================  =========================================    Subjective   Ailyn is a 86 year old who presents for the following health issues   Lives in Sr housing in [place].  She still drives.    last year, sold home and moved .   She has 4 children, 2 are in the area who are supportive.      85 y/o  ('21) F here for med check       H/o  PMR/inflammatory arthritis (MTX use), L TKA, mild MDD, granulomatous lung disease, HTN, moderate aortic and mitral valve insufficiency-stable   Recent MTX surveillance labs look fine.   Started omeprazole via colleague summer '21 due to epigastric pain, then d/c again.            Complains of occasional sore ribcage.  Followed by pain at L breast.  This occurs once daily, random.  The severity is mild.  No other associated symptoms.   Not brought on by activity.  Tired a lot.  Weight stable.      Her L foot affects mobility and balance, h/o surgeries.  Has numbness there.       She wants to know why she has \"odor in private area\".    In past, WP is negative.      Has some trouble sleeping at times.           HPI     Medication Followup of all meds    Taking Medication as prescribed: yes    Side Effects:  None    Medication Helping Symptoms:  yes         Review of Systems   Constitutional, HEENT, cardiovascular, pulmonary, gi and gu systems are negative, except as otherwise noted.      Objective    BP (!) 150/62 (BP Location: Left arm, Patient Position: Sitting, Cuff Size: Adult Regular)   Pulse 59   Temp 98.3  F (36.8  C) (Oral)   Wt 63.5 kg (140 lb)   SpO2 96%   BMI 28.28 kg/m    Body mass index is 28.28 kg/m .  Physical Exam   GENERAL: healthy, alert and no distress  EYES: Eyes grossly normal to inspection, PERRL and conjunctivae and sclerae normal  NECK: no adenopathy, no asymmetry, masses, or scars and thyroid normal to palpation  RESP: lungs clear to auscultation - no rales, rhonchi or " wheezes  BREAST: normal without masses, tenderness or nipple discharge and no palpable axillary masses or adenopathy  BREAST:  Unable to reproduce pain to palpation of breast, rib cage.    CV: regular rate and rhythm, normal S1 S2, no S3 or S4, no murmur, click or rub, no peripheral edema and peripheral pulses strong  MS: no gross musculoskeletal defects noted, no edema  SKIN: no suspicious lesions or rashes  NEURO: Normal strength and tone, mentation intact and speech normal  PSYCH: mentation appears normal, affect normal/bright.  Content of speech re: symptoms and concerns.     FLP pending.

## 2022-02-01 NOTE — RESULT ENCOUNTER NOTE
Ailyn Trevino    The cholesterol is elevated.  We can discuss options at our next visit, no urgency to start therapy.     I will watch for your results upcoming.     Best.       JACKY DIEGO M.D.

## 2022-03-03 ENCOUNTER — OFFICE VISIT (OUTPATIENT)
Dept: OTHER | Facility: CLINIC | Age: 87
End: 2022-03-03
Payer: COMMERCIAL

## 2022-03-03 VITALS
OXYGEN SATURATION: 97 % | SYSTOLIC BLOOD PRESSURE: 155 MMHG | RESPIRATION RATE: 16 BRPM | BODY MASS INDEX: 27.48 KG/M2 | WEIGHT: 140 LBS | TEMPERATURE: 97.9 F | HEIGHT: 60 IN | DIASTOLIC BLOOD PRESSURE: 65 MMHG | HEART RATE: 60 BPM

## 2022-03-03 DIAGNOSIS — Z00.00 ENCOUNTER FOR MEDICARE ANNUAL WELLNESS EXAM: Primary | ICD-10-CM

## 2022-03-03 PROCEDURE — G0439 PPPS, SUBSEQ VISIT: HCPCS | Performed by: FAMILY MEDICINE

## 2022-03-03 ASSESSMENT — ACTIVITIES OF DAILY LIVING (ADL): CURRENT_FUNCTION: NO ASSISTANCE NEEDED

## 2022-03-03 ASSESSMENT — PAIN SCALES - GENERAL: PAINLEVEL: NO PAIN (0)

## 2022-03-03 NOTE — PATIENT INSTRUCTIONS
Patient Education   Personalized Prevention Plan  You are due for the preventive services outlined below.  Your care team is available to assist you in scheduling these services.  If you have already completed any of these items, please share that information with your care team to update in your medical record.  Health Maintenance Due   Topic Date Due     Zoster (Shingles) Vaccine (2 of 3) 07/30/2009

## 2022-03-03 NOTE — PROGRESS NOTES
"Assessment & Plan     ICD-10-CM    1. Encounter for Medicare annual wellness exam  Z00.00        Follow Up/Next Steps    Return in about 53 weeks (around 3/9/2023) for Annual Wellness Visit.  BP reading 155/65. Patient on BP medications. Per patient , she saw her PCP Feb. 1 2022.  Overdue for zoster immunization. Patient states will take care the overdue she has. Continue to follow up with PCP for chronic conditions.    Counseling and Education  Reviewed preventive health counseling, as reflected in patient instructions        Appropriate preventive services were discussed with this patient, including applicable screening as appropriate for cardiovascular disease, diabetes, osteopenia/osteoporosis, and glaucoma.  As appropriate for age/gender, discussed screening for colorectal cancer, prostate cancer, breast cancer, and cervical cancer. Checklist reviewing preventive services available has been given to the patient.    Reviewed patients plan of care and provided an AVS. The Basic Care Plan (routine screening as documented in Health Maintenance) for Ailyn meets the Care Plan requirement. This Care Plan has been established and reviewed with the Patient.    Visit Provider: Nelly Melgoza RN  Supervising Provider: Mary Watson MD, MD  [facility]       Subjective   Ailyn is a 86 year old who is being seen for an Annual Wellness Visit  accompanied by her none.    Healthy Habits:     In general, how would you rate your overall health?  Good    Frequency of exercise:  2-3 days/week    Duration of exercise:  45-60 minutes    Do you usually eat at least 4 servings of fruit and vegetables a day, include whole grains    & fiber and avoid regularly eating high fat or \"junk\" foods?  No    Taking medications regularly:  Yes    Medication side effects:  None    Ability to successfully perform activities of daily living:  No assistance needed    Home Safety:  No safety concerns " identified    Hearing Impairment:  Difficulty following a conversation in a noisy restaurant or crowded room, feel that people are mumbling or not speaking clearly, difficult to understand a speaker at a public meeting or [employer] service, need to ask people to speak up or repeat themselves and difficulty understanding soft or whispered speech    In the past 6 months, have you been bothered by leaking of urine?  No    In general, how would you rate your overall mental or emotional health?  Good      PHQ-2 Total Score: 1    Additional concerns today:  No    Do you feel safe in your environment? Yes    Have you ever done Advance Care Planning? (For example, a Health Directive, POLST, or a discussion with a medical provider or your loved ones about your wishes): Yes, patient states has an Advance Care Planning document and will bring a copy to the clinic.    Fall risk  Fallen 2 or more times in the past year?: No  Any fall with injury in the past year?: No  Cognitive Screening   1) Repeat 3 items (Leader, Season, Table)    2) Clock draw: NORMAL  3) 3 item recall: Recalls 2 objects   Results: NORMAL clock, 1-2 items recalled: COGNITIVE IMPAIRMENT LESS LIKELY    Mini-CogTM Copyright S [place]. Licensed by the author for use in [facility]; reprinted with permission ([email or web address]). All rights reserved.      Do you have sleep apnea, excessive snoring or daytime drowsiness?: no    Reviewed and updated as needed this visit by clinical staff    Allergies  Meds  Problems             Social History     Tobacco Use     Smoking status: Former Smoker     Quit date: 1998     Years since quittin.1     Smokeless tobacco: Never Used   Substance Use Topics     Alcohol use: Yes     Comment: 1 drink a day or less       Current providers sharing in care for this patient include:   Patient Care Team:  Arlyn Cazares MD as PCP - General (Internal Medicine)  Bud Hurst MD as MD  "(Rheumatology)  Silvia Sarmiento, RN as   Arlyn Cazares MD as Assigned PCP  Dale Marquis MD as Assigned Rheumatology Provider    The following health maintenance items are reviewed in Epic and correct as of today:  Health Maintenance Due   Topic Date Due     ZOSTER IMMUNIZATION (2 of 3) 07/30/2009       Patient states will make an appt. to be tested.  Pertinent mammograms are reviewed under the imaging tab.        Objective    BP (!) 155/65 (BP Location: Right arm, Patient Position: Sitting, Cuff Size: Adult Regular)   Pulse 60   Temp 97.9  F (36.6  C) (Temporal)   Resp 16   Ht 1.511 m (4' 11.5\")   Wt 63.5 kg (140 lb)   SpO2 97%   BMI 27.80 kg/m   Estimated body mass index is 27.8 kg/m  as calculated from the following:    Height as of this encounter: 1.511 m (4' 11.5\").    Weight as of this encounter: 63.5 kg (140 lb).  Physical Exam  Patient appears comfortable and in no acute distress.        Identified Health Risks:  "

## 2022-03-16 ENCOUNTER — ANCILLARY PROCEDURE (OUTPATIENT)
Dept: ULTRASOUND IMAGING | Facility: CLINIC | Age: 87
End: 2022-03-16
Attending: INTERNAL MEDICINE
Payer: COMMERCIAL

## 2022-03-16 ENCOUNTER — ANCILLARY PROCEDURE (OUTPATIENT)
Dept: MAMMOGRAPHY | Facility: CLINIC | Age: 87
End: 2022-03-16
Attending: INTERNAL MEDICINE
Payer: COMMERCIAL

## 2022-03-16 DIAGNOSIS — N64.4 BREAST PAIN, LEFT: ICD-10-CM

## 2022-03-16 PROCEDURE — 76642 ULTRASOUND BREAST LIMITED: CPT | Mod: LT

## 2022-03-16 PROCEDURE — 77066 DX MAMMO INCL CAD BI: CPT

## 2022-03-16 PROCEDURE — G0279 TOMOSYNTHESIS, MAMMO: HCPCS

## 2022-03-30 ENCOUNTER — LAB (OUTPATIENT)
Dept: LAB | Facility: CLINIC | Age: 87
End: 2022-03-30
Payer: COMMERCIAL

## 2022-03-30 DIAGNOSIS — M19.90 INFLAMMATORY ARTHRITIS: ICD-10-CM

## 2022-03-30 DIAGNOSIS — Z79.899 HIGH RISK MEDICATION USE: ICD-10-CM

## 2022-03-30 LAB
ALBUMIN SERPL-MCNC: 4 G/DL (ref 3.4–5)
ALP SERPL-CCNC: 79 U/L (ref 40–150)
ALT SERPL W P-5'-P-CCNC: 31 U/L (ref 0–50)
AST SERPL W P-5'-P-CCNC: 23 U/L (ref 0–45)
BASOPHILS # BLD AUTO: 0 10E3/UL (ref 0–0.2)
BASOPHILS NFR BLD AUTO: 1 %
BILIRUB DIRECT SERPL-MCNC: 0.2 MG/DL (ref 0–0.2)
BILIRUB SERPL-MCNC: 0.8 MG/DL (ref 0.2–1.3)
CREAT SERPL-MCNC: 0.71 MG/DL (ref 0.52–1.04)
EOSINOPHIL # BLD AUTO: 0.2 10E3/UL (ref 0–0.7)
EOSINOPHIL NFR BLD AUTO: 3 %
ERYTHROCYTE [DISTWIDTH] IN BLOOD BY AUTOMATED COUNT: 13.6 % (ref 10–15)
GFR SERPL CREATININE-BSD FRML MDRD: 82 ML/MIN/1.73M2
HCT VFR BLD AUTO: 40.8 % (ref 35–47)
HGB BLD-MCNC: 13.7 G/DL (ref 11.7–15.7)
LYMPHOCYTES # BLD AUTO: 1.3 10E3/UL (ref 0.8–5.3)
LYMPHOCYTES NFR BLD AUTO: 23 %
MCH RBC QN AUTO: 33.5 PG (ref 26.5–33)
MCHC RBC AUTO-ENTMCNC: 33.6 G/DL (ref 31.5–36.5)
MCV RBC AUTO: 100 FL (ref 78–100)
MONOCYTES # BLD AUTO: 0.5 10E3/UL (ref 0–1.3)
MONOCYTES NFR BLD AUTO: 8 %
NEUTROPHILS # BLD AUTO: 3.7 10E3/UL (ref 1.6–8.3)
NEUTROPHILS NFR BLD AUTO: 65 %
PLATELET # BLD AUTO: 242 10E3/UL (ref 150–450)
PROT SERPL-MCNC: 6.9 G/DL (ref 6.8–8.8)
RBC # BLD AUTO: 4.09 10E6/UL (ref 3.8–5.2)
WBC # BLD AUTO: 5.7 10E3/UL (ref 4–11)

## 2022-03-30 PROCEDURE — 36415 COLL VENOUS BLD VENIPUNCTURE: CPT

## 2022-03-30 PROCEDURE — 85025 COMPLETE CBC W/AUTO DIFF WBC: CPT

## 2022-03-30 PROCEDURE — 80076 HEPATIC FUNCTION PANEL: CPT

## 2022-03-30 PROCEDURE — 82565 ASSAY OF CREATININE: CPT

## 2022-04-11 ENCOUNTER — TELEPHONE (OUTPATIENT)
Dept: FAMILY MEDICINE | Facility: CLINIC | Age: 87
End: 2022-04-11
Payer: COMMERCIAL

## 2022-04-11 ENCOUNTER — OFFICE VISIT (OUTPATIENT)
Dept: INTERNAL MEDICINE | Facility: CLINIC | Age: 87
End: 2022-04-11
Payer: COMMERCIAL

## 2022-04-11 VITALS
BODY MASS INDEX: 28.22 KG/M2 | OXYGEN SATURATION: 94 % | SYSTOLIC BLOOD PRESSURE: 189 MMHG | TEMPERATURE: 98.1 F | HEART RATE: 69 BPM | WEIGHT: 140 LBS | HEIGHT: 59 IN | DIASTOLIC BLOOD PRESSURE: 69 MMHG

## 2022-04-11 DIAGNOSIS — R07.89 CHEST DISCOMFORT: Primary | ICD-10-CM

## 2022-04-11 DIAGNOSIS — I10 HYPERTENSION, GOAL BELOW 140/90: ICD-10-CM

## 2022-04-11 DIAGNOSIS — F41.9 ANXIETY AND DEPRESSION: ICD-10-CM

## 2022-04-11 DIAGNOSIS — F32.A ANXIETY AND DEPRESSION: ICD-10-CM

## 2022-04-11 DIAGNOSIS — Z23 HIGH PRIORITY FOR 2019-NCOV VACCINE: ICD-10-CM

## 2022-04-11 DIAGNOSIS — R10.13 EPIGASTRIC PAIN: ICD-10-CM

## 2022-04-11 PROCEDURE — 96127 BRIEF EMOTIONAL/BEHAV ASSMT: CPT | Performed by: INTERNAL MEDICINE

## 2022-04-11 PROCEDURE — 91305 COVID-19,PF,PFIZER (12+ YRS): CPT | Performed by: INTERNAL MEDICINE

## 2022-04-11 PROCEDURE — 99215 OFFICE O/P EST HI 40 MIN: CPT | Mod: 25 | Performed by: INTERNAL MEDICINE

## 2022-04-11 PROCEDURE — 0054A COVID-19,PF,PFIZER (12+ YRS): CPT | Performed by: INTERNAL MEDICINE

## 2022-04-11 RX ORDER — OMEPRAZOLE 40 MG/1
40 CAPSULE, DELAYED RELEASE ORAL DAILY
Qty: 90 CAPSULE | Refills: 1 | Status: SHIPPED | OUTPATIENT
Start: 2022-04-11 | End: 2022-06-03

## 2022-04-11 RX ORDER — SERTRALINE HYDROCHLORIDE 25 MG/1
25 TABLET, FILM COATED ORAL DAILY
Qty: 90 TABLET | Refills: 1 | Status: SHIPPED | OUTPATIENT
Start: 2022-04-11 | End: 2022-06-03

## 2022-04-11 RX ORDER — LOSARTAN POTASSIUM 50 MG/1
50 TABLET ORAL DAILY
Qty: 90 TABLET | Refills: 3 | Status: SHIPPED | OUTPATIENT
Start: 2022-04-11 | End: 2023-03-23

## 2022-04-11 ASSESSMENT — PATIENT HEALTH QUESTIONNAIRE - PHQ9
SUM OF ALL RESPONSES TO PHQ QUESTIONS 1-9: 13
10. IF YOU CHECKED OFF ANY PROBLEMS, HOW DIFFICULT HAVE THESE PROBLEMS MADE IT FOR YOU TO DO YOUR WORK, TAKE CARE OF THINGS AT HOME, OR GET ALONG WITH OTHER PEOPLE: SOMEWHAT DIFFICULT
SUM OF ALL RESPONSES TO PHQ QUESTIONS 1-9: 13

## 2022-04-11 ASSESSMENT — ANXIETY QUESTIONNAIRES
GAD7 TOTAL SCORE: 13
GAD7 TOTAL SCORE: 13
4. TROUBLE RELAXING: SEVERAL DAYS
5. BEING SO RESTLESS THAT IT IS HARD TO SIT STILL: NOT AT ALL
2. NOT BEING ABLE TO STOP OR CONTROL WORRYING: NEARLY EVERY DAY
7. FEELING AFRAID AS IF SOMETHING AWFUL MIGHT HAPPEN: NOT AT ALL
GAD7 TOTAL SCORE: 13
3. WORRYING TOO MUCH ABOUT DIFFERENT THINGS: NEARLY EVERY DAY
6. BECOMING EASILY ANNOYED OR IRRITABLE: NEARLY EVERY DAY
7. FEELING AFRAID AS IF SOMETHING AWFUL MIGHT HAPPEN: NOT AT ALL
1. FEELING NERVOUS, ANXIOUS, OR ON EDGE: NEARLY EVERY DAY

## 2022-04-11 ASSESSMENT — ENCOUNTER SYMPTOMS: ABDOMINAL PAIN: 1

## 2022-04-11 NOTE — PATIENT INSTRUCTIONS
Omeprazole    40 mg daily.   This is an acid suppressant      Sertraline 25 mg daily... if no side effects, consider doubling this   Anx/depre      Blood Pressure medication:  losartan 50 mg daily  Schedule BP recheck and labs in 1-2 weeks.     Work on less sodium intake    Stress test:   Call to schedule imaging

## 2022-04-11 NOTE — PROGRESS NOTES
Assessment & Plan     Chest discomfort   she has discomfort at lower rib borders... rule out cardiac  - NM Lexiscan stress test; Future  - CBC with platelets; Future    Epigastric pain   she likely has GERD, should really take PPI daily, don't d/c it.    - omeprazole (PRILOSEC) 40 MG DR capsule; Take 1 capsule (40 mg) by mouth in the morning.    Anxiety and depression   she is very very anxious and the above symptoms stir things up  In addition, she has lost some mobiltiy and she is very apprehensive about her upcoming flight to [place] (granddaughter's wedding) .  With the symptoms above and her high anxiety, she should absolutely not make this trip.  She is agreeable to staying home and getting eval&treat.    Letter composed for airline, cancel trip    Start Sertraline.    Side effects discussed and questions answered.   - sertraline (ZOLOFT) 25 MG tablet; Take 1 tablet (25 mg) by mouth in the morning.    Hypertension, goal below 140/90   she is agreeable to starting   - losartan (COZAAR) 50 MG tablet; Take 1 tablet (50 mg) by mouth in the morning.  - **Basic metabolic panel FUTURE 14d; Future    High priority for 2019-nCoV vaccine  done  - COVID-19,PF,PFIZER (12+ Yrs GRAY LABEL)      I spent a total of 40 minutes on the day of the visit.   Time spent doing chart review, history and exam, documentation and further activities per the note        Depression Screening Follow Up    PHQ 4/11/2022   PHQ-9 Total Score 13   Q9: Thoughts of better off dead/self-harm past 2 weeks Several days   F/U: Thoughts of suicide or self-harm Yes   F/U: Self harm-plan No   F/U: Self-harm action No   F/U: Safety concerns No      Follow Up      Follow Up Actions Taken  Patient to follow up with PCP.  Clinic staff to schedule appointment if able.  pt will start sertraline.        Discussed the following ways the patient can remain in a safe environment:  be around others       Return in about 8 weeks (around 6/7/2022) for Routine Visit,  "BP Recheck.    Arlyn Cazares MD  [facility]    =====================================================  ====================================    Subjective   Ailyn is a 86 year old who presents for the following health issues  accompanied by her daughter.    H/o  PMR/inflammatory arthritis (MTX use), L TKA, mild MDD, granulomatous lung disease, HTN, moderate aortic and mitral valve insufficiency-stable   Recent MTX surveillance labs look fine.   Started omeprazole via colleague summer '21 due to epigastric pain, then d/c again.      Lives in  housing in [place].  She still drives.    last year, now lives at [facility].       Complains of occasional sore ribcage.   Burps \"feels like it comes from the bottom of my belly.      Stools seem dark    Not sleeping well.       Anxious about everything, was supposed to go to [place], but will not go b/c she is not 100%  She has insomnia.   Often awakens at 2 and then     BP elevated today   She had been checking at home.         Abdominal Pain     History of Present Illness       Mental Health Follow-up:  Patient presents to follow-up on Depression & Anxiety.Patient's depression since last visit has been:  No change  The patient is having other symptoms associated with depression.  Patient's anxiety since last visit has been:  Bad  The patient is having other symptoms associated with anxiety.  Any significant life events: health concerns  Patient is feeling anxious or having panic attacks.  Patient has no concerns about alcohol or drug use.       Today's PHQ-9         PHQ-9 Total Score: 13  PHQ-9 Q9 Thoughts of better off dead/self-harm past 2 weeks :   (P) Several days  Thoughts of suicide or self harm: (P) Yes  Self-harm Plan:   (P) No  Self-harm Action:     (P) No  Safety concerns for self or others: (P) No    How difficult have these problems made it for you to do your work, take care of things at home, or get along with other " "people: Somewhat difficult    Today's ELISEO-7 Score: 13    Migraines:   Since the patient's last clinic visit, headaches are: no change  The patient is getting headaches:  No migraines just occasional headaches  She is able to do normal daily activities when she has a migraine.  The patient is taking the following rescue/relief medications:  Ibuprofen (Advil, Motrin), Naproxyn (Aleve) and Tylenol   Patient states \"The relief is inconsistent\" from the rescue/relief medications.   The patient is taking the following medications to prevent migraines:  Other  In the past 4 weeks, the patient has gone to an Urgent Care or Emergency Room 0 times times due to headaches.    Reason for visit:  Anxious pain under rib cage discomfort burping and gas  Symptom onset:  3-4 weeks ago  Symptoms include:  Burping gas anxiety not sleeping  Symptom intensity:  Severe  Symptom progression:  Improving  Had these symptoms before:  No  What makes it better:  Gasx    She eats 0-1 servings of fruits and vegetables daily.She consumes 1 sweetened beverage(s) daily.She exercises with enough effort to increase her heart rate 9 or less minutes per day.  She exercises with enough effort to increase her heart rate 3 or less days per week.   She is taking medications regularly.             Review of Systems   Gastrointestinal: Positive for abdominal pain.      Constitutional, HEENT, cardiovascular, pulmonary, gi and gu systems are negative, except as otherwise noted.      Objective    BP (!) 189/69 (BP Location: Right arm, Patient Position: Sitting, Cuff Size: Adult Regular)   Pulse 69   Temp 98.1  F (36.7  C) (Oral)   Ht 1.486 m (4' 10.5\")   Wt 63.5 kg (140 lb)   SpO2 94%   BMI 28.76 kg/m    There is no height or weight on file to calculate BMI.     Physical Exam   GENERAL: healthy, alert and no distress  EYES: Eyes grossly normal to inspection, PERRL and conjunctivae and sclerae normal  NECK: no adenopathy, no asymmetry, masses, or scars and " thyroid normal to palpation  RESP: lungs clear to auscultation - no rales, rhonchi or wheezes  CV: regular rate and rhythm, normal S1 S2, no S3 or S4, no murmur, click or rub, no peripheral edema and peripheral pulses strong  ABDOMEN: soft, nontender, no hepatosplenomegaly, no masses and bowel sounds normal  MS: no gross musculoskeletal defects noted, no edema  PSYCH: mentation appears normal, affect pressured and very anxious.  Makes many symptomatic statements     No results found for this or any previous visit (from the past 24 hour(s)).

## 2022-04-11 NOTE — TELEPHONE ENCOUNTER
Reason for Call:  Same Day Appointment, Requested Provider:  Arlyn Cazares MD    PCP: Arlyn Cazares    Reason for visit: trouble sleeping, anxiety and stomach issues      Have you been treated for this in the past? No    Additional comments: Patient is going out of town on Wednesday April 20th. She is hoping to get worked in with [facility] before then. She is currently scheduled with Bonnie on 4/13 just in case. Please advise.     Can we leave a detailed message on this number? YES    Phone number patient can be reached at: Cell number on file:    Telephone Information:   Mobile [phone]       Best Time: any    Call taken on 4/11/2022 at 8:25 AM by Abhinav Collier

## 2022-04-11 NOTE — LETTER
RE:   Ailyn Trevino  :   1935      Dear Airline      Due to medical ailments, Ailyn is not fit to fly on her upcoming flight.  We need some time to work this up    Your understanding is appreciated      Sincerely,       JACKY DIEGO M.D.   [phone] ()  [phone] (fax)

## 2022-04-12 ASSESSMENT — ANXIETY QUESTIONNAIRES: GAD7 TOTAL SCORE: 13

## 2022-04-12 ASSESSMENT — PATIENT HEALTH QUESTIONNAIRE - PHQ9: SUM OF ALL RESPONSES TO PHQ QUESTIONS 1-9: 13

## 2022-04-15 ENCOUNTER — NURSE TRIAGE (OUTPATIENT)
Dept: INTERNAL MEDICINE | Facility: CLINIC | Age: 87
End: 2022-04-15
Payer: COMMERCIAL

## 2022-04-15 NOTE — TELEPHONE ENCOUNTER
Pt calling. States she was seen on 4/11. Was prescribed Losartan potassium, Sertraline and Omeprazole. Now is experiencing a fluttering, rapid beating of her heart that is constant. Today is the 3rd day she has been taking medications. No chest pain. No SOB. Is a little lightheaded occasionally. Last took medications this am at 0900. BP this am was 141/62  Took BP while on the phone and was 82/65 102 HR. Pt tried to recheck and wasn't able to get a reading. Pt was having difficulty with doing this while on the phone. States she is going to try to check it again after we hang up. States she has had this feeling before. She can't get in for stress test until May. No cardiac history. Could this be caused by medication for BP? Did recommend ER because pt also reported feeling weak and is having lightheadedness and fluttering. Pt requested a message be sent to PCP to advise.    Brandi Weeks RN  [facility]    Reason for Disposition    Dizziness, lightheadedness, or weakness    Additional Information    Negative: Passed out (i.e., fainted, collapsed and was not responding)    Negative: Shock suspected (e.g., cold/pale/clammy skin, too weak to stand, low BP, rapid pulse)    Negative: Difficult to awaken or acting confused (e.g., disoriented, slurred speech)    Negative: Visible sweat on face or sweat dripping down face    Negative: Unable to walk, or can only walk with assistance (e.g., requires support)    Negative: Received SHOCK from implantable cardiac defibrillator and has persisting symptoms (i.e., palpitations, lightheadedness)    Negative: Dizziness, lightheadedness, or weakness and heart beating very rapidly (e.g., > 140 / minute)    Negative: Dizziness, lightheadedness, or weakness and heart beating very slowly (e.g., < 50 / minute)    Negative: Sounds like a life-threatening emergency to the triager    Negative: Chest pain    Protocols used: HEART RATE AND HEARTBEAT [id number]

## 2022-04-15 NOTE — TELEPHONE ENCOUNTER
Patient scheduled 4/18/22    Advised to take bp/hr 1 x day and bring record.     She was able to find radial pulse and will try to take pulse that way and note if she feels any skipped beats.     Gauri Pinzon RN

## 2022-04-18 ENCOUNTER — OFFICE VISIT (OUTPATIENT)
Dept: INTERNAL MEDICINE | Facility: CLINIC | Age: 87
End: 2022-04-18
Payer: COMMERCIAL

## 2022-04-18 VITALS
TEMPERATURE: 98.5 F | SYSTOLIC BLOOD PRESSURE: 153 MMHG | OXYGEN SATURATION: 96 % | BODY MASS INDEX: 28.56 KG/M2 | HEART RATE: 90 BPM | WEIGHT: 139 LBS | DIASTOLIC BLOOD PRESSURE: 72 MMHG

## 2022-04-18 DIAGNOSIS — R10.13 EPIGASTRIC PAIN: ICD-10-CM

## 2022-04-18 DIAGNOSIS — R00.2 PALPITATIONS: ICD-10-CM

## 2022-04-18 DIAGNOSIS — M19.90 INFLAMMATORY ARTHRITIS: ICD-10-CM

## 2022-04-18 DIAGNOSIS — I10 HYPERTENSION, GOAL BELOW 140/90: ICD-10-CM

## 2022-04-18 DIAGNOSIS — F33.1 MODERATE EPISODE OF RECURRENT MAJOR DEPRESSIVE DISORDER (H): ICD-10-CM

## 2022-04-18 DIAGNOSIS — R07.89 ATYPICAL CHEST PAIN: ICD-10-CM

## 2022-04-18 DIAGNOSIS — F41.1 GENERALIZED ANXIETY DISORDER: Primary | ICD-10-CM

## 2022-04-18 PROCEDURE — 93000 ELECTROCARDIOGRAM COMPLETE: CPT | Performed by: INTERNAL MEDICINE

## 2022-04-18 PROCEDURE — 99215 OFFICE O/P EST HI 40 MIN: CPT | Performed by: INTERNAL MEDICINE

## 2022-04-18 RX ORDER — PHENOL 1.4 %
10 AEROSOL, SPRAY (ML) MUCOUS MEMBRANE
COMMUNITY

## 2022-04-18 NOTE — PATIENT INSTRUCTIONS
Resume the atenolol    Plan to continue current management with the omeprazole: you need to continue to suppress your acid.        Blood Pressure goal less than 160/90.      Follow up 6/3/22 as scheduled  --   this is to recheck anxiety / sertraline.

## 2022-04-18 NOTE — PROGRESS NOTES
"  Assessment & Plan     Generalized anxiety disorder  Moderate episode of recurrent major depressive disorder (H)  Has been through a lot of changes, loss and moved to apartment.    Adjustment.       Hypertension, goal below 140/90   recently added losartan.   She assumed to stop the atenolol.    I think the palpitations were due to her stopping atenolol (withdrawal)  She will resume it   Atypical chest pain  EKG is negative today  Will get Lexiscan 5/11/22  Palpitations   resume BB  - EKG 12-lead complete w/read - Clinics    Epigastric pain  She is ressured that it is okay to continue on PPI for the long run.     Inflammatory arthritis   h/o           I spent a total of 42 minutes on the day of the visit.   Time spent doing chart review, history and exam, documentation and further activities per the note             No follow-ups on file.    Arlyn Cazares MD  [facility]    =======================================================  ====================================================    Subjective   Ailyn is a 86 year old who presents for the following health issues     87 y/o F here for f/u:    H/o  PMR/inflammatory arthritis (MTX use), L TKA, mild MDD, granulomatous lung disease, HTN, moderate aortic and mitral valve insufficiency-stable, GERD (increased PPI to 40 mg last week).      Lives in  housing in [place].  She still drives.    last year, now lives at [facility]       Seen last week for a multitude of symptoms:  -- we started losartan for HTN:    -- we started Sertraline for anxiety/MDD... she does not think she notes  -- we encouraged resumption of PPI (prilosec)  -- we ordered Lexiscan to rule out cardiac (atypical chest pain).     Then, a few days later she called with report of \"experiencing a fluttering, rapid beating of her heart that is constant. Today is the 3rd day she has been taking medications. No chest pain. No SOB. Is a little lightheaded " occasionally. Last took medications this am at 0900. BP this am was 141/62  Took BP while on the phone and was 82/65 102 HR. ...  Recheck BP and P have been 133 -147/60 - 90.       :  Discuss Anx/MDD at next visit.    She has some sinus drainage.  PND.           Discuss cholesterol at next visit, too      HPI     Hypertension Follow-up      Do you check your blood pressure regularly outside of the clinic? Yes     Are you following a low salt diet? Yes    Are your blood pressures ever more than 140 on the top number (systolic) OR more   than 90 on the bottom number (diastolic), for example 140/90? Yes        Review of Systems         Objective    BP (!) 153/72 (BP Location: Right arm, Patient Position: Sitting, Cuff Size: Adult Regular)   Pulse 90   Temp 98.5  F (36.9  C) (Oral)   Wt 63 kg (139 lb)   SpO2 96%   BMI 28.56 kg/m    Body mass index is 28.56 kg/m .  Physical Exam     General:  Alert and OX3  HEENT:  Atraumatic  Pulm: Cta   Normal symm rise  CV:  RRR  Ext:  Minimal edema (improved)  Psych:  Appropriate yet very pressured / anxious.

## 2022-04-26 DIAGNOSIS — M19.90 INFLAMMATORY ARTHRITIS: ICD-10-CM

## 2022-04-26 RX ORDER — METHOTREXATE 2.5 MG/1
15 TABLET ORAL WEEKLY
Qty: 78 TABLET | Refills: 0 | Status: SHIPPED | OUTPATIENT
Start: 2022-04-26 | End: 2022-07-22

## 2022-04-26 NOTE — TELEPHONE ENCOUNTER
Methotrexate toxicity monitoring labs were done on 3/30/22 and looked okay. Refilled prescription per rheumatology protocol.    Prudencio CORLEY RN....4/26/2022 10:28 AM

## 2022-06-03 ENCOUNTER — OFFICE VISIT (OUTPATIENT)
Dept: INTERNAL MEDICINE | Facility: CLINIC | Age: 87
End: 2022-06-03
Payer: COMMERCIAL

## 2022-06-03 VITALS
SYSTOLIC BLOOD PRESSURE: 138 MMHG | OXYGEN SATURATION: 97 % | WEIGHT: 141 LBS | BODY MASS INDEX: 28.97 KG/M2 | TEMPERATURE: 98.1 F | DIASTOLIC BLOOD PRESSURE: 62 MMHG | HEART RATE: 67 BPM

## 2022-06-03 DIAGNOSIS — K21.9 GASTROESOPHAGEAL REFLUX DISEASE WITHOUT ESOPHAGITIS: ICD-10-CM

## 2022-06-03 DIAGNOSIS — F33.1 MODERATE EPISODE OF RECURRENT MAJOR DEPRESSIVE DISORDER (H): ICD-10-CM

## 2022-06-03 DIAGNOSIS — R07.89 ATYPICAL CHEST PAIN: ICD-10-CM

## 2022-06-03 DIAGNOSIS — R00.2 PALPITATIONS: ICD-10-CM

## 2022-06-03 DIAGNOSIS — I10 HYPERTENSION, GOAL BELOW 140/90: ICD-10-CM

## 2022-06-03 DIAGNOSIS — F41.9 ANXIETY AND DEPRESSION: ICD-10-CM

## 2022-06-03 DIAGNOSIS — R10.13 EPIGASTRIC PAIN: ICD-10-CM

## 2022-06-03 DIAGNOSIS — F32.A ANXIETY AND DEPRESSION: ICD-10-CM

## 2022-06-03 DIAGNOSIS — F41.1 GENERALIZED ANXIETY DISORDER: Primary | ICD-10-CM

## 2022-06-03 PROCEDURE — 99213 OFFICE O/P EST LOW 20 MIN: CPT | Performed by: INTERNAL MEDICINE

## 2022-06-03 RX ORDER — SERTRALINE HYDROCHLORIDE 25 MG/1
25 TABLET, FILM COATED ORAL DAILY
Qty: 90 TABLET | Refills: 3 | Status: SHIPPED | OUTPATIENT
Start: 2022-06-03 | End: 2023-03-28

## 2022-06-03 RX ORDER — OMEPRAZOLE 40 MG/1
40 CAPSULE, DELAYED RELEASE ORAL DAILY
Qty: 90 CAPSULE | Refills: 1 | Status: SHIPPED | OUTPATIENT
Start: 2022-06-03 | End: 2023-04-03

## 2022-06-03 NOTE — PROGRESS NOTES
Assessment & Plan     Generalized anxiety disorder  Anxiety and depression  - sertraline (ZOLOFT) 25 MG tablet; Take 1 tablet (25 mg) by mouth daily  Moderate episode of recurrent major depressive disorder (H)  *improved on sertraline.   I think continue current management due to chronic anxiety per family, even before the [place] trip     Hypertension, goal below 140/90   controlled.  Her  Home BP look great     Gastroesophageal reflux disease without esophagitis   stay on PPI, don't d/c  Her atypical chest pain is now gone.   - omeprazole (PRILOSEC) 40 MG DR capsule; Take 1 capsule (40 mg) by mouth daily  Epigastric pain  Atypical chest pain  *was GERD  She never did get the nuc med stress test:  The symptoms have abated since starting ppi     Palpitations   metoprolol.  Continue current management       I spent a total of 24 minutes on the day of the visit.   Time spent doing chart review, history and exam, documentation and further activities per the note             Return in about 9 months (around 3/3/2023).    Arlyn Cazares MD  [facility]    ================================================  ==============================================================    Subjective   Ailyn is a 86 year old who presents for the following health issues     87 y/o F here for f/u    H/o  PMR/inflammatory arthritis (MTX use), L TKA, mild MDD, granulomatous lung disease, HTN, moderate aortic and mitral valve insufficiency-stable, GERD (increased PPI to 40 mg last week).     Last month, discussed MDD.  She was  last year.  Moved to an apartment. We started sertraline   What she really thinks helped, was to get out of the situation with a wedding in [place].   She is overall sleeping better.      Restarted metoprolol due to palpitations.  She had assumed when starting losartan earlier that she was to stop metorpolol... Palpitations are much improved    She has     HPI     Hypertension  Follow-up      Do you check your blood pressure regularly outside of the clinic? Yes     Are you following a low salt diet? No    Are your blood pressures ever more than 140 on the top number (systolic) OR more   than 90 on the bottom number (diastolic), for example 140/90? Yes    Anxiety Follow-Up    How are you doing with your anxiety since your last visit? Improved     Are you having other symptoms that might be associated with anxiety? No    Have you had a significant life event? No     Are you feeling depressed? No    Do you have any concerns with your use of alcohol or other drugs? No    Social History     Tobacco Use     Smoking status: Former Smoker     Quit date: 1998     Years since quittin.4     Smokeless tobacco: Never Used   Substance Use Topics     Alcohol use: Yes     Comment: 1 drink a day or less     Drug use: No     ELISEO-7 SCORE 2018   Total Score - - 13 (moderate anxiety)   Total Score 1 4 13     PHQ 2021   PHQ-9 Total Score 16 9 13   Q9: Thoughts of better off dead/self-harm past 2 weeks Several days Several days Several days   F/U: Thoughts of suicide or self-harm - - Yes   F/U: Self harm-plan - - No   F/U: Self-harm action - - No   F/U: Safety concerns - - No         How many servings of fruits and vegetables do you eat daily?  0-1    On average, how many sweetened beverages do you drink each day (Examples: soda, juice, sweet tea, etc.  Do NOT count diet or artificially sweetened beverages)?   0    How many days per week do you exercise enough to make your heart beat faster? 3 or less    How many minutes a day do you exercise enough to make your heart beat faster? 30 - 60    How many days per week do you miss taking your medication? 0        Review of Systems   Constitutional, HEENT, cardiovascular, pulmonary, gi and gu systems are negative, except as otherwise noted.      Objective    /62 (BP Location: Right arm, Patient Position:  Sitting, Cuff Size: Adult Regular)   Pulse 67   Temp 98.1  F (36.7  C) (Oral)   Wt 64 kg (141 lb)   SpO2 97%   BMI 28.97 kg/m    There is no height or weight on file to calculate BMI.     Physical Exam   GENERAL: healthy, alert and no distress  Very mobile, independent.   EYES: Eyes grossly normal to inspection, PERRL and conjunctivae and sclerae normal  MS: no gross musculoskeletal defects noted, no edema  NEURO: Normal strength and tone, mentation intact and speech normal  PSYCH: mentation appears normal, affect normal/bright  Somewhat pressured, but improved from last visit

## 2022-06-22 ENCOUNTER — TELEPHONE (OUTPATIENT)
Dept: FAMILY MEDICINE | Facility: CLINIC | Age: 87
End: 2022-06-22

## 2022-06-22 NOTE — TELEPHONE ENCOUNTER
"Patient tested positive for Covid with home test today.     Symptoms started today-fatigue    Daughter tested positive 3 days ago, patient was with her day before.    Phone number for Covid treatment appointment given to patient.     Routing to ambulatory infection prevention to update chart.       Instructions for Patients  Your COVID-19 test was positive. This means you have the virus. Please follow the \"How can I take care of myself\" and \"How do I self-isolate?\" guidelines in these instructions.    What treatments are available?  Over-the-counter medicines may help with your symptoms such as runny or stuffy nose, cough, chills, and fever. Talk to your care team about your options.     Some people are at high risk for severe illness (for example if you have a weak immune system, you're 65 or older, or you have certain medical problems). If your risk it high and your symptoms started in the last 5 to 7 days, we strongly recommend for you to get COVID treatment as soon as possible before you get really sick. Paxlovid, Molnupiravir and the monoclonal antibody treatments are proven safe and effective, make you feel better faster, and prevent hospitalization and death.       You can schedule an appointment to discuss COVID treatment by requesting an appointment on [employer] by selecting \"schedule COVID-19 Treatment\" or by calling [employer] ([phone]) and pressing 7.    What are the symptoms of COVID-19?  Symptoms can include fever, cough, shortness of breath, chills, headache, muscle pain sore throat, fatigue, runny or stuffy nose, and loss of taste and smell. Other less common symptoms include nausea, vomiting, or diarrhea (watery stools).    Know when to call 911. Emergency warning signs include:    Trouble breathing or shortness of breath    Pain or pressure in the chest that doesn't go away    Feeling confused like you haven't felt before, or not being able to wake up    Bluish-colored lips or face    How " can I take care of myself?  1. Get lots of rest. Drink extra fluids (unless a doctor has told you not to).  2. Take Tylenol (acetaminophen) for fever or pain. If you have liver or kidney problems, ask your family doctor if it's okay to take Tylenol   Adults:   650 mg (two 325 mg pills or tablets) every 4 to 6 hours, or...   1,000 mg (two 500 mg pills or tablets) every 8 hours as needed.  Note: Don't take more than 3,000 mg in one day. Acetaminophen is found in many medicines (both prescribed and over the counter). Read all labels to be sure you don't take too much.  For children, check the Tylenol bottle for the right dose. The dose is based on the child's age or weight.  3. Take over the counter medicines for your symptoms as needed. Talk to your pharmacist.  4. If you have other health problems (like cancer, heart failure, an organ transplant, or severe kidney disease): Call your specialty clinic if you don't feel better in the next 2 days.    These guidelines are for isolating and quarantining before returning to work, school or .     For employers, schools and day cares: This is an official notice for this person's medical guidelines for returning in-person.     For health care sites: The CDC gives different isolation and quarantine guidelines for healthcare sites, please check with these sites before arriving.     How do I self-isolate?  You isolate when you have symptoms of COVID or a test shows you have COVID, even if you don't have symptoms.     If you DO have symptoms:  o Stay home and away from others  - For at least 5 days after your symptoms started, AND   - You are fever free for 24 hours (with no medicine that reduces fever), AND  - Your other symptoms are better.  o Wear a mask for 10 full days any time you are around others.    If you DON'T have symptoms:  o Stay at home and away from others for at least 5 days after your positive test.  o Wear a mask for 10 full days any time you are around  others.    How and when do I quarantine?  You quarantine when you may have been exposed to the virus and DON'T have symptoms.     Stay home and away from others.     You must quarantine for 5 days after your last contact with a person who has COVID.  o Note: If you are fully vaccinated, you don't need to quarantine. You should still follow the steps below.     Wear a mask for 10 full days any time you're around others.    Get tested at least 5 days after you were exposed, even if you don't have symptoms.     If you start to have symptoms, isolate right away and get tested.    Where can I get more information?    [facility] COVID-19 Resource Hub: [email or web address]     CDC Quarantine & Isolation: https://www.cdc.gov/coronavirus/2019-ncov/your-health/quarantine-isolation.html     [facility] - What to Do If You're Sick: https://www.cdc.gov/coronavirus/2019-ncov/if-you-are-sick/index.html    [facility] clinical trials (COVID-19 research studies): clinicalaffairs.[facility]/umn-clinical-trials    [place] Department of Health COVID-19 Public Hotline: 1-141.566.1100

## 2022-06-23 ENCOUNTER — VIRTUAL VISIT (OUTPATIENT)
Dept: PEDIATRICS | Facility: CLINIC | Age: 87
End: 2022-06-23
Payer: COMMERCIAL

## 2022-06-23 DIAGNOSIS — U07.1 INFECTION DUE TO 2019 NOVEL CORONAVIRUS: Primary | ICD-10-CM

## 2022-06-23 PROCEDURE — 99213 OFFICE O/P EST LOW 20 MIN: CPT | Mod: 95 | Performed by: STUDENT IN AN ORGANIZED HEALTH CARE EDUCATION/TRAINING PROGRAM

## 2022-06-23 NOTE — PROGRESS NOTES
Ailyn is a 86 year old who is being evaluated via a billable telephone visit.        Assessment & Plan     Infection due to 2019 novel coronavirus  Patient with mild symptoms and has received 2 booster COVID19 vaccines. Her medications do not have noted interactions with Paxlovid and she does qualify based on age but given her mild symptoms and that this medication has only been studies in unvaccinated patients discussed risks/benefits of taking medication vs not and I think favors not taking medication which patient agrees with. Provided her with my station phone number and my name in case she changes her mind. Discussed plan of care and reasons to present to the ED (emergency department). Patient and/or guardian in agreement with plan.      Return in about 1 day (around 6/24/2022), or if symptoms worsen or fail to improve.   -if patient calls back tomorrow and would like Paxlovid I will prescribe it; she does not need another virtual appointment.    Iqra Singer MD  [facility]    Soraida Robertson is a 86 year old, presenting for the following health issues:  No chief complaint on file.      HPI   COVID-19 Symptom Review  How many days ago did these symptoms start? 1 day ago; 06/22/22  Home test positive yesterday    Are any of the following symptoms significant for you?    New or worsening difficulty breathing? No    Worsening cough? No    Fever or chills? No    Headache: no    Sore throat: YES from drainage    Chest pain: no    Diarrhea: no    Body aches? no    Fatigue    Rhinorrhea    Drainage    What treatments has patient tried? aspirin   Does patient live in a nursing home, group home, or shelter? No; 65 and older building  Does patient have a way to get food/medications during quarantined? Yes, I have a friend or family member who can help me.              Objective       Vitals:  No vitals were obtained today due to virtual visit.    Physical Exam   healthy, alert and no  distress  PSYCH: Alert and oriented times 3; coherent speech, normal   rate and volume, able to articulate logical thoughts, able   to abstract reason, no tangential thoughts, no hallucinations   or delusions  Her affect is normal  RESP: No cough, no audible wheezing, able to talk in full sentences  Remainder of exam unable to be completed due to telephone visits          Phone call duration: 11 minutes

## 2022-06-24 ENCOUNTER — TELEPHONE (OUTPATIENT)
Dept: FAMILY MEDICINE | Facility: CLINIC | Age: 87
End: 2022-06-24

## 2022-06-24 ENCOUNTER — NURSE TRIAGE (OUTPATIENT)
Dept: NURSING | Facility: CLINIC | Age: 87
End: 2022-06-24

## 2022-06-24 DIAGNOSIS — U07.1 INFECTION DUE TO 2019 NOVEL CORONAVIRUS: ICD-10-CM

## 2022-06-24 DIAGNOSIS — U07.1 INFECTION DUE TO 2019 NOVEL CORONAVIRUS: Primary | ICD-10-CM

## 2022-06-24 NOTE — TELEPHONE ENCOUNTER
Patient was prescribed paxlovid today.  It was sent to the wrong pharmacy for the patient to .  Patient and daughter are requesting the medication to be sent to the [facility] [address] [place] [address] [place], [facility].  Sent the prescription from [facility] to [facility] in [place] as requested.      Brandi Shields RN   06/24/22 6:40 PM  [facility] Nurse Advisor    Reason for Disposition    Caller has medication question, adult has minor symptoms, caller declines triage, AND triager answers question    Additional Information    Negative: Drug overdose and triager unable to answer question    Negative: Caller requesting information unrelated to medicine    Negative: Caller requesting a prescription for Strep throat and has a positive culture result    Negative: Rash while taking a medication or within 3 days of stopping it    Negative: Immunization reaction suspected    Negative: [1] Asthma and [2] having symptoms of asthma (cough, wheezing, etc.)    Negative: [1] Influenza symptoms AND [2] anti-viral med prescription request, such as Tamiflu    Negative: [1] Symptom of illness (e.g., headache, abdominal pain, earache, vomiting) AND [2] more than mild    Negative: MORE THAN A DOUBLE DOSE of a prescription or over-the-counter (OTC) drug    Negative: [1] DOUBLE DOSE (an extra dose or lesser amount) of over-the-counter (OTC) drug AND [2] any symptoms (e.g., dizziness, nausea, pain, sleepiness)    Negative: [1] DOUBLE DOSE (an extra dose or lesser amount) of prescription drug AND [2] any symptoms (e.g., dizziness, nausea, pain, sleepiness)    Negative: Took another person's prescription drug    Negative: [1] DOUBLE DOSE (an extra dose or lesser amount) of prescription drug AND [2] NO symptoms (Exception: a double dose of antibiotics)    Negative: Diabetes drug error or overdose (e.g., took wrong type of insulin or took extra dose)    Negative: [1] Request for URGENT new prescription or refill of  "\"essential\" medication (i.e., likelihood of harm to patient if not taken) AND [2] triager unable to fill per unit policy    Negative: [1] Prescription not at pharmacy AND [2] was prescribed by PCP recently    Negative: [1] Pharmacy calling with prescription questions AND [2] triager unable to answer question    Negative: [1] Caller has URGENT medication question about med that PCP or specialist prescribed AND [2] triager unable to answer question    Negative: [1] Caller has NON-URGENT medication question about med that PCP prescribed AND [2] triager unable to answer question    Negative: [1] Caller requesting a NON-URGENT new prescription or refill AND [2] triager unable to refill per unit policy    Negative: [1] Caller has medication question about med not prescribed by PCP AND [2] triager unable to answer question (e.g., compatibility with other med, storage)    Negative: Caller requesting a CONTROLLED substance prescription refill (e.g., narcotics, ADHD medicines)    Negative: Caller wants to use a complementary or alternative medicine    Negative: [1] Prescription prescribed recently is not at pharmacy AND [2] triager has access to patient's EMR AND [3] prescription is recorded in the EMR    Negative: [1] DOUBLE DOSE (an extra dose or lesser amount) of over-the-counter (OTC) drug AND [2] NO symptoms    Negative: [1] DOUBLE DOSE (an extra dose or lesser amount) of antibiotic drug AND [2] NO symptoms    Negative: Caller has medication question only, adult not sick, and triager answers question    Protocols used: MEDICATION QUESTION CALL-A-AH      "

## 2022-06-24 NOTE — TELEPHONE ENCOUNTER
Patient called back stating she would like to try Paxlovid since she is not improving. Would like prescription sent to [facility] in [place], pharmacy number is [phone].       Sofi Ulrich MA 1:27 PM 6/24/2022

## 2022-06-24 NOTE — TELEPHONE ENCOUNTER
Called patient and relayed message below. Patient stated she will check with her pharmacy.     Sofi Ulrich MA 4:33 PM 6/24/2022

## 2022-06-24 NOTE — TELEPHONE ENCOUNTER
Sent in prescription for Paxlovid to [facility] in [place] M-F 7a-7p.  We only send prescription for this medication to [facility] pharmacies unless patient can confirm their requested pharmacy has it in stock.    Other option is to send to [facility] which is open M-F until 5pm.    Iqra Singer MD  Internal Medicine & Pediatrics  [facility]  She/her

## 2022-07-07 ENCOUNTER — NURSE TRIAGE (OUTPATIENT)
Dept: NURSING | Facility: CLINIC | Age: 87
End: 2022-07-07

## 2022-07-07 NOTE — TELEPHONE ENCOUNTER
Patient had covid 1.5 week ago.  Today woke up congested.  Drainage in the back of her throat and nasal congestion.  Patient denies fever cough and shortness of breath.  Patient took Paxlovid and finished medication.  Patient tested positive for covid in June.  Patient will continue to monitor and treat  symptoms at home and phone back if necessary.    COVID 19 Nurse Triage Plan/Patient Instructions    Please be aware that novel coronavirus (COVID-19) may be circulating in the community. If you develop symptoms such as fever, cough, or SOB or if you have concerns about the presence of another infection including coronavirus (COVID-19), please contact your health care provider or visit [email or web address].     Disposition/Instructions    Home care recommended. Follow home care protocol based instructions.    Thank you for taking steps to prevent the spread of this virus.  o Limit your contact with others.  o Wear a simple mask to cover your cough.  o Wash your hands well and often.    Resources    [facility]: About COVID-19: [email or web address]    CDC: What to Do If You're Sick: www.cdc.gov/coronavirus/2019-ncov/about/steps-when-sick.html    CDC: Ending Home Isolation: www.cdc.gov/coronavirus/2019-ncov/hcp/disposition-in-home-patients.html     CDC: Caring for Someone: www.cdc.gov/coronavirus/2019-ncov/if-you-are-sick/care-for-someone.html     [facility]: Interim Guidance for Hospital Discharge to Home: www.health.[facility].[place]/diseases/coronavirus/hcp/hospdischarge.pdf    [facility] clinical trials (COVID-19 research studies): clinicalaffairs.[facility]/n-clinical-trials     Below are the COVID-19 hotlines at the [facility] ([facility]). Interpreters are available.   o For health questions: Call 163-810-9821 or 1-642.150.1819 (7 a.m. to 7 p.m.)  o For questions about schools and childcare: Call 792-111-4755 or 1-160.837.8633 (7 a.m. to 7 p.m.)                       Reason  for Disposition    Sinus congestion as part of a cold, present < 10 days    Additional Information    Negative: Sounds like a life-threatening emergency to the triager    Negative: Difficulty breathing, and not from stuffy nose (e.g., not relieved by cleaning out the nose)    Negative: SEVERE headache and has fever    Negative: Patient sounds very sick or weak to the triager    Negative: SEVERE sinus pain    Negative: Severe headache    Negative: Redness or swelling on the cheek, forehead, or around the eye    Negative: Fever > 103 F (39.4 C)    Negative: Fever > 101 F (38.3 C) and over 60 years of age    Negative: Fever > 100.0 F (37.8 C) and has diabetes mellitus or a weak immune system (e.g., HIV positive, cancer chemotherapy, organ transplant, splenectomy, chronic steroids)    Negative: Fever > 100.0 F (37.8 C) and bedridden (e.g., nursing home patient, stroke, chronic illness, recovering from surgery)    Negative: Fever present > 3 days (72 hours)    Negative: Fever returns after gone for over 24 hours and symptoms worse or not improved    Negative: Sinus pain (not just congestion) and fever    Negative: Earache    Negative: Sinus congestion (pressure, fullness) present > 10 days    Negative: Nasal discharge present > 10 days    Negative: Using nasal washes and pain medicine > 24 hours and sinus pain (lower forehead, cheekbone, or eye) persists    Negative: Lots of coughing    Negative: Patient wants to be seen    Protocols used: SINUS PAIN OR CONGESTION-A-OH

## 2022-07-11 ENCOUNTER — NURSE TRIAGE (OUTPATIENT)
Dept: NURSING | Facility: CLINIC | Age: 87
End: 2022-07-11

## 2022-07-11 ENCOUNTER — LAB (OUTPATIENT)
Dept: LAB | Facility: CLINIC | Age: 87
End: 2022-07-11
Payer: COMMERCIAL

## 2022-07-11 DIAGNOSIS — M19.90 INFLAMMATORY ARTHRITIS: ICD-10-CM

## 2022-07-11 DIAGNOSIS — Z79.899 HIGH RISK MEDICATION USE: ICD-10-CM

## 2022-07-11 LAB
ALBUMIN SERPL-MCNC: 4 G/DL (ref 3.4–5)
ALP SERPL-CCNC: 88 U/L (ref 40–150)
ALT SERPL W P-5'-P-CCNC: 34 U/L (ref 0–50)
AST SERPL W P-5'-P-CCNC: 26 U/L (ref 0–45)
BASOPHILS # BLD AUTO: 0 10E3/UL (ref 0–0.2)
BASOPHILS NFR BLD AUTO: 0 %
BILIRUB DIRECT SERPL-MCNC: 0.1 MG/DL (ref 0–0.2)
BILIRUB SERPL-MCNC: 0.6 MG/DL (ref 0.2–1.3)
CREAT SERPL-MCNC: 0.79 MG/DL (ref 0.52–1.04)
CRP SERPL-MCNC: 4.7 MG/L (ref 0–8)
EOSINOPHIL # BLD AUTO: 0.3 10E3/UL (ref 0–0.7)
EOSINOPHIL NFR BLD AUTO: 4 %
ERYTHROCYTE [DISTWIDTH] IN BLOOD BY AUTOMATED COUNT: 13.3 % (ref 10–15)
ERYTHROCYTE [SEDIMENTATION RATE] IN BLOOD BY WESTERGREN METHOD: 8 MM/HR (ref 0–30)
GFR SERPL CREATININE-BSD FRML MDRD: 72 ML/MIN/1.73M2
HCT VFR BLD AUTO: 38.4 % (ref 35–47)
HGB BLD-MCNC: 12.9 G/DL (ref 11.7–15.7)
LYMPHOCYTES # BLD AUTO: 1.4 10E3/UL (ref 0.8–5.3)
LYMPHOCYTES NFR BLD AUTO: 20 %
MCH RBC QN AUTO: 33.2 PG (ref 26.5–33)
MCHC RBC AUTO-ENTMCNC: 33.6 G/DL (ref 31.5–36.5)
MCV RBC AUTO: 99 FL (ref 78–100)
MONOCYTES # BLD AUTO: 0.6 10E3/UL (ref 0–1.3)
MONOCYTES NFR BLD AUTO: 9 %
NEUTROPHILS # BLD AUTO: 4.6 10E3/UL (ref 1.6–8.3)
NEUTROPHILS NFR BLD AUTO: 66 %
PLATELET # BLD AUTO: 243 10E3/UL (ref 150–450)
PROT SERPL-MCNC: 7 G/DL (ref 6.8–8.8)
RBC # BLD AUTO: 3.88 10E6/UL (ref 3.8–5.2)
WBC # BLD AUTO: 7 10E3/UL (ref 4–11)

## 2022-07-11 PROCEDURE — 82565 ASSAY OF CREATININE: CPT

## 2022-07-11 PROCEDURE — 86140 C-REACTIVE PROTEIN: CPT

## 2022-07-11 PROCEDURE — 36415 COLL VENOUS BLD VENIPUNCTURE: CPT

## 2022-07-11 PROCEDURE — 80076 HEPATIC FUNCTION PANEL: CPT

## 2022-07-11 PROCEDURE — 85025 COMPLETE CBC W/AUTO DIFF WBC: CPT

## 2022-07-11 PROCEDURE — 85652 RBC SED RATE AUTOMATED: CPT

## 2022-07-11 NOTE — TELEPHONE ENCOUNTER
Patient calling -   Says she had covid about three weeks ago.  Sinus symptoms started about a week ago.  Is having clear and cloudy nasal drainage and stuffy nose.  Has occasional headache.  No headache or sinus pressure now.  She has been using over the counter nasal spray and tylenol sinus.  Says symptoms are improving.      No difficulty breathing.  No fever.  No redness or swelling on cheek, forehead or around the eye.  No sinus pain or pressure.    Patient would like to be seen.  Transferred to scheduling.  Care advice given per protocol.  Advised to call back if symptoms worsen.    Asmita Larsen RN  Triage Nurse Advisor    Additional Information    Negative: Sounds like a life-threatening emergency to the triager    Negative: Difficulty breathing, and not from stuffy nose (e.g., not relieved by cleaning out the nose)    Negative: SEVERE headache and has fever    Negative: Patient sounds very sick or weak to the triager    Negative: SEVERE sinus pain    Negative: Severe headache    Negative: Redness or swelling on the cheek, forehead, or around the eye    Negative: Fever > 103 F (39.4 C)    Negative: Fever > 101 F (38.3 C) and over 60 years of age    Negative: Fever > 100.0 F (37.8 C) and bedridden (e.g., nursing home patient, stroke, chronic illness, recovering from surgery)    Negative: Fever > 100.0 F (37.8 C) and has diabetes mellitus or a weak immune system (e.g., HIV positive, cancer chemotherapy, organ transplant, splenectomy, chronic steroids)    Negative: Fever present > 3 days (72 hours)    Negative: Fever returns after gone for over 24 hours and symptoms worse or not improved    Negative: Sinus pain (not just congestion) and fever    Negative: Earache    Negative: Sinus congestion (pressure, fullness) present > 10 days    Negative: Nasal discharge present > 10 days    Negative: Using nasal washes and pain medicine > 24 hours and sinus pain (lower forehead, cheekbone, or eye) persists    Negative:  Lots of coughing    Patient wants to be seen    Protocols used: SINUS PAIN AND CONGESTION-A-OH

## 2022-07-13 ENCOUNTER — VIRTUAL VISIT (OUTPATIENT)
Dept: RHEUMATOLOGY | Facility: CLINIC | Age: 87
End: 2022-07-13
Payer: COMMERCIAL

## 2022-07-13 DIAGNOSIS — Z79.899 HIGH RISK MEDICATION USE: ICD-10-CM

## 2022-07-13 DIAGNOSIS — M19.90 INFLAMMATORY ARTHRITIS: Primary | ICD-10-CM

## 2022-07-13 PROCEDURE — 99441 PR PHYSICIAN TELEPHONE EVALUATION 5-10 MIN: CPT | Mod: 95 | Performed by: INTERNAL MEDICINE

## 2022-07-13 NOTE — PROGRESS NOTES
"Ailyn Trevino is a 86 year old year old who is being evaluated via a billable telephone visit.      What phone number would you like to be contacted at? [phone]   How would you like to obtain your AVS? [facility]       Rheumatology Telephone/Telehealth  Visit      Ailyn Trevino MRN# [id number]   YOB: 1935 Age: 86 year old      Date of visit: 7/13/22   PCP: Dr. Arlyn Cazares    Chief Complaint   Patient presents with:  RECHECK: Inflammatory arthritis    Assessment and Plan     1.  Inflammatory arthritis / PMR: I initially evaluated in 2017 when she transferred care from Dr. Hurst.  Based on historical records, I agreed that she most likely had polymyalgia rheumatica. PMR was steroid responsive and she has been able to reduce her prednisone dose to 3 mg daily without issue, but then had a \"flare\" involving back pain that radiated around to include just over her bilateral lower rib cage. At the time of her flare in April 2017, the ESR and CRP were normal. She was also having hip pain at that time; she had left TKA performed in January 2017. Prednisone dose was increased at that time with resolution of her pain. I suspected that the pain she was having at that time was due to degenerative spine change rather than PMR.  11/1/2017 MRI of the T-spine showed minimal disc space narrowing at several levels in the mid and lower thoracic spine, minimal osteophyte formation or disc protrusions at several levels, but no stenosis. She has degenerative changes seen on [facility].  Prednisone was tapered off over time without worsening of her symptoms.  On 1/24/2018 she returned with bilateral wrist swelling and tenderness to palpation; also some pain in the back of her hand; and continued neck/back pain. Inflammatory arthritis dx'd at that time and was started on MTX and prednisone; was on MTX 15mg once weekly " and no prednisone; doing well when seen on 4/26/2018.   When on methotrexate she noted no stiffness and improved  strength.  She then decided to stop methotrexate.  Not seen between 4/26/2018 and 3/1/2021; restarted methotrexate on 3/1/2021 but then was lost to follow-up until 9/27/2021.  She continues on methotrexate and is doing well with regard to inflammatory arthritis.  Chronic illness, stable.    - Continue methotrexate 15 mg once weekly  - Continue folic acid 1mg daily  - Labs every 8-12 weeks: CBC, creatinine, hepatic panel, ESR, CRP      High risk medication requiring intensive toxicity monitoring at least quarterly: labs ordered include CBC, Creatinine, Hepatic panel to monitor for cytopenia and hepatotoxicity; checking creatinine as it affects clearance of medication.     2.  Hand osteoarthritis: Large Heberden's nodes.  We had a discussion today about osteoarthritis and how was different from an inflammatory arthritis.     3.  Osteoporosis screening: Osteopenia in 2015 DEXA.  Postmenopausal.  Previously advised and ordered DEXA; reminded her to call to schedule.  - DEXA ordered previously; advised that she call to schedule     4.  Vaccinations: Vaccinations reviewed with Ms. Trevino.  Risks and benefits of vaccinations were discussed.    - Influenza: up to date  - [id number]: up to date  - [id number]: up to date  - Shingrix: Advised receiving  - COVID-19: has received the Pfizer COVID-19 vaccination on 2/11/2021, 3/4/2021, and 11/19/2021; a fourth mRNA COVID-19 vaccination is advised to be received on or shortly after 4/19/2022 and to hold methotrexate for 2 weeks afterward.    Total minutes spent in evaluation with patient, documentation, , and review of pertinent studies and chart notes: 13    Ms. Trevino verbalized agreement with and understanding of the rational for the diagnosis and treatment plan.  All questions were answered to best of my ability and the patient's satisfaction.  "Ms. Trevino was advised to contact the clinic with any questions that may arise after the clinic visit.      Thank you for involving me in the care of the patient    Return to clinic: 6 months      HPI   Ailyn Trevino is a 86 year old female with a past medical history significant for hyperlipidemia, granulomatous lung disease, aortic valve disorder, mitral valve disorder, impaired fasting glucose, polymyalgia rheumatica, and depression who presents for f/u of arthritis.     4/20/2015 rheumatology clinic note by Dr. Bud Hurst at the [facility] documents the patient does not have signs or symptoms of GCA. Significant myalgias in March 2015 in her shoulders and popliteal area, but not in the hip area. Cortisone shots in the hip in March 2015. Elevated CRP and mildly abnormal sedimentation rate. Morning stiffness for about 2 weeks. 2 hours to loosen up. Difficulty lifting her arms above her shoulders. CK was normal. Prednisone 20 mg daily \"helped about almost completely in 4 days' time\"    In 10/2017, Ms. Trevion reported that she was initially diagnosed with PMR and treated with steroids that were very effective. At that time, she reports having some difficulty raising her arms above her head. She says that she has always had some hip and knee pain that was thought to be secondary to her left knee osteoarthritis. She had a left total knee arthroplasty in January 2017. She also reports having flat feet bilaterally. She has been reducing prednisone slowly over time, and was on prednisone 3 mg daily at that time. When she was evaluated by Dr. Christopher  on 4/20/2017 it was noted that she was having pain in her rib cage, back, and hips. She had just reduced prednisone and today she says at that time she was on 1 mg daily, so the dose was increased to 20 mg a day with resolution of her pain. She feels like she has still doing well while on prednisone 3 mg daily. She still has some thoracic back " pain. No difficult or raising her arms above her head today. No difficulty standing up from a low seated position. No jaw claudication, scalp tenderness, headaches, or vision changes.she also reports having some right groin pain that she thinks is due to altered gait because of her knee issues. She also reports having lower back pain that was addressed at [facility] with an epidural steroid injection that was not effective. This was at the direction of her orthopedic surgeon who is at [facility]. She reports having had an MRI of her lumbar spine at [facility]. 8/1/2017 clinic note by Prosper Leonardo at [facility] documents that an MRI showed multilevel degenerative changes namely L4-L5 central canal compromise and severe right lateral recess stenosis. Currently without hand pain, but she says that she has had some pain in her fingers from time to time. She denies having stiffness in her hands.     She then had an MRI of the thoracic spine showing degenerative changes of the thoracic spine    1/2018: she returned complaining of bilateral wrist pain, also some pain across the back of her hand.  Pain is worse in the morning with morning stiffness for at least one hour.  Stiffness improves with activity.  Pain in her hands improves with activity.  She continues to have pain in her lower back, upper back, mid back, and neck.  spine pain is worse with activity and improves with rest.  She is following with an orthopedic surgeon for her back.      4/26/2018, she reported that she is doing poorly because she has a nonproductive cough but feels like she needs to cough something up.  She is going to have a chest CT later today.  No shortness of breath or chest pain.  Feels like she has chest congestion now.  Says that she is feeling better since using methotrexate.  She is off of prednisone.  Improved  strength bilaterally.  Hand pain, especially in the morning, has improved  significantly.  No morning stiffness. No difficulty raising her arms above her head or standing up from a low seated position.  Denies jaw claudication, scalp tenderness, vision change, new headache, difficulty standing up from a chair, or difficulty raising her arms above her head.      3/1/2021: Not doing as well as she was doing previously when on methotrexate.  She said that she stopped taking methotrexate because she was concerned about potential side effects.  Now with swelling in her wrists and across her MCPs that is worse in the morning and improved with time and activity.  Morning stiffness for at least 1 hour at the wrists.  Reduced  strength bilaterally.  She recalls that these were symptoms she had in the past that improved with methotrexate.  She notes drinking 4 ounces of wine per day.    2021: Currently doing well on methotrexate.  No joint pain or swelling.  No morning stiffness or gelling phenomenon.  She says that her   last October and she has now moved to [place] so she is adjusting to these changes.  Otherwise doing well.  Mild chronic neck pain that is worse with activity and improves with rest; nonradiating.    2022: Currently doing well with regard to inflammatory arthritis.  Tolerating methotrexate well.  No joint pain or stiffness except for the left foot that has been operated on twice and she follows with podiatry for this.  No joint swelling.    Today, doing well.  No joint pain or swelling.  No morning stiffness or gelling phenomenon.  Tolerating methotrexate well.    Denies fevers, chills, nausea, vomiting, constipation, diarrhea. No abdominal pain. No chest pain/pressure, palpitations, or shortness of breath. No LE swelling.  No oral or nasal sores.  No rash.      Tobacco:  None  EtOH: No more than 1 drink per day: 4 ounce glass of wine  Drugs:   Occupation: used to work as a , retired now    ROS   12 point review of system was completed and  negative except as noted in the HPI     Active Problem List     Patient Active Problem List   Diagnosis     Advanced directives, counseling/discussion     Osteopenia     Palpitations     Hyperlipidemia LDL goal <130     Renal cyst     Health Care Home     Insomnia     S/P total knee arthroplasty, left     Aortic valve disorder     Mitral valve disorder     Encounter for long-term current use of medication     Major depressive disorder, single episode, mild (H)     Current chronic use of systemic steroids     Fatigue, unspecified type     Chronic pain of left knee     Venous stasis dermatitis of left lower extremity     Fecal urgency     Elevated hemidiaphragm     Inflammatory arthritis     Pulmonary nodules     Hypertension goal BP (blood pressure) < 140/90     Past Medical History     Past Medical History:   Diagnosis Date     Aortic valve disorder      Elevated hemidiaphragm      Granulomatous lung disease (H) 8/7/2012     High cholesterol      Insomnia 10/8/2013    Benadryl gives her RLS     Mitral valve disorder      Osteoarthritis of knee 10/8/2013    Sees Dr. Whaley     Osteopenia 7/31/2012     Other chronic pain 8/3/2015    Patient is followed by KIARA RAINES for ongoing prescription of pain medication.  All refills should be approved by this provider, or covering partner.  Medication(s): Hydrocodone/APAP.  Maximum quantity per month: 30 Clinic visit frequency required: Q 3 months   Controlled substance agreement on file: Yes      Date(s): 8/3/15  Pain Clinic evaluation in the past: No  DIRE Total Score(s): No fl     Polymyalgia rheumatica (H)      Pulmonary nodules      Renal cyst 8/7/2012     Past Surgical History     Past Surgical History:   Procedure Laterality Date     APPENDECTOMY  1951     CATARACT IOL, RT/LT  2010    bilateral      CHOLECYSTECTOMY  2010     ORTHOPEDIC SURGERY      Left Knee Surgery 1/2017     RECONSTRUCT FOREFOOT WITH METATARSOPHALANGEAL (MTP) FUSION Left 10/24/2018     Procedure: LEFT FIRST METATARSOPHALANGEAL JOINT ARTHRODESIS;  Surgeon: Rufus Harden MD;  Location: [facility] OR     REPAIR HAMMER TOE Left 10/24/2018    Procedure: LESSER TOE RECONSTRUCTION SECOND, THIRD, FOURTH AND FIFTH TOES;  Surgeon: Rufus Harden MD;  Location:  OR     SURGICAL HISTORY OF -   12/13    bilateral YAG laser surgery of eyes     Allergy   No Known Allergies  Current Medication List     Current Outpatient Medications   Medication Sig     Acetaminophen (TYLENOL PO) Take 650 mg by mouth 2 times daily as needed for mild pain or fever     aspirin - buffered (ASCRIPTIN) 325 MG TABS tablet Take 1 tablet (325 mg) by mouth daily (Patient taking differently: Take 325 mg by mouth as needed)     atenolol (TENORMIN) 25 MG tablet Take 1 tablet (25 mg) by mouth daily     folic acid (FOLVITE) 1 MG tablet Take 1 tablet (1 mg) by mouth daily     losartan (COZAAR) 50 MG tablet Take 1 tablet (50 mg) by mouth in the morning.     Melatonin 10 MG TABS tablet Take 10 mg by mouth nightly as needed for sleep     methotrexate sodium 2.5 MG TABS Take 6 tablets (15 mg) by mouth once a week . Take all 6 tablets on the same day of each week.  Labs required every 3 months     Multiple Vitamins-Minerals (CENTRUM SILVER ADULT 50+ PO) Take 1 tablet by mouth daily Reported on 4/20/2017     nirmatrelvir and ritonavir (PAXLOVID) therapy pack Take 3 tablets by mouth 2 times daily Take 2 Nirmatrelvir tablets and 1 Ritonavir tablet twice daily for 5 days.     omeprazole (PRILOSEC) 40 MG DR capsule Take 1 capsule (40 mg) by mouth daily     sertraline (ZOLOFT) 25 MG tablet Take 1 tablet (25 mg) by mouth daily     triamcinolone (KENALOG) 0.1 % external cream Apply topically 2 times daily as needed for irritation //itching     No current facility-administered medications for this visit.       Social History   See HPI    Family History     Family History   Problem Relation Age of Onset     Cancer Mother      Cerebrovascular Disease Father  "     Denies family history of autoimmune disease     Physical Exam     Temp Readings from Last 3 Encounters:   06/03/22 98.1  F (36.7  C) (Oral)   04/18/22 98.5  F (36.9  C) (Oral)   04/11/22 98.1  F (36.7  C) (Oral)     BP Readings from Last 5 Encounters:   06/03/22 138/62   04/18/22 (!) 153/72   04/11/22 (!) 189/69   03/03/22 (!) 155/65   02/01/22 (!) 150/62     Pulse Readings from Last 1 Encounters:   06/03/22 67     Resp Readings from Last 1 Encounters:   03/03/22 16     Estimated body mass index is 28.97 kg/m  as calculated from the following:    Height as of 4/11/22: 1.486 m (4' 10.5\").    Weight as of 6/3/22: 64 kg (141 lb).    GEN: alert and no distress  PSYCH: Alert; coherent speech, normal rate and volume, able to articulate logical thoughts, able   to abstract reason, no tangential thoughts. Normal affect.   RESP: No cough, no audible wheezing, able to talk in full sentences  Remainder of exam unable to be completed due to telephone visits       Labs / Imaging (select studies)   RF/CCP  Recent Labs   Lab Test 06/26/17  1854 04/20/15  1340 02/02/15  1115   CCPABY  --  <20  Interpretation:  Negative    --    CCPIGG <1  Negative    --   --    RHF <20  --  <20     CBC  Recent Labs   Lab Test 07/11/22  1417 03/30/22  1151 01/06/22  1022 09/27/21  0939 07/08/21  1156 05/26/21  1328 10/09/18  1158 04/23/18  1054   WBC 7.0 5.7 5.5   < > 7.9 6.0   < > 6.7   RBC 3.88 4.09 4.04   < > 3.99 4.25   < > 4.18   HGB 12.9 13.7 13.6   < > 13.6 13.9   < > 13.4   HCT 38.4 40.8 40.5   < > 39.4 41.5   < > 40.4   MCV 99 100 100   < > 99 98   < > 97   RDW 13.3 13.6 13.4   < > 13.8 14.8   < > 14.8    242 248   < > 260 261   < > 210   MCH 33.2* 33.5* 33.7*   < > 34.1* 32.7   < > 32.1   MCHC 33.6 33.6 33.6   < > 34.5 33.5   < > 33.2   NEUTROPHIL 66 65 59   < > 71.3 69.7  --  62.7   LYMPH 20 23 24   < > 16.7 18.8  --  20.4   MONOCYTE 9 8 11   < > 8.8 6.9  --  15.6   EOSINOPHIL 4 3 5   < > 2.8 3.9  --  1.0   BASOPHIL 0 1 1   < " > 0.4 0.7  --  0.3   ANEU  --   --   --   --  5.6 4.2  --  4.2   ALYM  --   --   --   --  1.3 1.1  --  1.4   LAURENCE  --   --   --   --  0.7 0.4  --  1.0   AEOS  --   --   --   --  0.2 0.2  --  0.1   ABAS  --   --   --   --  0.0 0.0  --  0.0   ANEUTAUTO 4.6 3.7 3.2   < >  --   --   --   --    ALYMPAUTO 1.4 1.3 1.3   < >  --   --   --   --    AMONOAUTO 0.6 0.5 0.6   < >  --   --   --   --    AEOSAUTO 0.3 0.2 0.3   < >  --   --   --   --    ABSBASO 0.0 0.0 0.1   < >  --   --   --   --     < > = values in this interval not displayed.     CMP  Recent Labs   Lab Test 07/11/22  1417 03/30/22  1151 01/06/22  1022 09/27/21  0938 09/27/21  0938 07/08/21  1156 05/26/21  1328 02/22/21  1634 10/19/20  1433 07/23/19  1035   NA  --   --   --   --   --  140  --   --  139 141   POTASSIUM  --   --   --   --   --  4.7  --   --  4.0 4.1   CHLORIDE  --   --   --   --   --  108  --   --  106 109   CO2  --   --   --   --   --  26  --   --  24 27   ANIONGAP  --   --   --   --   --  6  --   --  9 5   GLC  --   --   --   --   --  94  --   --  90 101*   BUN  --   --   --   --   --  14  --   --  21 20   CR 0.79 0.71 0.69   < > 0.73 0.80 0.88 0.70 0.74 0.67   GFRESTIMATED 72 82 84   < > 75 67 60* 79 74 81   GFRESTBLACK  --   --   --   --   --  78 69 >90 86 >90   ALEJANDRO  --   --   --   --  9.7 9.2  --   --  9.0 8.8   BILITOTAL 0.6 0.8 0.5  --   --  0.7 0.4 0.3  --  0.5   ALBUMIN 4.0 4.0 4.0  --   --  3.9 3.8 4.1  --  3.9   PROTTOTAL 7.0 6.9 6.6*  --   --  7.1 6.5* 7.1  --  7.2   ALKPHOS 88 79 84  --   --  78 69 82  --  101   AST 26 23 18  --   --  23 21 21  --  21   ALT 34 31 27  --   --  30 28 26 27 27    < > = values in this interval not displayed.     Calcium/VitaminD  Recent Labs   Lab Test 09/27/21  0938 07/08/21  1156 10/19/20  1433 10/04/16  1145 02/05/16  1500   ALEJANDRO 9.7 9.2 9.0   < > 9.1   VITDT 40  --   --   --  39    < > = values in this interval not displayed.     ESR/CRP  Recent Labs   Lab Test 07/11/22  1417 01/06/22  1022 09/27/21  0938    SED 8 8 8   CRP 4.7 3.8 4.0     Hepatitis B  Recent Labs   Lab Test 01/24/18 0918   HBCAB Nonreactive   HEPBANG Nonreactive     Hepatitis C  Recent Labs   Lab Test 01/24/18 0918   HCVAB Nonreactive     Immunization History     Immunization History   Administered Date(s) Administered     COVID-19,PF,Pfizer (12+ Yrs) 02/11/2021, 03/04/2021, 11/19/2021     COVID-19,PF,Pfizer 12+ Yrs (2022 and After) 04/11/2022     FLU 6-35 months 09/25/2009     Flu, Unspecified 10/04/2016     Influenza (High Dose) 3 valent vaccine 10/26/2010, 10/08/2013, 10/22/2014, 10/05/2015, 10/04/2016, 09/08/2017, 10/09/2018, 10/01/2019     Influenza (IIV3) PF 10/13/2006, 10/31/2008, 10/26/2010, 10/24/2011, 10/23/2012, 10/08/2020     Influenza, Quad, High Dose, Pf, 65yr+ (Fluzone HD) 10/08/2020, 09/27/2021     Pneumo Conj 13-V (2010&after) 11/03/2015     Pneumococcal 23 valent 12/16/2004     TD (ADULT, 7+) 07/21/2010, 06/04/2021     Tdap (Adacel,Boostrix) 07/21/2010     Zoster vaccine, live 06/04/2009          Chart documentation done in part with Dragon Voice recognition Software. Although reviewed after completion, some word and grammatical error may remain.    Phone call duration with patient (in minutes): 9    Location of patient: [place], [place]   [facility]

## 2022-07-18 ENCOUNTER — ANCILLARY PROCEDURE (OUTPATIENT)
Dept: BONE DENSITY | Facility: CLINIC | Age: 87
End: 2022-07-18
Attending: INTERNAL MEDICINE
Payer: COMMERCIAL

## 2022-07-18 DIAGNOSIS — M19.90 INFLAMMATORY ARTHRITIS: ICD-10-CM

## 2022-07-18 DIAGNOSIS — Z78.0 POST-MENOPAUSAL: ICD-10-CM

## 2022-07-18 DIAGNOSIS — M85.80 OSTEOPENIA, UNSPECIFIED LOCATION: ICD-10-CM

## 2022-07-18 DIAGNOSIS — Z13.820 OSTEOPOROSIS SCREENING: ICD-10-CM

## 2022-07-18 PROCEDURE — 77080 DXA BONE DENSITY AXIAL: CPT | Performed by: INTERNAL MEDICINE

## 2022-07-22 ENCOUNTER — TELEPHONE (OUTPATIENT)
Dept: RHEUMATOLOGY | Facility: CLINIC | Age: 87
End: 2022-07-22

## 2022-07-22 RX ORDER — METHOTREXATE 2.5 MG/1
15 TABLET ORAL WEEKLY
Qty: 78 TABLET | Refills: 0 | Status: SHIPPED | OUTPATIENT
Start: 2022-07-22 | End: 2022-10-20

## 2022-07-22 NOTE — RESULT ENCOUNTER NOTE
RN: Please call to notify Ailyn Trevino that the 7/20/2022 bone density scan shows osteopenia, that is a reduced bone mineral density, and she has a significantly elevated fracture assessment score with a 27% risk for major osteoporotic fracture in the next 10 years and a 10% risk for a hip fracture in the next 10 years.  Due to the higher risk for fracture it is advised to use medication to treat the low bone mineral density.  We will need to discuss this at an appointment that can be either via video or in office.      Dale Marquis MD  7/22/2022

## 2022-07-22 NOTE — TELEPHONE ENCOUNTER
Rheumatology team: Please call to notify Ms. Trevino that methotrexate has been refilled.  Dale Marquis MD  7/22/2022 3:22 PM         ---------------------  [facility] Call Center    Phone Message    May a detailed message be left on voicemail: yes     Reason for Call: Medication Refill Request    Has the patient contacted the pharmacy for the refill? Yes   Name of medication being requested: Methotrexate   Provider who prescribed the medication: Dr Marquis  Pharmacy: [facility] [address] [place] [address]  Date medication is needed: 7/22- Pt said she is completely out and takes this on Sunday.     Action Taken: Message routed to:  Other: [facility]     Travel Screening: Not Applicable

## 2022-10-14 ENCOUNTER — TELEPHONE (OUTPATIENT)
Dept: RHEUMATOLOGY | Facility: CLINIC | Age: 87
End: 2022-10-14

## 2022-10-14 DIAGNOSIS — M19.90 INFLAMMATORY ARTHRITIS: ICD-10-CM

## 2022-10-14 NOTE — TELEPHONE ENCOUNTER
Called pt to update her about the need for labs, called and phone rang, picked up (timer started on phone screen) and was silent with no response/option for VM. Called again after and reached VM. Vm left to have labs completed for medication refill.    Loren RAMIREZ RN Specialty Triage 10/14/2022 2:46 PM

## 2022-10-14 NOTE — TELEPHONE ENCOUNTER
[facility] Call Center    Phone Message    May a detailed message be left on voicemail: yes     Reason for Call: Medication Refill Request    Has the patient contacted the pharmacy for the refill? Yes   Name of medication being requested: methotrexate  Provider who prescribed the medication: Dr. Marquis   Pharmacy:[facility] [place] Date medication is needed: ASAP       Action Taken: Other: [facility]    Travel Screening: Not Applicable

## 2022-10-14 NOTE — TELEPHONE ENCOUNTER
Pt schedule for lab 10/17/2022. States she only has 3 pills left would like to know what she need to do in the mean time. Pt is requesting a call back.

## 2022-10-17 ENCOUNTER — LAB (OUTPATIENT)
Dept: LAB | Facility: CLINIC | Age: 87
End: 2022-10-17
Payer: COMMERCIAL

## 2022-10-17 DIAGNOSIS — M19.90 INFLAMMATORY ARTHRITIS: ICD-10-CM

## 2022-10-17 DIAGNOSIS — Z79.899 HIGH RISK MEDICATION USE: ICD-10-CM

## 2022-10-17 LAB
ALBUMIN SERPL-MCNC: 3.9 G/DL (ref 3.4–5)
ALP SERPL-CCNC: 93 U/L (ref 40–150)
ALT SERPL W P-5'-P-CCNC: 38 U/L (ref 0–50)
AST SERPL W P-5'-P-CCNC: 32 U/L (ref 0–45)
BASOPHILS # BLD AUTO: 0 10E3/UL (ref 0–0.2)
BASOPHILS NFR BLD AUTO: 1 %
BILIRUB DIRECT SERPL-MCNC: 0.1 MG/DL (ref 0–0.2)
BILIRUB SERPL-MCNC: 0.6 MG/DL (ref 0.2–1.3)
CREAT SERPL-MCNC: 0.77 MG/DL (ref 0.52–1.04)
CRP SERPL-MCNC: 3.3 MG/L (ref 0–8)
EOSINOPHIL # BLD AUTO: 0.2 10E3/UL (ref 0–0.7)
EOSINOPHIL NFR BLD AUTO: 3 %
ERYTHROCYTE [DISTWIDTH] IN BLOOD BY AUTOMATED COUNT: 13.2 % (ref 10–15)
ERYTHROCYTE [SEDIMENTATION RATE] IN BLOOD BY WESTERGREN METHOD: 8 MM/HR (ref 0–30)
GFR SERPL CREATININE-BSD FRML MDRD: 74 ML/MIN/1.73M2
HCT VFR BLD AUTO: 40.3 % (ref 35–47)
HGB BLD-MCNC: 13.5 G/DL (ref 11.7–15.7)
IMM GRANULOCYTES # BLD: 0 10E3/UL
IMM GRANULOCYTES NFR BLD: 0 %
LYMPHOCYTES # BLD AUTO: 1.1 10E3/UL (ref 0.8–5.3)
LYMPHOCYTES NFR BLD AUTO: 13 %
MCH RBC QN AUTO: 33.3 PG (ref 26.5–33)
MCHC RBC AUTO-ENTMCNC: 33.5 G/DL (ref 31.5–36.5)
MCV RBC AUTO: 99 FL (ref 78–100)
MONOCYTES # BLD AUTO: 0.6 10E3/UL (ref 0–1.3)
MONOCYTES NFR BLD AUTO: 7 %
NEUTROPHILS # BLD AUTO: 6.2 10E3/UL (ref 1.6–8.3)
NEUTROPHILS NFR BLD AUTO: 76 %
NRBC # BLD AUTO: 0 10E3/UL
NRBC BLD AUTO-RTO: 0 /100
PLATELET # BLD AUTO: 266 10E3/UL (ref 150–450)
PROT SERPL-MCNC: 7.2 G/DL (ref 6.8–8.8)
RBC # BLD AUTO: 4.06 10E6/UL (ref 3.8–5.2)
WBC # BLD AUTO: 8.2 10E3/UL (ref 4–11)

## 2022-10-17 PROCEDURE — 85025 COMPLETE CBC W/AUTO DIFF WBC: CPT

## 2022-10-17 PROCEDURE — 86140 C-REACTIVE PROTEIN: CPT

## 2022-10-17 PROCEDURE — 85652 RBC SED RATE AUTOMATED: CPT

## 2022-10-17 PROCEDURE — 82565 ASSAY OF CREATININE: CPT

## 2022-10-17 PROCEDURE — 80076 HEPATIC FUNCTION PANEL: CPT

## 2022-10-17 PROCEDURE — 36415 COLL VENOUS BLD VENIPUNCTURE: CPT

## 2022-10-20 RX ORDER — METHOTREXATE 2.5 MG/1
15 TABLET ORAL WEEKLY
Qty: 78 TABLET | Refills: 0 | Status: SHIPPED | OUTPATIENT
Start: 2022-10-20 | End: 2023-01-19

## 2022-10-20 RX ORDER — FOLIC ACID 1 MG/1
1 TABLET ORAL DAILY
Qty: 90 TABLET | Refills: 2 | Status: SHIPPED | OUTPATIENT
Start: 2022-10-20 | End: 2023-04-05

## 2022-10-20 NOTE — TELEPHONE ENCOUNTER
RN: Please call to notify Ailyn Trevino that rheumatology labs are okay; methotrexate has been refilled.   Dale Marquis MD  10/20/2022

## 2022-10-20 NOTE — TELEPHONE ENCOUNTER
Called patient and notified her. Patient confirmed understanding.     Tatyana GILL RN, Specialty Clinic 10/20/22 9:56 AM

## 2022-11-16 NOTE — TELEPHONE ENCOUNTER
Per 3/17/17 lab results:    Result Notes   Notes Recorded by Maddi Church MD on 3/19/2017 at 9:58 PM  CRP is on the high end of normal.  Let's keep at 4 mg prednisone for another month.  CRP at that time - lab only appointment needed.      Dr. Church    crp in 1 month - indication PMR     Order placed.   [employer] message sent   H/O:  section  2017   36.3

## 2023-01-11 ENCOUNTER — OFFICE VISIT (OUTPATIENT)
Dept: RHEUMATOLOGY | Facility: CLINIC | Age: 88
End: 2023-01-11
Payer: COMMERCIAL

## 2023-01-11 VITALS
WEIGHT: 141 LBS | RESPIRATION RATE: 16 BRPM | OXYGEN SATURATION: 94 % | SYSTOLIC BLOOD PRESSURE: 132 MMHG | HEART RATE: 60 BPM | BODY MASS INDEX: 28.97 KG/M2 | DIASTOLIC BLOOD PRESSURE: 70 MMHG

## 2023-01-11 DIAGNOSIS — M19.90 INFLAMMATORY ARTHRITIS: Primary | ICD-10-CM

## 2023-01-11 DIAGNOSIS — Z79.899 HIGH RISK MEDICATION USE: ICD-10-CM

## 2023-01-11 DIAGNOSIS — M85.80 OSTEOPENIA, UNSPECIFIED LOCATION: ICD-10-CM

## 2023-01-11 LAB
ALBUMIN SERPL-MCNC: 4.1 G/DL (ref 3.4–5)
ALP SERPL-CCNC: 83 U/L (ref 40–150)
ALT SERPL W P-5'-P-CCNC: 35 U/L (ref 0–50)
AST SERPL W P-5'-P-CCNC: 25 U/L (ref 0–45)
BASOPHILS # BLD AUTO: 0 10E3/UL (ref 0–0.2)
BASOPHILS NFR BLD AUTO: 1 %
BILIRUB DIRECT SERPL-MCNC: 0.1 MG/DL (ref 0–0.2)
BILIRUB SERPL-MCNC: 0.8 MG/DL (ref 0.2–1.3)
CALCIUM SERPL-MCNC: 9.6 MG/DL (ref 8.5–10.1)
CREAT SERPL-MCNC: 0.66 MG/DL (ref 0.52–1.04)
CRP SERPL-MCNC: 4 MG/L (ref 0–8)
DEPRECATED CALCIDIOL+CALCIFEROL SERPL-MC: 39 UG/L (ref 20–75)
EOSINOPHIL # BLD AUTO: 0.2 10E3/UL (ref 0–0.7)
EOSINOPHIL NFR BLD AUTO: 4 %
ERYTHROCYTE [DISTWIDTH] IN BLOOD BY AUTOMATED COUNT: 13.5 % (ref 10–15)
ERYTHROCYTE [SEDIMENTATION RATE] IN BLOOD BY WESTERGREN METHOD: 7 MM/HR (ref 0–30)
GFR SERPL CREATININE-BSD FRML MDRD: 84 ML/MIN/1.73M2
HCT VFR BLD AUTO: 36 % (ref 35–47)
HGB BLD-MCNC: 12.2 G/DL (ref 11.7–15.7)
LYMPHOCYTES # BLD AUTO: 1.2 10E3/UL (ref 0.8–5.3)
LYMPHOCYTES NFR BLD AUTO: 22 %
MCH RBC QN AUTO: 33.7 PG (ref 26.5–33)
MCHC RBC AUTO-ENTMCNC: 33.9 G/DL (ref 31.5–36.5)
MCV RBC AUTO: 99 FL (ref 78–100)
MONOCYTES # BLD AUTO: 0.5 10E3/UL (ref 0–1.3)
MONOCYTES NFR BLD AUTO: 10 %
NEUTROPHILS # BLD AUTO: 3.3 10E3/UL (ref 1.6–8.3)
NEUTROPHILS NFR BLD AUTO: 64 %
PLATELET # BLD AUTO: 247 10E3/UL (ref 150–450)
PROT SERPL-MCNC: 7.1 G/DL (ref 6.8–8.8)
PTH-INTACT SERPL-MCNC: 42 PG/ML (ref 15–65)
RBC # BLD AUTO: 3.62 10E6/UL (ref 3.8–5.2)
WBC # BLD AUTO: 5.2 10E3/UL (ref 4–11)

## 2023-01-11 PROCEDURE — 85025 COMPLETE CBC W/AUTO DIFF WBC: CPT | Performed by: INTERNAL MEDICINE

## 2023-01-11 PROCEDURE — 83970 ASSAY OF PARATHORMONE: CPT | Performed by: INTERNAL MEDICINE

## 2023-01-11 PROCEDURE — 86140 C-REACTIVE PROTEIN: CPT | Performed by: INTERNAL MEDICINE

## 2023-01-11 PROCEDURE — 85652 RBC SED RATE AUTOMATED: CPT | Performed by: INTERNAL MEDICINE

## 2023-01-11 PROCEDURE — 82306 VITAMIN D 25 HYDROXY: CPT | Performed by: INTERNAL MEDICINE

## 2023-01-11 PROCEDURE — 80076 HEPATIC FUNCTION PANEL: CPT | Performed by: INTERNAL MEDICINE

## 2023-01-11 PROCEDURE — 82565 ASSAY OF CREATININE: CPT | Performed by: INTERNAL MEDICINE

## 2023-01-11 PROCEDURE — 82310 ASSAY OF CALCIUM: CPT | Performed by: INTERNAL MEDICINE

## 2023-01-11 PROCEDURE — 99214 OFFICE O/P EST MOD 30 MIN: CPT | Performed by: INTERNAL MEDICINE

## 2023-01-11 PROCEDURE — 36415 COLL VENOUS BLD VENIPUNCTURE: CPT | Performed by: INTERNAL MEDICINE

## 2023-01-11 ASSESSMENT — PAIN SCALES - GENERAL: PAINLEVEL: MILD PAIN (2)

## 2023-01-11 NOTE — PROGRESS NOTES
"  Rheumatology Clinic Visit      Ailyn Trevino MRN# [id number]   YOB: 1935 Age: 87 year old      Date of visit: 1/11/23   PCP: Dr. Arlyn Cazares    Chief Complaint   Patient presents with:  RECHECK: RA   Arthritis    Assessment and Plan     1.  Inflammatory arthritis / PMR: I initially evaluated in 2017 when she transferred care from Dr. Hurst.  Based on historical records, I agreed that she most likely had polymyalgia rheumatica. PMR was steroid responsive and she has been able to reduce her prednisone dose to 3 mg daily without issue, but then had a \"flare\" involving back pain that radiated around to include just over her bilateral lower rib cage. At the time of her flare in April 2017, the ESR and CRP were normal. She was also having hip pain at that time; she had left TKA performed in January 2017. Prednisone dose was increased at that time with resolution of her pain. I suspected that the pain she was having at that time was due to degenerative spine change rather than PMR.  11/1/2017 MRI of the T-spine showed minimal disc space narrowing at several levels in the mid and lower thoracic spine, minimal osteophyte formation or disc protrusions at several levels, but no stenosis. She has degenerative changes seen on a lumbar spine MRI that was performed at [facility], per a clinic note from [facility].  Prednisone was tapered off over time without worsening of her symptoms.  On 1/24/2018 she returned with bilateral wrist swelling and tenderness to palpation; also some pain in the back of her hand; and continued neck/back pain. Inflammatory arthritis dx'd at that time and was started on MTX and prednisone; was on MTX 15mg once weekly and no prednisone; doing well when seen on 4/26/2018.   When on methotrexate she noted no stiffness and improved  strength.  She then decided to stop methotrexate.  Not seen between 4/26/2018 and 3/1/2021; restarted methotrexate on " 3/1/2021 but then was lost to follow-up until 9/27/2021.  She continues on methotrexate and is doing well with regard to inflammatory arthritis.  We had a thorough discussion today about continuing methotrexate for disease management, the rationale for continuing methotrexate, and a discussion about the risks of undertreated or untreated inflammatory arthritis with regard to articular and extra-articular manifestations.  Chronic illness, stable.    - Continue methotrexate 15 mg once weekly  - Continue folic acid 1mg daily  - Labs today: CBC, creatinine, hepatic panel, ESR, CRP  - Labs in 3 months: CBC, Creatinine, Hepatic Panel, ESR, CRP    High risk medication requiring intensive toxicity monitoring at least quarterly: labs ordered include CBC, Creatinine, Hepatic panel to monitor for cytopenia and hepatotoxicity; checking creatinine as it affects clearance of medication.     2.  Hand osteoarthritis: Large Heberden's nodes.      3.  Osteopenia with elevated FRAX: Based on 7/18/2022 DEXA showed a lumbar spine T score -0.8, left femoral neck T score -1.9, and right femoral neck T score -1.9; FRAX score showed a 27% risk of major osteoporotic fracture in the next 10 years and a 10% risk for osteoporotic hip fracture in the next 10 years.  History of alendronate use from 2/5/2016-4/21/2018.  We reviewed the DEXA and the rationale for treating based on the elevated FRAX and she will consider this.  We discussed age as a consideration when determining whether or not to treat; she appears to be a healthy 87-year-old so I think treatment is reasonable and she will take this into consideration as well.  She is currently taking a multivitamin but does not know the exact calcium and vitamin D that she is taking.  I provided a printout from the [employer] regarding bisphosphonates. She reports having good dentition at this time with no plans for invasive dental work. She will notify me if she would like  to start alendronate; and if she does not notify me before her next visit then we can discuss again at her follow-up appointment in 3 months.  Chronic illness  - advised calcium 1000 mg daily  - advised vitamin D 1000 IU daily  - Labs today: Calcium, vitamin D, PTH     # Bisphosphonate risks and side effects:  Risks and side effects include esophageal irritation, heartburn, osteonecrosis of the jaw (most often in people with dental disease or a recent dental procedure), and atypical femoral fractures.  IV bisphosphonates can cause a flu-like illness and bone pain lasting up to 2-3 days; premedication with acetaminophen will often prevent or lessen these symptoms. Unusual side effects that have been reported include occular symptoms such as uveitis, keartitis, optic neuritis, and orbital swelling.  Oral bisphosphonates should not be used in patients with esophageal problems (such as strictures, achalasia, or severe dysmotility, varices), malabsorption, or the inability to sit upright.  Oral and IV bisphosphonates should not be used if the CrCl is less than 25-40mL/min, or the patient is pregnant or breastfeeding.  Prior to starting a biosphosphonate, invasive dental work should be completed if needed, and vitamin D level should be adequate.     4.  Vaccinations: Vaccinations reviewed with Ms. Trevino.   - Influenza: encouraged yearly vaccination  - [id number]: up to date  - [id number]: up to date  - Shingrix: Up to date  - COVID-19: Advised updating    Total minutes spent in evaluation with patient, documentation, , and review of pertinent studies and chart notes: 13    Ms. Trevino verbalized agreement with and understanding of the rational for the diagnosis and treatment plan.  All questions were answered to best of my ability and the patient's satisfaction. Ms. Trevino was advised to contact the clinic with any questions that may arise after the clinic visit.      Thank you for involving me in the  "care of the patient    Return to clinic: 3 months      HPI   Ailyn Trevino is a 87 year old female with a past medical history significant for hyperlipidemia, granulomatous lung disease, aortic valve disorder, mitral valve disorder, impaired fasting glucose, polymyalgia rheumatica, and depression who presents for f/u of arthritis.     4/20/2015 rheumatology clinic note by Dr. Bud Hurst at the [facility] documents the patient does not have signs or symptoms of GCA. Significant myalgias in March 2015 in her shoulders and popliteal area, but not in the hip area. Cortisone shots in the hip in March 2015. Elevated CRP and mildly abnormal sedimentation rate. Morning stiffness for about 2 weeks. 2 hours to loosen up. Difficulty lifting her arms above her shoulders. CK was normal. Prednisone 20 mg daily \"helped about almost completely in 4 days' time\"    In 10/2017, Ms. Trevino reported that she was initially diagnosed with PMR and treated with steroids that were very effective. At that time, she reports having some difficulty raising her arms above her head. She says that she has always had some hip and knee pain that was thought to be secondary to her left knee osteoarthritis. She had a left total knee arthroplasty in January 2017. She also reports having flat feet bilaterally. She has been reducing prednisone slowly over time, and was on prednisone 3 mg daily at that time. When she was evaluated by Dr. Christopher  on 4/20/2017 it was noted that she was having pain in her rib cage, back, and hips. She had just reduced prednisone and today she says at that time she was on 1 mg daily, so the dose was increased to 20 mg a day with resolution of her pain. She feels like she has still doing well while on prednisone 3 mg daily. She still has some thoracic back pain. No difficult or raising her arms above her head today. No difficulty standing up from a low seated position. No jaw claudication, scalp " tenderness, headaches, or vision changes.she also reports having some right groin pain that she thinks is due to altered gait because of her knee issues. She also reports having lower back [facility] with [facility] epidural steroid injection that was not effective. This was at the direction of her orthopedic surgeon who is at [facility]. She reports having had an [facility] of her lumbar [facility]. 8/1/2017 clinic note by Prosper Leonardo at [facility] documents that an MRI showed multilevel degenerative changes namely L4-L5 central canal compromise and severe right lateral recess stenosis. Currently without hand pain, but she says that she has had some pain in her fingers from time to time. She denies having stiffness in her hands.     She then had an MRI of the thoracic spine showing degenerative changes of the thoracic spine    1/2018: she returned complaining of bilateral wrist pain, also some pain across the back of her hand.  Pain is worse in the morning with morning stiffness for at least one hour.  Stiffness improves with activity.  Pain in her hands improves with activity.  She continues to have pain in her lower back, upper back, mid back, and neck.  spine pain is worse with activity and improves with rest.  She is following with an orthopedic surgeon for her back.      4/26/2018, she reported that she is doing poorly because she has a nonproductive cough but feels like she needs to cough something up.  She is going to have a chest CT later today.  No shortness of breath or chest pain.  Feels like she has chest congestion now.  Says that she is feeling better since using methotrexate.  She is off of prednisone.  Improved  strength bilaterally.  Hand pain, especially in the morning, has improved significantly.  No morning stiffness. No difficulty raising her arms above her head or standing up from a low seated position.  Denies jaw  claudication, scalp tenderness, vision change, new headache, difficulty standing up from a chair, or difficulty raising her arms above her head.      3/1/2021: Not doing as well as she was doing previously when on methotrexate.  She said that she stopped taking methotrexate because she was concerned about potential side effects.  Now with swelling in her wrists and across her MCPs that is worse in the morning and improved with time and activity.  Morning stiffness for at least 1 hour at the wrists.  Reduced  strength bilaterally.  She recalls that these were symptoms she had in the past that improved with methotrexate.  She notes drinking 4 ounces of wine per day.    2021: Currently doing well on methotrexate.  No joint pain or swelling.  No morning stiffness or gelling phenomenon.  She says that her   last October and she has now moved to [place] so she is adjusting to these changes.  Otherwise doing well.  Mild chronic neck pain that is worse with activity and improves with rest; nonradiating.    2022: Currently doing well with regard to inflammatory arthritis.  Tolerating methotrexate well.  No joint pain or stiffness except for the left foot that has been operated on twice and she follows with podiatry for this.  No joint swelling.    Today, 2023: Currently doing well.  No joint pain or swelling.  Morning stiffness for no more than 10 minutes but does not occur every day.  Positive gelling phenomenon if she sits for a very long time but this resolves quickly.  Methotrexate is effective for arthritis.  Recalls being on Fosamax in the distant past and does not recall any side effects from Fosamax.  No heartburn.  No dysphagia.  Reports having good dentition; had a tooth removed last year.  Does not anticipate any future invasive dental work.  Follows at the dentist regularly.    Denies fevers, chills, nausea, vomiting, constipation, diarrhea. No abdominal pain. No chest  pain/pressure, palpitations, or shortness of breath. No LE swelling.  No oral or nasal sores.  No rash.      Tobacco:  None  EtOH: No more than 1 drink per day: 4 ounce glass of wine  Drugs:   Occupation: used to work as a , retired now    ROS   12 point review of system was completed and negative except as noted in the HPI     Active Problem List     Patient Active Problem List   Diagnosis     Advanced directives, counseling/discussion     Osteopenia     Palpitations     Hyperlipidemia LDL goal <130     Renal cyst     Health Care Home     Insomnia     S/P total knee arthroplasty, left     Aortic valve disorder     Mitral valve disorder     Encounter for long-term current use of medication     Major depressive disorder, single episode, mild (H)     Current chronic use of systemic steroids     Fatigue, unspecified type     Chronic pain of left knee     Venous stasis dermatitis of left lower extremity     Fecal urgency     Elevated hemidiaphragm     Inflammatory arthritis     Pulmonary nodules     Hypertension goal BP (blood pressure) < 140/90     Past Medical History     Past Medical History:   Diagnosis Date     Aortic valve disorder      Elevated hemidiaphragm      Granulomatous lung disease (H) 8/7/2012     High cholesterol      Insomnia 10/8/2013    Benadryl gives her RLS     Mitral valve disorder      Osteoarthritis of knee 10/8/2013    Sees Dr. Whaley     Osteopenia 7/31/2012     Other chronic pain 8/3/2015    Patient is followed by KIARA RAINES for ongoing prescription of pain medication.  All refills should be approved by this provider, or covering partner.  Medication(s): Hydrocodone/APAP.  Maximum quantity per month: 30 Clinic visit frequency required: Q 3 months   Controlled substance agreement on file: Yes      Date(s): 8/3/15  Pain Clinic evaluation in the past: No  DIRE Total Score(s): No fl     Polymyalgia rheumatica (H)      Pulmonary nodules      Renal cyst 8/7/2012     Past  Surgical History     Past Surgical History:   Procedure Laterality Date     APPENDECTOMY  1951     CATARACT IOL, RT/LT  2010    bilateral      CHOLECYSTECTOMY  2010     ORTHOPEDIC SURGERY      Left Knee Surgery 1/2017     RECONSTRUCT FOREFOOT WITH METATARSOPHALANGEAL (MTP) FUSION Left 10/24/2018    Procedure: LEFT FIRST METATARSOPHALANGEAL JOINT ARTHRODESIS;  Surgeon: Rufus Harden MD;  Location: [facility] OR     REPAIR HAMMER TOE Left 10/24/2018    Procedure: LESSER TOE RECONSTRUCTION SECOND, THIRD, FOURTH AND FIFTH TOES;  Surgeon: Rufus Harden MD;  Location:  OR     SURGICAL HISTORY OF -   12/13    bilateral YAG laser surgery of eyes     Allergy   No Known Allergies  Current Medication List     Current Outpatient Medications   Medication Sig     Acetaminophen (TYLENOL PO) Take 650 mg by mouth 2 times daily as needed for mild pain or fever     aspirin - buffered (ASCRIPTIN) 325 MG TABS tablet Take 1 tablet (325 mg) by mouth daily (Patient taking differently: Take 325 mg by mouth as needed)     atenolol (TENORMIN) 25 MG tablet Take 1 tablet (25 mg) by mouth daily     folic acid (FOLVITE) 1 MG tablet Take 1 tablet (1 mg) by mouth daily     losartan (COZAAR) 50 MG tablet Take 1 tablet (50 mg) by mouth in the morning.     Melatonin 10 MG TABS tablet Take 10 mg by mouth nightly as needed for sleep     methotrexate sodium 2.5 MG TABS Take 6 tablets (15 mg) by mouth once a week . Take all 6 tablets on the same day of each week.  Labs required every 12 weeks.     Multiple Vitamins-Minerals (CENTRUM SILVER ADULT 50+ PO) Take 1 tablet by mouth daily Reported on 4/20/2017     nirmatrelvir and ritonavir (PAXLOVID) therapy pack Take 3 tablets by mouth 2 times daily Take 2 Nirmatrelvir tablets and 1 Ritonavir tablet twice daily for 5 days.     omeprazole (PRILOSEC) 40 MG DR capsule Take 1 capsule (40 mg) by mouth daily     sertraline (ZOLOFT) 25 MG tablet Take 1 tablet (25 mg) by mouth daily     triamcinolone (KENALOG) 0.1  "% external cream Apply topically 2 times daily as needed for irritation //itching     No current facility-administered medications for this visit.       Social History   See HPI    Family History     Family History   Problem Relation Age of Onset     Cancer Mother      Cerebrovascular Disease Father      Denies family history of autoimmune disease     Physical Exam     Temp Readings from Last 3 Encounters:   06/03/22 98.1  F (36.7  C) (Oral)   04/18/22 98.5  F (36.9  C) (Oral)   04/11/22 98.1  F (36.7  C) (Oral)     BP Readings from Last 5 Encounters:   01/11/23 132/70   06/03/22 138/62   04/18/22 (!) 153/72   04/11/22 (!) 189/69   03/03/22 (!) 155/65     Pulse Readings from Last 1 Encounters:   01/11/23 60     Resp Readings from Last 1 Encounters:   01/11/23 16     Estimated body mass index is 28.97 kg/m  as calculated from the following:    Height as of 4/11/22: 1.486 m (4' 10.5\").    Weight as of this encounter: 64 kg (141 lb).    GEN: NAD.  HEENT:  Anicteric, noninjected sclera. No obvious external lesions of the ear and nose. Hearing intact.  CV: S1, S2. RRR. No m/r/g  PULM: No increased work of breathing. CTA bilaterally   MSK: MCPs, PIPs, DIPs without swelling or tenderness to palpation.  Heberden's and Kecia's nodes present.  Wrists without swelling or tenderness to palpation.  Elbows and shoulders without swelling or tenderness to palpation.    Knees, ankles, and MTPs without swelling or tenderness to palpation.    SKIN: No rash or jaundice seen  PSYCH: Alert. Appropriate.       Labs / Imaging (select studies)     RF/CCP  Recent Labs   Lab Test 06/26/17  1854 04/20/15  1340 02/02/15  1115   CCPABY  --  <20  Interpretation:  Negative    --    CCPIGG <1  Negative    --   --    RHF <20  --  <20     CBC  Recent Labs   Lab Test 10/17/22  1127 07/11/22  1417 03/30/22  1151 09/27/21  0939 07/08/21  1156 05/26/21  1328 10/09/18  1158 04/23/18  1054   WBC 8.2 7.0 5.7   < > 7.9 6.0   < > 6.7   RBC 4.06 3.88 4.09   " < > 3.99 4.25   < > 4.18   HGB 13.5 12.9 13.7   < > 13.6 13.9   < > 13.4   HCT 40.3 38.4 40.8   < > 39.4 41.5   < > 40.4   MCV 99 99 100   < > 99 98   < > 97   RDW 13.2 13.3 13.6   < > 13.8 14.8   < > 14.8    243 242   < > 260 261   < > 210   MCH 33.3* 33.2* 33.5*   < > 34.1* 32.7   < > 32.1   MCHC 33.5 33.6 33.6   < > 34.5 33.5   < > 33.2   NEUTROPHIL 76 66 65   < > 71.3 69.7  --  62.7   LYMPH 13 20 23   < > 16.7 18.8  --  20.4   MONOCYTE 7 9 8   < > 8.8 6.9  --  15.6   EOSINOPHIL 3 4 3   < > 2.8 3.9  --  1.0   BASOPHIL 1 0 1   < > 0.4 0.7  --  0.3   ANEU  --   --   --   --  5.6 4.2  --  4.2   ALYM  --   --   --   --  1.3 1.1  --  1.4   LAURENCE  --   --   --   --  0.7 0.4  --  1.0   AEOS  --   --   --   --  0.2 0.2  --  0.1   ABAS  --   --   --   --  0.0 0.0  --  0.0   ANEUTAUTO 6.2 4.6 3.7   < >  --   --   --   --    ALYMPAUTO 1.1 1.4 1.3   < >  --   --   --   --    AMONOAUTO 0.6 0.6 0.5   < >  --   --   --   --    AEOSAUTO 0.2 0.3 0.2   < >  --   --   --   --    ABSBASO 0.0 0.0 0.0   < >  --   --   --   --     < > = values in this interval not displayed.     CMP  Recent Labs   Lab Test 10/17/22  1127 07/11/22  1417 03/30/22  1151 01/06/22  1022 09/27/21  0938 07/08/21  1156 05/26/21  1328 02/22/21  1634 10/19/20  1433 07/23/19  1035   NA  --   --   --   --   --  140  --   --  139 141   POTASSIUM  --   --   --   --   --  4.7  --   --  4.0 4.1   CHLORIDE  --   --   --   --   --  108  --   --  106 109   CO2  --   --   --   --   --  26  --   --  24 27   ANIONGAP  --   --   --   --   --  6  --   --  9 5   GLC  --   --   --   --   --  94  --   --  90 101*   BUN  --   --   --   --   --  14  --   --  21 20   CR 0.77 0.79 0.71   < > 0.73 0.80 0.88 0.70 0.74 0.67   GFRESTIMATED 74 72 82   < > 75 67 60* 79 74 81   GFRESTBLACK  --   --   --   --   --  78 69 >90 86 >90   ALEJANDRO  --   --   --   --  9.7 9.2  --   --  9.0 8.8   BILITOTAL 0.6 0.6 0.8   < >  --  0.7 0.4 0.3  --  0.5   ALBUMIN 3.9 4.0 4.0   < >  --  3.9 3.8 4.1   --  3.9   PROTTOTAL 7.2 7.0 6.9   < >  --  7.1 6.5* 7.1  --  7.2   ALKPHOS 93 88 79   < >  --  78 69 82  --  101   AST 32 26 23   < >  --  23 21 21  --  21   ALT 38 34 31   < >  --  30 28 26 27 27    < > = values in this interval not displayed.     Calcium/VitaminD  Recent Labs   Lab Test 09/27/21  0938 07/08/21  1156 10/19/20  1433 10/04/16  1145 02/05/16  1500   ALEJANDRO 9.7 9.2 9.0   < > 9.1   VITDT 40  --   --   --  39    < > = values in this interval not displayed.     ESR/CRP  Recent Labs   Lab Test 10/17/22  1127 07/11/22  1417 01/06/22  1022   SED 8 8 8   CRP 3.3 4.7 3.8     Hepatitis B  Recent Labs   Lab Test 01/24/18  0918   HBCAB Nonreactive   HEPBANG Nonreactive     Hepatitis C  Recent Labs   Lab Test 01/24/18  0918   HCVAB Nonreactive     Immunization History     Immunization History   Administered Date(s) Administered     COVID-19 Vaccine 12+ (Pfizer 2022) 04/11/2022     COVID-19 Vaccine 12+ (Pfizer) 02/11/2021, 03/04/2021, 11/19/2021     FLU 6-35 months 09/25/2009     Flu, Unspecified 10/04/2016     Influenza (High Dose) 3 valent vaccine 10/26/2010, 10/08/2013, 10/22/2014, 10/05/2015, 10/04/2016, 09/08/2017, 10/09/2018, 10/01/2019     Influenza (IIV3) PF 10/13/2006, 10/31/2008, 10/26/2010, 10/24/2011, 10/23/2012, 10/08/2020     Influenza Vaccine 65+ (Fluzone HD) 10/08/2020, 09/27/2021     Pneumo Conj 13-V (2010&after) 11/03/2015     Pneumococcal 23 valent 12/16/2004     TD (ADULT, 7+) 07/21/2010, 06/04/2021     Tdap (Adacel,Boostrix) 07/21/2010     Zoster vaccine, live 06/04/2009          Chart documentation done in part with Dragon Voice recognition Software. Although reviewed after completion, some word and grammatical error may remain.      Dale Marquis MD

## 2023-01-11 NOTE — PATIENT INSTRUCTIONS
Calcium 1000 mg daily  Vitamin D 1000 IU daily    Please consider fosamax for osteopenia treatment.  Fosamax is a bisphosphonate.

## 2023-01-11 NOTE — PROGRESS NOTES
RAPID3 (0-30) Cumulative Score  5.7          RAPID3 Weighted Score (divide #4 by 3 and that is the weighted score)  1.9

## 2023-01-19 ENCOUNTER — TELEPHONE (OUTPATIENT)
Dept: RHEUMATOLOGY | Facility: CLINIC | Age: 88
End: 2023-01-19
Payer: COMMERCIAL

## 2023-01-19 DIAGNOSIS — M19.90 INFLAMMATORY ARTHRITIS: Primary | ICD-10-CM

## 2023-01-19 RX ORDER — METHOTREXATE 2.5 MG/1
15 TABLET ORAL
Qty: 78 TABLET | Refills: 0 | Status: SHIPPED | OUTPATIENT
Start: 2023-01-19 | End: 2023-04-05

## 2023-01-19 NOTE — TELEPHONE ENCOUNTER
RN: Please call to notify Ailyn Trevino that 1/11/2023 rheumatology labs are normal.     Dale Marquis MD  1/19/2023

## 2023-01-19 NOTE — TELEPHONE ENCOUNTER
[facility] Call Center    Phone Message    May a detailed message be left on voicemail: yes     Reason for Call: Medication Refill Request    Has the patient contacted the pharmacy for the refill? Yes   Name of medication being requested: methotrexate  Provider who prescribed the medication: Dr. Marquis   Pharmacy: [facility].   Date medication is needed: ASAP         Action Taken: Other: [facility]    Travel Screening: Not Applicable

## 2023-01-19 NOTE — TELEPHONE ENCOUNTER
Called pt and let her know that her erx was sent over and her labs look similar to [facility] labs, but Dr Marquis has the message so he can comment on them officially.    Loren RAMIREZ RN Specialty Triage 1/19/2023 1:42 PM

## 2023-01-19 NOTE — TELEPHONE ENCOUNTER
[facility] Call Center    Phone Message    May a detailed message be left on voicemail: yes     Reason for Call: Requesting Results   Name/type of test: labs  Date of test: 01/11/23  Was test done at a location other than [facility] (Please fill in the location if not [facility])?: No      Action Taken: Message routed to:  Other: [facility]    Travel Screening: Not Applicable

## 2023-01-20 NOTE — TELEPHONE ENCOUNTER
RN left message for patient to regarding lab results. Detailed message left per initial incoming call stating ok. Notified that Rheumatology labs are normal. Provided call back number of [phone] to return call today until 4pm with any questions.      Maribel TORIBIO, Specialty RN 1/20/2023 9:48 AM

## 2023-02-20 ENCOUNTER — TELEPHONE (OUTPATIENT)
Dept: FAMILY MEDICINE | Facility: CLINIC | Age: 88
End: 2023-02-20
Payer: COMMERCIAL

## 2023-02-20 DIAGNOSIS — R73.9 ELEVATED SERUM GLUCOSE: ICD-10-CM

## 2023-02-20 DIAGNOSIS — E78.5 HYPERLIPIDEMIA LDL GOAL <130: Primary | ICD-10-CM

## 2023-02-20 NOTE — TELEPHONE ENCOUNTER
Patient has a physical scheduled for 3/28 and would like any lab orders put in now so she can make a lab appointment prior to her appointment.       Please call Ailyn and schedule lab visit after orders are put in.    [phone]

## 2023-03-01 DIAGNOSIS — I10 HYPERTENSION GOAL BP (BLOOD PRESSURE) < 140/90: ICD-10-CM

## 2023-03-02 RX ORDER — ATENOLOL 25 MG/1
25 TABLET ORAL DAILY
Qty: 90 TABLET | Refills: 4 | Status: SHIPPED | OUTPATIENT
Start: 2023-03-02 | End: 2024-05-03

## 2023-03-22 DIAGNOSIS — I10 HYPERTENSION, GOAL BELOW 140/90: ICD-10-CM

## 2023-03-23 RX ORDER — LOSARTAN POTASSIUM 50 MG/1
50 TABLET ORAL DAILY
Qty: 90 TABLET | Refills: 0 | Status: SHIPPED | OUTPATIENT
Start: 2023-03-23 | End: 2023-03-28

## 2023-03-26 PROBLEM — M81.0 OSTEOPOROSIS WITHOUT CURRENT PATHOLOGICAL FRACTURE, UNSPECIFIED OSTEOPOROSIS TYPE: Status: ACTIVE | Noted: 2023-03-26

## 2023-03-28 ENCOUNTER — OFFICE VISIT (OUTPATIENT)
Dept: INTERNAL MEDICINE | Facility: CLINIC | Age: 88
End: 2023-03-28
Payer: COMMERCIAL

## 2023-03-28 VITALS
HEIGHT: 59 IN | WEIGHT: 143.4 LBS | SYSTOLIC BLOOD PRESSURE: 174 MMHG | TEMPERATURE: 98.6 F | RESPIRATION RATE: 12 BRPM | BODY MASS INDEX: 28.91 KG/M2 | DIASTOLIC BLOOD PRESSURE: 68 MMHG | OXYGEN SATURATION: 99 % | HEART RATE: 61 BPM

## 2023-03-28 DIAGNOSIS — I10 HYPERTENSION, GOAL BELOW 140/90: ICD-10-CM

## 2023-03-28 DIAGNOSIS — Z12.31 ENCOUNTER FOR SCREENING MAMMOGRAM FOR BREAST CANCER: ICD-10-CM

## 2023-03-28 DIAGNOSIS — M54.2 CERVICALGIA: ICD-10-CM

## 2023-03-28 DIAGNOSIS — M19.90 INFLAMMATORY ARTHRITIS: ICD-10-CM

## 2023-03-28 DIAGNOSIS — Z00.01 ENCOUNTER FOR GENERAL ADULT MEDICAL EXAMINATION WITH ABNORMAL FINDINGS: Primary | ICD-10-CM

## 2023-03-28 DIAGNOSIS — F32.A ANXIETY AND DEPRESSION: ICD-10-CM

## 2023-03-28 DIAGNOSIS — K21.9 GASTROESOPHAGEAL REFLUX DISEASE WITHOUT ESOPHAGITIS: ICD-10-CM

## 2023-03-28 DIAGNOSIS — F41.9 ANXIETY AND DEPRESSION: ICD-10-CM

## 2023-03-28 DIAGNOSIS — R73.9 ELEVATED SERUM GLUCOSE: ICD-10-CM

## 2023-03-28 DIAGNOSIS — M54.2 NECK PAIN: ICD-10-CM

## 2023-03-28 DIAGNOSIS — E78.5 HYPERLIPIDEMIA LDL GOAL <130: ICD-10-CM

## 2023-03-28 DIAGNOSIS — F33.1 MODERATE EPISODE OF RECURRENT MAJOR DEPRESSIVE DISORDER (H): ICD-10-CM

## 2023-03-28 DIAGNOSIS — H61.23 BILATERAL IMPACTED CERUMEN: ICD-10-CM

## 2023-03-28 DIAGNOSIS — Z79.899 HIGH RISK MEDICATION USE: ICD-10-CM

## 2023-03-28 DIAGNOSIS — M81.0 OSTEOPOROSIS WITHOUT CURRENT PATHOLOGICAL FRACTURE, UNSPECIFIED OSTEOPOROSIS TYPE: ICD-10-CM

## 2023-03-28 DIAGNOSIS — F41.1 GENERALIZED ANXIETY DISORDER: ICD-10-CM

## 2023-03-28 LAB
BASOPHILS # BLD AUTO: 0 10E3/UL (ref 0–0.2)
BASOPHILS NFR BLD AUTO: 1 %
EOSINOPHIL # BLD AUTO: 0.3 10E3/UL (ref 0–0.7)
EOSINOPHIL NFR BLD AUTO: 3 %
ERYTHROCYTE [DISTWIDTH] IN BLOOD BY AUTOMATED COUNT: 13 % (ref 10–15)
ERYTHROCYTE [SEDIMENTATION RATE] IN BLOOD BY WESTERGREN METHOD: 8 MM/HR (ref 0–30)
HBA1C MFR BLD: 5.4 % (ref 0–5.6)
HCT VFR BLD AUTO: 38.1 % (ref 35–47)
HGB BLD-MCNC: 12.9 G/DL (ref 11.7–15.7)
LYMPHOCYTES # BLD AUTO: 1.6 10E3/UL (ref 0.8–5.3)
LYMPHOCYTES NFR BLD AUTO: 20 %
MCH RBC QN AUTO: 33.2 PG (ref 26.5–33)
MCHC RBC AUTO-ENTMCNC: 33.9 G/DL (ref 31.5–36.5)
MCV RBC AUTO: 98 FL (ref 78–100)
MONOCYTES # BLD AUTO: 0.6 10E3/UL (ref 0–1.3)
MONOCYTES NFR BLD AUTO: 8 %
NEUTROPHILS # BLD AUTO: 5.6 10E3/UL (ref 1.6–8.3)
NEUTROPHILS NFR BLD AUTO: 69 %
PLATELET # BLD AUTO: 261 10E3/UL (ref 150–450)
RBC # BLD AUTO: 3.89 10E6/UL (ref 3.8–5.2)
WBC # BLD AUTO: 8.1 10E3/UL (ref 4–11)

## 2023-03-28 PROCEDURE — 84132 ASSAY OF SERUM POTASSIUM: CPT | Performed by: INTERNAL MEDICINE

## 2023-03-28 PROCEDURE — 86140 C-REACTIVE PROTEIN: CPT | Performed by: INTERNAL MEDICINE

## 2023-03-28 PROCEDURE — 80061 LIPID PANEL: CPT | Performed by: INTERNAL MEDICINE

## 2023-03-28 PROCEDURE — 85025 COMPLETE CBC W/AUTO DIFF WBC: CPT | Performed by: INTERNAL MEDICINE

## 2023-03-28 PROCEDURE — 83036 HEMOGLOBIN GLYCOSYLATED A1C: CPT | Performed by: INTERNAL MEDICINE

## 2023-03-28 PROCEDURE — 85652 RBC SED RATE AUTOMATED: CPT | Performed by: INTERNAL MEDICINE

## 2023-03-28 PROCEDURE — 80076 HEPATIC FUNCTION PANEL: CPT | Performed by: INTERNAL MEDICINE

## 2023-03-28 PROCEDURE — 82565 ASSAY OF CREATININE: CPT | Performed by: INTERNAL MEDICINE

## 2023-03-28 PROCEDURE — 36415 COLL VENOUS BLD VENIPUNCTURE: CPT | Performed by: INTERNAL MEDICINE

## 2023-03-28 PROCEDURE — G0439 PPPS, SUBSEQ VISIT: HCPCS | Performed by: INTERNAL MEDICINE

## 2023-03-28 RX ORDER — TIZANIDINE 2 MG/1
1 TABLET ORAL
Qty: 90 TABLET | Refills: 1 | Status: SHIPPED | OUTPATIENT
Start: 2023-03-28

## 2023-03-28 RX ORDER — LOSARTAN POTASSIUM 50 MG/1
50 TABLET ORAL DAILY
Qty: 90 TABLET | Refills: 3 | Status: SHIPPED | OUTPATIENT
Start: 2023-03-28 | End: 2024-05-03

## 2023-03-28 RX ORDER — SERTRALINE HYDROCHLORIDE 25 MG/1
25 TABLET, FILM COATED ORAL DAILY
Qty: 90 TABLET | Refills: 3 | Status: SHIPPED | OUTPATIENT
Start: 2023-03-28 | End: 2024-03-08

## 2023-03-28 ASSESSMENT — ENCOUNTER SYMPTOMS
HEMATOCHEZIA: 0
HEADACHES: 0
MYALGIAS: 0
VOMITING: 0
FEVER: 0
DYSURIA: 0
COUGH: 0
CHILLS: 0
DIFFICULTY URINATING: 0
ARTHRALGIAS: 1
DIARRHEA: 0
NAUSEA: 0
ROS GI COMMENTS: INCREASED GAS
SINUS PRESSURE: 0
FATIGUE: 0
SORE THROAT: 0
WEAKNESS: 0
PALPITATIONS: 0
APPETITE CHANGE: 0
DYSPHORIC MOOD: 0
NERVOUS/ANXIOUS: 1
DIZZINESS: 0
HEARTBURN: 0
ABDOMINAL PAIN: 0
SHORTNESS OF BREATH: 0
CONSTIPATION: 0
UNEXPECTED WEIGHT CHANGE: 0
SLEEP DISTURBANCE: 0
PARESTHESIAS: 0
CHEST TIGHTNESS: 0
WHEEZING: 0
ADENOPATHY: 0
ACTIVITY CHANGE: 0
LIGHT-HEADEDNESS: 0
NUMBNESS: 0
TREMORS: 0

## 2023-03-28 ASSESSMENT — ACTIVITIES OF DAILY LIVING (ADL): CURRENT_FUNCTION: NO ASSISTANCE NEEDED

## 2023-03-28 NOTE — PROGRESS NOTES
SUBJECTIVE:   Ailyn is a 87 year old who presents for Preventive Visit.    Patient has been advised of split billing requirements and indicates understanding: Yes  Are you in the first 12 months of your Medicare coverage?  No    86 y/o  F here for AFE.   H/o PMR/inflammatory arthritis (MTX use--monitored per Rheum), L TKA, mild MDD, granulomatous lung disease, HTN, moderate aortic and mitral valve insufficiency-stable, GERD (needs to stay on PPI for suppression).  On SSRI for Anx/Depr since summer '22    Pt is very pleasant and feels health overall is good. She is still very mobile and able to drive. Living in independent living facility in [place]. Has two children who live in town and 2 others who live father away. She is excited to go on vacation to [place] with daughter soon.     Her only acute concern today is increased gas/flatulance for the past 2 weeks. Denies symptoms of nasuea, heartburn, vomiting, diarrhea, stomach pain, and constipation. No significant dietary changes recently. No medication changes. Has not noticed a pattern or triggers for symptoms, though feels it is better today than it has been.     Feels mood is much improved for last year and the sertraline is working well. States she is sleeping much better and being social with friends. Has some anxiety about friends passing and the state of the world, but feels this is manageable now compared to last year.  Her   2 years ago and she moved into assisted living, which may have contributed to the severity of the depression/anxiety symptoms she experienced last summer.      Lives in  housing in [place].  She still drives  .        >> SSRI since Summer '22, tolerating well.            Healthy Habits:    In general, how would you rate your overall health?  Fair    Frequency of exercise:  2-3 days/week    Duration of exercise:  30-45 minutes    Taking medications regularly:  Yes    Barriers to taking medications:   None    Medication side effects:  None    Ability to successfully perform activities of daily living:  No assistance needed    Home Safety:  No safety concerns identified    Hearing Impairment:  No hearing concerns    In the past 6 months, have you been bothered by leaking of urine?  No    In general, how would you rate your overall mental or emotional health?  Good      PHQ-2 Total Score:    Additional concerns today:  Yes      Have you ever done Advance Care Planning? (For example, a Health Directive, POLST, or a discussion with a medical provider or your loved ones about your wishes): No, advance care planning information given to patient to review.  Patient declined advance care planning discussion at this time.       Fall risk  Fallen 2 or more times in the past year?: No  Any fall with injury in the past year?: No    Cognitive Screening   1) Repeat 3 items (Leader, Season, Table)    2) Clock draw: NORMAL  3) 3 item recall: Recalls 3 objects  Results: 3 items recalled: COGNITIVE IMPAIRMENT LESS LIKELY    Mini-CogTM Copyright S [employer]. Licensed by the author for use in [facility]; reprinted with permission (soob@[facility]). All rights reserved.          Reviewed and updated as needed this visit by clinical staff                  Reviewed and updated as needed this visit by Provider                 Social History     Tobacco Use     Smoking status: Former     Types: Cigarettes     Quit date: 1998     Years since quittin.2     Smokeless tobacco: Never   Substance Use Topics     Alcohol use: Yes     Comment: 1 drink a day or less     Alcohol Use 3/3/2022   Prescreen: >3 drinks/day or >7 drinks/week? No     Do you have a current opioid prescription? No  Do you use any other controlled substances or medications that are not prescribed by a provider? None          Current providers sharing in care for this patient include:   Patient Care Team:  Arlyn Cazares MD as PCP - General (Internal  Medicine)  Bud Hurst MD as MD (Rheumatology)  Silvia Sarmiento, RN as   Arlyn Cazares MD as Assigned PCP  Dale Marquis MD as Assigned Rheumatology Provider    The following health maintenance items are reviewed in Epic and correct as of today:  Health Maintenance   Topic Date Due     COVID-19 Vaccine (5 - Booster for Pfizer series) 06/06/2022     PHQ-9  10/11/2022     FALL RISK ASSESSMENT  03/03/2023     MEDICARE ANNUAL WELLNESS VISIT  03/03/2023     ANNUAL REVIEW OF HM ORDERS  06/03/2023     ADVANCE CARE PLANNING  03/03/2027     DTAP/TDAP/TD IMMUNIZATION (4 - Td or Tdap) 06/04/2031     DEPRESSION ACTION PLAN  Completed     INFLUENZA VACCINE  Completed     Pneumococcal Vaccine: 65+ Years  Completed     ZOSTER IMMUNIZATION  Completed     IPV IMMUNIZATION  Aged Out     MENINGITIS IMMUNIZATION  Aged Out     DEXA  Discontinued     Lab work is in process  Labs reviewed in EPIC  BP Readings from Last 3 Encounters:   03/28/23 (!) 174/68   01/11/23 132/70   06/03/22 138/62    Wt Readings from Last 3 Encounters:   03/28/23 65 kg (143 lb 6.4 oz)   01/11/23 64 kg (141 lb)   06/03/22 64 kg (141 lb)                  Patient Active Problem List   Diagnosis     Advanced directives, counseling/discussion     Osteopenia     Palpitations     Hyperlipidemia LDL goal <130     Renal cyst     Health Care Home     Insomnia     S/P total knee arthroplasty, left     Aortic valve disorder     Mitral valve disorder     Encounter for long-term current use of medication     Major depressive disorder, single episode, mild (H)     Current chronic use of systemic steroids     Fatigue, unspecified type     Chronic pain of left knee     Venous stasis dermatitis of left lower extremity     Fecal urgency     Elevated hemidiaphragm     Inflammatory arthritis     Pulmonary nodules     Hypertension goal BP (blood pressure) < 140/90     Osteoporosis without current pathological fracture, unspecified osteoporosis  type     Past Surgical History:   Procedure Laterality Date     APPENDECTOMY       CATARACT IOL, RT/LT      bilateral      CHOLECYSTECTOMY       ORTHOPEDIC SURGERY      Left Knee Surgery 2017     RECONSTRUCT FOREFOOT WITH METATARSOPHALANGEAL (MTP) FUSION Left 10/24/2018    Procedure: LEFT FIRST METATARSOPHALANGEAL JOINT ARTHRODESIS;  Surgeon: Rufus Harden MD;  Location: [facility] OR     REPAIR HAMMER TOE Left 10/24/2018    Procedure: LESSER TOE RECONSTRUCTION SECOND, THIRD, FOURTH AND FIFTH TOES;  Surgeon: Rufus Harden MD;  Location: [facility] OR     SURGICAL HISTORY OF -       bilateral YAG laser surgery of eyes       Social History     Tobacco Use     Smoking status: Former     Types: Cigarettes     Quit date: 1998     Years since quittin.2     Smokeless tobacco: Never   Substance Use Topics     Alcohol use: Yes     Comment: 1 drink a day or less     Family History   Problem Relation Age of Onset     Cancer Mother      Cerebrovascular Disease Father          Current Outpatient Medications   Medication Sig Dispense Refill     Acetaminophen (TYLENOL PO) Take 650 mg by mouth 2 times daily as needed for mild pain or fever       aspirin - buffered (ASCRIPTIN) 325 MG TABS tablet Take 1 tablet (325 mg) by mouth daily (Patient taking differently: Take 325 mg by mouth as needed) 60 each 0     atenolol (TENORMIN) 25 MG tablet Take 1 tablet (25 mg) by mouth daily 90 tablet 4     folic acid (FOLVITE) 1 MG tablet Take 1 tablet (1 mg) by mouth daily 90 tablet 2     losartan (COZAAR) 50 MG tablet Take 1 tablet (50 mg) by mouth in the morning. 90 tablet 0     Melatonin 10 MG TABS tablet Take 10 mg by mouth nightly as needed for sleep       methotrexate sodium 2.5 MG TABS Take 6 tablets (15 mg) by mouth every 7 days Requires labs every 12 weeks 78 tablet 0     Multiple Vitamins-Minerals (CENTRUM SILVER ADULT 50+ PO) Take 1 tablet by mouth daily Reported on 2017       omeprazole (PRILOSEC) 40 MG   capsule Take 1 capsule (40 mg) by mouth daily 90 capsule 1     sertraline (ZOLOFT) 25 MG tablet Take 1 tablet (25 mg) by mouth daily 90 tablet 3     tiZANidine (ZANAFLEX) 2 MG tablet Take 0.5 tablets (1 mg) by mouth nightly as needed for muscle spasms 90 tablet 1     triamcinolone (KENALOG) 0.1 % external cream Apply topically 2 times daily as needed for irritation //itching 45 g 1     No Known Allergies  Recent Labs   Lab Test 03/28/23  1310 01/11/23  1121 10/17/22  1127 07/11/22  1417 03/30/22  1151 02/01/22  1015 09/27/21  0938 07/08/21  1156 05/26/21  1328 02/22/21  1634 10/19/20  1433 07/23/19  1035 01/24/18  0918 01/03/18  1000 10/23/17  1032 06/26/17  1854 01/02/17  1503 10/04/16  1145   A1C 5.4  --   --   --   --   --   --   --   --   --   --   --   --   --   --   --   --  5.2   LDL  --   --   --   --   --  163*  --   --   --   --  158* 160*  --  134*   < >  --   --   --    HDL  --   --   --   --   --  61  --   --   --   --   --  50  --  53   < >  --   --   --    TRIG  --   --   --   --   --  138  --   --   --   --   --  157*  --  144   < >  --   --   --    ALT  --  35 38 34   < >  --    < > 30 28 26 27 27   < >  --   --  27  --  23   CR  --  0.66 0.77 0.79   < >  --    < > 0.80 0.88 0.70 0.74 0.67   < >  --   --  0.61   < > 0.65   GFRESTIMATED  --  84 74 72   < >  --    < > 67 60* 79 74 81   < >  --   --  >90  Non  GFR Calc     < > 88   GFRESTBLACK  --   --   --   --   --   --   --  78 69 >90 86 >90   < >  --   --  >90  African American GFR Calc     < > >90   GFR Calc     POTASSIUM  --   --   --   --   --   --   --  4.7  --   --  4.0 4.1  --   --   --  4.1   < > 4.1   TSH  --   --   --   --   --   --   --   --   --  1.97  --   --   --   --   --  0.91  --   --     < > = values in this interval not displayed.           Mammogram Screening - Patient over age 75, has elected to continue with screening.  Pertinent mammograms are reviewed under the imaging tab.    Review of Systems  "  Constitutional: Negative for activity change, appetite change, chills, fatigue, fever and unexpected weight change.   HENT: Negative for congestion, ear pain, sinus pressure and sore throat.    Respiratory: Negative for cough, chest tightness, shortness of breath and wheezing.    Cardiovascular: Negative for chest pain, palpitations and peripheral edema.   Gastrointestinal: Negative for abdominal pain, constipation, diarrhea, heartburn, hematochezia, nausea and vomiting.        Increased gas   Genitourinary: Negative for difficulty urinating and dysuria.   Musculoskeletal: Positive for arthralgias (well managed with methotrexate). Negative for myalgias.   Neurological: Negative for dizziness, tremors, weakness, light-headedness, numbness, headaches and paresthesias.   Hematological: Negative for adenopathy.   Psychiatric/Behavioral: Negative for dysphoric mood, mood changes, sleep disturbance and suicidal ideas. The patient is nervous/anxious.           OBJECTIVE:   BP (!) 171/74   Pulse 61   Temp 98.6  F (37  C) (Temporal)   Resp 12   Ht 1.486 m (4' 10.5\")   Wt 65 kg (143 lb 6.4 oz)   SpO2 99%   BMI 29.46 kg/m   Estimated body mass index is 28.97 kg/m  as calculated from the following:    Height as of 4/11/22: 1.486 m (4' 10.5\").    Weight as of 1/11/23: 64 kg (141 lb).  Physical Exam  Constitutional:       General: She is not in acute distress.     Appearance: Normal appearance.   HENT:      Right Ear: Ear canal and external ear normal. There is impacted cerumen.      Left Ear: Ear canal and external ear normal. There is impacted cerumen.      Nose: Nose normal.      Mouth/Throat:      Mouth: Mucous membranes are moist.      Pharynx: Oropharynx is clear.   Eyes:      Extraocular Movements: Extraocular movements intact.      Conjunctiva/sclera: Conjunctivae normal.      Pupils: Pupils are equal, round, and reactive to light.   Neck:      Thyroid: No thyroid mass or thyromegaly.   Cardiovascular:      Rate " and Rhythm: Normal rate and regular rhythm.      Heart sounds: Normal heart sounds, S1 normal and S2 normal. No murmur heard.  Pulmonary:      Effort: Pulmonary effort is normal.      Breath sounds: Normal breath sounds and air entry. No wheezing, rhonchi or rales.   Chest:   Breasts:     Right: Normal. No mass.      Left: Normal. No mass.   Abdominal:      General: Bowel sounds are normal.      Palpations: Abdomen is soft. There is no hepatomegaly.      Tenderness: There is no abdominal tenderness.   Musculoskeletal:         General: Normal range of motion.      Cervical back: Normal range of motion and neck supple.      Right lower leg: No edema.      Left lower leg: No edema.   Lymphadenopathy:      Cervical: No cervical adenopathy.   Skin:     General: Skin is warm and dry.   Neurological:      General: No focal deficit present.      Mental Status: She is alert and oriented to person, place, and time. Mental status is at baseline.      Motor: No weakness.      Coordination: Coordination normal.      Gait: Gait normal.   Psychiatric:         Mood and Affect: Mood normal.         Behavior: Behavior normal.         Thought Content: Thought content normal.         Judgment: Judgment normal.       Diagnostic Test Results:  No results found for any visits on 03/28/23.    ASSESSMENT / PLAN:   (Z00.01) Encounter for general adult medical examination with abnormal findings  (primary encounter diagnosis)  Comment: overall health is stable  Plan: Discussed plan to find new pcp in [place].     (I10) Hypertension, goal below 140/90  Comment: BP elevated at today's visit  Plan: Monitor BP at home. If it continues to be elevated above goal, call clinic to schedule.     (F41.1) Generalized anxiety disorder  Comment: Pt has some anxiety but feels this is much improved from last year.  Plan: Continue sertraline as prescribed    (F33.1) Moderate episode of recurrent major depressive disorder (H)  Comment: Pt denies current  "depression. Mood has improved significantly since starting sertraline.   Plan: Continue sertraline as prescribed.     (K21.9) Gastroesophageal reflux disease without esophagitis  Comment: Well controlled.      (M19.90) Inflammatory arthritis  Comment: Rheumatology visit in January- pt is stable  Plan: continue to follow with rheumatology    (M81.0) Osteoporosis without current pathological fracture, unspecified osteoporosis type  Comment: Pt was on fosamax in 5877-4611. Hx of prednisone use for RA. Discussed benefits and side effects of oral fosamax. Recommended pt start this therapy.   Plan: Pt will consider starting oral fosamax. Followup regarding decision at next visit.     (M54.2) Neck pain  Comment: Occasional muscle spasms. Zanaflex helped in the past  Plan: tiZANidine (ZANAFLEX) 2 MG tablet            (M54.2) Cervicalgia  Comment: refilled for occasional use at night time.  Has helped her cervical issues a lot.   Plan: tiZANidine (ZANAFLEX) 2 MG tablet            (H61.23) Bilateral impacted cerumen  Comment:  Wears hearing aids.   Plan: CT REMOVAL IMPACTED CERUMEN IRRIGATION/LVG         UNILAT            (Z12.31) Encounter for screening mammogram for breast cancer  Comment:   Plan: *MA Screening Digital Bilateral            COUNSELING:  Reviewed preventive health counseling, as reflected in patient instructions      BMI:   Estimated body mass index is 28.97 kg/m  as calculated from the following:    Height as of 4/11/22: 1.486 m (4' 10.5\").    Weight as of 1/11/23: 64 kg (141 lb).   Weight management plan: Discussed healthy diet and exercise guidelines      She reports that she quit smoking about 25 years ago. Her smoking use included cigarettes. She has never used smokeless tobacco.      Appropriate preventive services were discussed with this patient, including applicable screening as appropriate for cardiovascular disease, diabetes, osteopenia/osteoporosis, and glaucoma.  As appropriate for age/gender, " discussed screening for colorectal cancer, prostate cancer, breast cancer, and cervical cancer. Checklist reviewing preventive services available has been given to the patient.    Reviewed patients plan of care and provided an AVS. The Basic Care Plan (routine screening as documented in Health Maintenance) for Ailyn meets the Care Plan requirement. This Care Plan has been established and reviewed with the Patient.          Arlyn Cazares MD  [facility]    Identified Health Risks:

## 2023-03-28 NOTE — PATIENT INSTRUCTIONS
Check BP at home and report to me next week.       Consider Fosamax weekly for bone fragility (discussed)    Call to schedule imaging  OR 1 [phone]   MAMMOGRAM

## 2023-03-29 LAB
ALBUMIN SERPL-MCNC: 4 G/DL (ref 3.4–5)
ALP SERPL-CCNC: 98 U/L (ref 40–150)
ALT SERPL W P-5'-P-CCNC: 24 U/L (ref 0–50)
AST SERPL W P-5'-P-CCNC: 22 U/L (ref 0–45)
BILIRUB DIRECT SERPL-MCNC: 0.1 MG/DL (ref 0–0.2)
BILIRUB SERPL-MCNC: 0.6 MG/DL (ref 0.2–1.3)
CHOLEST SERPL-MCNC: 259 MG/DL
CREAT SERPL-MCNC: 0.7 MG/DL (ref 0.52–1.04)
CRP SERPL-MCNC: 6.7 MG/L (ref 0–8)
FASTING STATUS PATIENT QL REPORTED: YES
GFR SERPL CREATININE-BSD FRML MDRD: 83 ML/MIN/1.73M2
HDLC SERPL-MCNC: 56 MG/DL
LDLC SERPL CALC-MCNC: 175 MG/DL
NONHDLC SERPL-MCNC: 203 MG/DL
POTASSIUM BLD-SCNC: 4.9 MMOL/L (ref 3.4–5.3)
PROT SERPL-MCNC: 7.2 G/DL (ref 6.8–8.8)
TRIGL SERPL-MCNC: 142 MG/DL

## 2023-03-29 NOTE — RESULT ENCOUNTER NOTE
Ailyn Trevino    Cholesterol remains elevated.   I see that in the past, simvastatin had caused a lot of muscle discomfort.         IF you want to try one of the newer statins at a low dose while carefully monitoring for those side effects (you can always stop it again!!), I would consider something like Rosuvastatin 5 mg daily    If not, keep working on staying active, low cholesterol foods.     Shun,       JACKY DIEGO M.D.

## 2023-04-01 DIAGNOSIS — K21.9 GASTROESOPHAGEAL REFLUX DISEASE WITHOUT ESOPHAGITIS: ICD-10-CM

## 2023-04-03 RX ORDER — OMEPRAZOLE 40 MG/1
CAPSULE, DELAYED RELEASE ORAL
Qty: 90 CAPSULE | Refills: 4 | Status: SHIPPED | OUTPATIENT
Start: 2023-04-03 | End: 2024-04-05

## 2023-04-05 ENCOUNTER — OFFICE VISIT (OUTPATIENT)
Dept: RHEUMATOLOGY | Facility: CLINIC | Age: 88
End: 2023-04-05
Payer: COMMERCIAL

## 2023-04-05 VITALS
WEIGHT: 142 LBS | BODY MASS INDEX: 29.17 KG/M2 | DIASTOLIC BLOOD PRESSURE: 67 MMHG | HEART RATE: 66 BPM | SYSTOLIC BLOOD PRESSURE: 119 MMHG | OXYGEN SATURATION: 96 %

## 2023-04-05 DIAGNOSIS — M19.90 INFLAMMATORY ARTHRITIS: Primary | ICD-10-CM

## 2023-04-05 DIAGNOSIS — Z79.899 HIGH RISK MEDICATION USE: ICD-10-CM

## 2023-04-05 DIAGNOSIS — M50.30 DEGENERATIVE DISC DISEASE, CERVICAL: ICD-10-CM

## 2023-04-05 PROCEDURE — 99214 OFFICE O/P EST MOD 30 MIN: CPT | Performed by: INTERNAL MEDICINE

## 2023-04-05 RX ORDER — FOLIC ACID 1 MG/1
1 TABLET ORAL DAILY
Qty: 90 TABLET | Refills: 2 | Status: SHIPPED | OUTPATIENT
Start: 2023-04-05 | End: 2023-10-04

## 2023-04-05 RX ORDER — METHOTREXATE 2.5 MG/1
15 TABLET ORAL
Qty: 78 TABLET | Refills: 1 | Status: SHIPPED | OUTPATIENT
Start: 2023-04-05 | End: 2023-10-04

## 2023-04-05 NOTE — PROGRESS NOTES
"  Rheumatology Clinic Visit      Ailyn Trevino MRN# [id number]   YOB: 1935 Age: 87 year old      Date of visit: 4/05/23   PCP: Dr. Arlyn Cazares    Chief Complaint   Patient presents with:  Rheumatoid Arthritis: Doing the same    Assessment and Plan     1.  Inflammatory arthritis / PMR: I initially evaluated in 2017 when she transferred care from Dr. Hurst.  Based on historical records, I agreed that she most likely had polymyalgia rheumatica. PMR was steroid responsive and she has been able to reduce her prednisone dose to 3 mg daily without issue, but then had a \"flare\" involving back pain that radiated around to include just over her bilateral lower rib cage. At the time of her flare in April 2017, the ESR and CRP were normal. She was also having hip pain at that time; she had left TKA performed in January 2017. Prednisone dose was increased at that time with resolution of her pain. I suspected that the pain she was having at that time was due to degenerative spine change rather than PMR.  11/1/2017 MRI of the T-spine showed minimal disc space narrowing at several levels in the mid and lower thoracic spine, minimal osteophyte formation or disc protrusions at several levels, but no stenosis. She has degenerative changes seen on a lumbar spine MRI that was performed at [facility], per a clinic note from [facility] orthopedics.  Prednisone was tapered off over time without worsening of her symptoms.  On 1/24/2018 she returned with bilateral wrist swelling and tenderness to palpation; also some pain in the back of her hand; and continued neck/back pain. Inflammatory arthritis dx'd at that time and was started on MTX and prednisone; was on MTX 15mg once weekly and no prednisone; doing well when seen on 4/26/2018.   When on methotrexate she noted no stiffness and improved  strength.  She then decided to stop methotrexate.  Not seen between 4/26/2018 and 3/1/2021; restarted " methotrexate on 3/1/2021 but then was lost to follow-up until 9/27/2021.  Currently on methotrexate and doing well with regard to inflammatory arthritis.  Chronic illness, stable.    - Continue methotrexate 15 mg once weekly  - Continue folic acid 1mg daily  - Labs in 3 months: CBC, Creatinine, Hepatic Panel  - Labs in 6 months: CBC, Creatinine, Hepatic Panel, ESR, CRP     High risk medication requiring intensive toxicity monitoring at least quarterly: labs ordered include CBC, Creatinine, Hepatic panel to monitor for cytopenia and hepatotoxicity; checking creatinine as it affects clearance of medication.     2.  Hand osteoarthritis: Large Heberden's nodes.  Reviewed the difference between inflammatory arthritis and osteoarthritis.    3.  Osteopenia with elevated FRAX: Based on 7/18/2022 DEXA showed a lumbar spine T score -0.8, left femoral neck T score -1.9, and right femoral neck T score -1.9; FRAX score showed a 27% risk of major osteoporotic fracture in the next 10 years and a 10% risk for osteoporotic hip fracture in the next 10 years.  History of alendronate use from 2/5/2016-4/21/2018.  We reviewed the DEXA and the rationale for treating based on the elevated FRAX and she will consider this.  She is currently taking calcium and vitamin D.  I previously provided a printout from the [employer] regarding bisphosphonates. She reports having good dentition at this time with no plans for invasive dental work. She does not wish to proceed with using an osteoporosis specific medication but we will continue calcium and vitamin D supplementation.  If she changes her mind she says that she will notify me.  She verbalized understanding about the risks of untreated disease.  Chronic illness  - calcium 1000 mg daily  - vitamin D 1000 IU daily     4.  Vaccinations: Vaccinations reviewed with Ms. Trevino.   - Influenza: encouraged yearly vaccination  - [id number]: up to date  - [id number]: up to  "date  - Shingrix: Up to date  - COVID-19: Advised updating    Total minutes spent in evaluation with patient, documentation, , and review of pertinent studies and chart notes: 14    Ms. Trevino verbalized agreement with and understanding of the rational for the diagnosis and treatment plan.  All questions were answered to best of my ability and the patient's satisfaction. Ms. Trevino was advised to contact the clinic with any questions that may arise after the clinic visit.      Thank you for involving me in the care of the patient    Return to clinic: 6 months      HPI   Ailyn Trevino is a 87 year old female with a past medical history significant for hyperlipidemia, granulomatous lung disease, aortic valve disorder, mitral valve disorder, impaired fasting glucose, polymyalgia rheumatica, and depression who presents for f/u of arthritis.     4/20/2015 rheumatology clinic note by Dr. Bud Hurst at the [facility] documents the patient does not have signs or symptoms of GCA. Significant myalgias in March 2015 in her shoulders and popliteal area, but not in the hip area. Cortisone shots in the hip in March 2015. Elevated CRP and mildly abnormal sedimentation rate. Morning stiffness for about 2 weeks. 2 hours to loosen up. Difficulty lifting her arms above her shoulders. CK was normal. Prednisone 20 mg daily \"helped about almost completely in 4 days' time\"    In 10/2017, Ms. Trevino reported that she was initially diagnosed with PMR and treated with steroids that were very effective. At that time, she reports having some difficulty raising her arms above her head. She says that she has always had some hip and knee pain that was thought to be secondary to her left knee osteoarthritis. She had a left total knee arthroplasty in January 2017. She also reports having flat feet bilaterally. She has been reducing prednisone slowly over time, and was on prednisone 3 mg daily at that " time. When she was evaluated by Dr. Christopher  on 4/20/2017 it was noted that she was having pain in her rib cage, back, and hips. She had just reduced prednisone and today she says at that time she was on 1 mg daily, so the dose was increased to 20 mg a day with resolution of her pain. She feels like she has still doing well while on prednisone 3 mg daily. She still has some thoracic back pain. No difficult or raising her arms above her head today. No difficulty standing up from a low seated position. No jaw claudication, scalp tenderness, headaches, or vision changes.she also reports having some right groin pain that she thinks is due to altered gait because of her knee issues. She also reports having lower back pain that was addressed at [facility] with an epidural steroid injection that was not effective. This was at the direction of her orthopedic surgeon who is at [facility]. She reports having had an MRI of her lumbar spine at [facility]. 8/1/2017 clinic note by Prosper Leonardo at [facility] orthopedics documents that an MRI showed multilevel degenerative changes namely L4-L5 central canal compromise and severe right lateral recess stenosis. Currently without hand pain, but she says that she has had some pain in her fingers from time to time. She denies having stiffness in her hands.     She then had an MRI of the thoracic spine showing degenerative changes of the thoracic spine    1/2018: she returned complaining of bilateral wrist pain, also some pain across the back of her hand.  Pain is worse in the morning with morning stiffness for at least one hour.  Stiffness improves with activity.  Pain in her hands improves with activity.  She continues to have pain in her lower back, upper back, mid back, and neck.  spine pain is worse with activity and improves with rest.  She is following with an orthopedic surgeon for her back.      4/26/2018, she reported that she is doing poorly because  she has a nonproductive cough but feels like she needs to cough something up.  She is going to have a chest CT later today.  No shortness of breath or chest pain.  Feels like she has chest congestion now.  Says that she is feeling better since using methotrexate.  She is off of prednisone.  Improved  strength bilaterally.  Hand pain, especially in the morning, has improved significantly.  No morning stiffness. No difficulty raising her arms above her head or standing up from a low seated position.  Denies jaw claudication, scalp tenderness, vision change, new headache, difficulty standing up from a chair, or difficulty raising her arms above her head.      3/1/2021: Not doing as well as she was doing previously when on methotrexate.  She said that she stopped taking methotrexate because she was concerned about potential side effects.  Now with swelling in her wrists and across her MCPs that is worse in the morning and improved with time and activity.  Morning stiffness for at least 1 hour at the wrists.  Reduced  strength bilaterally.  She recalls that these were symptoms she had in the past that improved with methotrexate.  She notes drinking 4 ounces of wine per day.    2021: Currently doing well on methotrexate.  No joint pain or swelling.  No morning stiffness or gelling phenomenon.  She says that her   last October and she has now moved to [place] so she is adjusting to these changes.  Otherwise doing well.  Mild chronic neck pain that is worse with activity and improves with rest; nonradiating.    2022: Currently doing well with regard to inflammatory arthritis.  Tolerating methotrexate well.  No joint pain or stiffness except for the left foot that has been operated on twice and she follows with podiatry for this.  No joint swelling.    2023: Currently doing well.  No joint pain or swelling.  Morning stiffness for no more than 10 minutes but does not occur every day.   Positive gelling phenomenon if she sits for a very long time but this resolves quickly.  Methotrexate is effective for arthritis.  Recalls being on Fosamax in the distant past and does not recall any side effects from Fosamax.  No heartburn.  No dysphagia.  Reports having good dentition; had a tooth removed last year.  Does not anticipate any future invasive dental work.  Follows at the dentist regularly.    Today, 4/5/2023: Currently doing well with regard to inflammatory arthritis.  Large Heberden's nodes that are not painful.  No morning stiffness.  No gelling phenomenon.  Arthritis is not limiting her daily activities.    Denies fevers, chills, nausea, vomiting, constipation, diarrhea. No abdominal pain. No chest pain/pressure, palpitations, or shortness of breath. No LE swelling.  No oral or nasal sores.  No rash.      Tobacco:  None  EtOH: No more than 1 drink per day: 4 ounce glass of wine  Drugs:   Occupation: used to work as a , retired now    ROS   12 point review of system was completed and negative except as noted in the HPI     Active Problem List     Patient Active Problem List   Diagnosis     Advanced directives, counseling/discussion     Osteopenia     Palpitations     Hyperlipidemia LDL goal <130     Renal cyst     Health Care Home     Insomnia     S/P total knee arthroplasty, left     Aortic valve disorder     Mitral valve disorder     Encounter for long-term current use of medication     Major depressive disorder, single episode, mild (H)     Current chronic use of systemic steroids     Fatigue, unspecified type     Chronic pain of left knee     Venous stasis dermatitis of left lower extremity     Fecal urgency     Elevated hemidiaphragm     Inflammatory arthritis     Pulmonary nodules     Hypertension goal BP (blood pressure) < 140/90     Osteoporosis without current pathological fracture, unspecified osteoporosis type     Past Medical History     Past Medical History:   Diagnosis  Date     Aortic valve disorder      Elevated hemidiaphragm      Granulomatous lung disease (H) 8/7/2012     High cholesterol      Insomnia 10/8/2013    Benadryl gives her RLS     Mitral valve disorder      Osteoarthritis of knee 10/8/2013    Sees Dr. Whaley     Osteopenia 7/31/2012     Other chronic pain 8/3/2015    Patient is followed by KIARA RAINES for ongoing prescription of pain medication.  All refills should be approved by this provider, or covering partner.  Medication(s): Hydrocodone/APAP.  Maximum quantity per month: 30 Clinic visit frequency required: Q 3 months   Controlled substance agreement on file: Yes      Date(s): 8/3/15  Pain Clinic evaluation in the past: No  DIRE Total Score(s): No fl     Polymyalgia rheumatica (H)      Pulmonary nodules      Renal cyst 8/7/2012     Past Surgical History     Past Surgical History:   Procedure Laterality Date     APPENDECTOMY  1951     CATARACT IOL, RT/LT  2010    bilateral      CHOLECYSTECTOMY  2010     ORTHOPEDIC SURGERY      Left Knee Surgery 1/2017     RECONSTRUCT FOREFOOT WITH METATARSOPHALANGEAL (MTP) FUSION Left 10/24/2018    Procedure: LEFT FIRST METATARSOPHALANGEAL JOINT ARTHRODESIS;  Surgeon: Rufus Harden MD;  Location:  OR     REPAIR HAMMER TOE Left 10/24/2018    Procedure: LESSER TOE RECONSTRUCTION SECOND, THIRD, FOURTH AND FIFTH TOES;  Surgeon: Rufus Harden MD;  Location:  OR     SURGICAL HISTORY OF -   12/13    bilateral YAG laser surgery of eyes     Allergy     Allergies   Allergen Reactions     Simvastatin Muscle Pain (Myalgia)     Current Medication List     Current Outpatient Medications   Medication Sig     Acetaminophen (TYLENOL PO) Take 650 mg by mouth 2 times daily as needed for mild pain or fever     aspirin - buffered (ASCRIPTIN) 325 MG TABS tablet Take 1 tablet (325 mg) by mouth daily (Patient taking differently: Take 325 mg by mouth as needed)     atenolol (TENORMIN) 25 MG tablet Take 1 tablet (25 mg) by mouth daily  "    folic acid (FOLVITE) 1 MG tablet Take 1 tablet (1 mg) by mouth daily     losartan (COZAAR) 50 MG tablet Take 1 tablet (50 mg) by mouth daily     Melatonin 10 MG TABS tablet Take 10 mg by mouth nightly as needed for sleep     methotrexate sodium 2.5 MG TABS Take 6 tablets (15 mg) by mouth every 7 days Requires labs every 12 weeks     Multiple Vitamins-Minerals (CENTRUM SILVER ADULT 50+ PO) Take 1 tablet by mouth daily Reported on 4/20/2017     omeprazole (PRILOSEC) 40 MG DR capsule TAKE ONE CAPSULE BY MOUTH ONE TIME DAILY     sertraline (ZOLOFT) 25 MG tablet Take 1 tablet (25 mg) by mouth daily     tiZANidine (ZANAFLEX) 2 MG tablet Take 0.5 tablets (1 mg) by mouth nightly as needed for muscle spasms     triamcinolone (KENALOG) 0.1 % external cream Apply topically 2 times daily as needed for irritation //itching     No current facility-administered medications for this visit.       Social History   See HPI    Family History     Family History   Problem Relation Age of Onset     Cancer Mother      Cerebrovascular Disease Father      Denies family history of autoimmune disease     Physical Exam     Temp Readings from Last 3 Encounters:   03/28/23 98.6  F (37  C) (Temporal)   06/03/22 98.1  F (36.7  C) (Oral)   04/18/22 98.5  F (36.9  C) (Oral)     BP Readings from Last 5 Encounters:   04/05/23 119/67   03/28/23 (!) 174/68   01/11/23 132/70   06/03/22 138/62   04/18/22 (!) 153/72     Pulse Readings from Last 1 Encounters:   04/05/23 66     Resp Readings from Last 1 Encounters:   03/28/23 12     Estimated body mass index is 29.17 kg/m  as calculated from the following:    Height as of 3/28/23: 1.486 m (4' 10.5\").    Weight as of this encounter: 64.4 kg (142 lb).    GEN: NAD.  HEENT:  Anicteric, noninjected sclera. No obvious external lesions of the ear and nose. Hearing intact.  CV: S1, S2. RRR. No m/r/g  PULM: No increased work of breathing. CTA bilaterally   MSK: MCPs, PIPs, DIPs without swelling or tenderness to " palpation.  Heberden's and Kecia's nodes present.  Wrists without swelling or tenderness to palpation.  Elbows and shoulders without swelling or tenderness to palpation.    Knees, ankles, and MTPs without swelling or tenderness to palpation.    SKIN: No rash or jaundice seen  PSYCH: Alert. Appropriate.       Labs / Imaging (select studies)     RF/CCP  Recent Labs   Lab Test 06/26/17  1854 04/20/15  1340 02/02/15  1115   CCPABY  --  <20  Interpretation:  Negative    --    CCPIGG <1  Negative    --   --    RHF <20  --  <20     CBC  Recent Labs   Lab Test 03/28/23  1310 01/11/23  1121 10/17/22  1127 09/27/21  0939 07/08/21  1156 05/26/21  1328 10/09/18  1158 04/23/18  1054   WBC 8.1 5.2 8.2   < > 7.9 6.0   < > 6.7   RBC 3.89 3.62* 4.06   < > 3.99 4.25   < > 4.18   HGB 12.9 12.2 13.5   < > 13.6 13.9   < > 13.4   HCT 38.1 36.0 40.3   < > 39.4 41.5   < > 40.4   MCV 98 99 99   < > 99 98   < > 97   RDW 13.0 13.5 13.2   < > 13.8 14.8   < > 14.8    247 266   < > 260 261   < > 210   MCH 33.2* 33.7* 33.3*   < > 34.1* 32.7   < > 32.1   MCHC 33.9 33.9 33.5   < > 34.5 33.5   < > 33.2   NEUTROPHIL 69 64 76   < > 71.3 69.7  --  62.7   LYMPH 20 22 13   < > 16.7 18.8  --  20.4   MONOCYTE 8 10 7   < > 8.8 6.9  --  15.6   EOSINOPHIL 3 4 3   < > 2.8 3.9  --  1.0   BASOPHIL 1 1 1   < > 0.4 0.7  --  0.3   ANEU  --   --   --   --  5.6 4.2  --  4.2   ALYM  --   --   --   --  1.3 1.1  --  1.4   LAURENCE  --   --   --   --  0.7 0.4  --  1.0   AEOS  --   --   --   --  0.2 0.2  --  0.1   ABAS  --   --   --   --  0.0 0.0  --  0.0   ANEUTAUTO 5.6 3.3 6.2   < >  --   --   --   --    ALYMPAUTO 1.6 1.2 1.1   < >  --   --   --   --    AMONOAUTO 0.6 0.5 0.6   < >  --   --   --   --    AEOSAUTO 0.3 0.2 0.2   < >  --   --   --   --    ABSBASO 0.0 0.0 0.0   < >  --   --   --   --     < > = values in this interval not displayed.     CMP  Recent Labs   Lab Test 03/28/23  1310 01/11/23  1121 10/17/22  1127 01/06/22  1022 09/27/21  0938 07/08/21  1156  05/26/21  1328 02/22/21  1634 10/19/20  1433 07/23/19  1035   NA  --   --   --   --   --  140  --   --  139 141   POTASSIUM 4.9  --   --   --   --  4.7  --   --  4.0 4.1   CHLORIDE  --   --   --   --   --  108  --   --  106 109   CO2  --   --   --   --   --  26  --   --  24 27   ANIONGAP  --   --   --   --   --  6  --   --  9 5   GLC  --   --   --   --   --  94  --   --  90 101*   BUN  --   --   --   --   --  14  --   --  21 20   CR 0.70 0.66 0.77   < > 0.73 0.80 0.88 0.70 0.74 0.67   GFRESTIMATED 83 84 74   < > 75 67 60* 79 74 81   GFRESTBLACK  --   --   --   --   --  78 69 >90 86 >90   ALEJANDRO  --  9.6  --   --  9.7 9.2  --   --  9.0 8.8   BILITOTAL 0.6 0.8 0.6   < >  --  0.7 0.4 0.3  --  0.5   ALBUMIN 4.0 4.1 3.9   < >  --  3.9 3.8 4.1  --  3.9   PROTTOTAL 7.2 7.1 7.2   < >  --  7.1 6.5* 7.1  --  7.2   ALKPHOS 98 83 93   < >  --  78 69 82  --  101   AST 22 25 32   < >  --  23 21 21  --  21   ALT 24 35 38   < >  --  30 28 26 27 27    < > = values in this interval not displayed.     Calcium/VitaminD  Recent Labs   Lab Test 01/11/23  1121 09/27/21  0938 07/08/21  1156 10/04/16  1145 02/05/16  1500   ALEJANDRO 9.6 9.7 9.2   < > 9.1   VITDT 39 40  --   --  39    < > = values in this interval not displayed.     ESR/CRP  Recent Labs   Lab Test 03/28/23  1310 01/11/23  1121 10/17/22  1127   SED 8 7 8   CRP 6.7 4.0 3.3       Hepatitis B  Recent Labs   Lab Test 01/24/18 0918   HBCAB Nonreactive   HEPBANG Nonreactive     Hepatitis C  Recent Labs   Lab Test 01/24/18 0918   HCVAB Nonreactive     Immunization History     Immunization History   Administered Date(s) Administered     COVID-19 Vaccine 12+ (Pfizer 2022) 04/11/2022     COVID-19 Vaccine 12+ (Pfizer) 02/11/2021, 03/04/2021, 11/19/2021     FLU 6-35 months 09/25/2009     Flu, Unspecified 10/04/2016     Influenza (High Dose) 3 valent vaccine 10/26/2010, 10/08/2013, 10/22/2014, 10/05/2015, 10/04/2016, 09/08/2017, 10/09/2018, 10/01/2019     Influenza (IIV3) PF 10/13/2006,  10/31/2008, 10/26/2010, 10/24/2011, 10/23/2012, 10/08/2020     Influenza Vaccine 65+ (Fluzone HD) 10/08/2020, 09/27/2021     Pneumo Conj 13-V (2010&after) 11/03/2015     Pneumococcal 23 valent 12/16/2004     TD,PF 7+ (Tenivac) 07/21/2010, 06/04/2021     TDAP (Adacel,Boostrix) 07/21/2010     Zoster vaccine, live 06/04/2009          Chart documentation done in part with Dragon Voice recognition Software. Although reviewed after completion, some word and grammatical error may remain.      Dale Marquis MD

## 2023-04-05 NOTE — NURSING NOTE
RAPID3 (0-30) Cumulative Score  5.3          RAPID3 Weighted Score (divide #4 by 3 and that is the weighted score)  1.7

## 2023-04-05 NOTE — PATIENT INSTRUCTIONS
RHEUMATOLOGY    Dr. Dale Marquis    [facility]  [address] [place] [address]  [place] [address]  Phone number: [phone]  Fax number: [phone]      Thank you for choosing [facility]!    Please make a lab appointment in July (beginning) and again before you see Dr. Marquis

## 2023-04-27 ENCOUNTER — OFFICE VISIT (OUTPATIENT)
Dept: FAMILY MEDICINE | Facility: CLINIC | Age: 88
End: 2023-04-27
Payer: COMMERCIAL

## 2023-04-27 VITALS
TEMPERATURE: 98.7 F | OXYGEN SATURATION: 96 % | RESPIRATION RATE: 24 BRPM | BODY MASS INDEX: 29.29 KG/M2 | WEIGHT: 145.3 LBS | HEART RATE: 65 BPM | SYSTOLIC BLOOD PRESSURE: 140 MMHG | DIASTOLIC BLOOD PRESSURE: 50 MMHG | HEIGHT: 59 IN

## 2023-04-27 DIAGNOSIS — Z76.89 ENCOUNTER TO ESTABLISH CARE: Primary | ICD-10-CM

## 2023-04-27 DIAGNOSIS — E78.5 HYPERLIPIDEMIA LDL GOAL <100: ICD-10-CM

## 2023-04-27 DIAGNOSIS — I10 HYPERTENSION GOAL BP (BLOOD PRESSURE) < 140/90: ICD-10-CM

## 2023-04-27 DIAGNOSIS — M35.3 PMR (POLYMYALGIA RHEUMATICA) (H): ICD-10-CM

## 2023-04-27 DIAGNOSIS — M19.90 INFLAMMATORY ARTHRITIS: ICD-10-CM

## 2023-04-27 PROCEDURE — 99213 OFFICE O/P EST LOW 20 MIN: CPT | Performed by: INTERNAL MEDICINE

## 2023-04-27 ASSESSMENT — PATIENT HEALTH QUESTIONNAIRE - PHQ9
10. IF YOU CHECKED OFF ANY PROBLEMS, HOW DIFFICULT HAVE THESE PROBLEMS MADE IT FOR YOU TO DO YOUR WORK, TAKE CARE OF THINGS AT HOME, OR GET ALONG WITH OTHER PEOPLE: NOT DIFFICULT AT ALL
SUM OF ALL RESPONSES TO PHQ QUESTIONS 1-9: 1
SUM OF ALL RESPONSES TO PHQ QUESTIONS 1-9: 1

## 2023-04-27 NOTE — PROGRESS NOTES
Assessment & Plan     Ailyn was seen today for [facility].    Diagnoses and all orders for this visit:    Encounter to [facility] care    PMR (polymyalgia rheumatica) (H)  Comments:  history of this. not on prednisone. see rheumatology. on MTX.     Hypertension goal BP (blood pressure) < 140/90    Inflammatory arthritis  Comments:  sees rheumatology, on MTX    Hyperlipidemia LDL goal <100    Other orders  -     PRIMARY CARE FOLLOW-UP SCHEDULING; Future      Reviewed patient's health issues and medications.  Overall patient is doing very well.  Blood pressure is borderline today.  Patient feels her blood pressure is better at home.  We will have her check blood pressure daily for a week.  In regards to gas/bloating problem: Have her try Beano or Gas-X to see if it helps.  Look for food correlation.  No change to current prescription medication for now.    Virtual visit follow-up in 1 month on blood pressure and stomach issue.  Wellness visit in March next year.    I spent a total of 26 minutes on the day of the visit.   doing chart review, history and exam, documentation and further activities as noted above    Isela Mac MD PhD  [facility]   Ailyn is a 87 year old, presenting for the following health issues:  Establish Care        4/27/2023     2:27 PM   Additional Questions   Roomed by Grace   Accompanied by self     History of Present Illness       Reason for visit:  Establish care    She eats 0-1 servings of fruits and vegetables daily.She consumes 0 sweetened beverage(s) daily.She exercises with enough effort to increase her heart rate 20 to 29 minutes per day.  She exercises with enough effort to increase her heart rate 7 days per week.   She is taking medications regularly.    Today's PHQ-9         PHQ-9 Total Score: 1    PHQ-9 Q9 Thoughts of better off dead/self-harm past 2 weeks :   Not at all    How difficult have these problems made it for you to do your work,  "take care of things at home, or get along with other people: Not difficult at all     Patient came to establish care due to PCP leaving.  She had a wellness visit recently in March.  Patient has a history of hypertension depression inflammatory arthritis/PMR.  She follows with rheumatologist.  Patient reports she is doing well.    Patient reported increasing bloating and gas symptoms in the last 2 months.  Sometimes it can be embarrassing in public setting.  She seems to be able to control it some when she is around people.      Denied any change in her diet.  No recent medication changes.      Review of Systems   Constitutional, HEENT, cardiovascular, pulmonary, gi and gu systems are negative, except as otherwise noted.      Objective    BP (!) 140/50   Pulse 65   Temp 98.7  F (37.1  C) (Oral)   Resp 24   Ht 1.486 m (4' 10.5\")   Wt 65.9 kg (145 lb 4.8 oz)   SpO2 96%   BMI 29.85 kg/m    Body mass index is 29.85 kg/m .  Physical Exam   GENERAL APPEARANCE: healthy, alert and no distress    Office Visit on 03/28/2023   Component Date Value Ref Range Status     Creatinine 03/28/2023 0.70  0.52 - 1.04 mg/dL Final     GFR Estimate 03/28/2023 83  >60 mL/min/1.73m2 Final    eGFR calculated using 2021 CKD-EPI equation.     Erythrocyte Sedimentation Rate 03/28/2023 8  0 - 30 mm/hr Final     CRP Inflammation 03/28/2023 6.7  0.0 - 8.0 mg/L Final     Bilirubin Total 03/28/2023 0.6  0.2 - 1.3 mg/dL Final     Bilirubin Direct 03/28/2023 0.1  0.0 - 0.2 mg/dL Final     Protein Total 03/28/2023 7.2  6.8 - 8.8 g/dL Final     Albumin 03/28/2023 4.0  3.4 - 5.0 g/dL Final     Alkaline Phosphatase 03/28/2023 98  40 - 150 U/L Final     AST 03/28/2023 22  0 - 45 U/L Final     ALT 03/28/2023 24  0 - 50 U/L Final     Hemoglobin A1C 03/28/2023 5.4  0.0 - 5.6 % Final    Normal <5.7%   Prediabetes 5.7-6.4%    Diabetes 6.5% or higher     Note: Adopted from ADA consensus guidelines.     Cholesterol 03/28/2023 259 (H)  <200 mg/dL Final     " Triglycerides 03/28/2023 142  <150 mg/dL Final     Direct Measure HDL 03/28/2023 56  >=50 mg/dL Final     LDL Cholesterol Calculated 03/28/2023 175 (H)  <=100 mg/dL Final     Non HDL Cholesterol 03/28/2023 203 (H)  <130 mg/dL Final     Patient Fasting > 8hrs? 03/28/2023 Yes   Final     WBC Count 03/28/2023 8.1  4.0 - 11.0 10e3/uL Final     RBC Count 03/28/2023 3.89  3.80 - 5.20 10e6/uL Final     Hemoglobin 03/28/2023 12.9  11.7 - 15.7 g/dL Final     Hematocrit 03/28/2023 38.1  35.0 - 47.0 % Final     MCV 03/28/2023 98  78 - 100 fL Final     MCH 03/28/2023 33.2 (H)  26.5 - 33.0 pg Final     MCHC 03/28/2023 33.9  31.5 - 36.5 g/dL Final     RDW 03/28/2023 13.0  10.0 - 15.0 % Final     Platelet Count 03/28/2023 261  150 - 450 10e3/uL Final     % Neutrophils 03/28/2023 69  % Final     % Lymphocytes 03/28/2023 20  % Final     % Monocytes 03/28/2023 8  % Final     % Eosinophils 03/28/2023 3  % Final     % Basophils 03/28/2023 1  % Final     Absolute Neutrophils 03/28/2023 5.6  1.6 - 8.3 10e3/uL Final     Absolute Lymphocytes 03/28/2023 1.6  0.8 - 5.3 10e3/uL Final     Absolute Monocytes 03/28/2023 0.6  0.0 - 1.3 10e3/uL Final     Absolute Eosinophils 03/28/2023 0.3  0.0 - 0.7 10e3/uL Final     Absolute Basophils 03/28/2023 0.0  0.0 - 0.2 10e3/uL Final     Potassium 03/28/2023 4.9  3.4 - 5.3 mmol/L Final

## 2023-04-27 NOTE — PATIENT INSTRUCTIONS
Additional instructions:  Check blood pressure daily for 1 week, 1-2 hours after you take morning blood pressure medications.   Normal blood pressure ideally > 140/90.  Try Gas-X or Beano to see if they help with gas problem.

## 2023-05-03 ENCOUNTER — ANCILLARY PROCEDURE (OUTPATIENT)
Dept: MAMMOGRAPHY | Facility: CLINIC | Age: 88
End: 2023-05-03
Attending: INTERNAL MEDICINE
Payer: COMMERCIAL

## 2023-05-03 DIAGNOSIS — Z12.31 ENCOUNTER FOR SCREENING MAMMOGRAM FOR BREAST CANCER: ICD-10-CM

## 2023-05-03 PROCEDURE — 77067 SCR MAMMO BI INCL CAD: CPT | Mod: GC | Performed by: RADIOLOGY

## 2023-10-04 ENCOUNTER — OFFICE VISIT (OUTPATIENT)
Dept: RHEUMATOLOGY | Facility: CLINIC | Age: 88
End: 2023-10-04
Payer: COMMERCIAL

## 2023-10-04 VITALS
BODY MASS INDEX: 29.62 KG/M2 | SYSTOLIC BLOOD PRESSURE: 146 MMHG | WEIGHT: 144.2 LBS | OXYGEN SATURATION: 99 % | HEART RATE: 66 BPM | DIASTOLIC BLOOD PRESSURE: 71 MMHG

## 2023-10-04 DIAGNOSIS — Z79.899 HIGH RISK MEDICATION USE: ICD-10-CM

## 2023-10-04 DIAGNOSIS — M19.90 INFLAMMATORY ARTHRITIS: Primary | ICD-10-CM

## 2023-10-04 DIAGNOSIS — M19.042 PRIMARY OSTEOARTHRITIS OF BOTH HANDS: ICD-10-CM

## 2023-10-04 DIAGNOSIS — M19.041 PRIMARY OSTEOARTHRITIS OF BOTH HANDS: ICD-10-CM

## 2023-10-04 LAB
BASO+EOS+MONOS # BLD AUTO: NORMAL 10*3/UL
BASO+EOS+MONOS NFR BLD AUTO: NORMAL %
BASOPHILS # BLD AUTO: 0.1 10E3/UL (ref 0–0.2)
BASOPHILS NFR BLD AUTO: 1 %
EOSINOPHIL # BLD AUTO: 0.3 10E3/UL (ref 0–0.7)
EOSINOPHIL NFR BLD AUTO: 3 %
ERYTHROCYTE [DISTWIDTH] IN BLOOD BY AUTOMATED COUNT: 13 % (ref 10–15)
HCT VFR BLD AUTO: 38.1 % (ref 35–47)
HGB BLD-MCNC: 12.9 G/DL (ref 11.7–15.7)
IMM GRANULOCYTES # BLD: 0 10E3/UL
IMM GRANULOCYTES NFR BLD: 0 %
LYMPHOCYTES # BLD AUTO: 1.3 10E3/UL (ref 0.8–5.3)
LYMPHOCYTES NFR BLD AUTO: 17 %
MCH RBC QN AUTO: 32.9 PG (ref 26.5–33)
MCHC RBC AUTO-ENTMCNC: 33.9 G/DL (ref 31.5–36.5)
MCV RBC AUTO: 97 FL (ref 78–100)
MONOCYTES # BLD AUTO: 0.7 10E3/UL (ref 0–1.3)
MONOCYTES NFR BLD AUTO: 9 %
NEUTROPHILS # BLD AUTO: 5.5 10E3/UL (ref 1.6–8.3)
NEUTROPHILS NFR BLD AUTO: 70 %
PLATELET # BLD AUTO: 285 10E3/UL (ref 150–450)
RBC # BLD AUTO: 3.92 10E6/UL (ref 3.8–5.2)
WBC # BLD AUTO: 7.9 10E3/UL (ref 4–11)

## 2023-10-04 PROCEDURE — 99214 OFFICE O/P EST MOD 30 MIN: CPT | Performed by: INTERNAL MEDICINE

## 2023-10-04 PROCEDURE — 85025 COMPLETE CBC W/AUTO DIFF WBC: CPT | Performed by: INTERNAL MEDICINE

## 2023-10-04 PROCEDURE — 80076 HEPATIC FUNCTION PANEL: CPT | Performed by: INTERNAL MEDICINE

## 2023-10-04 PROCEDURE — 82565 ASSAY OF CREATININE: CPT | Performed by: INTERNAL MEDICINE

## 2023-10-04 PROCEDURE — 36415 COLL VENOUS BLD VENIPUNCTURE: CPT | Performed by: INTERNAL MEDICINE

## 2023-10-04 RX ORDER — METHOTREXATE 2.5 MG/1
15 TABLET ORAL
Qty: 78 TABLET | Refills: 0 | Status: SHIPPED | OUTPATIENT
Start: 2023-10-04 | End: 2024-01-11

## 2023-10-04 RX ORDER — FOLIC ACID 1 MG/1
1 TABLET ORAL DAILY
Qty: 90 TABLET | Refills: 0 | Status: SHIPPED | OUTPATIENT
Start: 2023-10-04 | End: 2024-03-15

## 2023-10-04 NOTE — PROGRESS NOTES
"  Rheumatology Clinic Visit      Ailyn Trevino MRN# [id number]   YOB: 1935 Age: 88 year old      Date of visit: 10/04/23   PCP: Dr. Isela Mac    Chief Complaint   Patient presents with:  Rheumatoid Arthritis    Assessment and Plan     1.  Inflammatory arthritis / PMR: I initially evaluated in 2017 when she transferred care from Dr. Hurst.  Based on historical records, I agreed that she most likely had polymyalgia rheumatica. PMR was steroid responsive and she has been able to reduce her prednisone dose to 3 mg daily without issue, but then had a \"flare\" involving back pain that radiated around to include just over her bilateral lower rib cage. At the time of her flare in April 2017, the ESR and CRP were normal. She was also having hip pain at that time; she had left TKA performed in January 2017. Prednisone dose was increased at that time with resolution of her pain. I suspected that the pain she was having at that time was due to degenerative spine change rather than PMR.  11/1/2017 MRI of the T-spine showed minimal disc space narrowing at several levels in the mid and lower thoracic spine, minimal osteophyte formation or disc protrusions at several levels, but no stenosis. She has degenerative changes seen [facility] from [facility].  Prednisone was tapered off over time without worsening of her symptoms.  On 1/24/2018 she returned with bilateral wrist swelling and tenderness to palpation; also some pain in the back of her hand; and continued neck/back pain. Inflammatory arthritis dx'd at that time and was started on MTX and prednisone; was on MTX 15mg once weekly and no prednisone; doing well when seen on 4/26/2018.   When on methotrexate she noted no stiffness and improved  strength.  She then decided to stop methotrexate.  Not seen between 4/26/2018 and 3/1/2021; restarted methotrexate on 3/1/2021 but then " was lost to follow-up until 9/27/2021.  Currently on methotrexate and doing well with regard to inflammatory arthritis.  Toxicity monitoring labs are overdue and will be completed today.  Patient verbalized understanding about needing labs completed every 3 months.  Chronic illness, stable.    - Continue methotrexate 15 mg once weekly  - Continue folic acid 1mg daily  - Labs now: CBC, Creatinine, Hepatic Panel  - Labs in 3 months: CBC, Creatinine, Hepatic Panel  - Labs in 6 months: CBC, Creatinine, Hepatic Panel, ESR, CRP     High risk medication requiring intensive toxicity monitoring at least quarterly.     2.  Hand osteoarthritis: Large Heberden's nodes and bilateral first CMC joint squaring.  Reviewed the difference between inflammatory arthritis and osteoarthritis again today.    3.  Osteopenia with elevated FRAX: Based on 7/18/2022 DEXA showed a lumbar spine T score -0.8, left femoral neck T score -1.9, and right femoral neck T score -1.9; FRAX score showed a 27% risk of major osteoporotic fracture in the next 10 years and a 10% risk for osteoporotic hip fracture in the next 10 years.  History of alendronate use from 2/5/2016-4/21/2018.  We reviewed the DEXA and the rationale for treating based on the elevated FRAX and she will consider this.  She is currently taking calcium and vitamin D.  I previously provided a printout from the [employer] regarding bisphosphonates. She reports having good dentition at this time with no plans for invasive dental work. She does not wish to proceed with using an osteoporosis specific medication but we will continue calcium and vitamin D supplementation.  If she changes her mind she says that she will notify me.  She verbalized understanding about the risks of untreated disease.  Chronic illness  - calcium 1000 mg daily  - vitamin D 1000 IU daily     4.  Vaccinations: Vaccinations reviewed with Ms. Trevino.   - Influenza: encouraged yearly vaccination  -  "[id number]: up to date  - [id number]: up to date  - Shingrix: Up to date  - COVID-19: Advised keeping up-to-date    5. Elevated blood pressure:  Ailyn to follow up with Primary Care provider regarding elevated blood pressure.     Total minutes spent in evaluation with patient, documentation, , and review of pertinent studies and chart notes: 14    Ms. Trevino verbalized agreement with and understanding of the rational for the diagnosis and treatment plan.  All questions were answered to best of my ability and the patient's satisfaction. Ms. Trevino was advised to contact the clinic with any questions that may arise after the clinic visit.      Thank you for involving me in the care of the patient    Return to clinic: 6 months      HPI   Ailyn Trevino is a 88 year old female with a past medical history significant for hyperlipidemia, granulomatous lung disease, aortic valve disorder, mitral valve disorder, impaired fasting glucose, polymyalgia rheumatica, and depression who presents for f/u of arthritis.     4/20/2015 rheumatology clinic note by Dr. Bud Hurst at the [facility] documents the patient does not have signs or symptoms of GCA. Significant myalgias in March 2015 in her shoulders and popliteal area, but not in the hip area. Cortisone shots in the hip in March 2015. Elevated CRP and mildly abnormal sedimentation rate. Morning stiffness for about 2 weeks. 2 hours to loosen up. Difficulty lifting her arms above her shoulders. CK was normal. Prednisone 20 mg daily \"helped about almost completely in 4 days' time\"    In 10/2017, Ms. Trevino reported that she was initially diagnosed with PMR and treated with steroids that were very effective. At that time, she reports having some difficulty raising her arms above her head. She says that she has always had some hip and knee pain that was thought to be secondary to her left knee osteoarthritis. She had a left total knee " arthroplasty in January 2017. She also reports having flat feet bilaterally. She has been reducing prednisone slowly over time, and was on prednisone 3 mg daily at that time. When she was evaluated by Dr. Christopher  on 4/20/2017 it was noted that she was having pain in her rib cage, back, and hips. She had just reduced prednisone and today she says at that time she was on 1 mg daily, so the dose was increased to 20 mg a day with resolution of her pain. She feels like she has still doing well while on prednisone 3 mg daily. She still has some thoracic back pain. No difficult or raising her arms above her head today. No difficulty standing up from a low seated position. No jaw claudication, scalp tenderness, headaches, or vision changes.she also reports having some right groin pain that she thinks is due to altered gait because of her knee issues. She also reports having lower back pain that was addressed at [facility] with an epidural steroid injection that was not effective. This was at the direction of her orthopedic surgeon who is at [facility] orthopedics. She reports having had an MRI of her lumbar spine at [facility]. 8/1/2017 clinic note by Prosper Leonardo at [facility] orthopedics documents that an MRI showed multilevel degenerative changes namely L4-L5 central canal compromise and severe right lateral recess stenosis. Currently without hand pain, but she says that she has had some pain in her fingers from time to time. She denies having stiffness in her hands.     She then had an MRI of the thoracic spine showing degenerative changes of the thoracic spine    1/2018: she returned complaining of bilateral wrist pain, also some pain across the back of her hand.  Pain is worse in the morning with morning stiffness for at least one hour.  Stiffness improves with activity.  Pain in her hands improves with activity.  She continues to have pain in her lower back, upper back, mid back, and neck.  spine  pain is worse with activity and improves with rest.  She is following with an orthopedic surgeon for her back.      2018, she reported that she is doing poorly because she has a nonproductive cough but feels like she needs to cough something up.  She is going to have a chest CT later today.  No shortness of breath or chest pain.  Feels like she has chest congestion now.  Says that she is feeling better since using methotrexate.  She is off of prednisone.  Improved  strength bilaterally.  Hand pain, especially in the morning, has improved significantly.  No morning stiffness. No difficulty raising her arms above her head or standing up from a low seated position.  Denies jaw claudication, scalp tenderness, vision change, new headache, difficulty standing up from a chair, or difficulty raising her arms above her head.      3/1/2021: Not doing as well as she was doing previously when on methotrexate.  She said that she stopped taking methotrexate because she was concerned about potential side effects.  Now with swelling in her wrists and across her MCPs that is worse in the morning and improved with time and activity.  Morning stiffness for at least 1 hour at the wrists.  Reduced  strength bilaterally.  She recalls that these were symptoms she had in the past that improved with methotrexate.  She notes drinking 4 ounces of wine per day.    2021: Currently doing well on methotrexate.  No joint pain or swelling.  No morning stiffness or gelling phenomenon.  She says that her   last October and she has now moved to [place] so she is adjusting to these changes.  Otherwise doing well.  Mild chronic neck pain that is worse with activity and improves with rest; nonradiating.    2022: Currently doing well with regard to inflammatory arthritis.  Tolerating methotrexate well.  No joint pain or stiffness except for the left foot that has been operated on twice and she follows with podiatry  for this.  No joint swelling.    1/11/2023: Currently doing well.  No joint pain or swelling.  Morning stiffness for no more than 10 minutes but does not occur every day.  Positive gelling phenomenon if she sits for a very long time but this resolves quickly.  Methotrexate is effective for arthritis.  Recalls being on Fosamax in the distant past and does not recall any side effects from Fosamax.  No heartburn.  No dysphagia.  Reports having good dentition; had a tooth removed last year.  Does not anticipate any future invasive dental work.  Follows at the dentist regularly.    4/5/2023: Currently doing well with regard to inflammatory arthritis.  Large Heberden's nodes that are not painful.  No morning stiffness.  No gelling phenomenon.  Arthritis is not limiting her daily activities.    Today, 10/4/2023: Mild pain at the first CMC joints with overactivity, improved with rest; no swelling, increased warmth, or overlying erythema at the first CMC joints.  No other joint pain.  Morning stiffness for no more than 10 minutes, if present at all.  Overall happy with how well she is doing.  Arthritis is not limiting her daily activities.    Denies fevers, chills, nausea, vomiting, constipation, diarrhea. No abdominal pain. No chest pain/pressure, palpitations, or shortness of breath. No LE swelling.  No oral or nasal sores.  No rash.      Tobacco:  None  EtOH: No more than 1 drink per day: 4 ounce glass of wine  Drugs:   Occupation: used to work as a , retired now    ROS   12 point review of system was completed and negative except as noted in the HPI     Active Problem List     Patient Active Problem List   Diagnosis    Advanced directives, counseling/discussion    Osteopenia    Palpitations    Hyperlipidemia LDL goal <100    Renal cyst    Health Care Home    Insomnia    S/P total knee arthroplasty, left    Aortic valve disorder    Mitral valve disorder    PMR (polymyalgia rheumatica) (H24)    Encounter for  long-term current use of medication    Major depressive disorder, single episode, mild (H24)    Current chronic use of systemic steroids    Fatigue, unspecified type    Chronic pain of left knee    Venous stasis dermatitis of left lower extremity    Fecal urgency    Elevated hemidiaphragm    Inflammatory arthritis    Pulmonary nodules    Hypertension goal BP (blood pressure) < 140/90    Osteoporosis without current pathological fracture, unspecified osteoporosis type     Past Medical History     Past Medical History:   Diagnosis Date    Aortic valve disorder     Elevated hemidiaphragm     Granulomatous lung disease (H) 8/7/2012    High cholesterol     Insomnia 10/8/2013    Benadryl gives her RLS    Mitral valve disorder     Osteoarthritis of knee 10/8/2013    Sees Dr. Whaley    Osteopenia 7/31/2012    Other chronic pain 8/3/2015    Patient is followed by KIARA RAINES for ongoing prescription of pain medication.  All refills should be approved by this provider, or covering partner.  Medication(s): Hydrocodone/APAP.  Maximum quantity per month: 30 Clinic visit frequency required: Q 3 months   Controlled substance agreement on file: Yes      Date(s): 8/3/15  Pain Clinic evaluation in the past: No  DIRE Total Score(s): No fl    Polymyalgia rheumatica (H24)     Pulmonary nodules     Renal cyst 8/7/2012     Past Surgical History     Past Surgical History:   Procedure Laterality Date    APPENDECTOMY  1951    CATARACT IOL, RT/LT  2010    bilateral     CHOLECYSTECTOMY  2010    ORTHOPEDIC SURGERY      Left Knee Surgery 1/2017    RECONSTRUCT FOREFOOT WITH METATARSOPHALANGEAL (MTP) FUSION Left 10/24/2018    Procedure: LEFT FIRST METATARSOPHALANGEAL JOINT ARTHRODESIS;  Surgeon: Rufus Harden MD;  Location:  OR    REPAIR HAMMER TOE Left 10/24/2018    Procedure: LESSER TOE RECONSTRUCTION SECOND, THIRD, FOURTH AND FIFTH TOES;  Surgeon: Rufus Harden MD;  Location:  OR    SURGICAL HISTORY OF - 12/13     bilateral YAG laser surgery of eyes     Allergy     Allergies   Allergen Reactions    Simvastatin Muscle Pain (Myalgia)     Current Medication List     Current Outpatient Medications   Medication Sig    folic acid (FOLVITE) 1 MG tablet Take 1 tablet (1 mg) by mouth daily    methotrexate sodium 2.5 MG TABS Take 6 tablets (15 mg) by mouth every 7 days . Requires labs every 3 months    Acetaminophen (TYLENOL PO) Take 650 mg by mouth 2 times daily as needed for mild pain or fever    aspirin - buffered (ASCRIPTIN) 325 MG TABS tablet Take 1 tablet (325 mg) by mouth daily (Patient taking differently: Take 325 mg by mouth as needed)    atenolol (TENORMIN) 25 MG tablet Take 1 tablet (25 mg) by mouth daily    losartan (COZAAR) 50 MG tablet Take 1 tablet (50 mg) by mouth daily    Melatonin 10 MG TABS tablet Take 10 mg by mouth nightly as needed for sleep    Multiple Vitamins-Minerals (CENTRUM SILVER ADULT 50+ PO) Take 1 tablet by mouth daily Reported on 4/20/2017    omeprazole (PRILOSEC) 40 MG DR capsule TAKE ONE CAPSULE BY MOUTH ONE TIME DAILY    sertraline (ZOLOFT) 25 MG tablet Take 1 tablet (25 mg) by mouth daily    tiZANidine (ZANAFLEX) 2 MG tablet Take 0.5 tablets (1 mg) by mouth nightly as needed for muscle spasms    triamcinolone (KENALOG) 0.1 % external cream Apply topically 2 times daily as needed for irritation //itching     No current facility-administered medications for this visit.       Social History   See HPI    Family History     Family History   Problem Relation Age of Onset    Cancer Mother     Cerebrovascular Disease Father      Denies family history of autoimmune disease     Physical Exam     Temp Readings from Last 3 Encounters:   04/27/23 98.7  F (37.1  C) (Oral)   03/28/23 98.6  F (37  C) (Temporal)   06/03/22 98.1  F (36.7  C) (Oral)     BP Readings from Last 5 Encounters:   10/04/23 (!) 146/71   04/27/23 (!) 140/50   04/05/23 119/67   03/28/23 (!) 174/68   01/11/23 132/70     Pulse Readings from Last  "1 Encounters:   10/04/23 66     Resp Readings from Last 1 Encounters:   04/27/23 24     Estimated body mass index is 29.62 kg/m  as calculated from the following:    Height as of 4/27/23: 1.486 m (4' 10.5\").    Weight as of this encounter: 65.4 kg (144 lb 3.2 oz).    GEN: NAD.  HEENT:  Anicteric, noninjected sclera. No obvious external lesions of the ear and nose. Hearing intact.  CV: S1, S2. RRR. No m/r/g  PULM: No increased work of breathing. CTA bilaterally   MSK: MCPs, PIPs, DIPs without swelling or tenderness to palpation.  Heberden's and Kecia's nodes present.  Squaring at the bilateral first CMC joints that were mildly tender to palpation but no increased warmth, swelling, or overlying erythema.  Wrists without swelling or tenderness to palpation.  Elbows and shoulders without swelling or tenderness to palpation.    Knees, ankles, and MTPs without swelling or tenderness to palpation.    SKIN: No rash or jaundice seen  PSYCH: Alert. Appropriate.       Labs / Imaging (select studies)     RF/CCP  Recent Labs   Lab Test 06/26/17  1854   CCPIGG <1  Negative     RHF <20     CBC  Recent Labs   Lab Test 03/28/23  1310 01/11/23  1121 10/17/22  1127 09/27/21  0939 07/08/21  1156 05/26/21  1328 10/09/18  1158 04/23/18  1054   WBC 8.1 5.2 8.2   < > 7.9 6.0   < > 6.7   RBC 3.89 3.62* 4.06   < > 3.99 4.25   < > 4.18   HGB 12.9 12.2 13.5   < > 13.6 13.9   < > 13.4   HCT 38.1 36.0 40.3   < > 39.4 41.5   < > 40.4   MCV 98 99 99   < > 99 98   < > 97   RDW 13.0 13.5 13.2   < > 13.8 14.8   < > 14.8    247 266   < > 260 261   < > 210   MCH 33.2* 33.7* 33.3*   < > 34.1* 32.7   < > 32.1   MCHC 33.9 33.9 33.5   < > 34.5 33.5   < > 33.2   NEUTROPHIL 69 64 76   < > 71.3 69.7  --  62.7   LYMPH 20 22 13   < > 16.7 18.8  --  20.4   MONOCYTE 8 10 7   < > 8.8 6.9  --  15.6   EOSINOPHIL 3 4 3   < > 2.8 3.9  --  1.0   BASOPHIL 1 1 1   < > 0.4 0.7  --  0.3   ANEU  --   --   --   --  5.6 4.2  --  4.2   ALYM  --   --   --   --  1.3 " 1.1  --  1.4   LAURENCE  --   --   --   --  0.7 0.4  --  1.0   AEOS  --   --   --   --  0.2 0.2  --  0.1   ABAS  --   --   --   --  0.0 0.0  --  0.0   ANEUTAUTO 5.6 3.3 6.2   < >  --   --   --   --    ALYMPAUTO 1.6 1.2 1.1   < >  --   --   --   --    AMONOAUTO 0.6 0.5 0.6   < >  --   --   --   --    AEOSAUTO 0.3 0.2 0.2   < >  --   --   --   --    ABSBASO 0.0 0.0 0.0   < >  --   --   --   --     < > = values in this interval not displayed.     CMP  Recent Labs   Lab Test 03/28/23  1310 01/11/23  1121 10/17/22  1127 01/06/22  1022 09/27/21  0938 07/08/21  1156 05/26/21  1328 02/22/21  1634 10/19/20  1433 07/23/19  1035   NA  --   --   --   --   --  140  --   --  139 141   POTASSIUM 4.9  --   --   --   --  4.7  --   --  4.0 4.1   CHLORIDE  --   --   --   --   --  108  --   --  106 109   CO2  --   --   --   --   --  26  --   --  24 27   ANIONGAP  --   --   --   --   --  6  --   --  9 5   GLC  --   --   --   --   --  94  --   --  90 101*   BUN  --   --   --   --   --  14  --   --  21 20   CR 0.70 0.66 0.77   < > 0.73 0.80 0.88 0.70 0.74 0.67   GFRESTIMATED 83 84 74   < > 75 67 60* 79 74 81   GFRESTBLACK  --   --   --   --   --  78 69 >90 86 >90   ALEJANDRO  --  9.6  --   --  9.7 9.2  --   --  9.0 8.8   BILITOTAL 0.6 0.8 0.6   < >  --  0.7 0.4 0.3  --  0.5   ALBUMIN 4.0 4.1 3.9   < >  --  3.9 3.8 4.1  --  3.9   PROTTOTAL 7.2 7.1 7.2   < >  --  7.1 6.5* 7.1  --  7.2   ALKPHOS 98 83 93   < >  --  78 69 82  --  101   AST 22 25 32   < >  --  23 21 21  --  21   ALT 24 35 38   < >  --  30 28 26 27 27    < > = values in this interval not displayed.     Calcium/VitaminD  Recent Labs   Lab Test 01/11/23  1121 09/27/21  0938 07/08/21  1156 10/04/16  1145 02/05/16  1500   ALEJANDRO 9.6 9.7 9.2   < > 9.1   VITDT 39 40  --   --  39    < > = values in this interval not displayed.     ESR/CRP  Recent Labs   Lab Test 03/28/23  1310 01/11/23  1121 10/17/22  1127   SED 8 7 8   CRP 6.7 4.0 3.3       Hepatitis B  Recent Labs   Lab Test 01/24/18  0918    HBCAB Nonreactive   HEPBANG Nonreactive     Hepatitis C  Recent Labs   Lab Test 01/24/18  0918   HCVAB Nonreactive       Immunization History     Immunization History   Administered Date(s) Administered    COVID-19 MONOVALENT 12+ (Pfizer) 02/11/2021, 03/04/2021, 11/19/2021    COVID-19 Monovalent 12+ (Pfizer 2022) 04/11/2022    FLU 6-35 months 09/25/2009    Flu, Unspecified 10/04/2016    Influenza (High Dose) 3 valent vaccine 10/26/2010, 10/08/2013, 10/22/2014, 10/05/2015, 10/04/2016, 09/08/2017, 10/09/2018, 10/01/2019    Influenza (IIV3) PF 10/13/2006, 10/31/2008, 10/26/2010, 10/24/2011, 10/23/2012, 10/08/2020    Influenza Vaccine 65+ (Fluzone HD) 10/08/2020, 09/27/2021    Pneumo Conj 13-V (2010&after) 11/03/2015    Pneumococcal 23 valent 12/16/2004    TD,PF 7+ (Tenivac) 07/21/2010, 06/04/2021    TDAP (Adacel,Boostrix) 07/21/2010    Zoster vaccine, live 06/04/2009          Chart documentation done in part with Dragon Voice recognition Software. Although reviewed after completion, some word and grammatical error may remain.      Dale Marquis MD

## 2023-10-04 NOTE — NURSING NOTE
RAPID3 (0-30) Cumulative Score  7.7          RAPID3 Weighted Score (divide #4 by 3 and that is the weighted score)  2.6       Tatyana GILL RN, [facility] 10/04/23 12:57 PM

## 2023-10-04 NOTE — PATIENT INSTRUCTIONS
RHEUMATOLOGY    [facility]  [address]  [place] [address]    Phone number: [phone]  Fax number: [phone]    If you need a medication refill, please contact us as you may need lab work and/or a follow up visit prior to your refill.      Thank you for choosing [facility]!

## 2023-10-05 LAB
ALBUMIN SERPL BCG-MCNC: 4.4 G/DL (ref 3.5–5.2)
ALP SERPL-CCNC: 87 U/L (ref 35–104)
ALT SERPL W P-5'-P-CCNC: 19 U/L (ref 0–50)
AST SERPL W P-5'-P-CCNC: 26 U/L (ref 0–45)
BILIRUB DIRECT SERPL-MCNC: <0.2 MG/DL (ref 0–0.3)
BILIRUB SERPL-MCNC: 0.5 MG/DL
CREAT SERPL-MCNC: 0.78 MG/DL (ref 0.51–0.95)
EGFRCR SERPLBLD CKD-EPI 2021: 73 ML/MIN/1.73M2
PROT SERPL-MCNC: 6.8 G/DL (ref 6.4–8.3)

## 2023-10-30 ENCOUNTER — NURSE TRIAGE (OUTPATIENT)
Dept: FAMILY MEDICINE | Facility: CLINIC | Age: 88
End: 2023-10-30
Payer: COMMERCIAL

## 2023-10-30 NOTE — TELEPHONE ENCOUNTER
"Patient calling to report sinus congestion, asked what OTC medication she can use. Patient reports sinus congestion started 4 days ago and gradually worsened but patient is feeling better today but states has drainage and coughing \"a lot of crap.\"    Patient rates severity is moderate and c/o frontal headache. Patient states phlegm is clear and coughing it out when phlegm draining at the back of throat. Patient denies trouble breathing, shortness of breath, or fever. Patient states she is negative for Covid.     Patient states she has used neti pot in the past and the last time congestion was this bad was 5-6 years ago.     Advise patient can continue to monitor sx, provided home care instruction, and OTC medication as requested. Advise can also check in with pharmacist for additional medication advice. Advise patient if sx continues 10 days or more, if sx worsens, new symptoms develops, or has trouble breathing, SOB, fever to call clinic back. Patient verbalized understanding     CAROLYN German  [facility]      Reason for Disposition   Sinus congestion as part of a cold, present < 10 days    Additional Information   Negative: SEVERE headache and has fever   Negative: Patient sounds very sick or weak to the triager   Negative: Difficulty breathing, and not from stuffy nose (e.g., not relieved by cleaning out the nose)   Negative: SEVERE sinus pain   Negative: SEVERE headache   Negative: Redness or swelling on the cheek, forehead, or around the eye   Negative: Fever > 103 F (39.4 C)   Negative: Fever > 101 F (38.3 C) and over 60 years of age   Negative: Fever > 100.0 F (37.8 C) and has diabetes mellitus or a weak immune system (e.g., HIV positive, cancer chemotherapy, organ transplant, splenectomy, chronic steroids)   Negative: Fever > 100.0 F (37.8 C) and bedridden (e.g., CVA, chronic illness, recovering from surgery)   Negative: Fever present > 3 days (72 hours)   Negative: Fever returns after " "gone for over 24 hours and symptoms worse or not improved   Negative: Sinus pain (not just congestion) and fever   Negative: Earache   Negative: Sinus congestion (pressure, fullness) present > 10 days   Negative: Nasal discharge present > 10 days   Negative: Using nasal washes and pain medicine > 24 hours and sinus pain (lower forehead, cheekbone, or eye) persists   Negative: Lots of coughing   Negative: Patient wants to be seen    Answer Assessment - Initial Assessment Questions  1. LOCATION: \"Where does it hurt?\"       Sinus area due to congestion   2. ONSET: \"When did the sinus pain start?\"  (e.g., hours, days)       4 days ago, reports sx gradually worsened.   3. SEVERITY: \"How bad is the pain?\"   (Scale 1-10; mild, moderate or severe)    - MILD (1-3): doesn't interfere with normal activities     - MODERATE (4-7): interferes with normal activities (e.g., work or school) or awakens from sleep    - SEVERE (8-10): excruciating pain and patient unable to do any normal activities       Moderate, \"pressure where the sinus are draining.\"   4. RECURRENT SYMPTOM: \"Have you ever had sinus problems before?\" If Yes, ask: \"When was the last time?\" and \"What happened that time?\"       Yes but its been a while, last time was 5-6 years ago.  5. NASAL CONGESTION: \"Is the nose blocked?\" If Yes, ask: \"Can you open it or must you breathe through your mouth?\"      No, patient states she can breathe through nose.   6. NASAL DISCHARGE: \"Do you have discharge from your nose?\" If so ask, \"What color?\"     Yes, clear   7. FEVER: \"Do you have a fever?\" If Yes, ask: \"What is it, how was it measured, and when did it start?\"       No fever   8. OTHER SYMPTOMS: \"Do you have any other symptoms?\" (e.g., sore throat, cough, earache, difficulty breathing)     Frontal headache and cough to clear out phlegm when draining at the back of throat.     9. PREGNANCY: \"Is there any chance you are pregnant?\" \"When was your last menstrual period?\"      " N/a    Protocols used: Sinus Pain or Congestion-A-[place]

## 2023-11-06 NOTE — TELEPHONE ENCOUNTER
Patient calling clinic back to report ongoing sinus congestion and constant clear nasal drainage. She has tried Mucinex and Sudafed with not much relief. Patient states she does not have a known seasonal allergies, but has tried using Flonase in the past. Patient also reports having mild fatigue. Denies any fever, chest pain, breathing difficulties, or other red flag symptoms at this time.     Telephone visit scheduled with PCP tomorrow 11/7 for further evaluation. Patient in agreement with plan. No further questions or concerns.    CAROLNY Murdock  [facility] Triage

## 2023-11-07 ENCOUNTER — VIRTUAL VISIT (OUTPATIENT)
Dept: FAMILY MEDICINE | Facility: CLINIC | Age: 88
End: 2023-11-07
Payer: COMMERCIAL

## 2023-11-07 DIAGNOSIS — M19.90 INFLAMMATORY ARTHRITIS: ICD-10-CM

## 2023-11-07 DIAGNOSIS — J01.10 ACUTE NON-RECURRENT FRONTAL SINUSITIS: Primary | ICD-10-CM

## 2023-11-07 PROCEDURE — 99213 OFFICE O/P EST LOW 20 MIN: CPT | Mod: 95 | Performed by: INTERNAL MEDICINE

## 2023-11-07 ASSESSMENT — PATIENT HEALTH QUESTIONNAIRE - PHQ9
10. IF YOU CHECKED OFF ANY PROBLEMS, HOW DIFFICULT HAVE THESE PROBLEMS MADE IT FOR YOU TO DO YOUR WORK, TAKE CARE OF THINGS AT HOME, OR GET ALONG WITH OTHER PEOPLE: NOT DIFFICULT AT ALL
SUM OF ALL RESPONSES TO PHQ QUESTIONS 1-9: 2
SUM OF ALL RESPONSES TO PHQ QUESTIONS 1-9: 2

## 2023-11-07 NOTE — PATIENT INSTRUCTIONS

## 2023-11-07 NOTE — PROGRESS NOTES
Instructions Relayed to Patient by Virtual Roomer:         Reminded patient to ensure they were logged on to virtual visit by arrival time listed. Documented in appointment notes if patient had flexibility to initiate visit sooner than arrival time. If pediatric virtual visit, ensured pediatric patient along with parent/guardian will be present for video visit.     Patient offered the website [email or web address] and/or phone number to [employer] Help line: [phone]   Ailyn is a 88 year old who is being evaluated via a billable telephone visit.      What phone number would you like to be contacted at? [phone]   How would you like to obtain your AVS? Mail a copy    Distant Location (provider location):  Off-site    Assessment & Plan     Ailyn was seen today for sinus problem and cough.    Diagnoses and all orders for this visit:    Acute non-recurrent frontal sinusitis  Comments:  Patient is immune compromised due to inflammatory arthritis.  We will start Augmentin for 7 days.  Use DayQuil/NyQuil for symptom relief  Orders:  -     amoxicillin-clavulanate (AUGMENTIN) 875-125 MG tablet; Take 1 tablet by mouth 2 times daily for 7 days    Inflammatory arthritis  -     amoxicillin-clavulanate (AUGMENTIN) 875-125 MG tablet; Take 1 tablet by mouth 2 times daily for 7 days    Other orders  -     REVIEW OF HEALTH MAINTENANCE PROTOCOL ORDERS      Follow-up if no improvement or worsening    Isela Mac MD PhD  [facility]   Ailyn is a 88 year old, presenting for the following health issues:  Sinus Problem and Cough      History of Present Illness       Reason for visit:  Cough/ sinus congestion    She eats 2-3 servings of fruits and vegetables daily.She consumes 0 sweetened beverage(s) daily.She exercises with enough effort to increase her heart rate 9 or less minutes per day.  She exercises with enough effort to increase her heart rate 3 or less days per week.   She is  taking medications regularly.       Acute Illness  Acute illness concerns: cough/ sinus congestion  Onset/Duration: oct 29  Symptoms:  Fever: No  Chills/Sweats: No  Headache (location?): YES  Sinus Pressure: YES  Conjunctivitis:  No  Ear Pain: no  Rhinorrhea: YES  Congestion: YES  Sore Throat: No  Cough: YES-productive of clear sputum  Wheeze: No  Decreased Appetite: YES  Nausea: No  Vomiting: No  Diarrhea: No  Dysuria/Freq.: No  Dysuria or Hematuria: No  Fatigue/Achiness: YES  Sick/Strep Exposure: No  Therapies tried and outcome: mucinex, sudafed.     Her symptoms have not gotten better.    patient has inflammatory arthritis on methotrexate.    Review of Systems   Constitutional, HEENT, cardiovascular, pulmonary, gi and gu systems are negative, except as otherwise noted.      Objective           Vitals:  No vitals were obtained today due to virtual visit.    Physical Exam   healthy, alert, and no distress  PSYCH: Alert and oriented times 3; coherent speech, normal   rate and volume, able to articulate logical thoughts, able   to abstract reason, no tangential thoughts, no hallucinations   or delusions  Her affect is normal  RESP: No cough, no audible wheezing, able to talk in full sentences  Remainder of exam unable to be completed due to telephone visits          Phone call duration: 6 minutes

## 2024-01-04 ENCOUNTER — LAB (OUTPATIENT)
Dept: LAB | Facility: CLINIC | Age: 89
End: 2024-01-04
Payer: COMMERCIAL

## 2024-01-04 DIAGNOSIS — M19.90 INFLAMMATORY ARTHRITIS: ICD-10-CM

## 2024-01-04 DIAGNOSIS — Z79.899 HIGH RISK MEDICATION USE: ICD-10-CM

## 2024-01-04 LAB
ALBUMIN SERPL BCG-MCNC: 4.5 G/DL (ref 3.5–5.2)
ALP SERPL-CCNC: 85 U/L (ref 40–150)
ALT SERPL W P-5'-P-CCNC: 17 U/L (ref 0–50)
AST SERPL W P-5'-P-CCNC: 23 U/L (ref 0–45)
BASOPHILS # BLD AUTO: 0.1 10E3/UL (ref 0–0.2)
BASOPHILS NFR BLD AUTO: 1 %
BILIRUB DIRECT SERPL-MCNC: <0.2 MG/DL (ref 0–0.3)
BILIRUB SERPL-MCNC: 0.5 MG/DL
CREAT SERPL-MCNC: 0.74 MG/DL (ref 0.51–0.95)
EGFRCR SERPLBLD CKD-EPI 2021: 77 ML/MIN/1.73M2
EOSINOPHIL # BLD AUTO: 0.2 10E3/UL (ref 0–0.7)
EOSINOPHIL NFR BLD AUTO: 4 %
ERYTHROCYTE [DISTWIDTH] IN BLOOD BY AUTOMATED COUNT: 13.3 % (ref 10–15)
HCT VFR BLD AUTO: 39.6 % (ref 35–47)
HGB BLD-MCNC: 13.3 G/DL (ref 11.7–15.7)
IMM GRANULOCYTES # BLD: 0 10E3/UL
IMM GRANULOCYTES NFR BLD: 0 %
LYMPHOCYTES # BLD AUTO: 1.3 10E3/UL (ref 0.8–5.3)
LYMPHOCYTES NFR BLD AUTO: 19 %
MCH RBC QN AUTO: 32.3 PG (ref 26.5–33)
MCHC RBC AUTO-ENTMCNC: 33.6 G/DL (ref 31.5–36.5)
MCV RBC AUTO: 96 FL (ref 78–100)
MONOCYTES # BLD AUTO: 0.6 10E3/UL (ref 0–1.3)
MONOCYTES NFR BLD AUTO: 8 %
NEUTROPHILS # BLD AUTO: 4.7 10E3/UL (ref 1.6–8.3)
NEUTROPHILS NFR BLD AUTO: 68 %
NRBC # BLD AUTO: 0 10E3/UL
NRBC BLD AUTO-RTO: 0 /100
PLATELET # BLD AUTO: 245 10E3/UL (ref 150–450)
PROT SERPL-MCNC: 6.9 G/DL (ref 6.4–8.3)
RBC # BLD AUTO: 4.12 10E6/UL (ref 3.8–5.2)
WBC # BLD AUTO: 6.8 10E3/UL (ref 4–11)

## 2024-01-04 PROCEDURE — 36415 COLL VENOUS BLD VENIPUNCTURE: CPT

## 2024-01-04 PROCEDURE — 80076 HEPATIC FUNCTION PANEL: CPT

## 2024-01-04 PROCEDURE — 85025 COMPLETE CBC W/AUTO DIFF WBC: CPT

## 2024-01-04 PROCEDURE — 82565 ASSAY OF CREATININE: CPT

## 2024-01-11 ENCOUNTER — TELEPHONE (OUTPATIENT)
Dept: FAMILY MEDICINE | Facility: CLINIC | Age: 89
End: 2024-01-11
Payer: COMMERCIAL

## 2024-01-11 DIAGNOSIS — M19.90 INFLAMMATORY ARTHRITIS: ICD-10-CM

## 2024-01-11 NOTE — TELEPHONE ENCOUNTER
Routing to Dr Marquis      Patient calling      She has not heard back from her lab that were done on 1/4    She completed these labs in order to continue taking her Methotrexate medication which she is waiting on refills for.      Please review labs and send meds if agreeable-    Pended medication and pharmacy

## 2024-01-12 RX ORDER — METHOTREXATE 2.5 MG/1
15 TABLET ORAL
Qty: 78 TABLET | Refills: 0 | Status: SHIPPED | OUTPATIENT
Start: 2024-01-12 | End: 2024-04-15

## 2024-01-12 NOTE — TELEPHONE ENCOUNTER
Called pt and let her Md message below. She was thankful.    JOSÉ LUIS Nichole RN 1/12/2024 4:35 PM

## 2024-01-12 NOTE — TELEPHONE ENCOUNTER
RN: Please call to notify Ailyn Trevino that January 2024 rheumatology labs are normal.  Methotrexate has been refilled.    Dale Marquis MD  1/12/2024

## 2024-01-16 ENCOUNTER — TELEPHONE (OUTPATIENT)
Dept: FAMILY MEDICINE | Facility: CLINIC | Age: 89
End: 2024-01-16
Payer: COMMERCIAL

## 2024-01-16 NOTE — TELEPHONE ENCOUNTER
Reason for Call:  Appointment Request    Patient requesting this type of appt:  stomach issues/gas     Requested provider: Isela Mac    Reason patient unable to be scheduled: Not within requested timeframe    When does patient want to be seen/preferred time: After Feb 2     Comments: pt looking to be worked into schedule regarding above issues     Could we send this information to you in [employer] or would you prefer to receive a phone call?:   Patient would prefer a phone call   Okay to leave a detailed message?: Yes at Cell number on file:    Telephone Information:   Mobile [phone]       Call taken on 1/16/2024 at 1:03 PM by Rambo Mariscal

## 2024-01-16 NOTE — TELEPHONE ENCOUNTER
LVM for pt to call back to offer virtual appt or see if she is looking for in person. If wanting in person will need to ask PCP if ok to work pt in using SD  Fouzia Nieves CMA

## 2024-02-05 ENCOUNTER — TELEPHONE (OUTPATIENT)
Dept: RHEUMATOLOGY | Facility: CLINIC | Age: 89
End: 2024-02-05
Payer: COMMERCIAL

## 2024-02-05 NOTE — TELEPHONE ENCOUNTER
[facility] Call Center    Phone Message    May a detailed message be left on voicemail: yes     Reason for Call: Other: .     Patient is wanting to get a call back. Patient states she is taking the medication, methotrexate 2.5 MG tablet and that she needs lab work done for Dr. Marquis to review. Patient states she is needing lab work prior to seeing Dr. Marquis and is scheduled for a lab appt on 04/01/2024. Patient states she is going to be out of town the day she has an appt with Dr. Marquis on 04/04/2024 and the next available appt is in May. Patient is wanting to know if she is needing to schedule the lab appt in May or if she is able to keep appt for lab on 04/01/2024. Please advise.       Action Taken: Message routed to:  [facility] Rheum    Travel Screening: Not Applicable

## 2024-02-05 NOTE — TELEPHONE ENCOUNTER
Called patient and rescheduled appointments.     Tatyana BRADFORD RN, Specialty Clinic 02/05/24 3:31 PM

## 2024-03-08 DIAGNOSIS — F32.A ANXIETY AND DEPRESSION: ICD-10-CM

## 2024-03-08 DIAGNOSIS — F41.9 ANXIETY AND DEPRESSION: ICD-10-CM

## 2024-03-08 RX ORDER — SERTRALINE HYDROCHLORIDE 25 MG/1
25 TABLET, FILM COATED ORAL DAILY
Qty: 60 TABLET | Refills: 0 | Status: SHIPPED | OUTPATIENT
Start: 2024-03-08 | End: 2024-05-03

## 2024-03-15 ENCOUNTER — TELEPHONE (OUTPATIENT)
Dept: RHEUMATOLOGY | Facility: CLINIC | Age: 89
End: 2024-03-15
Payer: COMMERCIAL

## 2024-03-15 DIAGNOSIS — M19.90 INFLAMMATORY ARTHRITIS: ICD-10-CM

## 2024-03-15 RX ORDER — FOLIC ACID 1 MG/1
1 TABLET ORAL DAILY
Qty: 90 TABLET | Refills: 0 | Status: SHIPPED | OUTPATIENT
Start: 2024-03-15 | End: 2024-04-15

## 2024-03-15 NOTE — TELEPHONE ENCOUNTER
After chart review it was noted that this is appropriate for a refill.    FANTA BoyceN RN Specialty Triage 3/15/2024 4:36 PM

## 2024-03-15 NOTE — TELEPHONE ENCOUNTER
[facility] Call Center    Phone Message    May a detailed message be left on voicemail: yes     Reason for Call: Medication Refill Request    Has the patient contacted the pharmacy for the refill? Yes   Name of medication being requested: folic acid (FOLVITE) 1 MG tablet   Provider who prescribed the medication: Dr. Marquis  Pharmacy: [facility]  Date medication is needed: ASAP patient is completely        Action Taken: Other: Rheum    Travel Screening: Not Applicable

## 2024-04-01 ENCOUNTER — LAB (OUTPATIENT)
Dept: LAB | Facility: CLINIC | Age: 89
End: 2024-04-01
Payer: COMMERCIAL

## 2024-04-01 DIAGNOSIS — M19.90 INFLAMMATORY ARTHRITIS: ICD-10-CM

## 2024-04-01 DIAGNOSIS — Z79.899 HIGH RISK MEDICATION USE: ICD-10-CM

## 2024-04-01 LAB
ALBUMIN SERPL BCG-MCNC: 4.6 G/DL (ref 3.5–5.2)
ALP SERPL-CCNC: 96 U/L (ref 40–150)
ALT SERPL W P-5'-P-CCNC: 21 U/L (ref 0–50)
AST SERPL W P-5'-P-CCNC: 27 U/L (ref 0–45)
BASOPHILS # BLD AUTO: 0.1 10E3/UL (ref 0–0.2)
BASOPHILS NFR BLD AUTO: 1 %
BILIRUB DIRECT SERPL-MCNC: <0.2 MG/DL (ref 0–0.3)
BILIRUB SERPL-MCNC: 0.4 MG/DL
CREAT SERPL-MCNC: 0.87 MG/DL (ref 0.51–0.95)
CRP SERPL-MCNC: 3.82 MG/L
EGFRCR SERPLBLD CKD-EPI 2021: 64 ML/MIN/1.73M2
EOSINOPHIL # BLD AUTO: 0.2 10E3/UL (ref 0–0.7)
EOSINOPHIL NFR BLD AUTO: 3 %
ERYTHROCYTE [DISTWIDTH] IN BLOOD BY AUTOMATED COUNT: 13.1 % (ref 10–15)
ERYTHROCYTE [SEDIMENTATION RATE] IN BLOOD BY WESTERGREN METHOD: 2 MM/HR (ref 0–30)
HCT VFR BLD AUTO: 40.3 % (ref 35–47)
HGB BLD-MCNC: 13.6 G/DL (ref 11.7–15.7)
IMM GRANULOCYTES # BLD: 0 10E3/UL
IMM GRANULOCYTES NFR BLD: 0 %
LYMPHOCYTES # BLD AUTO: 1.3 10E3/UL (ref 0.8–5.3)
LYMPHOCYTES NFR BLD AUTO: 18 %
MCH RBC QN AUTO: 33 PG (ref 26.5–33)
MCHC RBC AUTO-ENTMCNC: 33.7 G/DL (ref 31.5–36.5)
MCV RBC AUTO: 98 FL (ref 78–100)
MONOCYTES # BLD AUTO: 0.6 10E3/UL (ref 0–1.3)
MONOCYTES NFR BLD AUTO: 8 %
NEUTROPHILS # BLD AUTO: 5 10E3/UL (ref 1.6–8.3)
NEUTROPHILS NFR BLD AUTO: 70 %
NRBC # BLD AUTO: 0 10E3/UL
NRBC BLD AUTO-RTO: 0 /100
PLATELET # BLD AUTO: 262 10E3/UL (ref 150–450)
PROT SERPL-MCNC: 7.1 G/DL (ref 6.4–8.3)
RBC # BLD AUTO: 4.12 10E6/UL (ref 3.8–5.2)
WBC # BLD AUTO: 7.2 10E3/UL (ref 4–11)

## 2024-04-01 PROCEDURE — 85025 COMPLETE CBC W/AUTO DIFF WBC: CPT

## 2024-04-01 PROCEDURE — 86140 C-REACTIVE PROTEIN: CPT

## 2024-04-01 PROCEDURE — 80076 HEPATIC FUNCTION PANEL: CPT

## 2024-04-01 PROCEDURE — 85652 RBC SED RATE AUTOMATED: CPT

## 2024-04-01 PROCEDURE — 82565 ASSAY OF CREATININE: CPT

## 2024-04-01 PROCEDURE — 36415 COLL VENOUS BLD VENIPUNCTURE: CPT

## 2024-04-05 DIAGNOSIS — K21.9 GASTROESOPHAGEAL REFLUX DISEASE WITHOUT ESOPHAGITIS: ICD-10-CM

## 2024-04-05 RX ORDER — OMEPRAZOLE 40 MG/1
CAPSULE, DELAYED RELEASE ORAL
Qty: 90 CAPSULE | Refills: 0 | Status: SHIPPED | OUTPATIENT
Start: 2024-04-05 | End: 2024-05-03

## 2024-04-15 ENCOUNTER — OFFICE VISIT (OUTPATIENT)
Dept: RHEUMATOLOGY | Facility: CLINIC | Age: 89
End: 2024-04-15
Payer: COMMERCIAL

## 2024-04-15 VITALS
HEART RATE: 83 BPM | WEIGHT: 145.2 LBS | RESPIRATION RATE: 24 BRPM | DIASTOLIC BLOOD PRESSURE: 66 MMHG | BODY MASS INDEX: 29.83 KG/M2 | OXYGEN SATURATION: 95 % | SYSTOLIC BLOOD PRESSURE: 162 MMHG

## 2024-04-15 DIAGNOSIS — M19.90 INFLAMMATORY ARTHRITIS: Primary | ICD-10-CM

## 2024-04-15 DIAGNOSIS — Z79.899 HIGH RISK MEDICATION USE: ICD-10-CM

## 2024-04-15 PROCEDURE — G2211 COMPLEX E/M VISIT ADD ON: HCPCS | Performed by: INTERNAL MEDICINE

## 2024-04-15 PROCEDURE — 99214 OFFICE O/P EST MOD 30 MIN: CPT | Performed by: INTERNAL MEDICINE

## 2024-04-15 RX ORDER — METHOTREXATE 2.5 MG/1
15 TABLET ORAL
Qty: 78 TABLET | Refills: 1 | Status: SHIPPED | OUTPATIENT
Start: 2024-04-15

## 2024-04-15 RX ORDER — FOLIC ACID 1 MG/1
1 TABLET ORAL DAILY
Qty: 90 TABLET | Refills: 2 | Status: SHIPPED | OUTPATIENT
Start: 2024-04-15

## 2024-04-15 NOTE — PATIENT INSTRUCTIONS
RHEUMATOLOGY    [facility]  [address] [place] [address]  [place] [address]    Phone number: [phone]  Fax number: [phone]    If you need a medication refill, please contact us as you may need lab work and/or a follow up visit prior to your refill.      Thank you for choosing [facility]!    Mesha Nieves CMA Rheumatology

## 2024-04-15 NOTE — NURSING NOTE
Blood pressure rechecked after visit    162/66  Mesha Nieves CMA Rheumatology  4/15/2024                                RAPID3 (0-30) Cumulative Score  2.0          RAPID3 Weighted Score (divide #4 by 3 and that is the weighted score)  0.6

## 2024-04-15 NOTE — PROGRESS NOTES
"  Rheumatology Clinic Visit      Ailyn Trevino MRN# [id number]   YOB: 1935 Age: 88 year old      Date of visit: 4/15/24   PCP: Dr. Isela Mac    Chief Complaint   Patient presents with:  Inflammatory arthritis     Assessment and Plan     1.  Inflammatory arthritis / PMR: I initially evaluated in 2017 when she transferred care from Dr. Hurst.  Based on historical records, I agreed that she most likely had polymyalgia rheumatica. PMR was steroid responsive and she has been able to reduce her prednisone dose to 3 mg daily without issue, but then had a \"flare\" involving back pain that radiated around to include just over her bilateral lower rib cage. At the time of her flare in April 2017, the ESR and CRP were normal. She was also having hip pain at that time; she had left TKA performed in January 2017. Prednisone dose was increased at that time with resolution of her pain. I suspected that the pain she was having at that time was due to degenerative spine change rather than PMR.  11/1/2017 MRI of the T-spine showed minimal disc space narrowing at several levels in the mid and lower thoracic spine, minimal osteophyte formation or disc protrusions at several levels, but no stenosis. She has degenerative changes seen on a lumbar spine MRI that [facility], per a clinic note from [facility].  Prednisone was tapered off over time without worsening of her symptoms.  On 1/24/2018 she returned with bilateral wrist swelling and tenderness to palpation; also some pain in the back of her hand; and continued neck/back pain. Inflammatory arthritis dx'd at that time and was started on MTX and prednisone; was on MTX 15mg once weekly and no prednisone; doing well when seen on 4/26/2018.   When on methotrexate she noted no stiffness and improved  strength.  She then decided to stop methotrexate.  Not seen between 4/26/2018 and 3/1/2021; restarted methotrexate on 3/1/2021 but " then was lost to follow-up until 9/27/2021.  Currently on methotrexate and doing well with regard to inflammatory arthritis.    Chronic illness, stable.    - Continue methotrexate 15 mg once weekly; labs are required every 3 months and the patient verbalized understanding of this  - Continue folic acid 1mg daily  - Labs now: CBC, Creatinine, Hepatic Panel  - Labs in 3 months: CBC, Creatinine, Hepatic Panel  - Labs in 6 months: CBC, Creatinine, Hepatic Panel, ESR, CRP     High risk medication requiring intensive toxicity monitoring at least quarterly.     2.  Hand osteoarthritis: Large Heberden's nodes and bilateral first CMC joint squaring.  Reviewed the difference between inflammatory arthritis and osteoarthritis again today.    3.  Osteopenia with elevated FRAX: Based on 7/18/2022 DEXA showed a lumbar spine T score -0.8, left femoral neck T score -1.9, and right femoral neck T score -1.9; FRAX score showed a 27% risk of major osteoporotic fracture in the next 10 years and a 10% risk for osteoporotic hip fracture in the next 10 years.  History of alendronate use from 2/5/2016-4/21/2018.  We reviewed the DEXA and the rationale for treating based on the elevated FRAX and she will consider this.  She is currently taking calcium and vitamin D.  I previously provided a printout from the [employer] regarding bisphosphonates. She reports having good dentition at this time with no plans for invasive dental work. She does not wish to proceed with using an osteoporosis specific medication but we will continue calcium and vitamin D supplementation.  If she changes her mind she says that she will notify me.  She previously verbalized understanding about the risks of untreated disease.  This was not discussed again today.  Chronic illness  - calcium 1000 mg daily  - vitamin D 1000 IU daily     4.  Vaccinations: Vaccinations reviewed with Ms. Trevino.   - Influenza: encouraged yearly vaccination  -  [id number]: up to date  - [id number]: up to date  - Prevnar 20: Discussed vaccination and patient plans to receive  - Shingrix: Up to date  - COVID-19: Advised keeping up-to-date    5. Elevated blood pressure:  Ailyn to follow up with Primary Care provider regarding elevated blood pressure.    6.  Chronic left foot pain: History of multiple surgeries.  Has followed with a foot surgeon.  Advised wearing stiff soled shoes whenever ambulatory, indoors and outdoors, to see if that helps.    7.  Subcutaneous nodule on the palmar ulnar aspect of the right second DIP: Unknown etiology.  Possible rheumatoid nodule?.  Discussed option for surgical removal.  This is not bothering the patient so she is going to monitor without intervention at this time.    Total minutes spent in evaluation with patient, documentation, , and review of pertinent studies and chart notes: 22  The longitudinal plan of care for the rheumatology problem(s) were addressed during this visit.  Due to added complexity of care, we will continue to support the patient and the subsequent management of this condition with ongoing continuity of care.       Ms. Trevino verbalized agreement with and understanding of the rational for the diagnosis and treatment plan.  All questions were answered to best of my ability and the patient's satisfaction. Ms. Trevino was advised to contact the clinic with any questions that may arise after the clinic visit.      Thank you for involving me in the care of the patient    Return to clinic: 6 months      HPI   Ailyn Trevino is a 88 year old female with a past medical history significant for hyperlipidemia, granulomatous lung disease, aortic valve disorder, mitral valve disorder, impaired fasting glucose, polymyalgia rheumatica, and depression who presents for f/u of arthritis.     4/20/2015 rheumatology clinic note by Dr. Bud Hurst at the [facility] documents the patient does not  "have signs or symptoms of GCA. Significant myalgias in March 2015 in her shoulders and popliteal area, but not in the hip area. Cortisone shots in the hip in March 2015. Elevated CRP and mildly abnormal sedimentation rate. Morning stiffness for about 2 weeks. 2 hours to loosen up. Difficulty lifting her arms above her shoulders. CK was normal. Prednisone 20 mg daily \"helped about almost completely in 4 days' time\"    In 10/2017, Ms. Trevino reported that she was initially diagnosed with PMR and treated with steroids that were very effective. At that time, she reports having some difficulty raising her arms above her head. She says that she has always had some hip and knee pain that was thought to be secondary to her left knee osteoarthritis. She had a left total knee arthroplasty in January 2017. She also reports having flat feet bilaterally. She has been reducing prednisone slowly over time, and was on prednisone 3 mg daily at that time. When she was evaluated by Dr. Christopher  on 4/20/2017 it was noted that she was having pain in her rib cage, back, and hips. She had just reduced prednisone and today she says at that time she was on 1 mg daily, so the dose was increased to 20 mg a day with resolution of her pain. She feels like she has still doing well while on prednisone 3 mg daily. She still has some thoracic back pain. No difficult or raising her arms above her head today. No difficulty standing up from a low seated position. No jaw claudication, scalp tenderness, headaches, or vision changes.she also reports having some right groin pain that she thinks is due to altered gait because of her knee issues. She also reports having lower [facility] epidural steroid injection that was not effective. This was at the direction of her orthopedic surgeon who is at [facility]. She reports having had an MRI of her lumbar spine at [facility] imaging. 8/1/2017 clinic note " by Prosper Leonardo at [facility] orthopedics documents that an MRI showed multilevel degenerative changes namely L4-L5 central canal compromise and severe right lateral recess stenosis. Currently without hand pain, but she says that she has had some pain in her fingers from time to time. She denies having stiffness in her hands.     She then had an MRI of the thoracic spine showing degenerative changes of the thoracic spine    1/2018: she returned complaining of bilateral wrist pain, also some pain across the back of her hand.  Pain is worse in the morning with morning stiffness for at least one hour.  Stiffness improves with activity.  Pain in her hands improves with activity.  She continues to have pain in her lower back, upper back, mid back, and neck.  spine pain is worse with activity and improves with rest.  She is following with an orthopedic surgeon for her back.      4/26/2018, she reported that she is doing poorly because she has a nonproductive cough but feels like she needs to cough something up.  She is going to have a chest CT later today.  No shortness of breath or chest pain.  Feels like she has chest congestion now.  Says that she is feeling better since using methotrexate.  She is off of prednisone.  Improved  strength bilaterally.  Hand pain, especially in the morning, has improved significantly.  No morning stiffness. No difficulty raising her arms above her head or standing up from a low seated position.  Denies jaw claudication, scalp tenderness, vision change, new headache, difficulty standing up from a chair, or difficulty raising her arms above her head.      3/1/2021: Not doing as well as she was doing previously when on methotrexate.  She said that she stopped taking methotrexate because she was concerned about potential side effects.  Now with swelling in her wrists and across her MCPs that is worse in the morning and improved with time and activity.  Morning stiffness for at least  1 hour at the wrists.  Reduced  strength bilaterally.  She recalls that these were symptoms she had in the past that improved with methotrexate.  She notes drinking 4 ounces of wine per day.    2021: Currently doing well on methotrexate.  No joint pain or swelling.  No morning stiffness or gelling phenomenon.  She says that her   last October and she has now moved to [place] so she is adjusting to these changes.  Otherwise doing well.  Mild chronic neck pain that is worse with activity and improves with rest; nonradiating.    2022: Currently doing well with regard to inflammatory arthritis.  Tolerating methotrexate well.  No joint pain or stiffness except for the left foot that has been operated on twice and she follows with podiatry for this.  No joint swelling.    2023: Currently doing well.  No joint pain or swelling.  Morning stiffness for no more than 10 minutes but does not occur every day.  Positive gelling phenomenon if she sits for a very long time but this resolves quickly.  Methotrexate is effective for arthritis.  Recalls being on Fosamax in the distant past and does not recall any side effects from Fosamax.  No heartburn.  No dysphagia.  Reports having good dentition; had a tooth removed last year.  Does not anticipate any future invasive dental work.  Follows at the dentist regularly.    2023: Currently doing well with regard to inflammatory arthritis.  Large Heberden's nodes that are not painful.  No morning stiffness.  No gelling phenomenon.  Arthritis is not limiting her daily activities.    10/4/2023: Mild pain at the first CMC joints with overactivity, improved with rest; no swelling, increased warmth, or overlying erythema at the first CMC joints.  No other joint pain.  Morning stiffness for no more than 10 minutes, if present at all.  Overall happy with how well she is doing.  Arthritis is not limiting her daily activities.    Today, 4/15/2024: Currently  doing well.  No joint pain or swelling.  Morning stiffness for no more than 5-10 minutes.  Arthritis does not limit her daily activities, except for chronic left foot pain that has deformities, and has been operated on by a foot surgeon.    Denies fevers, chills, nausea, vomiting, constipation, diarrhea. No abdominal pain. No chest pain/pressure, palpitations, or shortness of breath. No LE swelling.  No oral or nasal sores.  No rash.      Tobacco:  None  EtOH: No more than 1 drink per day: 4 ounce glass of wine  Drugs:   Occupation: used to work as a , retired now    ROS   12 point review of system was completed and negative except as noted in the HPI     Active Problem List     Patient Active Problem List   Diagnosis    Advanced directives, counseling/discussion    Osteopenia    Palpitations    Hyperlipidemia LDL goal <100    Renal cyst    Health Care Home    Insomnia    S/P total knee arthroplasty, left    Aortic valve disorder    Mitral valve disorder    PMR (polymyalgia rheumatica) (H24)    Encounter for long-term current use of medication    Major depressive disorder, single episode, mild (H24)    Current chronic use of systemic steroids    Fatigue, unspecified type    Chronic pain of left knee    Venous stasis dermatitis of left lower extremity    Fecal urgency    Elevated hemidiaphragm    Inflammatory arthritis    Pulmonary nodules    Hypertension goal BP (blood pressure) < 140/90    Osteoporosis without current pathological fracture, unspecified osteoporosis type     Past Medical History     Past Medical History:   Diagnosis Date    Aortic valve disorder     Elevated hemidiaphragm     Granulomatous lung disease (H) 8/7/2012    High cholesterol     Insomnia 10/8/2013    Benadryl gives her RLS    Mitral valve disorder     Osteoarthritis of knee 10/8/2013    Sees Dr. Whaley    Osteopenia 7/31/2012    Other chronic pain 8/3/2015    Patient is followed by KIARA RAINES for ongoing prescription  of pain medication.  All refills should be approved by this provider, or covering partner.  Medication(s): Hydrocodone/APAP.  Maximum quantity per month: 30 Clinic visit frequency required: Q 3 months   Controlled substance agreement on file: Yes      Date(s): 8/3/15  Pain Clinic evaluation in the past: No  DIRE Total Score(s): No fl    Polymyalgia rheumatica (H24)     Pulmonary nodules     Renal cyst 8/7/2012     Past Surgical History     Past Surgical History:   Procedure Laterality Date    APPENDECTOMY  1951    CATARACT IOL, RT/LT  2010    bilateral     CHOLECYSTECTOMY  2010    ORTHOPEDIC SURGERY      Left Knee Surgery 1/2017    RECONSTRUCT FOREFOOT WITH METATARSOPHALANGEAL (MTP) FUSION Left 10/24/2018    Procedure: LEFT FIRST METATARSOPHALANGEAL JOINT ARTHRODESIS;  Surgeon: Rufus Harden MD;  Location: [facility] OR    REPAIR HAMMER TOE Left 10/24/2018    Procedure: LESSER TOE RECONSTRUCTION SECOND, THIRD, FOURTH AND FIFTH TOES;  Surgeon: Rufus Harden MD;  Location: [facility] OR    SURGICAL HISTORY OF -   12/13    bilateral YAG laser surgery of eyes     Allergy     Allergies   Allergen Reactions    Simvastatin Muscle Pain (Myalgia)     Current Medication List     Current Outpatient Medications   Medication Sig Dispense Refill    Acetaminophen (TYLENOL PO) Take 650 mg by mouth 2 times daily as needed for mild pain or fever      aspirin - buffered (ASCRIPTIN) 325 MG TABS tablet Take 1 tablet (325 mg) by mouth daily (Patient taking differently: Take 325 mg by mouth as needed) 60 each 0    atenolol (TENORMIN) 25 MG tablet Take 1 tablet (25 mg) by mouth daily 90 tablet 4    folic acid (FOLVITE) 1 MG tablet Take 1 tablet (1 mg) by mouth daily 90 tablet 0    losartan (COZAAR) 50 MG tablet Take 1 tablet (50 mg) by mouth daily 90 tablet 3    Melatonin 10 MG TABS tablet Take 10 mg by mouth nightly as needed for sleep      methotrexate 2.5 MG tablet Take 6 tablets (15 mg) by mouth every 7 days . Methotrexate is a once weekly  "medication, taken on the same day of each week.  Labs required every 3 months 78 tablet 0    Multiple Vitamins-Minerals (CENTRUM SILVER ADULT 50+ PO) Take 1 tablet by mouth daily Reported on 4/20/2017      omeprazole (PRILOSEC) 40 MG DR capsule TAKE ONE CAPSULE BY MOUTH ONE TIME DAILY 90 capsule 0    sertraline (ZOLOFT) 25 MG tablet Take 1 tablet (25 mg) by mouth daily 60 tablet 0    tiZANidine (ZANAFLEX) 2 MG tablet Take 0.5 tablets (1 mg) by mouth nightly as needed for muscle spasms 90 tablet 1    triamcinolone (KENALOG) 0.1 % external cream Apply topically 2 times daily as needed for irritation //itching 45 g 1     No current facility-administered medications for this visit.       Social History   See HPI    Family History     Family History   Problem Relation Age of Onset    Cancer Mother     Cerebrovascular Disease Father      Denies family history of autoimmune disease     Physical Exam     Temp Readings from Last 3 Encounters:   04/27/23 98.7  F (37.1  C) (Oral)   03/28/23 98.6  F (37  C) (Temporal)   06/03/22 98.1  F (36.7  C) (Oral)     BP Readings from Last 5 Encounters:   10/04/23 (!) 146/71   04/27/23 (!) 140/50   04/05/23 119/67   03/28/23 (!) 174/68   01/11/23 132/70     Pulse Readings from Last 1 Encounters:   10/04/23 66     Resp Readings from Last 1 Encounters:   04/27/23 24     Estimated body mass index is 29.62 kg/m  as calculated from the following:    Height as of 4/27/23: 1.486 m (4' 10.5\").    Weight as of 10/4/23: 65.4 kg (144 lb 3.2 oz).    GEN: NAD.  HEENT:  Anicteric, noninjected sclera. No obvious external lesions of the ear and nose. Hearing intact.  CV: S1, S2. RRR. No m/r/g  PULM: No increased work of breathing. CTA bilaterally   MSK: MCPs, PIPs, DIPs without swelling or tenderness to palpation.  Heberden's and Kecia's nodes present.  Squaring at the bilateral first CMC joints that were mildly tender to palpation but no increased warmth, swelling, or overlying erythema.  Wrists " without swelling or tenderness to palpation.  Elbows and shoulders without swelling or tenderness to palpation.    Knees, ankles, and MTPs without swelling or tenderness to palpation.  Minimal arch of the left foot and ankle eversion when standing.  SKIN: No rash or jaundice seen.  Nontender mobile subcutaneous nodule on the palmar ulnar aspect of the right second DIP  PSYCH: Alert. Appropriate.       Labs / Imaging (select studies)     RF/CCP  Recent Labs   Lab Test 06/26/17  1854   CCPIGG <1  Negative     RHF <20     CBC  Recent Labs   Lab Test 04/01/24  1228 01/04/24  1221 10/04/23  1329 09/27/21  0939 07/08/21  1156 05/26/21  1328 10/09/18  1158 04/23/18  1054   WBC 7.2 6.8 7.9   < > 7.9 6.0   < > 6.7   RBC 4.12 4.12 3.92   < > 3.99 4.25   < > 4.18   HGB 13.6 13.3 12.9   < > 13.6 13.9   < > 13.4   HCT 40.3 39.6 38.1   < > 39.4 41.5   < > 40.4   MCV 98 96 97   < > 99 98   < > 97   RDW 13.1 13.3 13.0   < > 13.8 14.8   < > 14.8    245 285   < > 260 261   < > 210   MCH 33.0 32.3 32.9   < > 34.1* 32.7   < > 32.1   MCHC 33.7 33.6 33.9   < > 34.5 33.5   < > 33.2   NEUTROPHIL 70 68 70   < > 71.3 69.7  --  62.7   LYMPH 18 19 17   < > 16.7 18.8  --  20.4   MONOCYTE 8 8 9   < > 8.8 6.9  --  15.6   EOSINOPHIL 3 4 3   < > 2.8 3.9  --  1.0   BASOPHIL 1 1 1   < > 0.4 0.7  --  0.3   ANEU  --   --   --   --  5.6 4.2  --  4.2   ALYM  --   --   --   --  1.3 1.1  --  1.4   LAURENCE  --   --   --   --  0.7 0.4  --  1.0   AEOS  --   --   --   --  0.2 0.2  --  0.1   ABAS  --   --   --   --  0.0 0.0  --  0.0   ANEUTAUTO 5.0 4.7 5.5   < >  --   --   --   --    ALYMPAUTO 1.3 1.3 1.3   < >  --   --   --   --    AMONOAUTO 0.6 0.6 0.7   < >  --   --   --   --    AEOSAUTO 0.2 0.2 0.3   < >  --   --   --   --    ABSBASO 0.1 0.1 0.1   < >  --   --   --   --     < > = values in this interval not displayed.     CMP  Recent Labs   Lab Test 04/01/24  1228 01/04/24  1221 10/04/23  1329 03/28/23  1310 01/11/23  1121 01/06/22  1022 09/27/21  0938  07/08/21  1156 05/26/21  1328 02/22/21  1634 10/19/20  1433 07/23/19  1035   NA  --   --   --   --   --   --   --  140  --   --  139 141   POTASSIUM  --   --   --  4.9  --   --   --  4.7  --   --  4.0 4.1   CHLORIDE  --   --   --   --   --   --   --  108  --   --  106 109   CO2  --   --   --   --   --   --   --  26  --   --  24 27   ANIONGAP  --   --   --   --   --   --   --  6  --   --  9 5   GLC  --   --   --   --   --   --   --  94  --   --  90 101*   BUN  --   --   --   --   --   --   --  14  --   --  21 20   CR 0.87 0.74 0.78 0.70 0.66   < > 0.73 0.80 0.88 0.70 0.74 0.67   GFRESTIMATED 64 77 73 83 84   < > 75 67 60* 79 74 81   GFRESTBLACK  --   --   --   --   --   --   --  78 69 >90 86 >90   ALEJANDRO  --   --   --   --  9.6  --  9.7 9.2  --   --  9.0 8.8   BILITOTAL 0.4 0.5 0.5 0.6 0.8   < >  --  0.7 0.4 0.3  --  0.5   ALBUMIN 4.6 4.5 4.4 4.0 4.1   < >  --  3.9 3.8 4.1  --  3.9   PROTTOTAL 7.1 6.9 6.8 7.2 7.1   < >  --  7.1 6.5* 7.1  --  7.2   ALKPHOS 96 85 87 98 83   < >  --  78 69 82  --  101   AST 27 23 26 22 25   < >  --  23 21 21  --  21   ALT 21 17 19 24 35   < >  --  30 28 26 27 27    < > = values in this interval not displayed.     Calcium/VitaminD  Recent Labs   Lab Test 01/11/23  1121 09/27/21  0938 07/08/21  1156 10/04/16  1145 02/05/16  1500   ALEJANDRO 9.6 9.7 9.2   < > 9.1   VITDT 39 40  --   --  39    < > = values in this interval not displayed.     ESR/CRP  Recent Labs   Lab Test 04/01/24  1228 03/28/23  1310 01/11/23  1121 10/17/22  1127   SED 2 8 7 8   CRP  --  6.7 4.0 3.3   CRPI 3.82  --   --   --      Hepatitis B  Recent Labs   Lab Test 01/24/18  0918   HBCAB Nonreactive   HEPBANG Nonreactive     Hepatitis C  Recent Labs   Lab Test 01/24/18  0918   HCVAB Nonreactive     Immunization History     Immunization History   Administered Date(s) Administered    COVID-19 MONOVALENT 12+ (Pfizer) 02/11/2021, 03/04/2021, 11/19/2021    COVID-19 Monovalent 12+ (Pfizer 2022) 04/11/2022    Flu, Unspecified 10/04/2016     Influenza (High Dose) 3 valent vaccine 10/26/2010, 10/08/2013, 10/22/2014, 10/05/2015, 10/04/2016, 09/08/2017, 10/09/2018, 10/01/2019    Influenza (IIV3) PF 10/13/2006, 10/31/2008, 10/26/2010, 10/24/2011, 10/23/2012, 10/08/2020    Influenza Vaccine 65+ (Fluzone HD) 10/08/2020, 09/27/2021    Influenza, seasonal, injectable, PF 09/25/2009    Pneumo Conj 13-V (2010&after) 11/03/2015    Pneumococcal 23 valent 12/16/2004    TD,PF 7+ (Tenivac) 07/21/2010, 06/04/2021    TDAP (Adacel,Boostrix) 07/21/2010    Zoster vaccine, live 06/04/2009          Chart documentation done in part with Dragon Voice recognition Software. Although reviewed after completion, some word and grammatical error may remain.    Dale Marquis MD

## 2024-05-03 ENCOUNTER — OFFICE VISIT (OUTPATIENT)
Dept: FAMILY MEDICINE | Facility: CLINIC | Age: 89
End: 2024-05-03
Payer: COMMERCIAL

## 2024-05-03 VITALS
OXYGEN SATURATION: 96 % | RESPIRATION RATE: 16 BRPM | SYSTOLIC BLOOD PRESSURE: 125 MMHG | TEMPERATURE: 98.1 F | HEIGHT: 58 IN | WEIGHT: 143.6 LBS | DIASTOLIC BLOOD PRESSURE: 65 MMHG | HEART RATE: 64 BPM | BODY MASS INDEX: 30.14 KG/M2

## 2024-05-03 DIAGNOSIS — R14.3 FLATULENCE, ERUCTATION AND GAS PAIN: ICD-10-CM

## 2024-05-03 DIAGNOSIS — L57.8 SUN-DAMAGED SKIN: ICD-10-CM

## 2024-05-03 DIAGNOSIS — I10 HYPERTENSION, GOAL BELOW 140/90: ICD-10-CM

## 2024-05-03 DIAGNOSIS — I10 HYPERTENSION GOAL BP (BLOOD PRESSURE) < 140/90: ICD-10-CM

## 2024-05-03 DIAGNOSIS — F41.9 ANXIETY AND DEPRESSION: ICD-10-CM

## 2024-05-03 DIAGNOSIS — F33.42 MAJOR DEPRESSION, RECURRENT, FULL REMISSION (H): ICD-10-CM

## 2024-05-03 DIAGNOSIS — B00.1 COLD SORE: ICD-10-CM

## 2024-05-03 DIAGNOSIS — M35.3 PMR (POLYMYALGIA RHEUMATICA) (H): ICD-10-CM

## 2024-05-03 DIAGNOSIS — F32.A ANXIETY AND DEPRESSION: ICD-10-CM

## 2024-05-03 DIAGNOSIS — E78.5 HYPERLIPIDEMIA LDL GOAL <100: ICD-10-CM

## 2024-05-03 DIAGNOSIS — K21.9 GASTROESOPHAGEAL REFLUX DISEASE WITHOUT ESOPHAGITIS: ICD-10-CM

## 2024-05-03 DIAGNOSIS — R14.1 FLATULENCE, ERUCTATION AND GAS PAIN: ICD-10-CM

## 2024-05-03 DIAGNOSIS — Z00.00 ENCOUNTER FOR MEDICARE ANNUAL WELLNESS EXAM: Primary | ICD-10-CM

## 2024-05-03 DIAGNOSIS — R14.2 FLATULENCE, ERUCTATION AND GAS PAIN: ICD-10-CM

## 2024-05-03 PROCEDURE — 99214 OFFICE O/P EST MOD 30 MIN: CPT | Mod: 25 | Performed by: INTERNAL MEDICINE

## 2024-05-03 PROCEDURE — G0439 PPPS, SUBSEQ VISIT: HCPCS | Performed by: INTERNAL MEDICINE

## 2024-05-03 RX ORDER — SERTRALINE HYDROCHLORIDE 25 MG/1
25 TABLET, FILM COATED ORAL DAILY
Qty: 90 TABLET | Refills: 3 | Status: SHIPPED | OUTPATIENT
Start: 2024-05-03

## 2024-05-03 RX ORDER — OMEPRAZOLE 40 MG/1
40 CAPSULE, DELAYED RELEASE ORAL DAILY
Qty: 90 CAPSULE | Refills: 3 | Status: SHIPPED | OUTPATIENT
Start: 2024-05-03

## 2024-05-03 RX ORDER — ATENOLOL 25 MG/1
25 TABLET ORAL DAILY
Qty: 90 TABLET | Refills: 3 | Status: SHIPPED | OUTPATIENT
Start: 2024-05-03

## 2024-05-03 RX ORDER — VALACYCLOVIR HYDROCHLORIDE 1 G/1
1000 TABLET, FILM COATED ORAL 2 TIMES DAILY
Qty: 2 TABLET | Refills: 0 | Status: SHIPPED | OUTPATIENT
Start: 2024-05-03 | End: 2024-05-04

## 2024-05-03 RX ORDER — LOSARTAN POTASSIUM 50 MG/1
50 TABLET ORAL DAILY
Qty: 90 TABLET | Refills: 3 | Status: SHIPPED | OUTPATIENT
Start: 2024-05-03

## 2024-05-03 SDOH — HEALTH STABILITY: PHYSICAL HEALTH: ON AVERAGE, HOW MANY DAYS PER WEEK DO YOU ENGAGE IN MODERATE TO STRENUOUS EXERCISE (LIKE A BRISK WALK)?: 2 DAYS

## 2024-05-03 SDOH — HEALTH STABILITY: PHYSICAL HEALTH: ON AVERAGE, HOW MANY MINUTES DO YOU ENGAGE IN EXERCISE AT THIS LEVEL?: 50 MIN

## 2024-05-03 ASSESSMENT — SOCIAL DETERMINANTS OF HEALTH (SDOH): HOW OFTEN DO YOU GET TOGETHER WITH FRIENDS OR RELATIVES?: MORE THAN THREE TIMES A WEEK

## 2024-05-03 ASSESSMENT — PAIN SCALES - GENERAL: PAINLEVEL: NO PAIN (0)

## 2024-05-03 NOTE — PATIENT INSTRUCTIONS
Additional instructions:  Valacyclovir for cold sore. If not resolve in the next week, call us. I will refer you to see ENT.  Do not take tizanidine while you take valacyclovir.     Preventive Care Advice   This is general advice given by our system to help you stay healthy. However, your care team may have specific advice just for you. Please talk to your care team about your preventive care needs.  Nutrition  Eat 5 or more servings of fruits and vegetables each day.  Try wheat bread, brown rice and whole grain pasta (instead of white bread, rice, and pasta).  Get enough calcium and vitamin D. Check the label on foods and aim for 100% of the RDA (recommended daily allowance).  Lifestyle  Exercise at least 150 minutes each week   (30 minutes a day, 5 days a week).  Do muscle strengthening activities 2 days a week. These help control your weight and prevent disease.  No smoking.  Wear sunscreen to prevent skin cancer.  Have a dental exam and cleaning every 6 months.  Yearly exams  See your health care team every year to talk about:  Any changes in your health.  Any medicines your care team has prescribed.  Preventive care, family planning, and ways to prevent chronic diseases.  Shots (vaccines)   HPV shots (up to age 26), if you've never had them before.  Hepatitis B shots (up to age 59), if you've never had them before.  COVID-19 shot: Get this shot when it's due.  Flu shot: Get a flu shot every year.  Tetanus shot: Get a tetanus shot every 10 years.  Pneumococcal, hepatitis A, and RSV shots: Ask your care team if you need these based on your risk.  Shingles shot (for age 50 and up).  General health tests  Diabetes screening:  Starting at age 35, Get screened for diabetes at least every 3 years.  If you are younger than age 35, ask your care team if you should be screened for diabetes.  Cholesterol test: At age 39, start having a cholesterol test every 5 years, or more often if advised.  Bone density scan (DEXA): At  age 50, ask your care team if you should have this scan for osteoporosis (brittle bones).  Hepatitis C: Get tested at least once in your life.  STIs (sexually transmitted infections)  Before age 24: Ask your care team if you should be screened for STIs.  After age 24: Get screened for STIs if you're at risk. You are at risk for STIs (including HIV) if:  You are sexually active with more than one person.  You don't use condoms every time.  You or a partner was diagnosed with a sexually transmitted infection.  If you are at risk for HIV, ask about PrEP medicine to prevent HIV.  Get tested for HIV at least once in your life, whether you are at risk for HIV or not.  Cancer screening tests  Cervical cancer screening: If you have a cervix, begin getting regular cervical cancer screening tests at age 21. Most people who have regular screenings with normal results can stop after age 65. Talk about this with your provider.  Breast cancer scan (mammogram): If you've ever had breasts, begin having regular mammograms starting at age 40. This is a scan to check for breast cancer.  Colon cancer screening: It is important to start screening for colon cancer at age 45.  Have a colonoscopy test every 10 years (or more often if you're at risk) Or, ask your provider about stool tests like a FIT test every year or Cologuard test every 3 years.  To learn more about your testing options, visit: [email or web address].  For help making a decision, visit: https://bit.ly/dn29693.  Prostate cancer screening test: If you have a prostate and are age 55 to 69, ask your provider if you would benefit from a yearly prostate cancer screening test.  Lung cancer screening: If you are a current or former smoker age 50 to 80, ask your care team if ongoing lung cancer screenings are right for you.  For informational purposes only. Not to replace the advice of your health care provider. Copyright   2023 [employer]. All rights  reserved. Clinically reviewed by the [facility]. [employer] [id number] - REV 01/24.    Preventing Falls: Care Instructions  Injuries and health problems such as trouble walking or poor eyesight can increase your risk of falling. So can some medicines. But there are things you can do to help prevent falls. You can exercise to get stronger. You can also arrange your home to make it safer.    Talk to your doctor about the medicines you take. Ask if any of them increase the risk of falls and whether they can be changed or stopped.   Try to exercise regularly. It can help improve your strength and balance. This can help lower your risk of falling.     Practice fall safety and prevention.    Wear low-heeled shoes that fit well and give your feet good support. Talk to your doctor if you have foot problems that make this hard.  Carry a cellphone or wear a medical alert device that you can use to call for help.  Use stepladders instead of chairs to reach high objects. Don't climb if you're at risk for falls. Ask for help, if needed.  Wear the correct eyeglasses, if you need them.    Make your home safer.    Remove rugs, cords, clutter, and furniture from walkways.  Keep your house well lit. Use night-lights in hallways and bathrooms.  Install and use sturdy handrails on stairways.  Wear nonskid footwear, even inside. Don't walk barefoot or in socks without shoes.    Be safe outside.    Use handrails, curb cuts, and ramps whenever possible.  Keep your hands free by using a shoulder bag or backpack.  Try to walk in well-lit areas. Watch out for uneven ground, changes in pavement, and debris.  Be careful in the winter. Walk on the grass or gravel when sidewalks are slippery. Use de-icer on steps and walkways. Add non-slip devices to shoes.    Put grab bars and nonskid mats in your shower or tub and near the toilet. Try to use a shower chair or bath bench when bathing.   Get into a tub or shower by putting  "in your weaker leg first. Get out with your strong side first. Have a phone or medical alert device in the bathroom with you.   Where can you learn more?  Go to https://www.[employer].net/patiented  Enter G117 in the search box to learn more about \"Preventing Falls: Care Instructions.\"  Current as of: July 17, 2023               Content Version: 14.0    4815-0234 [employer].   Care instructions adapted under license by your healthcare professional. If you have questions about a medical condition or this instruction, always ask your healthcare professional. [employer] disclaims any warranty or liability for your use of this information.      Hearing Loss: Care Instructions  Overview     Hearing loss is a sudden or slow decrease in how well you hear. It can range from slight to profound. Permanent hearing loss can occur with aging. It also can happen when you are exposed long-term to loud noise. Examples include listening to loud music, riding motorcycles, or being around other loud machines.  Hearing loss can affect your work and home life. It can make you feel lonely or depressed. You may feel that you have lost your independence. But hearing aids and other devices can help you hear better and feel connected to others.  Follow-up care is a key part of your treatment and safety. Be sure to make and go to all appointments, and call your doctor if you are having problems. It's also a good idea to know your test results and keep a list of the medicines you take.  How can you care for yourself at home?  Avoid loud noises whenever possible. This helps keep your hearing from getting worse.  Always wear hearing protection around loud noises.  Wear a hearing aid as directed.  A professional can help you pick a hearing aid that will work best for you.  You can also get hearing aids over the counter for mild to moderate hearing loss.  Have hearing tests as your doctor suggests. They can show whether " "your hearing has changed. Your hearing aid may need to be adjusted.  Use other devices as needed. These may include:  Telephone amplifiers and hearing aids that can connect to a television, stereo, radio, or microphone.  Devices that use lights or vibrations. These alert you to the doorbell, a ringing telephone, or a baby monitor.  Television closed-captioning. This shows the words at the bottom of the screen. Most new TVs can do this.  TTY (text telephone). This lets you type messages back and forth on the telephone instead of talking or listening. These devices are also called TDD. When messages are typed on the keyboard, they are sent over the phone line to a receiving TTY. The message is shown on a monitor.  Use text messaging, social media, and email if it is hard for you to communicate by telephone.  Try to learn a listening technique called speechreading. It is not lipreading. You pay attention to people's gestures, expressions, posture, and tone of voice. These clues can help you understand what a person is saying. Face the person you are talking to, and have them face you. Make sure the lighting is good. You need to see the other person's face clearly.  Think about counseling if you need help to adjust to your hearing loss.  When should you call for help?  Watch closely for changes in your health, and be sure to contact your doctor if:    You think your hearing is getting worse.     You have new symptoms, such as dizziness or nausea.   Where can you learn more?  Go to https://[email or web address].net/patiented  Enter R798 in the search box to learn more about \"Hearing Loss: Care Instructions.\"  Current as of: September 27, 2023               Content Version: 14.0    2096-9587 [employer].   Care instructions adapted under license by your healthcare professional. If you have questions about a medical condition or this instruction, always ask your healthcare professional. [employer] " disclaims any warranty or liability for your use of this information.      Learning About Stress  What is stress?     Stress is your body's response to a hard situation. Your body can have a physical, emotional, or mental response. Stress is a fact of life for most people, and it affects everyone differently. What causes stress for you may not be stressful for someone else.  A lot of things can cause stress. You may feel stress when you go on a job interview, take a test, or run a race. This kind of short-term stress is normal and even useful. It can help you if you need to work hard or react quickly. For example, stress can help you finish an important job on time.  Long-term stress is caused by ongoing stressful situations or events. Examples of long-term stress include long-term health problems, ongoing problems at work, or conflicts in your family. Long-term stress can harm your health.  How does stress affect your health?  When you are stressed, your body responds as though you are in danger. It makes hormones that speed up your heart, make you breathe faster, and give you a burst of energy. This is called the fight-or-flight stress response. If the stress is over quickly, your body goes back to normal and no harm is done.  But if stress happens too often or lasts too long, it can have bad effects. Long-term stress can make you more likely to get sick, and it can make symptoms of some diseases worse. If you tense up when you are stressed, you may develop neck, shoulder, or low back pain. Stress is linked to high blood pressure and heart disease.  Stress also harms your emotional health. It can make you parnell, tense, or depressed. Your relationships may suffer, and you may not do well at work or school.  What can you do to manage stress?  You can try these things to help manage stress:   Do something active. Exercise or activity can help reduce stress. Walking is a great way to get started. Even everyday  activities such as housecleaning or yard work can help.  Try yoga or raciel chi. These techniques combine exercise and meditation. You may need some training at first to learn them.  Do something you enjoy. For example, listen to music or go to a movie. Practice your hobby or do volunteer work.  Meditate. This can help you relax, because you are not worrying about what happened before or what may happen in the future.  Do guided imagery. Imagine yourself in any setting that helps you feel calm. You can use online videos, books, or a teacher to guide you.  Do breathing exercises. For example:  From a standing position, bend forward from the waist with your knees slightly bent. Let your arms dangle close to the floor.  Breathe in slowly and deeply as you return to a standing position. Roll up slowly and lift your head last.  Hold your breath for just a few seconds in the standing position.  Breathe out slowly and bend forward from the waist.  Let your feelings out. Talk, laugh, cry, and express anger when you need to. Talking with supportive friends or family, a counselor, or a misty leader about your feelings is a healthy way to relieve stress. Avoid discussing your feelings with people who make you feel worse.  Write. It may help to write about things that are bothering you. This helps you find out how much stress you feel and what is causing it. When you know this, you can find better ways to cope.  What can you do to prevent stress?  You might try some of these things to help prevent stress:  Manage your time. This helps you find time to do the things you want and need to do.  Get enough sleep. Your body recovers from the stresses of the day while you are sleeping.  Get support. Your family, friends, and community can make a difference in how you experience stress.  Limit your news feed. Avoid or limit time on social media or news that may make you feel stressed.  Do something active. Exercise or activity can help  "reduce stress. Walking is a great way to get started.  Where can you learn more?  Go to https://www.[employer].net/patiented  Enter N032 in the search box to learn more about \"Learning About Stress.\"  Current as of: October 24, 2023               Content Version: 14.0    4224-7476 [employer].   Care instructions adapted under license by your healthcare professional. If you have questions about a medical condition or this instruction, always ask your healthcare professional. [employer] disclaims any warranty or liability for your use of this information.      Bladder Training: Care Instructions  Your Care Instructions     Bladder training is used to treat urge incontinence and stress incontinence. Urge incontinence means that the need to urinate comes on so fast that you can't get to a toilet in time. Stress incontinence means that you leak urine because of pressure on your bladder. For example, it may happen when you laugh, cough, or lift something heavy.  Bladder training can increase how long you can wait before you have to urinate. It can also help your bladder hold more urine. And it can give you better control over the urge to urinate.  It is important to remember that bladder training takes a few weeks to a few months to make a difference. You may not see results right away, but don't give up.  Follow-up care is a key part of your treatment and safety. Be sure to make and go to all appointments, and call your doctor if you are having problems. It's also a good idea to know your test results and keep a list of the medicines you take.  How can you care for yourself at home?  Work with your doctor to come up with a bladder training program that is right for you. You may use one or more of the following methods.  Delayed urination  In the beginning, try to keep from urinating for 5 minutes after you first feel the need to go.  While you wait, take deep, slow breaths to relax. Vern " "exercises can also help you delay the need to go to the bathroom.  After some practice, when you can easily wait 5 minutes to urinate, try to wait 10 minutes before you urinate.  Slowly increase the waiting period until you are able to control when you have to urinate.  Scheduled urination  Empty your bladder when you first wake up in the morning.  Schedule times throughout the day when you will urinate.  Start by going to the bathroom every hour, even if you don't need to go.  Slowly increase the time between trips to the bathroom.  When you have found a schedule that works well for you, keep doing it.  If you wake up during the night and have to urinate, do it. Apply your schedule to waking hours only.  Kegel exercises  These tighten and strengthen pelvic muscles, which can help you control the flow of urine. (If doing these exercises causes pain, stop doing them and talk with your doctor.) To do Kegel exercises:  Squeeze your muscles as if you were trying not to pass gas. Or squeeze your muscles as if you were stopping the flow of urine. Your belly, legs, and buttocks shouldn't move.  Hold the squeeze for 3 seconds, then relax for 5 to 10 seconds.  Start with 3 seconds, then add 1 second each week until you are able to squeeze for 10 seconds.  Repeat the exercise 10 times a session. Do 3 to 8 sessions a day.  When should you call for help?  Watch closely for changes in your health, and be sure to contact your doctor if:    Your incontinence is getting worse.     You do not get better as expected.   Where can you learn more?  Go to https://www.[employer].net/patiented  Enter V684 in the search box to learn more about \"Bladder Training: Care Instructions.\"  Current as of: November 15, 2023               Content Version: 14.0    5210-6930 [employer].   Care instructions adapted under license by your healthcare professional. If you have questions about a medical condition or this instruction, always ask " your healthcare professional. [employer] disclaims any warranty or liability for your use of this information.

## 2024-05-03 NOTE — PROGRESS NOTES
"  Preventive Care Visit  [facility]  Isela Mac MD PhD, Internal Medicine - Pediatrics  May 3, 2024      Assessment & Plan     Ailyn was seen today for wellness visit.    Diagnoses and all orders for this visit:    Encounter for Medicare annual wellness exam  -     PRIMARY CARE FOLLOW-UP SCHEDULING; Future    Hyperlipidemia LDL goal <100  -     Lipid panel reflex to direct LDL Non-fasting; Future    Hypertension goal BP (blood pressure) < 140/90  -     Basic metabolic panel  (Ca, Cl, CO2, Creat, Gluc, K, Na, BUN); Future  -     Albumin Random Urine Quantitative with Creat Ratio; Future  -     atenolol (TENORMIN) 25 MG tablet; Take 1 tablet (25 mg) by mouth daily    PMR (polymyalgia rheumatica) (H24)    Major depression, recurrent, full remission (H24)  -     sertraline (ZOLOFT) 25 MG tablet; Take 1 tablet (25 mg) by mouth daily  -     KS BEHAV ASSMT W/SCORE & DOCD/STAND INSTRUMENT    Flatulence, eructation and gas pain  -     Helicobacter pylori Antigen Stool; Future  -     Helicobacter pylori Antigen Stool    Cold sore  -     valACYclovir (VALTREX) 1000 mg tablet; Take 1 tablet (1,000 mg) by mouth 2 times daily for 1 day    Anxiety and depression  -     sertraline (ZOLOFT) 25 MG tablet; Take 1 tablet (25 mg) by mouth daily    Gastroesophageal reflux disease without esophagitis  -     omeprazole (PRILOSEC) 40 MG DR capsule; Take 1 capsule (40 mg) by mouth daily    Hypertension, goal below 140/90  -     losartan (COZAAR) 50 MG tablet; Take 1 tablet (50 mg) by mouth daily    Sun-damaged skin  -     Adult Dermatology  Referral; Future       Overall chronic health issues are stable.  Due for monitoring labs. She will return to do.   Work up for gas/bloating   If labs are normal or stable, return in one year.     BMI  Estimated body mass index is 30.01 kg/m  as calculated from the following:    Height as of this encounter: 1.473 m (4' 10\").    Weight as of this encounter: 65.1 kg (143 lb " 9.6 oz).       Counseling  Appropriate preventive services were discussed with this patient, including applicable screening as appropriate for fall prevention, nutrition, physical activity, Tobacco-use cessation, weight loss and cognition.  Checklist reviewing preventive services available has been given to the patient.          Soraida Robertson is a 88 year old, presenting for the following:  Wellness Visit        5/3/2024     1:44 PM   Additional Questions   Roomed by Tato         5/3/2024     1:44 PM   Patient Reported Additional Medications   Patient reports taking the following new medications None         Health Care Directive  Patient does not have a Health Care Directive or Living Will: Discussed advance care planning with patient; information given to patient to review.      Ailyn Trevino is a 88 year old female who has Hyperlipidemia LDL goal <100; Encounter for long-term current use of medication; Major depressive disorder, single episode, mild (H24); Elevated hemidiaphragm; Inflammatory arthritis; Pulmonary nodules; Hypertension goal BP (blood pressure) < 140/90; and Osteoporosis without current pathological fracture, unspecified osteoporosis type on their pertinent problem list.      Follows with rheumatology for inflammatory arthritis/PMR/osteoporosis.   Complained of excessive gas/bloating and epigastric discomfort for a few months. Unpredictable. No change in her diet or routine recently. No change in her medications.             3/3/2022   General Health   How would you rate your overall physical health? Good         3/3/2022   Nutrition   At least 4 servings of fruits and vegetables/day No         3/3/2022   Exercise   Frequency of exercise: 2-3 days/week         11/7/2023   Social Factors   Worry food won't last until get money to buy more No   Food not last or not have enough money for food? No   Do you have housing?  Yes   Are you worried about losing your housing? No   Lack of  transportation? No   Unable to get utilities (heat,electricity)? No         3/3/2022   Activities of Daily Living- Home Safety   Needs help with the following daily activites NO assistance is needed   Safety concerns in the home None of the above           3/3/2022   Hearing Screening   Hearing concerns? Difficulty following a conversation in a noisy restaurant or crowded room    Feel that people are mumbling or not speaking clearly    Difficult to understand a speaker at a public meeting or Christianity service    Need to ask people to speak up or repeat themselves    Difficulty understanding soft or whispered speech       Today's PHQ-9 Score:       5/3/2024     1:53 PM   PHQ-9 SCORE   PHQ-9 Total Score MyChart 1 (Minimal depression)   PHQ-9 Total Score 1           3/3/2022   Substance Use   Alcohol more than 3/day or more than 7/wk No     Social History     Tobacco Use    Smoking status: Former     Current packs/day: 0.00     Types: Cigarettes     Quit date: 1998     Years since quittin.4    Smokeless tobacco: Never   Substance Use Topics    Alcohol use: Yes     Comment: 1 drink a day or less    Drug use: No           5/3/2023   LAST FHS-7 RESULTS   1st degree relative breast or ovarian cancer No   Any relative bilateral breast cancer No   Any male have breast cancer No   Any ONE woman have BOTH breast AND ovarian cancer No   Any woman with breast cancer before 50yrs No   2 or more relatives with breast AND/OR ovarian cancer No   2 or more relatives with breast AND/OR bowel cancer No        Mammogram Screening - After age 74- determine frequency with patient based on health status, life expectancy and patient goals          Reviewed and updated as needed this visit by Provider                      Current providers sharing in care for this patient include:  Patient Care Team:  Isela Mac MD PhD as PCP - General (Internal Medicine - Pediatrics)  Bud Hurst MD as MD (Rheumatology)  Guillermina  "Silvia CHAN RN as   Dale Marquis MD as Assigned Rheumatology Provider  Isela Mac MD PhD as Assigned PCP    The following health maintenance items are reviewed in Epic and correct as of today:  Health Maintenance   Topic Date Due    ELISEO ASSESSMENT  04/11/2023    RSV VACCINE (Pregnancy & 60+) (1 - 1-dose 60+ series) 06/03/2024 (Originally 9/[birth year]/1995)    PHQ-9  11/03/2024    ANNUAL REVIEW OF HM ORDERS  11/07/2024    MEDICARE ANNUAL WELLNESS VISIT  05/03/2025    FALL RISK ASSESSMENT  05/03/2025    BMP  05/09/2025    LIPID  05/09/2025    MICROALBUMIN  05/09/2025    ADVANCE CARE PLANNING  03/28/2028    DTAP/TDAP/TD IMMUNIZATION (4 - Td or Tdap) 06/04/2031    DEPRESSION ACTION PLAN  Completed    INFLUENZA VACCINE  Completed    Pneumococcal Vaccine: 65+ Years  Completed    ZOSTER IMMUNIZATION  Completed    IPV IMMUNIZATION  Aged Out    HPV IMMUNIZATION  Aged Out    MENINGITIS IMMUNIZATION  Aged Out    RSV MONOCLONAL ANTIBODY  Aged Out    DEXA  Discontinued    COVID-19 Vaccine  Discontinued         Review of Systems  Constitutional, HEENT, cardiovascular, pulmonary, gi and gu systems are negative, except as otherwise noted.     Objective    Exam  /65 (BP Location: Right arm, Patient Position: Sitting, Cuff Size: Adult Regular)   Pulse 64   Temp 98.1  F (36.7  C) (Tympanic)   Resp 16   Ht 1.473 m (4' 10\")   Wt 65.1 kg (143 lb 9.6 oz)   SpO2 96%   BMI 30.01 kg/m     Estimated body mass index is 30.01 kg/m  as calculated from the following:    Height as of this encounter: 1.473 m (4' 10\").    Weight as of this encounter: 65.1 kg (143 lb 9.6 oz).    Physical Exam  GENERAL: alert and no distress  EYES: Eyes grossly normal to inspection, PERRL and conjunctivae and sclerae normal  HENT: ear canals and TM's normal, nose and mouth without ulcers or lesions  NECK: no adenopathy, no asymmetry, masses, or scars  RESP: lungs clear to auscultation - no rales, rhonchi or wheezes  CV: regular rate and rhythm, " normal S1 S2, no S3 or S4, no murmur, click or rub, no peripheral edema  ABDOMEN: soft, nontender, no hepatosplenomegaly, no masses and bowel sounds normal  MS: no gross musculoskeletal defects noted, no edema  SKIN: sun damaged skin. No specific worrisome lesions.   NEURO: Normal strength and tone, mentation intact and speech normal  PSYCH: mentation appears normal, affect normal/bright    Cognitive Screening   1) Repeat 3 items (Leader, Season, Table)   2) Clock draw: NORMAL  3) 3 item recall: Recalls 2 objects   Results: NORMAL clock, 1-2 items recalled: COGNITIVE IMPAIRMENT LESS LIKELY    Mini-CogTM Copyright [employer]. Licensed by the author for use in [facility] [place]; reprinted with permission ([email or web address]). All rights reserved.           4/27/2023     2:19 PM 11/7/2023    10:07 AM 5/3/2024     1:53 PM   PHQ-9 SCORE   PHQ-9 Total Score MyChart 1 (Minimal depression) 2 (Minimal depression) 1 (Minimal depression)   PHQ-9 Total Score 1 2 1         Signed Electronically by: Isela Mac MD PhD

## 2024-05-08 PROCEDURE — 87338 HPYLORI STOOL AG IA: CPT | Performed by: INTERNAL MEDICINE

## 2024-05-09 ENCOUNTER — LAB (OUTPATIENT)
Dept: LAB | Facility: CLINIC | Age: 89
End: 2024-05-09
Payer: COMMERCIAL

## 2024-05-09 DIAGNOSIS — I10 HYPERTENSION GOAL BP (BLOOD PRESSURE) < 140/90: ICD-10-CM

## 2024-05-09 DIAGNOSIS — E78.5 HYPERLIPIDEMIA LDL GOAL <100: ICD-10-CM

## 2024-05-09 LAB
ANION GAP SERPL CALCULATED.3IONS-SCNC: 9 MMOL/L (ref 7–15)
BUN SERPL-MCNC: 15.6 MG/DL (ref 8–23)
CALCIUM SERPL-MCNC: 9.3 MG/DL (ref 8.8–10.2)
CHLORIDE SERPL-SCNC: 105 MMOL/L (ref 98–107)
CHOLEST SERPL-MCNC: 248 MG/DL
CREAT SERPL-MCNC: 0.74 MG/DL (ref 0.51–0.95)
CREAT UR-MCNC: 88.1 MG/DL
DEPRECATED HCO3 PLAS-SCNC: 27 MMOL/L (ref 22–29)
EGFRCR SERPLBLD CKD-EPI 2021: 77 ML/MIN/1.73M2
FASTING STATUS PATIENT QL REPORTED: ABNORMAL
FASTING STATUS PATIENT QL REPORTED: ABNORMAL
GLUCOSE SERPL-MCNC: 101 MG/DL (ref 70–99)
HDLC SERPL-MCNC: 50 MG/DL
LDLC SERPL CALC-MCNC: 171 MG/DL
MICROALBUMIN UR-MCNC: <12 MG/L
MICROALBUMIN/CREAT UR: NORMAL MG/G{CREAT}
NONHDLC SERPL-MCNC: 198 MG/DL
POTASSIUM SERPL-SCNC: 4.3 MMOL/L (ref 3.4–5.3)
SODIUM SERPL-SCNC: 141 MMOL/L (ref 135–145)
TRIGL SERPL-MCNC: 137 MG/DL

## 2024-05-09 PROCEDURE — 80048 BASIC METABOLIC PNL TOTAL CA: CPT

## 2024-05-09 PROCEDURE — 80061 LIPID PANEL: CPT

## 2024-05-09 PROCEDURE — 82043 UR ALBUMIN QUANTITATIVE: CPT

## 2024-05-09 PROCEDURE — 82570 ASSAY OF URINE CREATININE: CPT

## 2024-05-09 PROCEDURE — 36415 COLL VENOUS BLD VENIPUNCTURE: CPT

## 2024-05-10 LAB — H PYLORI AG STL QL IA: NEGATIVE

## 2024-05-22 ENCOUNTER — TELEPHONE (OUTPATIENT)
Dept: FAMILY MEDICINE | Facility: CLINIC | Age: 89
End: 2024-05-22
Payer: COMMERCIAL

## 2024-05-22 DIAGNOSIS — E78.5 HYPERLIPIDEMIA LDL GOAL <100: Primary | ICD-10-CM

## 2024-05-22 RX ORDER — ROSUVASTATIN CALCIUM 10 MG/1
10 TABLET, COATED ORAL DAILY
Qty: 90 TABLET | Refills: 0 | Status: SHIPPED | OUTPATIENT
Start: 2024-05-22 | End: 2024-08-22

## 2024-05-22 NOTE — TELEPHONE ENCOUNTER
Pt calling regarding lab results from 5/9/24, she does not have MyChart anymore. Writer relayed message below to patient. Patient is interested in starting a cholesterol lowering medication. Patient does not remember simvastatin causing muscle pain, although has not taken this for several years. Patient is open to starting a cholesterol medication that Dr. Mac recommends, but would like to be called back with information about the name of the medication and dose.  Pt also asked about stool test- informed of negative Hpylori.  Yvette Roth RN    [facility]- Primary Care

## 2024-05-22 NOTE — TELEPHONE ENCOUNTER
I sent her rosuvastatin 10 mg daily.  Recheck fasting lipids in 2 to 3 months.  This can be done with her rheumatology labs, but need to be fasting.

## 2024-07-15 ENCOUNTER — LAB (OUTPATIENT)
Dept: LAB | Facility: CLINIC | Age: 89
End: 2024-07-15
Payer: COMMERCIAL

## 2024-07-15 DIAGNOSIS — M19.90 INFLAMMATORY ARTHRITIS: ICD-10-CM

## 2024-07-15 DIAGNOSIS — Z79.899 HIGH RISK MEDICATION USE: ICD-10-CM

## 2024-07-15 LAB
ALBUMIN SERPL BCG-MCNC: 4.5 G/DL (ref 3.5–5.2)
ALP SERPL-CCNC: 92 U/L (ref 40–150)
ALT SERPL W P-5'-P-CCNC: 13 U/L (ref 0–50)
AST SERPL W P-5'-P-CCNC: 28 U/L (ref 0–45)
BASOPHILS # BLD AUTO: 0.1 10E3/UL (ref 0–0.2)
BASOPHILS NFR BLD AUTO: 1 %
BILIRUB DIRECT SERPL-MCNC: <0.2 MG/DL (ref 0–0.3)
BILIRUB SERPL-MCNC: 0.5 MG/DL
CREAT SERPL-MCNC: 0.72 MG/DL (ref 0.51–0.95)
EGFRCR SERPLBLD CKD-EPI 2021: 80 ML/MIN/1.73M2
EOSINOPHIL # BLD AUTO: 0.3 10E3/UL (ref 0–0.7)
EOSINOPHIL NFR BLD AUTO: 4 %
ERYTHROCYTE [DISTWIDTH] IN BLOOD BY AUTOMATED COUNT: 12.8 % (ref 10–15)
HCT VFR BLD AUTO: 39.3 % (ref 35–47)
HGB BLD-MCNC: 13.5 G/DL (ref 11.7–15.7)
IMM GRANULOCYTES # BLD: 0 10E3/UL
IMM GRANULOCYTES NFR BLD: 0 %
LYMPHOCYTES # BLD AUTO: 1.2 10E3/UL (ref 0.8–5.3)
LYMPHOCYTES NFR BLD AUTO: 19 %
MCH RBC QN AUTO: 33.5 PG (ref 26.5–33)
MCHC RBC AUTO-ENTMCNC: 34.4 G/DL (ref 31.5–36.5)
MCV RBC AUTO: 98 FL (ref 78–100)
MONOCYTES # BLD AUTO: 0.7 10E3/UL (ref 0–1.3)
MONOCYTES NFR BLD AUTO: 11 %
NEUTROPHILS # BLD AUTO: 4 10E3/UL (ref 1.6–8.3)
NEUTROPHILS NFR BLD AUTO: 65 %
NRBC # BLD AUTO: 0 10E3/UL
NRBC BLD AUTO-RTO: 0 /100
PLATELET # BLD AUTO: 245 10E3/UL (ref 150–450)
PROT SERPL-MCNC: 7.1 G/DL (ref 6.4–8.3)
RBC # BLD AUTO: 4.03 10E6/UL (ref 3.8–5.2)
WBC # BLD AUTO: 6.2 10E3/UL (ref 4–11)

## 2024-07-15 PROCEDURE — 82565 ASSAY OF CREATININE: CPT

## 2024-07-15 PROCEDURE — 85025 COMPLETE CBC W/AUTO DIFF WBC: CPT

## 2024-07-15 PROCEDURE — 80076 HEPATIC FUNCTION PANEL: CPT

## 2024-07-15 PROCEDURE — 36415 COLL VENOUS BLD VENIPUNCTURE: CPT

## 2024-08-22 DIAGNOSIS — E78.5 HYPERLIPIDEMIA LDL GOAL <100: ICD-10-CM

## 2024-08-22 RX ORDER — ROSUVASTATIN CALCIUM 10 MG/1
10 TABLET, COATED ORAL DAILY
Qty: 90 TABLET | Refills: 1 | Status: SHIPPED | OUTPATIENT
Start: 2024-08-22

## 2024-10-07 ENCOUNTER — LAB (OUTPATIENT)
Dept: LAB | Facility: CLINIC | Age: 89
End: 2024-10-07
Payer: COMMERCIAL

## 2024-10-07 DIAGNOSIS — Z79.899 HIGH RISK MEDICATION USE: ICD-10-CM

## 2024-10-07 DIAGNOSIS — M19.90 INFLAMMATORY ARTHRITIS: ICD-10-CM

## 2024-10-07 LAB
ALBUMIN SERPL BCG-MCNC: 4.5 G/DL (ref 3.5–5.2)
ALP SERPL-CCNC: 87 U/L (ref 40–150)
ALT SERPL W P-5'-P-CCNC: 20 U/L (ref 0–50)
AST SERPL W P-5'-P-CCNC: 27 U/L (ref 0–45)
BASOPHILS # BLD AUTO: 0.1 10E3/UL (ref 0–0.2)
BASOPHILS NFR BLD AUTO: 1 %
BILIRUB DIRECT SERPL-MCNC: <0.2 MG/DL (ref 0–0.3)
BILIRUB SERPL-MCNC: 0.6 MG/DL
CREAT SERPL-MCNC: 0.87 MG/DL (ref 0.51–0.95)
CRP SERPL-MCNC: <3 MG/L
EGFRCR SERPLBLD CKD-EPI 2021: 63 ML/MIN/1.73M2
EOSINOPHIL # BLD AUTO: 0.2 10E3/UL (ref 0–0.7)
EOSINOPHIL NFR BLD AUTO: 4 %
ERYTHROCYTE [DISTWIDTH] IN BLOOD BY AUTOMATED COUNT: 13.1 % (ref 10–15)
ERYTHROCYTE [SEDIMENTATION RATE] IN BLOOD BY WESTERGREN METHOD: 2 MM/HR (ref 0–30)
HCT VFR BLD AUTO: 40.1 % (ref 35–47)
HGB BLD-MCNC: 13.6 G/DL (ref 11.7–15.7)
IMM GRANULOCYTES # BLD: 0 10E3/UL
IMM GRANULOCYTES NFR BLD: 0 %
LYMPHOCYTES # BLD AUTO: 1.2 10E3/UL (ref 0.8–5.3)
LYMPHOCYTES NFR BLD AUTO: 19 %
MCH RBC QN AUTO: 33 PG (ref 26.5–33)
MCHC RBC AUTO-ENTMCNC: 33.9 G/DL (ref 31.5–36.5)
MCV RBC AUTO: 97 FL (ref 78–100)
MONOCYTES # BLD AUTO: 0.7 10E3/UL (ref 0–1.3)
MONOCYTES NFR BLD AUTO: 12 %
NEUTROPHILS # BLD AUTO: 3.9 10E3/UL (ref 1.6–8.3)
NEUTROPHILS NFR BLD AUTO: 64 %
NRBC # BLD AUTO: 0 10E3/UL
NRBC BLD AUTO-RTO: 0 /100
PLATELET # BLD AUTO: 259 10E3/UL (ref 150–450)
PROT SERPL-MCNC: 6.9 G/DL (ref 6.4–8.3)
RBC # BLD AUTO: 4.12 10E6/UL (ref 3.8–5.2)
WBC # BLD AUTO: 6 10E3/UL (ref 4–11)

## 2024-10-07 PROCEDURE — 85652 RBC SED RATE AUTOMATED: CPT

## 2024-10-07 PROCEDURE — 86140 C-REACTIVE PROTEIN: CPT

## 2024-10-07 PROCEDURE — 36415 COLL VENOUS BLD VENIPUNCTURE: CPT

## 2024-10-07 PROCEDURE — 80076 HEPATIC FUNCTION PANEL: CPT

## 2024-10-07 PROCEDURE — 85025 COMPLETE CBC W/AUTO DIFF WBC: CPT

## 2024-10-07 PROCEDURE — 82565 ASSAY OF CREATININE: CPT

## 2024-10-09 DIAGNOSIS — M19.90 INFLAMMATORY ARTHRITIS: ICD-10-CM

## 2024-10-09 RX ORDER — METHOTREXATE 2.5 MG/1
15 TABLET ORAL
Qty: 78 TABLET | Refills: 0 | Status: SHIPPED | OUTPATIENT
Start: 2024-10-09 | End: 2024-10-14

## 2024-10-09 NOTE — TELEPHONE ENCOUNTER
Medication:   Methotrexate 2.5mg  Last written on:   04/15/2024  Quantity:   78    Refills:   1    Last office visit:   04/15/2024  Next office visit:   10/14/2024  Last labs:   10/07/2024

## 2024-10-09 NOTE — TELEPHONE ENCOUNTER
After chart review and based on prior discussion with MD it was noted that this is appropriate for a refill.    FANTA BoyceN RN Specialty Triage 10/9/2024 4:18 PM

## 2024-10-14 ENCOUNTER — OFFICE VISIT (OUTPATIENT)
Dept: RHEUMATOLOGY | Facility: CLINIC | Age: 89
End: 2024-10-14
Payer: COMMERCIAL

## 2024-10-14 VITALS
DIASTOLIC BLOOD PRESSURE: 73 MMHG | WEIGHT: 148 LBS | OXYGEN SATURATION: 95 % | BODY MASS INDEX: 30.93 KG/M2 | SYSTOLIC BLOOD PRESSURE: 162 MMHG | HEART RATE: 78 BPM

## 2024-10-14 DIAGNOSIS — M19.042 PRIMARY OSTEOARTHRITIS OF BOTH HANDS: ICD-10-CM

## 2024-10-14 DIAGNOSIS — M85.80 OSTEOPENIA, UNSPECIFIED LOCATION: ICD-10-CM

## 2024-10-14 DIAGNOSIS — M19.041 PRIMARY OSTEOARTHRITIS OF BOTH HANDS: ICD-10-CM

## 2024-10-14 DIAGNOSIS — Z79.899 HIGH RISK MEDICATION USE: ICD-10-CM

## 2024-10-14 DIAGNOSIS — M19.90 INFLAMMATORY ARTHRITIS: Primary | ICD-10-CM

## 2024-10-14 PROCEDURE — G2211 COMPLEX E/M VISIT ADD ON: HCPCS | Performed by: INTERNAL MEDICINE

## 2024-10-14 PROCEDURE — 99214 OFFICE O/P EST MOD 30 MIN: CPT | Performed by: INTERNAL MEDICINE

## 2024-10-14 RX ORDER — FOLIC ACID 1 MG/1
1 TABLET ORAL DAILY
Qty: 90 TABLET | Refills: 2 | Status: SHIPPED | OUTPATIENT
Start: 2024-10-14

## 2024-10-14 RX ORDER — METHOTREXATE 2.5 MG/1
15 TABLET ORAL
Qty: 78 TABLET | Refills: 1 | Status: SHIPPED | OUTPATIENT
Start: 2024-10-14

## 2024-10-14 NOTE — PROGRESS NOTES
"  Rheumatology Clinic Visit      Ailyn Trevino MRN# [id number]   YOB: 1935 Age: 89 year old      Date of visit: 10/14/24   PCP: Dr. Isela Mac    Chief Complaint   Patient presents with:  RECHECK    Assessment and Plan     1.  Inflammatory arthritis / PMR: I initially evaluated in 2017 when she transferred care from Dr. Hurst.  Based on historical records, I agreed that she most likely had polymyalgia rheumatica. PMR was steroid responsive and she has been able to reduce her prednisone dose to 3 mg daily without issue, but then had a \"flare\" involving back pain that radiated around to include just over her bilateral lower rib cage. At the time of her flare in April 2017, the ESR and CRP were normal. She was also having hip pain at that time; she had left TKA performed in January 2017. Prednisone dose was increased at that time with resolution of her pain. I suspected that the pain she was having at that time was due to degenerative spine change rather than PMR.  11/1/2017 MRI of the T-spine showed minimal disc space narrowing at several levels in the mid and lower thoracic spine, minimal osteophyte formation or disc protrusions at several levels, but no stenosis. She has degenerative changes seen on a lumbar spine MRI that [facility], per a clinic note from [facility].  Prednisone was tapered off over time without worsening of her symptoms.  On 1/24/2018 she returned with bilateral wrist swelling and tenderness to palpation; also some pain in the back of her hand; and continued neck/back pain. Inflammatory arthritis dx'd at that time and was started on MTX and prednisone; was on MTX 15mg once weekly and no prednisone; doing well when seen on 4/26/2018.   When on methotrexate she noted no stiffness and improved  strength.  She then decided to stop methotrexate.  Not seen between 4/26/2018 and 3/1/2021; restarted methotrexate on 3/1/2021 but then was lost to " follow-up until 9/27/2021.  Currently on methotrexate and doing well with regard to inflammatory arthritis. Discussed option to remain on current dose of methotrexate versus slow dose reduction; joint decision making to remain on the current regimen.    Chronic illness, stable.    - Continue methotrexate 15 mg once weekly; labs are required every 3 months and the patient verbalized understanding of this  - Continue folic acid 1mg daily  - Labs in 3 months: CBC, Creatinine, Hepatic Panel  - Labs in 6 months: CBC, Creatinine, Hepatic Panel, ESR, CRP     High risk medication requiring intensive toxicity monitoring at least quarterly.     2.  Hand osteoarthritis: Large Heberden's nodes and bilateral first CMC joint squaring.      3.  Osteopenia with elevated FRAX: Based on 7/18/2022 DEXA showed a lumbar spine T score -0.8, left femoral neck T score -1.9, and right femoral neck T score -1.9; FRAX score showed a 27% risk of major osteoporotic fracture in the next 10 years and a 10% risk for osteoporotic hip fracture in the next 10 years.  History of alendronate use from 2/5/2016-4/21/2018.  We reviewed the DEXA and the rationale for treating based on the elevated FRAX and she will consider this.  She is currently taking calcium and vitamin D.  I previously provided a printout from the [employer] regarding bisphosphonates. She reports having good dentition at this time with no plans for invasive dental work. She does not wish to proceed with using an osteoporosis specific medication but we will continue calcium and vitamin D supplementation.  If she changes her mind she said that she will notify me.  She previously verbalized understanding about the risks of untreated disease.  This was not discussed again today.  Chronic illness  - calcium 1000 mg daily  - vitamin D 1000 IU daily  - Lab in 3 mo: Vitamin D     4.  Vaccinations: Vaccinations reviewed with Ms. Trevino.   - Influenza: encouraged yearly  vaccination  - [id number]: up to date  - [id number]: up to date  - Prevnar 20: Discussed vaccination and patient plans to receive at a pharmacy  - Shingrix: Up to date  - COVID-19: Advised keeping up-to-date    5. Elevated blood pressure:  Ailyn to follow up with Primary Care provider regarding elevated blood pressure.     6.  Chronic left foot pain: History of multiple surgeries.  Has followed with a foot surgeon.  Advised wearing stiff soled shoes whenever ambulatory, indoors and outdoors, to see if that helps.    7.  Subcutaneous nodule on the palmar ulnar aspect of the right second DIP: Unknown etiology.  Possible rheumatoid nodule?.  Discussed option for surgical removal.  This is not bothering the patient so she is going to monitor without intervention at this time.    Total minutes spent in evaluation with patient, documentation, , and review of pertinent studies and chart notes: 14  The longitudinal plan of care for the rheumatology problem(s) were addressed during this visit.  Due to added complexity of care, we will continue to support the patient and the subsequent management of this condition with ongoing continuity of care.       Ms. Trevino verbalized agreement with and understanding of the rational for the diagnosis and treatment plan.  All questions were answered to best of my ability and the patient's satisfaction. Ms. Trevino was advised to contact the clinic with any questions that may arise after the clinic visit.      Thank you for involving me in the care of the patient    Return to clinic: 6 months      HPI   Ailyn Trevino is a 89 year old female with a past medical history significant for hyperlipidemia, granulomatous lung disease, aortic valve disorder, mitral valve disorder, impaired fasting glucose, polymyalgia rheumatica, and depression who presents for f/u of arthritis.     4/20/2015 rheumatology clinic note by Dr. Bud Hurst at the [facility]  "documents the patient does not have signs or symptoms of GCA. Significant myalgias in March 2015 in her shoulders and popliteal area, but not in the hip area. Cortisone shots in the hip in March 2015. Elevated CRP and mildly abnormal sedimentation rate. Morning stiffness for about 2 weeks. 2 hours to loosen up. Difficulty lifting her arms above her shoulders. CK was normal. Prednisone 20 mg daily \"helped about almost completely in 4 days' time\"    In 10/2017, Ms. Trevino reported that she was initially diagnosed with PMR and treated with steroids that were very effective. At that time, she reports having some difficulty raising her arms above her head. She says that she has always had some hip and knee pain that was thought to be secondary to her left knee osteoarthritis. She had a left total knee arthroplasty in January 2017. She also reports having flat feet bilaterally. She has been reducing prednisone slowly over time, and was on prednisone 3 mg daily at that time. When she was evaluated by Dr. Christopher  on 4/20/2017 it was noted that she was having pain in her rib cage, back, and hips. She had just reduced prednisone and today she says at that time she was on 1 mg daily, so the dose was increased to 20 mg a day with resolution of her pain. She feels like she has still doing well while on prednisone 3 mg daily. She still has some thoracic back pain. No difficult or raising her arms above her head today. No difficulty standing up from a low seated position. No jaw claudication, scalp tenderness, headaches, or vision changes.she also reports having some right groin pain that she thinks is due to altered gait because of her knee issues. She also reports having lower back [facility] epidural steroid injection that was not effective. This was at the direction of her orthopedic surgeon who is at [facility]. She reports having had an MRI of her lumbar spine at " [facility]. 8/1/2017 clinic note by Prosper Leonardo at [facility] orth[facility] documents that an MRI showed multilevel degenerative changes namely L4-L5 central canal compromise and severe right lateral recess stenosis. Currently without hand pain, but she says that she has had some pain in her fingers from time to time. She denies having stiffness in her hands.     She then had an MRI of the thoracic spine showing degenerative changes of the thoracic spine    1/2018: she returned complaining of bilateral wrist pain, also some pain across the back of her hand.  Pain is worse in the morning with morning stiffness for at least one hour.  Stiffness improves with activity.  Pain in her hands improves with activity.  She continues to have pain in her lower back, upper back, mid back, and neck.  spine pain is worse with activity and improves with rest.  She is following with an orthopedic surgeon for her back.      4/26/2018, she reported that she is doing poorly because she has a nonproductive cough but feels like she needs to cough something up.  She is going to have a chest CT later today.  No shortness of breath or chest pain.  Feels like she has chest congestion now.  Says that she is feeling better since using methotrexate.  She is off of prednisone.  Improved  strength bilaterally.  Hand pain, especially in the morning, has improved significantly.  No morning stiffness. No difficulty raising her arms above her head or standing up from a low seated position.  Denies jaw claudication, scalp tenderness, vision change, new headache, difficulty standing up from a chair, or difficulty raising her arms above her head.      3/1/2021: Not doing as well as she was doing previously when on methotrexate.  She said that she stopped taking methotrexate because she was concerned about potential side effects.  Now with swelling in her wrists and across her MCPs that is worse in the morning and improved with time and  activity.  Morning stiffness for at least 1 hour at the wrists.  Reduced  strength bilaterally.  She recalls that these were symptoms she had in the past that improved with methotrexate.  She notes drinking 4 ounces of wine per day.    2021: Currently doing well on methotrexate.  No joint pain or swelling.  No morning stiffness or gelling phenomenon.  She says that her   last October and she has now moved to [place] so she is adjusting to these changes.  Otherwise doing well.  Mild chronic neck pain that is worse with activity and improves with rest; nonradiating.    2022: Currently doing well with regard to inflammatory arthritis.  Tolerating methotrexate well.  No joint pain or stiffness except for the left foot that has been operated on twice and she follows with podiatry for this.  No joint swelling.    2023: Currently doing well.  No joint pain or swelling.  Morning stiffness for no more than 10 minutes but does not occur every day.  Positive gelling phenomenon if she sits for a very long time but this resolves quickly.  Methotrexate is effective for arthritis.  Recalls being on Fosamax in the distant past and does not recall any side effects from Fosamax.  No heartburn.  No dysphagia.  Reports having good dentition; had a tooth removed last year.  Does not anticipate any future invasive dental work.  Follows at the dentist regularly.    2023: Currently doing well with regard to inflammatory arthritis.  Large Heberden's nodes that are not painful.  No morning stiffness.  No gelling phenomenon.  Arthritis is not limiting her daily activities.    10/4/2023: Mild pain at the first CMC joints with overactivity, improved with rest; no swelling, increased warmth, or overlying erythema at the first CMC joints.  No other joint pain.  Morning stiffness for no more than 10 minutes, if present at all.  Overall happy with how well she is doing.  Arthritis is not limiting her daily  activities.    4/15/2024: Currently doing well.  No joint pain or swelling.  Morning stiffness for no more than 5-10 minutes.  Arthritis does not limit her daily activities, except for chronic left foot pain that has deformities, and has been operated on by a foot surgeon.    Today, 10/14/2024: currently doing well.  No joint pain or swelling.  Morning stiffness for < 10 min. No gelling.  Overall stable and happy with how well she is doing; except for chronic left foot pain that has deformities and has been operated on and is unchanged since last visit.     Denies fevers, chills, nausea, vomiting, constipation, diarrhea. No abdominal pain. No chest pain/pressure, palpitations, or shortness of breath. No LE swelling.  No oral or nasal sores.  No rash.      Tobacco:  None  EtOH: No more than 1 drink per day: 4 ounce glass of wine  Drugs:   Occupation: used to work as a , retired now    ROS   12 point review of system was completed and negative except as noted in the HPI     Active Problem List     Patient Active Problem List   Diagnosis    Osteopenia    Palpitations    Hyperlipidemia LDL goal <100    Renal cyst    Insomnia    S/P total knee arthroplasty, left    Aortic valve disorder    Mitral valve disorder    PMR (polymyalgia rheumatica) (H)    Encounter for long-term current use of medication    Major depressive disorder, single episode, mild (H)    Current chronic use of systemic steroids    Fatigue, unspecified type    Chronic pain of left knee    Venous stasis dermatitis of left lower extremity    Fecal urgency    Elevated hemidiaphragm    Inflammatory arthritis    Pulmonary nodules    Hypertension goal BP (blood pressure) < 140/90    Osteoporosis without current pathological fracture, unspecified osteoporosis type     Past Medical History     Past Medical History:   Diagnosis Date    Aortic valve disorder     Elevated hemidiaphragm     Granulomatous lung disease (H) 8/7/2012    High cholesterol      Insomnia 10/8/2013    Benadryl gives her RLS    Mitral valve disorder     Osteoarthritis of knee 10/8/2013    Sees Dr. Whaley    Osteopenia 7/31/2012    Other chronic pain 8/3/2015    Patient is followed by KIARA RAINES for ongoing prescription of pain medication.  All refills should be approved by this provider, or covering partner.  Medication(s): Hydrocodone/APAP.  Maximum quantity per month: 30 Clinic visit frequency required: Q 3 months   Controlled substance agreement on file: Yes      Date(s): 8/3/15  Pain Clinic evaluation in the past: No  DIRE Total Score(s): No fl    Polymyalgia rheumatica (H)     Pulmonary nodules     Renal cyst 8/7/2012     Past Surgical History     Past Surgical History:   Procedure Laterality Date    APPENDECTOMY  1951    CATARACT IOL, RT/LT  2010    bilateral     CHOLECYSTECTOMY  2010    ORTHOPEDIC SURGERY      Left Knee Surgery 1/2017    RECONSTRUCT FOREFOOT WITH METATARSOPHALANGEAL (MTP) FUSION Left 10/24/2018    Procedure: LEFT FIRST METATARSOPHALANGEAL JOINT ARTHRODESIS;  Surgeon: Rufus Harden MD;  Location: [facility] OR    REPAIR HAMMER TOE Left 10/24/2018    Procedure: LESSER TOE RECONSTRUCTION SECOND, THIRD, FOURTH AND FIFTH TOES;  Surgeon: Rufus Harden MD;  Location: [facility] OR    SURGICAL HISTORY OF -   12/13    bilateral YAG laser surgery of eyes     Allergy     Allergies   Allergen Reactions    Simvastatin Muscle Pain (Myalgia)     Current Medication List     Current Outpatient Medications   Medication Sig Dispense Refill    Acetaminophen (TYLENOL PO) Take 650 mg by mouth 2 times daily as needed for mild pain or fever      aspirin - buffered (ASCRIPTIN) 325 MG TABS tablet Take 1 tablet (325 mg) by mouth daily (Patient taking differently: Take 325 mg by mouth as needed.) 60 each 0    atenolol (TENORMIN) 25 MG tablet Take 1 tablet (25 mg) by mouth daily 90 tablet 3    folic acid (FOLVITE) 1 MG tablet Take 1 tablet (1 mg) by mouth daily 90 tablet 2    losartan  "(COZAAR) 50 MG tablet Take 1 tablet (50 mg) by mouth daily 90 tablet 3    Melatonin 10 MG TABS tablet Take 10 mg by mouth nightly as needed for sleep      methotrexate 2.5 MG tablet Take 6 tablets (15 mg) by mouth every 7 days. . Methotrexate is a once weekly medication, taken on the same day of each week.  Labs required every 3 months 78 tablet 0    Multiple Vitamins-Minerals (CENTRUM SILVER ADULT 50+ PO) Take 1 tablet by mouth daily Reported on 4/20/2017      omeprazole (PRILOSEC) 40 MG DR capsule Take 1 capsule (40 mg) by mouth daily 90 capsule 3    rosuvastatin (CRESTOR) 10 MG tablet Take 1 tablet (10 mg) by mouth daily 90 tablet 1    sertraline (ZOLOFT) 25 MG tablet Take 1 tablet (25 mg) by mouth daily 90 tablet 3    tiZANidine (ZANAFLEX) 2 MG tablet Take 0.5 tablets (1 mg) by mouth nightly as needed for muscle spasms 90 tablet 1    triamcinolone (KENALOG) 0.1 % external cream Apply topically 2 times daily as needed for irritation //itching 45 g 1    valACYclovir (VALTREX) 1000 mg tablet Take 1 tablet (1,000 mg) by mouth 2 times daily for 1 day 2 tablet 0     No current facility-administered medications for this visit.       Social History   See HPI    Family History     Family History   Problem Relation Age of Onset    Cancer Mother     Cerebrovascular Disease Father      Denies family history of autoimmune disease     Physical Exam     Temp Readings from Last 3 Encounters:   05/03/24 98.1  F (36.7  C) (Tympanic)   04/27/23 98.7  F (37.1  C) (Oral)   03/28/23 98.6  F (37  C) (Temporal)     BP Readings from Last 5 Encounters:   10/14/24 (!) 162/73   05/03/24 125/65   04/15/24 (!) 162/66   10/04/23 (!) 146/71   04/27/23 (!) 140/50     Pulse Readings from Last 1 Encounters:   10/14/24 78     Resp Readings from Last 1 Encounters:   05/03/24 16     Estimated body mass index is 30.93 kg/m  as calculated from the following:    Height as of 5/3/24: 1.473 m (4' 10\").    Weight as of this encounter: 67.1 kg (148 " lb).    GEN: NAD.  HEENT:  Anicteric, noninjected sclera. No obvious external lesions of the ear and nose. Hearing intact.  PULM: No increased work of breathing.  MSK: MCPs, PIPs, DIPs without swelling or tenderness to palpation.  Heberden's nodes present.  Squaring at the bilateral first CMC joints that were nontender to palpation and without increased warmth, swelling, or overlying erythema.  Wrists without swelling or tenderness to palpation.  Elbows and shoulders without swelling or tenderness to palpation.    Knees, ankles, and MTPs without swelling or tenderness to palpation.  Minimal arch of the left foot and ankle eversion when standing.  SKIN: No rash or jaundice seen.  Nontender mobile subcutaneous nodule on the palmar ulnar aspect of the right second DIP  PSYCH: Alert. Appropriate.       Labs / Imaging (select studies)     RF/CCP  Recent Labs   Lab Test 06/26/17  1854   CCPIGG <1  Negative     RHF <20     CBC  Recent Labs   Lab Test 10/07/24  1119 07/15/24  1107 04/01/24  1228 09/27/21  0939 07/08/21  1156 05/26/21  1328 10/09/18  1158 04/23/18  1054   WBC 6.0 6.2 7.2   < > 7.9 6.0   < > 6.7   RBC 4.12 4.03 4.12   < > 3.99 4.25   < > 4.18   HGB 13.6 13.5 13.6   < > 13.6 13.9   < > 13.4   HCT 40.1 39.3 40.3   < > 39.4 41.5   < > 40.4   MCV 97 98 98   < > 99 98   < > 97   RDW 13.1 12.8 13.1   < > 13.8 14.8   < > 14.8    245 262   < > 260 261   < > 210   MCH 33.0 33.5* 33.0   < > 34.1* 32.7   < > 32.1   MCHC 33.9 34.4 33.7   < > 34.5 33.5   < > 33.2   NEUTROPHIL 64 65 70   < > 71.3 69.7  --  62.7   LYMPH 19 19 18   < > 16.7 18.8  --  20.4   MONOCYTE 12 11 8   < > 8.8 6.9  --  15.6   EOSINOPHIL 4 4 3   < > 2.8 3.9  --  1.0   BASOPHIL 1 1 1   < > 0.4 0.7  --  0.3   ANEU  --   --   --   --  5.6 4.2  --  4.2   ALYM  --   --   --   --  1.3 1.1  --  1.4   LAURENCE  --   --   --   --  0.7 0.4  --  1.0   AEOS  --   --   --   --  0.2 0.2  --  0.1   ABAS  --   --   --   --  0.0 0.0  --  0.0   ANEUTAUTO 3.9 4.0 5.0    < >  --   --   --   --    ALYMPAUTO 1.2 1.2 1.3   < >  --   --   --   --    AMONOAUTO 0.7 0.7 0.6   < >  --   --   --   --    AEOSAUTO 0.2 0.3 0.2   < >  --   --   --   --    ABSBASO 0.1 0.1 0.1   < >  --   --   --   --     < > = values in this interval not displayed.     CMP  Recent Labs   Lab Test 10/07/24  1119 07/15/24  1107 05/09/24  0937 04/01/24  1228 10/04/23  1329 03/28/23  1310 01/11/23  1121 01/06/22  1022 09/27/21  0938 07/08/21  1156 05/26/21  1328 02/22/21  1634 10/19/20  1433   NA  --   --  141  --   --   --   --   --   --  140  --   --  139   POTASSIUM  --   --  4.3  --   --  4.9  --   --   --  4.7  --   --  4.0   CHLORIDE  --   --  105  --   --   --   --   --   --  108  --   --  106   CO2  --   --  27  --   --   --   --   --   --  26  --   --  24   ANIONGAP  --   --  9  --   --   --   --   --   --  6  --   --  9   GLC  --   --  101*  --   --   --   --   --   --  94  --   --  90   BUN  --   --  15.6  --   --   --   --   --   --  14  --   --  21   CR 0.87 0.72 0.74 0.87   < > 0.70 0.66   < > 0.73 0.80 0.88 0.70 0.74   GFRESTIMATED 63 80 77 64   < > 83 84   < > 75 67 60* 79 74   GFRESTBLACK  --   --   --   --   --   --   --   --   --  78 69 >90 86   ALEJANDRO  --   --  9.3  --   --   --  9.6  --  9.7 9.2  --   --  9.0   BILITOTAL 0.6 0.5  --  0.4   < > 0.6 0.8   < >  --  0.7 0.4 0.3  --    ALBUMIN 4.5 4.5  --  4.6   < > 4.0 4.1   < >  --  3.9 3.8 4.1  --    PROTTOTAL 6.9 7.1  --  7.1   < > 7.2 7.1   < >  --  7.1 6.5* 7.1  --    ALKPHOS 87 92  --  96   < > 98 83   < >  --  78 69 82  --    AST 27 28  --  27   < > 22 25   < >  --  23 21 21  --    ALT 20 13  --  21   < > 24 35   < >  --  30 28 26 27    < > = values in this interval not displayed.     Calcium/VitaminD  Recent Labs   Lab Test 05/09/24  0937 01/11/23  1121 09/27/21  0938   ALEJANDRO 9.3 9.6 9.7   VITDT  --  39 40     ESR/CRP  Recent Labs   Lab Test 10/07/24  1119 04/01/24  1228 03/28/23  1310 01/11/23  1121 10/17/22  1127   SED 2 2 8 7 8   CRP  --   --   6.7 4.0 3.3   CRPI <3.00 3.82  --   --   --      Lipid Panel  Recent Labs   Lab Test 05/09/24  0937 03/28/23  1310 02/01/22  1015   CHOL 248* 259* 252*   TRIG 137 142 138   HDL 50 56 61   * 175* 163*   NHDL 198* 203* 191*     Hepatitis B  Recent Labs   Lab Test 01/24/18 0918   HBCAB Nonreactive   HEPBANG Nonreactive     Hepatitis C  Recent Labs   Lab Test 01/24/18 0918   HCVAB Nonreactive     Immunization History     Immunization History   Administered Date(s) Administered    COVID-19 12+ (MODERNA) 09/23/2024    COVID-19 MONOVALENT 12+ (Pfizer) 02/11/2021, 03/04/2021, 11/19/2021    COVID-19 Monovalent 12+ (Pfizer 2022) 04/11/2022    Flu, Unspecified 10/04/2016    Influenza (High Dose) Trivalent,PF (Fluzone) 10/26/2010, 10/08/2013, 10/22/2014, 10/05/2015, 10/04/2016, 09/08/2017, 10/09/2018, 10/01/2019    Influenza (IIV3) PF 10/13/2006, 10/31/2008, 10/26/2010, 10/24/2011, 10/23/2012, 10/08/2020    Influenza Vaccine 65+ (Fluzone HD) 10/08/2020, 09/27/2021    Influenza, seasonal, injectable, PF 09/25/2009    Pneumo Conj 13-V (2010&after) 11/03/2015    Pneumococcal 23 valent 12/16/2004    TD,PF 7+ (Tenivac) 07/21/2010, 06/04/2021    TDAP (Adacel,Boostrix) 07/21/2010    Zoster vaccine, live 06/04/2009          Chart documentation done in part with Dragon Voice recognition Software. Although reviewed after completion, some word and grammatical error may remain.    Dale Marquis MD

## 2024-12-02 DIAGNOSIS — E78.5 HYPERLIPIDEMIA LDL GOAL <100: ICD-10-CM

## 2024-12-02 RX ORDER — ROSUVASTATIN CALCIUM 10 MG/1
10 TABLET, COATED ORAL DAILY
Qty: 90 TABLET | Refills: 0 | OUTPATIENT
Start: 2024-12-02

## 2024-12-30 ENCOUNTER — NURSE TRIAGE (OUTPATIENT)
Dept: FAMILY MEDICINE | Facility: CLINIC | Age: 89
End: 2024-12-30
Payer: COMMERCIAL

## 2024-12-30 NOTE — TELEPHONE ENCOUNTER
S-(situation): Patient calling in about sinus congestion.  She notes this has been happening since 12/25.      B-(background): She notes she has had this happen in the past and was urged to call in by her daughter.      A-(assessment): Patient denies any fever or redness or swelling in her face.  She notes she is using a Neti pot in the past.  She has a little runny nose.  Notes her headache or sinus pain is moderate in intensity.        R-(recommendations): Home Care.  Patient instructed to call back if needing further care in the future.  She verbalized understanding.      Kristina Kjellberg, MSN, RN      Reason for Disposition   Sinus congestion as part of a cold, present < 10 days    Additional Information   Negative: Sounds like a life-threatening emergency to the triager   Negative: Difficulty breathing, and not from stuffy nose (e.g., not relieved by cleaning out the nose)   Negative: SEVERE headache and has fever   Negative: Patient sounds very sick or weak to the triager   Negative: SEVERE sinus pain (e.g., excruciating)   Negative: Severe headache   Negative: Redness or swelling on the cheek, forehead, or around the eye   Negative: Fever > 103 F (39.4 C)   Negative: Fever > 101 F (38.3 C) and over 60 years of age   Negative: Fever > 100 F (37.8 C) and has diabetes mellitus or a weak immune system (e.g., HIV positive, cancer chemotherapy, organ transplant, splenectomy, chronic steroids)   Negative: Fever > 100 F (37.8 C) and bedridden (e.g., CVA, chronic illness, recovering from surgery)   Negative: Fever present > 3 days (72 hours)   Negative: Fever returns after gone for over 24 hours and symptoms worse or not improved   Negative: Sinus pain (not just congestion) and fever   Negative: Earache   Negative: Sinus congestion (pressure, fullness) present > 10 days   Negative: Nasal discharge present > 10 days   Negative: Using nasal washes and pain medicine > 24 hours and sinus pain (lower forehead, cheekbone,  "or eye) persists   Negative: Lots of coughing   Negative: Patient wants to be seen    Answer Assessment - Initial Assessment Questions  1. LOCATION: \"Where does it hurt?\"       No current headache  2. ONSET: \"When did the sinus pain start?\"  (e.g., hours, days)       12/25  3. SEVERITY: \"How bad is the pain?\"   (Scale 0-10; or none, mild, moderate or severe)      Moderate at times   4. RECURRENT SYMPTOM: \"Have you ever had sinus problems before?\" If Yes, ask: \"When was the last time?\" and \"What happened that time?\"       Yes, 1 year ago, rode it out last time.    5. NASAL CONGESTION: \"Is the nose blocked?\" If Yes, ask: \"Can you open it or must you breathe through your mouth?\"      Not blocked up  6. NASAL DISCHARGE: \"Do you have discharge from your nose?\" If so ask, \"What color?\"      Clear and coughing up clear too.    7. FEVER: \"Do you have a fever?\" If Yes, ask: \"What is it, how was it measured, and when did it start?\"       No  8. OTHER SYMPTOMS: \"Do you have any other symptoms?\" (e.g., sore throat, cough, earache, difficulty breathing)      Runny nose, cough  9. PREGNANCY: \"Is there any chance you are pregnant?\" \"When was your last menstrual period?\"      NA    Protocols used: Sinus Pain or Congestion-A-[place]    "

## 2025-01-14 ENCOUNTER — LAB (OUTPATIENT)
Dept: LAB | Facility: CLINIC | Age: OVER 89
End: 2025-01-14
Payer: COMMERCIAL

## 2025-01-14 DIAGNOSIS — Z79.899 HIGH RISK MEDICATION USE: ICD-10-CM

## 2025-01-14 DIAGNOSIS — M85.80 OSTEOPENIA, UNSPECIFIED LOCATION: ICD-10-CM

## 2025-01-14 DIAGNOSIS — M19.90 INFLAMMATORY ARTHRITIS: ICD-10-CM

## 2025-01-14 LAB
ALBUMIN SERPL BCG-MCNC: 4.7 G/DL (ref 3.5–5.2)
ALP SERPL-CCNC: 90 U/L (ref 40–150)
ALT SERPL W P-5'-P-CCNC: 21 U/L (ref 0–50)
AST SERPL W P-5'-P-CCNC: 31 U/L (ref 0–45)
BASOPHILS # BLD AUTO: 0.1 10E3/UL (ref 0–0.2)
BASOPHILS NFR BLD AUTO: 1 %
BILIRUB DIRECT SERPL-MCNC: <0.2 MG/DL (ref 0–0.3)
BILIRUB SERPL-MCNC: 0.7 MG/DL
CREAT SERPL-MCNC: 0.76 MG/DL (ref 0.51–0.95)
EGFRCR SERPLBLD CKD-EPI 2021: 74 ML/MIN/1.73M2
EOSINOPHIL # BLD AUTO: 0.2 10E3/UL (ref 0–0.7)
EOSINOPHIL NFR BLD AUTO: 2 %
ERYTHROCYTE [DISTWIDTH] IN BLOOD BY AUTOMATED COUNT: 12.7 % (ref 10–15)
HCT VFR BLD AUTO: 40.8 % (ref 35–47)
HGB BLD-MCNC: 14.1 G/DL (ref 11.7–15.7)
IMM GRANULOCYTES # BLD: 0 10E3/UL
IMM GRANULOCYTES NFR BLD: 0 %
LYMPHOCYTES # BLD AUTO: 1.4 10E3/UL (ref 0.8–5.3)
LYMPHOCYTES NFR BLD AUTO: 13 %
MCH RBC QN AUTO: 32.9 PG (ref 26.5–33)
MCHC RBC AUTO-ENTMCNC: 34.6 G/DL (ref 31.5–36.5)
MCV RBC AUTO: 95 FL (ref 78–100)
MONOCYTES # BLD AUTO: 0.6 10E3/UL (ref 0–1.3)
MONOCYTES NFR BLD AUTO: 6 %
NEUTROPHILS # BLD AUTO: 8.1 10E3/UL (ref 1.6–8.3)
NEUTROPHILS NFR BLD AUTO: 78 %
NRBC # BLD AUTO: 0 10E3/UL
NRBC BLD AUTO-RTO: 0 /100
PLATELET # BLD AUTO: 276 10E3/UL (ref 150–450)
PROT SERPL-MCNC: 7.2 G/DL (ref 6.4–8.3)
RBC # BLD AUTO: 4.28 10E6/UL (ref 3.8–5.2)
VIT D+METAB SERPL-MCNC: 41 NG/ML (ref 20–50)
WBC # BLD AUTO: 10.3 10E3/UL (ref 4–11)

## 2025-01-14 PROCEDURE — 82306 VITAMIN D 25 HYDROXY: CPT

## 2025-01-14 PROCEDURE — 82565 ASSAY OF CREATININE: CPT

## 2025-01-14 PROCEDURE — 36415 COLL VENOUS BLD VENIPUNCTURE: CPT

## 2025-01-14 PROCEDURE — 85025 COMPLETE CBC W/AUTO DIFF WBC: CPT

## 2025-01-14 PROCEDURE — 80076 HEPATIC FUNCTION PANEL: CPT

## 2025-01-21 ENCOUNTER — NURSE TRIAGE (OUTPATIENT)
Dept: FAMILY MEDICINE | Facility: CLINIC | Age: OVER 89
End: 2025-01-21

## 2025-01-21 ENCOUNTER — VIRTUAL VISIT (OUTPATIENT)
Dept: FAMILY MEDICINE | Facility: CLINIC | Age: OVER 89
End: 2025-01-21
Payer: COMMERCIAL

## 2025-01-21 DIAGNOSIS — R09.81 NASAL CONGESTION: Primary | ICD-10-CM

## 2025-01-21 PROCEDURE — 98013 SYNCH AUDIO-ONLY EST LOW 20: CPT | Performed by: INTERNAL MEDICINE

## 2025-01-21 RX ORDER — FLUTICASONE PROPIONATE 50 MCG
2 SPRAY, SUSPENSION (ML) NASAL DAILY
Qty: 16 G | Refills: 0 | Status: SHIPPED | OUTPATIENT
Start: 2025-01-21

## 2025-01-21 ASSESSMENT — ANXIETY QUESTIONNAIRES: GAD7 TOTAL SCORE: INCOMPLETE

## 2025-01-21 NOTE — TELEPHONE ENCOUNTER
Nurse Triage SBAR    Is this a 2nd Level Triage? YES, LICENSED PRACTITIONER REVIEW IS REQUIRED    Situation: patient is calling to request prescription for sinus infection without having to make an appointment.     Background: patient has been dealing with ongoing sinus pressure for the past 3 weeks, states it is now going into her bronchioles.    Assessment: reports mild-moderate sinus pressure in forehead and below her eyes, has started to cough but thinks is more related to the post-nasal drip from the sinus infection. Has tried nasal rinses with minimal relief. Denies fever, shortness of breath, and chest pain/tightness. Patient states she has a trip planned and wants to get this taken care of before she leaves    Protocol Recommended Disposition:   See in Office Today or Tomorrow    Recommendation: patient requesting prescription without having to make an appointment. Please advise.      Routed to provider    Does the patient meet one of the following criteria for ADS visit consideration? 16+ years old, with an [facility] PCP     TIP  Providers, please consider if this condition is appropriate for management at one of our Acute and Diagnostic Services sites.     If patient is a good candidate, please use dotphrase <dot>triageresponse and select Refer to ADS to document.      Reason for Disposition   Sinus congestion (pressure, fullness) present > 10 days    Additional Information   Negative: Sounds like a life-threatening emergency to the triager   Negative: Difficulty breathing, and not from stuffy nose (e.g., not relieved by cleaning out the nose)   Negative: SEVERE headache and has fever   Negative: Patient sounds very sick or weak to the triager   Negative: SEVERE sinus pain (e.g., excruciating)   Negative: Severe headache   Negative: Redness or swelling on the cheek, forehead, or around the eye   Negative: Fever > 103 F (39.4 C)   Negative: Fever > 101 F (38.3 C) and over 60 years of age   Negative: Fever > 100  "F (37.8 C) and has diabetes mellitus or a weak immune system (e.g., HIV positive, cancer chemotherapy, organ transplant, splenectomy, chronic steroids)   Negative: Fever > 100 F (37.8 C) and bedridden (e.g., CVA, chronic illness, recovering from surgery)   Negative: Fever present > 3 days (72 hours)   Negative: Fever returns after gone for over 24 hours and symptoms worse or not improved   Negative: Sinus pain (not just congestion) and fever   Negative: Earache    Answer Assessment - Initial Assessment Questions  1. LOCATION: \"Where does it hurt?\"       Pain in forehead and cheeks, is getting the postnasal drip,    2. ONSET: \"When did the sinus pain start?\"  (e.g., hours, days)       3 weeks ago    3. SEVERITY: \"How bad is the pain?\"   (Scale 0-10; or none, mild, moderate or severe)      Symptoms are moderate    4. RECURRENT SYMPTOM: \"Have you ever had sinus problems before?\" If Yes, ask: \"When was the last time?\" and \"What happened that time?\"       No    5. NASAL CONGESTION: \"Is the nose blocked?\" If Yes, ask: \"Can you open it or must you breathe through your mouth?\"      Yes    6. NASAL DISCHARGE: \"Do you have discharge from your nose?\" If so ask, \"What color?\"      Whitish in color when spits it up    7. FEVER: \"Do you have a fever?\" If Yes, ask: \"What is it, how was it measured, and when did it start?\"       No    8. OTHER SYMPTOMS: \"Do you have any other symptoms?\" (e.g., sore throat, cough, earache, difficulty breathing)      Cough- states has traveled down to bronchials    9. PREGNANCY: \"Is there any chance you are pregnant?\" \"When was your last menstrual period?\"      Not applicable    Protocols used: Sinus Pain or Congestion-A-[place]    "

## 2025-01-21 NOTE — PROGRESS NOTES
Ailyn is a 89 year old who is being evaluated via a billable telephone visit.    What phone number would you like to be contacted at? [phone]  How would you like to obtain your AVS? Mail a copy    If patient has telephone visit, have they been educated on video visit as preferred visit method and offered to change to video visit? N/A-declined    Instructions Relayed to Patient by Virtual Roomer:     Patient instructed that provider will initiate telephone visit via phone call    Patient Confirmed they will join visit via: Provider to call patient for telephone visit   Reminded patient to ensure they were logged on to virtual visit by arrival time listed.   Asked if patient has flexibility to initiate visit sooner than arrival time: patient stated yes, documented in appointment notes availability to initiate visit earlier than arrival time     If pediatric virtual visit, ensured pediatric patient along with parent/guardian will be present for video visit.     Patient offered the website [email or web address] and/or phone number to [facility] Help line: [phone]    Originating Location (pt. Location): Home    Distant Location (provider location):  Off-site  Telephone visit completed due to the patient did not have access to video, while the distant provider did.    Ailyn was seen today for sinus problem.    Diagnoses and all orders for this visit:    Nasal congestion  -     fluticasone (FLONASE) 50 MCG/ACT nasal spray; Spray 2 sprays into both nostrils daily.       Clinically her symptoms are not consistent with bacterial sinusitis. Has nasal congestion, possibly vasomotor in nature. Trial of Flonase nasal spray.      Subjective   Ailyn is a 89 year old, presenting for the following health issues:  Sinus Problem (Sinus pressure, would like prescription)        1/21/2025    11:06 AM   Additional Questions   Roomed by Mirella   Accompanied by Self         1/21/2025    11:06 AM   Patient Reported  Additional Medications   Patient reports taking the following new medications None     History of Present Illness       Reason for visit:  Sinus pressure-requesting prescription for possible sinus infection  Symptom onset:  3-4 weeks ago  Symptoms include:  Ongoing sinus pressure, slight cough (likely due to nasal drip)  Symptom intensity:  Moderate  Symptom progression:  Staying the same  Had these symptoms before:  No  What makes it worse:  N/A  What makes it better:  Nasal rinse has offered little relief but not much   She is taking medications regularly.     She has has runny nose since around Brandee time. She has never had fever. Occasional cough. She got Afrin nasal spray 2 weeks ago, used it for only 2 days per instruction. Not getting rid of it.     Denies fever. No sinus pain. Not much cough. She has no trouble sleeping at night.   After waking up, breakfast, will starting noticing nasal congestion.   When she blows her nose, it is clear/white color.   When she spits out phlegm, it is clear/white color.  No shortness of breath.       Review of Systems  Constitutional, HEENT, cardiovascular, pulmonary, gi and gu systems are negative, except as otherwise noted.      Objective           Vitals:  No vitals were obtained today due to virtual visit.    Physical Exam   General: Alert and no distress //Respiratory: No audible wheeze, cough, or shortness of breath // Psychiatric:  Appropriate affect, tone, and pace of words  Sounded a little congested but no cough.           Phone call duration: 11 minutes  Signed Electronically by: Isela Mac MD PhD

## 2025-01-21 NOTE — TELEPHONE ENCOUNTER
Called patient and relayed provider's message below. Patient agreed to visit but states she does not have access to camera for video visit. Telephone visit scheduled instead.      Routing to provider as KEIRA German RN  [facility]

## 2025-01-31 ENCOUNTER — OFFICE VISIT (OUTPATIENT)
Dept: FAMILY MEDICINE | Facility: CLINIC | Age: OVER 89
End: 2025-01-31
Payer: COMMERCIAL

## 2025-01-31 VITALS
RESPIRATION RATE: 14 BRPM | OXYGEN SATURATION: 97 % | HEART RATE: 68 BPM | DIASTOLIC BLOOD PRESSURE: 76 MMHG | BODY MASS INDEX: 27.82 KG/M2 | SYSTOLIC BLOOD PRESSURE: 166 MMHG | HEIGHT: 59 IN | TEMPERATURE: 97.9 F | WEIGHT: 138 LBS

## 2025-01-31 DIAGNOSIS — J20.9 ACUTE BRONCHITIS, UNSPECIFIED ORGANISM: Primary | ICD-10-CM

## 2025-01-31 DIAGNOSIS — R05.1 ACUTE COUGH: ICD-10-CM

## 2025-01-31 PROCEDURE — G2211 COMPLEX E/M VISIT ADD ON: HCPCS | Performed by: PHYSICIAN ASSISTANT

## 2025-01-31 PROCEDURE — 99213 OFFICE O/P EST LOW 20 MIN: CPT | Performed by: PHYSICIAN ASSISTANT

## 2025-01-31 RX ORDER — PREDNISONE 20 MG/1
40 TABLET ORAL DAILY
COMMUNITY
Start: 2025-01-27 | End: 2025-01-31

## 2025-01-31 RX ORDER — DOXYCYCLINE HYCLATE 100 MG
100 TABLET ORAL 2 TIMES DAILY
COMMUNITY
Start: 2025-01-26 | End: 2025-02-01

## 2025-01-31 RX ORDER — BENZONATATE 200 MG/1
200 CAPSULE ORAL 3 TIMES DAILY PRN
Qty: 30 CAPSULE | Refills: 0 | Status: SHIPPED | OUTPATIENT
Start: 2025-01-31 | End: 2025-02-05 | Stop reason: SINTOL

## 2025-01-31 RX ORDER — ALBUTEROL SULFATE 90 UG/1
2 INHALANT RESPIRATORY (INHALATION) EVERY 4 HOURS PRN
COMMUNITY

## 2025-01-31 ASSESSMENT — ANXIETY QUESTIONNAIRES
5. BEING SO RESTLESS THAT IT IS HARD TO SIT STILL: NOT AT ALL
4. TROUBLE RELAXING: NOT AT ALL
GAD7 TOTAL SCORE: 8
3. WORRYING TOO MUCH ABOUT DIFFERENT THINGS: MORE THAN HALF THE DAYS
IF YOU CHECKED OFF ANY PROBLEMS ON THIS QUESTIONNAIRE, HOW DIFFICULT HAVE THESE PROBLEMS MADE IT FOR YOU TO DO YOUR WORK, TAKE CARE OF THINGS AT HOME, OR GET ALONG WITH OTHER PEOPLE: NOT DIFFICULT AT ALL
1. FEELING NERVOUS, ANXIOUS, OR ON EDGE: NEARLY EVERY DAY
GAD7 TOTAL SCORE: 8
7. FEELING AFRAID AS IF SOMETHING AWFUL MIGHT HAPPEN: NOT AT ALL
6. BECOMING EASILY ANNOYED OR IRRITABLE: NOT AT ALL
2. NOT BEING ABLE TO STOP OR CONTROL WORRYING: NEARLY EVERY DAY

## 2025-01-31 ASSESSMENT — PAIN SCALES - GENERAL: PAINLEVEL_OUTOF10: MODERATE PAIN (4)

## 2025-01-31 NOTE — PROGRESS NOTES
"  Assessment & Plan     Acute bronchitis, unspecified organism  Has had two negative chest xray   Recent hospitalization with negative workup   Day or two left of doxycycline   Lungs are clear to auscultation   Saturation of 97 % - improved from previous  Appears comfortable and no respiratory distress   I don't want to treat with additional antibiotic - concern for developing cdiff   Anxious with 5 day course of prednisone  Will have her follow up with PCP next week         Acute cough  Prescription for tessalon to see if helpful for cough   - benzonatate (TESSALON) 200 MG capsule  Dispense: 30 capsule; Refill: 0      Patient is quite hypertensive here- has been using afrin - recommended stopping - PCP to review upon follow up with her next week     MED REC REQUIRED  Post Medication Reconciliation Status:  Discharge medications reconciled and changed, see notes/orders  BMI  Estimated body mass index is 27.87 kg/m  as calculated from the following:    Height as of this encounter: 1.499 m (4' 11\").    Weight as of this encounter: 62.6 kg (138 lb).         Patient Instructions   NON PRESCRIPTION TREATMENT    Mucinex 600 mg 2 tabs twice a day   Increase humidity to 30-40% in bedroom at night - vaporizer  Avoid decongestant  Saline nasal spray as needed  Increase fluid intake  Benadryl 25mg 1/2 - 1 hour before bed time  Maintain 8 hr minimum of sleep at night  Robitussin DM cough gels for cough    Continue antibiotic until gone  Take tessalon capsules three times a day as needed for cough  Run vaporizer -use flonase nasal spray   Follow up with Dr. Isela Mac next week  Call us urgently if any change in symptoms and we will arrange .     Subjective   Ailyn is a 89 year old, presenting for the following health issues:  Recheck Medication (refill) and Sinus Problem      1/31/2025    10:31 AM   Additional Questions   Roomed by Anjali   Accompanied by daughter         1/31/2025   Forms   Any forms needing to be completed Yes "         1/31/2025    10:31 AM   Patient Reported Additional Medications   Patient reports taking the following new medications no     Sinus Problem            ED/UC Followup:    Facility:  [facility]   Date of visit: 1/23/2025 / 1/25/2025  Reason for visit: Acute dyspnea, bronchitises  Current Status: Still not getting better, shakiness, anxious, fatigue     Hospital Follow-up Visit:    Hospital/Nursing Home/IP Rehab Facility:  [facility]   Date of Admission: 1/25/25  Date of Discharge: 1/26/25  Reason(s) for Admission: dyspnea, acute viral bronchitis, probable superimposed bacterial bronchitis, acute reactive airway disease   Was the patient in the ICU or did the patient experience delirium during hospitalization?  No  Do you have any other stressors you would like to discuss with your provider? No    Problems taking medications regularly:  None  Medication changes since discharge: None  Problems adhering to non-medication therapy:  None    Summary of hospitalization:  CareEverywhere information obtained and reviewed  Diagnostic Tests/Treatments reviewed.  Follow up needed: none  Other Healthcare Providers Involved in Patient s Care:         None  Update since discharge: stable.         Plan of care communicated with patient and family             Patient unfamiliar to me with history of hypertension, hyperlipidemia, PMR, depression, pulmonary nodules, osteoporosis , inflammatory arthritis, mitral and aortic valve disorder presents for follow up after hospitalization  since discharge Has used albuterol inhaler somewhat helpful- cough after  Feels about the same  Yesterday thought she was feeling better but today not so good again  Five weeks of symptoms .   Started with sinus-has had sinus infections before and was using nettipot   Then got into chest and crackling in there   Tested for test, covid and flu  First emergency department visit given oral steroids - had two emergency department visits  "  Did blood work  Two chest xrays normal   Cancelled trip for [place]  Appetite has improved   Has flonase but not used-used nettipot before hospital visits-   In past has felt it coming on and used nettipot and helpful  Albuterol nebs in emergency department and hospital were helpful  Has been taking Corididin  Symptoms for At least 3 weeks if not a month  Discharged on prednisone and doxycycline - feeling very jittery, anxious and shaky  Is having some troubles using albuterol inhaler effectively        Review of Systems  Constitutional, HEENT, cardiovascular, pulmonary, gi and gu systems are negative, except as otherwise noted.      Objective    BP (!) 166/79 (BP Location: Right arm, Patient Position: Sitting, Cuff Size: Adult Regular)   Pulse 68   Temp 97.9  F (36.6  C) (Temporal)   Resp 14   Ht 1.499 m (4' 11\")   Wt 62.6 kg (138 lb)   SpO2 97%   BMI 27.87 kg/m    Body mass index is 27.87 kg/m . Second BP: 166/76   Physical Exam   GENERAL: alert, no distress, elderly, and fatigued  EYES: Eyes grossly normal to inspection, PERRL and conjunctivae and sclerae normal  HENT: ear canals and TM's normal, nose and mouth without ulcers or lesions  NECK: no adenopathy, no asymmetry, masses, or scars  RESP: lungs clear to auscultation - no rales, rhonchi or wheezes  CV: regular rate and rhythm, normal S1 S2, no S3 or S4, no murmur, click or rub, no peripheral edema  MS: no gross musculoskeletal defects noted, no edema  PSYCH: mentation appears normal, affect normal/bright            Signed Electronically by: Neelam Casas PA-C    "

## 2025-01-31 NOTE — PATIENT INSTRUCTIONS
NON PRESCRIPTION TREATMENT    Mucinex 600 mg 2 tabs twice a day   Increase humidity to 30-40% in bedroom at night - vaporizer  Avoid decongestant  Saline nasal spray as needed  Increase fluid intake  Benadryl 25mg 1/2 - 1 hour before bed time  Maintain 8 hr minimum of sleep at night  Robitussin DM cough gels for cough    Continue antibiotic until gone  Take tessalon capsules three times a day as needed for cough  Run vaporizer -use flonase nasal spray   Follow up with Dr. Isela Mac next week  Call us urgently if any change in symptoms and we will arrange .

## 2025-02-02 PROBLEM — M17.12 PRIMARY OSTEOARTHRITIS OF LEFT KNEE: Status: ACTIVE | Noted: 2017-01-09

## 2025-02-02 PROBLEM — R06.00 ACUTE DYSPNEA: Status: ACTIVE | Noted: 2025-01-25

## 2025-02-05 ENCOUNTER — OFFICE VISIT (OUTPATIENT)
Dept: FAMILY MEDICINE | Facility: CLINIC | Age: OVER 89
End: 2025-02-05
Payer: COMMERCIAL

## 2025-02-05 VITALS
BODY MASS INDEX: 29.49 KG/M2 | SYSTOLIC BLOOD PRESSURE: 138 MMHG | RESPIRATION RATE: 16 BRPM | TEMPERATURE: 98.1 F | DIASTOLIC BLOOD PRESSURE: 68 MMHG | HEIGHT: 58 IN | HEART RATE: 66 BPM | WEIGHT: 140.5 LBS | OXYGEN SATURATION: 98 %

## 2025-02-05 DIAGNOSIS — Z87.891 FORMER SMOKER: ICD-10-CM

## 2025-02-05 DIAGNOSIS — R09.89 HYPERINFLATION OF LUNGS: Primary | ICD-10-CM

## 2025-02-05 DIAGNOSIS — R79.82 ELEVATED C-REACTIVE PROTEIN (CRP): ICD-10-CM

## 2025-02-05 LAB — CRP SERPL-MCNC: 4.76 MG/L

## 2025-02-05 PROCEDURE — 86140 C-REACTIVE PROTEIN: CPT | Performed by: INTERNAL MEDICINE

## 2025-02-05 PROCEDURE — G2211 COMPLEX E/M VISIT ADD ON: HCPCS | Performed by: INTERNAL MEDICINE

## 2025-02-05 PROCEDURE — 99214 OFFICE O/P EST MOD 30 MIN: CPT | Performed by: INTERNAL MEDICINE

## 2025-02-05 PROCEDURE — 36415 COLL VENOUS BLD VENIPUNCTURE: CPT | Performed by: INTERNAL MEDICINE

## 2025-02-05 ASSESSMENT — PAIN SCALES - GENERAL: PAINLEVEL_OUTOF10: SEVERE PAIN (7)

## 2025-02-05 NOTE — PROGRESS NOTES
Assessment & Plan     Ailyn was seen today for follow up.    Diagnoses and all orders for this visit:    Hyperinflation of lungs  -     General PFT Lab (Please always keep checked); Future  -     Pulmonary Function Test; Future    Former smoker  -     General PFT Lab (Please always keep checked); Future  -     Pulmonary Function Test; Future    Elevated C-reactive protein (CRP)  -     CRP, inflammation; Future  -     CRP, inflammation    Other orders  -     REVIEW OF HEALTH MAINTENANCE PROTOCOL ORDERS      We discussed the finding of hyperinflation, suggestion of COPD.  In terms of the bronchitis, I do not think she needs antibiotic at this time.  Patient seems to be very anxious about something bad that is missed, the infection may not be gone.  I recommend we recheck a CRP to be sure that he has returned to normal.  This may offer more assurance to the patient, reduce worry and anxiety.    I think be beneficial to obtain a lung function test to get a baseline.  If she does have COPD, there might be a low threshold for inhaler and or prednisone doing URI episodes to treat for COPD exacerbation.    Return in May for her annual wellness visit as scheduled    Subjective   Ailyn is a 89 year old, presenting for the following health issues:  Follow Up (Bronchitis)      Ailyn Trevino is a 89 year old female who has Hyperlipidemia LDL goal <100; Encounter for long-term current use of medication; Major depressive disorder, single episode, mild; Elevated hemidiaphragm; Inflammatory arthritis; Pulmonary nodules; Hypertension goal BP (blood pressure) < 140/90; and Osteoporosis without current pathological fracture, unspecified osteoporosis type on their pertinent problem list.     She came with her daughter for follow-up on bronchitis.  She had cough and cold symptoms for about 6 weeks.  Eventually ended up in the ER twice.  First time she was diagnosed with viral illness.  Second time the decision was to treat for bacterial  "bronchitis.  Chest x-ray were negative for both times.  She had José Miguel line elevated troponin, thought to be the ischemia.  No acute chest pain at that time.    Patient was treated with doxycycline and prednisone.    Patient reported her cough is mostly gone.  She still feels that she sounds nasally.  Occasionally cough up clear phlegm.  Nasal discharge is clear.    Occasionally feels heart racing, lasting seconds.  This started after the prednisone.  She finished prednisone 4 days ago.    She wants to be sure the infection is gone.  There is nothing else wrong with her.    Chart review showed chest x-ray commented on hyperinflation both times on the chest x-ray.  Former smoker, 40-45-pack-year history.  Quit 28 years ago.        History of Present Illness       Reason for visit:  Bronchitis/follow up   She is taking medications regularly.         Concern - Bronchitis   Onset: Last visit 01/31/2025  Description: Cough, Congestion   Intensity: stable better   Progression of Symptoms:  improving  Accompanying Signs & Symptoms: Headache and palpation   Previous history of similar problem: None  Precipitating factors:        Worsened by: None  Alleviating factors:        Improved by: None   Therapies tried and outcome: None        Review of Systems  Constitutional, HEENT, cardiovascular, pulmonary, gi and gu systems are negative, except as otherwise noted.      Objective    /68 (BP Location: Right arm, Patient Position: Sitting, Cuff Size: Adult Large)   Pulse 66   Temp 98.1  F (36.7  C) (Oral)   Resp 16   Ht 1.473 m (4' 10\")   Wt 63.7 kg (140 lb 8 oz)   SpO2 98%   BMI 29.36 kg/m    Body mass index is 29.36 kg/m .  Physical Exam   GENERAL: alert and no distress  RESP: lungs clear to auscultation - no rales, rhonchi or wheezes  CV: regular rate and rhythm, normal S1 S2, no S3 or S4, no murmur, click or rub, no peripheral edema         Signed Electronically by: Isela Mac MD PhD    "

## 2025-02-06 ENCOUNTER — TELEPHONE (OUTPATIENT)
Dept: FAMILY MEDICINE | Facility: CLINIC | Age: OVER 89
End: 2025-02-06
Payer: COMMERCIAL

## 2025-02-06 NOTE — RESULT ENCOUNTER NOTE
Please call patient: let her know that marker of infection is now in the normal range. No concern for residual infection. The residual symptoms from her infection will eventually go away.     Isela Mac MD PhD

## 2025-02-06 NOTE — TELEPHONE ENCOUNTER
Writer called patient to relay provider message below regarding lab results:        She verbalized understanding and had no other questions or concerns at this time.    Tato Simon RN  [facility]

## 2025-03-03 DIAGNOSIS — E78.5 HYPERLIPIDEMIA LDL GOAL <100: ICD-10-CM

## 2025-03-03 DIAGNOSIS — R09.89 HYPERINFLATION OF LUNGS: ICD-10-CM

## 2025-03-03 DIAGNOSIS — Z87.891 FORMER SMOKER: ICD-10-CM

## 2025-03-03 PROCEDURE — 94729 DIFFUSING CAPACITY: CPT | Performed by: INTERNAL MEDICINE

## 2025-03-03 PROCEDURE — 94726 PLETHYSMOGRAPHY LUNG VOLUMES: CPT | Performed by: INTERNAL MEDICINE

## 2025-03-03 PROCEDURE — 94375 RESPIRATORY FLOW VOLUME LOOP: CPT | Performed by: INTERNAL MEDICINE

## 2025-03-03 NOTE — LETTER
March 6, 2025      Ailyn Trevino  [address]   [place] [address]        Dear ,    We are writing to inform you of your test results.      Resulted Orders   General PFT Lab (Please always keep checked)   Result Value Ref Range    FVC-Pred 1.73 L    FVC-Pre 1.55 L    FVC-%Pred-Pre 89 %    FEV1-Pre 1.27 L    FEV1-%Pred-Pre 96 %    FEV1FVC-Pred 78 %    FEV1FVC-Pre 82 %    FEFMax-Pred 2.98 L/sec    FEFMax-Pre 2.56 L/sec    FEFMax-%Pred-Pre 85 %    FEF2575-Pred 1.11 L/sec    FEF2575-Pre 1.31 L/sec    SHS1276-%Pred-Pre 118 %    ExpTime-Pre 6.67 sec    FIFMax-Pre 2.06 L/sec    VC-Pred 2.24 L    VC-Pre 1.56 L    VC-%Pred-Pre 69 %    IC-Pred 1.01 L    IC-Pre 1.27 L    IC-%Pred-Pre 125 %    ERV-Pred 0.50 L    ERV-Pre 0.30 L    ERV-%Pred-Pre 59 %    FEV1FEV6-Pred 76 %    FEV1FEV6-Pre 82 %    FRCPleth-Pred 2.44 L    FRCPleth-Pre 2.44 L    FRCPleth-%Pred-Pre 99 %    RVPleth-Pred 2.12 L    RVPleth-Pre 2.14 L    RVPleth-%Pred-Pre 100 %    TLCPleth-Pred 4.02 L    TLCPleth-Pre 3.71 L    TLCPleth-%Pred-Pre 92 %    DLCOunc-Pred 15.36 ml/min/mmHg    DLCOunc-Pre 11.50 ml/min/mmHg    DLCOunc-%Pred-Pre 74 %    VA-Pre 2.71 L    VA-%Pred-Pre 75 %    FEV1SVC-Pred 59 %    FEV1SVC-Pre 81 %    Narrative    The FVC, FEV1, FEV1/FVC ratio and LYD05-04% are within normal limits.  The inspiratory flow rates are within normal limits.  Lung volumes are within normal limits.  The diffusing capacity is normal.  However, the diffusing capacity was not corrected for   the patient's hemoglobin.        IMPRESSION:    Normal spirometry, lung volumes and diffusing capacity.      This interpretation has been electronically signed:  TU NOYOLA 03/04/2025  03:11:02 PM         If you have any questions or concerns, please call the clinic at the number listed above.       Sincerely,      Isela Mac MD PhD    Electronically signed

## 2025-03-04 LAB
DLCOUNC-%PRED-PRE: 74 %
DLCOUNC-PRE: 11.5 ML/MIN/MMHG
DLCOUNC-PRED: 15.36 ML/MIN/MMHG
ERV-%PRED-PRE: 59 %
ERV-PRE: 0.3 L
ERV-PRED: 0.5 L
EXPTIME-PRE: 6.67 SEC
FEF2575-%PRED-PRE: 118 %
FEF2575-PRE: 1.31 L/SEC
FEF2575-PRED: 1.11 L/SEC
FEFMAX-%PRED-PRE: 85 %
FEFMAX-PRE: 2.56 L/SEC
FEFMAX-PRED: 2.98 L/SEC
FEV1-%PRED-PRE: 96 %
FEV1-PRE: 1.27 L
FEV1FEV6-PRE: 82 %
FEV1FEV6-PRED: 76 %
FEV1FVC-PRE: 82 %
FEV1FVC-PRED: 78 %
FEV1SVC-PRE: 81 %
FEV1SVC-PRED: 59 %
FIFMAX-PRE: 2.06 L/SEC
FRCPLETH-%PRED-PRE: 99 %
FRCPLETH-PRE: 2.44 L
FRCPLETH-PRED: 2.44 L
FVC-%PRED-PRE: 89 %
FVC-PRE: 1.55 L
FVC-PRED: 1.73 L
IC-%PRED-PRE: 125 %
IC-PRE: 1.27 L
IC-PRED: 1.01 L
RVPLETH-%PRED-PRE: 100 %
RVPLETH-PRE: 2.14 L
RVPLETH-PRED: 2.12 L
TLCPLETH-%PRED-PRE: 92 %
TLCPLETH-PRE: 3.71 L
TLCPLETH-PRED: 4.02 L
VA-%PRED-PRE: 75 %
VA-PRE: 2.71 L
VC-%PRED-PRE: 69 %
VC-PRE: 1.56 L
VC-PRED: 2.24 L

## 2025-03-04 RX ORDER — ROSUVASTATIN CALCIUM 10 MG/1
10 TABLET, COATED ORAL DAILY
Qty: 90 TABLET | Refills: 0 | Status: SHIPPED | OUTPATIENT
Start: 2025-03-04

## 2025-04-09 ENCOUNTER — DOCUMENTATION ONLY (OUTPATIENT)
Dept: LAB | Facility: CLINIC | Age: OVER 89
End: 2025-04-09
Payer: COMMERCIAL

## 2025-04-09 DIAGNOSIS — I10 HYPERTENSION GOAL BP (BLOOD PRESSURE) < 140/90: Primary | ICD-10-CM

## 2025-04-09 DIAGNOSIS — E78.5 HYPERLIPIDEMIA LDL GOAL <100: ICD-10-CM

## 2025-04-09 NOTE — PROGRESS NOTES
Ailyn Trevino has an upcoming lab appointment and is eligible to have due Health Maintenance labs drawn per Health Maintenance Protocol Orders (HMPO) process.    Before it can be drawn, a diagnosis is needed for the following lab: microalbumin    Please review and enter diagnosis as appropriate.     Thank you,  Gabriela Wooten

## 2025-04-14 ENCOUNTER — LAB (OUTPATIENT)
Dept: LAB | Facility: CLINIC | Age: OVER 89
End: 2025-04-14
Payer: COMMERCIAL

## 2025-04-14 DIAGNOSIS — Z79.899 HIGH RISK MEDICATION USE: ICD-10-CM

## 2025-04-14 DIAGNOSIS — I10 HYPERTENSION GOAL BP (BLOOD PRESSURE) < 140/90: ICD-10-CM

## 2025-04-14 DIAGNOSIS — E78.5 HYPERLIPIDEMIA LDL GOAL <100: ICD-10-CM

## 2025-04-14 DIAGNOSIS — M19.90 INFLAMMATORY ARTHRITIS: ICD-10-CM

## 2025-04-14 LAB
ALBUMIN SERPL BCG-MCNC: 4.4 G/DL (ref 3.5–5.2)
ALP SERPL-CCNC: 76 U/L (ref 40–150)
ALT SERPL W P-5'-P-CCNC: 18 U/L (ref 0–50)
ANION GAP SERPL CALCULATED.3IONS-SCNC: 13 MMOL/L (ref 7–15)
AST SERPL W P-5'-P-CCNC: 28 U/L (ref 0–45)
BASOPHILS # BLD AUTO: 0.1 10E3/UL (ref 0–0.2)
BASOPHILS NFR BLD AUTO: 1 %
BILIRUB DIRECT SERPL-MCNC: 0.24 MG/DL (ref 0–0.3)
BILIRUB SERPL-MCNC: 0.5 MG/DL
BUN SERPL-MCNC: 16.1 MG/DL (ref 8–23)
CALCIUM SERPL-MCNC: 9.6 MG/DL (ref 8.8–10.4)
CHLORIDE SERPL-SCNC: 109 MMOL/L (ref 98–107)
CHOLEST SERPL-MCNC: 150 MG/DL
CREAT SERPL-MCNC: 0.77 MG/DL (ref 0.51–0.95)
CREAT UR-MCNC: 153 MG/DL
CRP SERPL-MCNC: <3 MG/L
EGFRCR SERPLBLD CKD-EPI 2021: 73 ML/MIN/1.73M2
EOSINOPHIL # BLD AUTO: 0.1 10E3/UL (ref 0–0.7)
EOSINOPHIL NFR BLD AUTO: 2 %
ERYTHROCYTE [DISTWIDTH] IN BLOOD BY AUTOMATED COUNT: 13.3 % (ref 10–15)
ERYTHROCYTE [SEDIMENTATION RATE] IN BLOOD BY WESTERGREN METHOD: 7 MM/HR (ref 0–30)
FASTING STATUS PATIENT QL REPORTED: YES
FASTING STATUS PATIENT QL REPORTED: YES
GLUCOSE SERPL-MCNC: 101 MG/DL (ref 70–99)
HCO3 SERPL-SCNC: 23 MMOL/L (ref 22–29)
HCT VFR BLD AUTO: 37.8 % (ref 35–47)
HDLC SERPL-MCNC: 58 MG/DL
HGB BLD-MCNC: 13 G/DL (ref 11.7–15.7)
IMM GRANULOCYTES # BLD: 0 10E3/UL
IMM GRANULOCYTES NFR BLD: 0 %
LDLC SERPL CALC-MCNC: 67 MG/DL
LYMPHOCYTES # BLD AUTO: 1.1 10E3/UL (ref 0.8–5.3)
LYMPHOCYTES NFR BLD AUTO: 17 %
MCH RBC QN AUTO: 33.1 PG (ref 26.5–33)
MCHC RBC AUTO-ENTMCNC: 34.4 G/DL (ref 31.5–36.5)
MCV RBC AUTO: 96 FL (ref 78–100)
MICROALBUMIN UR-MCNC: 25.9 MG/L
MICROALBUMIN/CREAT UR: 16.93 MG/G CR (ref 0–25)
MONOCYTES # BLD AUTO: 0.5 10E3/UL (ref 0–1.3)
MONOCYTES NFR BLD AUTO: 8 %
NEUTROPHILS # BLD AUTO: 4.7 10E3/UL (ref 1.6–8.3)
NEUTROPHILS NFR BLD AUTO: 72 %
NONHDLC SERPL-MCNC: 92 MG/DL
NRBC # BLD AUTO: 0 10E3/UL
NRBC BLD AUTO-RTO: 0 /100
PLATELET # BLD AUTO: 236 10E3/UL (ref 150–450)
POTASSIUM SERPL-SCNC: 3.9 MMOL/L (ref 3.4–5.3)
PROT SERPL-MCNC: 6.7 G/DL (ref 6.4–8.3)
RBC # BLD AUTO: 3.93 10E6/UL (ref 3.8–5.2)
SODIUM SERPL-SCNC: 145 MMOL/L (ref 135–145)
TRIGL SERPL-MCNC: 126 MG/DL
WBC # BLD AUTO: 6.5 10E3/UL (ref 4–11)

## 2025-04-14 PROCEDURE — 36415 COLL VENOUS BLD VENIPUNCTURE: CPT

## 2025-04-14 PROCEDURE — 82570 ASSAY OF URINE CREATININE: CPT

## 2025-04-14 PROCEDURE — 85025 COMPLETE CBC W/AUTO DIFF WBC: CPT

## 2025-04-14 PROCEDURE — 86140 C-REACTIVE PROTEIN: CPT

## 2025-04-14 PROCEDURE — 82248 BILIRUBIN DIRECT: CPT

## 2025-04-14 PROCEDURE — 80053 COMPREHEN METABOLIC PANEL: CPT

## 2025-04-14 PROCEDURE — 82043 UR ALBUMIN QUANTITATIVE: CPT

## 2025-04-14 PROCEDURE — 85652 RBC SED RATE AUTOMATED: CPT

## 2025-04-14 PROCEDURE — 80061 LIPID PANEL: CPT

## 2025-04-17 ENCOUNTER — OFFICE VISIT (OUTPATIENT)
Dept: RHEUMATOLOGY | Facility: CLINIC | Age: OVER 89
End: 2025-04-17
Payer: COMMERCIAL

## 2025-04-17 VITALS
RESPIRATION RATE: 16 BRPM | DIASTOLIC BLOOD PRESSURE: 70 MMHG | BODY MASS INDEX: 29.34 KG/M2 | SYSTOLIC BLOOD PRESSURE: 129 MMHG | OXYGEN SATURATION: 96 % | WEIGHT: 140.4 LBS | HEART RATE: 70 BPM

## 2025-04-17 DIAGNOSIS — Z79.899 HIGH RISK MEDICATION USE: ICD-10-CM

## 2025-04-17 DIAGNOSIS — M19.90 INFLAMMATORY ARTHRITIS: Primary | ICD-10-CM

## 2025-04-17 DIAGNOSIS — M54.2 CHRONIC NECK PAIN: ICD-10-CM

## 2025-04-17 DIAGNOSIS — G89.29 CHRONIC NECK PAIN: ICD-10-CM

## 2025-04-17 RX ORDER — FOLIC ACID 1 MG/1
1 TABLET ORAL DAILY
Qty: 90 TABLET | Refills: 2 | Status: SHIPPED | OUTPATIENT
Start: 2025-04-17

## 2025-04-17 RX ORDER — METHOTREXATE 2.5 MG/1
15 TABLET ORAL
Qty: 78 TABLET | Refills: 1 | Status: SHIPPED | OUTPATIENT
Start: 2025-04-17

## 2025-04-17 NOTE — PROGRESS NOTES
"  Rheumatology Clinic Visit      Ailyn Trevino MRN# [id number]   YOB: 1935 Age: 89 year old      Date of visit: 4/17/25   PCP: Dr. Isela Mac    Chief Complaint   Patient presents with:  Inflammatory arthritis : Stiffness and pain in the mornings    Assessment and Plan     1.  Inflammatory arthritis / PMR: I initially evaluated in 2017 when she transferred care from Dr. Hurst.  Based on historical records, I agreed that she most likely had polymyalgia rheumatica. PMR was steroid responsive and she has been able to reduce her prednisone dose to 3 mg daily without issue, but then had a \"flare\" involving back pain that radiated around to include just over her bilateral lower rib cage. At the time of her flare in April 2017, the ESR and CRP were normal. She was also having hip pain at that time; she had left TKA performed in January 2017. Prednisone dose was increased at that time with resolution of her pain. I suspected that the pain she was having at that time was due to degenerative spine change rather than PMR.  11/1/2017 MRI of the T-spine showed minimal disc space narrowing at several levels in the mid and lower thoracic spine, minimal osteophyte formation or disc protrusions at several levels, but no stenosis. She has degenerative changes seen on a lumbar spine MRI that was performed at [facility], per a clinic note from [facility] orth[facility].  Prednisone was tapered off over time without worsening of her symptoms.  On 1/24/2018 she returned with bilateral wrist swelling and tenderness to palpation; also some pain in the back of her hand; and continued neck/back pain. Inflammatory arthritis dx'd at that time and was started on MTX and prednisone; was on MTX 15mg once weekly and no prednisone; doing well when seen on 4/26/2018.   When on methotrexate she noted no stiffness and improved  strength.  She then decided to stop methotrexate.  Not seen between 4/26/2018 and 3/1/2021; " restarted methotrexate on 3/1/2021 but then was lost to follow-up until 9/27/2021.  Currently on methotrexate and doing well with regard to inflammatory arthritis.  Chronic illness, stable.    - Continue methotrexate 15 mg once weekly; labs are required every 3 months and the patient verbalized understanding of this  - Continue folic acid 1mg daily  - Labs in 3 months: CBC, Creatinine, Hepatic Panel  - Labs in 6 months: CBC, Creatinine, Hepatic Panel, ESR, CRP     High risk medication requiring intensive toxicity monitoring at least quarterly.     2.  Hand osteoarthritis: Large Heberden's nodes and bilateral first CMC joint squaring.      3.  Osteopenia with elevated FRAX: Based on 7/18/2022 DEXA showed a lumbar spine T score -0.8, left femoral neck T score -1.9, and right femoral neck T score -1.9; FRAX score showed a 27% risk of major osteoporotic fracture in the next 10 years and a 10% risk for osteoporotic hip fracture in the next 10 years.  History of alendronate use from 2/5/2016-4/21/2018.  We reviewed the DEXA and the rationale for treating based on the elevated FRAX and she will consider this.  She is currently taking calcium and vitamin D.  I previously provided a printout from the [employer] regarding bisphosphonates. She reports having good dentition at this time with no plans for invasive dental work. She does not wish to proceed with using an osteoporosis specific medication but we will continue calcium and vitamin D supplementation.  If she changes her mind she said that she will notify me.  She previously verbalized understanding about the risks of untreated disease.  This was not discussed again today.  Chronic illness  - calcium 1000 mg daily  - vitamin D 1000 IU daily  - Lab in 3 mo: Vitamin D     4.  Vaccinations: Vaccinations reviewed with Ms. Trevino.   - Influenza: encouraged yearly vaccination  - [id number]: up to date  - [id number]: up to date  - Prevnar 20: Discussed  vaccination and patient plans to receive at a pharmacy  - Shingrix: Up to date  - COVID-19: Advised keeping up-to-date    5.  Chronic neck pain: This was evaluated for instability previously and only degenerative changes were seen without instability.  Stable symptoms since then.  No cervical radiculopathy.  Patient with now would like to consider physical therapy for her neck pain.  - Physical therapy referral    6.  Chronic left foot pain: History of multiple surgeries.  Has followed with a foot surgeon.  Previously advised wearing stiff soled shoes whenever ambulatory, indoors and outdoors, to see if that helps.    7.  Subcutaneous nodule on the palmar ulnar aspect of the right second DIP: Unknown etiology.  Possible rheumatoid nodule?.  Discussed option for surgical removal.  This is not bothering the patient so she is going to monitor without intervention at this time.    Total minutes spent in evaluation with patient, documentation, , and review of pertinent studies and chart notes: 14  The longitudinal plan of care for the rheumatology problem(s) were addressed during this visit.  Due to added complexity of care, we will continue to support the patient and the subsequent management of this condition with ongoing continuity of care.       Ms. Trevino verbalized agreement with and understanding of the rational for the diagnosis and treatment plan.  All questions were answered to best of my ability and the patient's satisfaction. Ms. Trevino was advised to contact the clinic with any questions that may arise after the clinic visit.      Thank you for involving me in the care of the patient    Return to clinic: 6 months      HPI   Ailyn Trevino is a 89 year old female with a past medical history significant for hyperlipidemia, granulomatous lung disease, aortic valve disorder, mitral valve disorder, impaired fasting glucose, polymyalgia rheumatica, and depression who presents for f/u of arthritis.  "    4/20/2015 rheumatology clinic note by Dr. Bud Hurst at the [facility] documents the patient does not have signs or symptoms of GCA. Significant myalgias in March 2015 in her shoulders and popliteal area, but not in the hip area. Cortisone shots in the hip in March 2015. Elevated CRP and mildly abnormal sedimentation rate. Morning stiffness for about 2 weeks. 2 hours to loosen up. Difficulty lifting her arms above her shoulders. CK was normal. Prednisone 20 mg daily \"helped about almost completely in 4 days' time\"    In 10/2017, Ms. Trevino reported that she was initially diagnosed with PMR and treated with steroids that were very effective. At that time, she reports having some difficulty raising her arms above her head. She says that she has always had some hip and knee pain that was thought to be secondary to her left knee osteoarthritis. She had a left total knee arthroplasty in January 2017. She also reports having flat feet bilaterally. She has been reducing prednisone slowly over time, and was on prednisone 3 mg daily at that time. When she was evaluated by Dr. Christopher  on 4/20/2017 it was noted that she was having pain in her rib cage, back, and hips. She had just reduced prednisone and today she says at that time she was on 1 mg daily, so the dose was increased to 20 mg a day with resolution of her pain. She feels like she has still doing well while on prednisone 3 mg daily. She still has some thoracic back pain. No difficult or raising her arms above her head today. No difficulty standing up from a low seated position. No jaw claudication, scalp tenderness, headaches, or vision changes.she also reports having some right groin pain that she thinks is due to altered gait because of her knee issues. She also reports having lower back [facility] that [facility] epidural steroid injection that was not effective. This was at the direction of her orthopedic " surgeon who is at [facility] orth[facility]ics. She reports having had an MRI of her lumbar spine at [facility]. 8/1/2017 clinic note by Prosper Leonardo at [facility] orthopedics documents that an MRI showed multilevel degenerative changes namely L4-L5 central canal compromise and severe right lateral recess stenosis. Currently without hand pain, but she says that she has had some pain in her fingers from time to time. She denies having stiffness in her hands.     She then had an MRI of the thoracic spine showing degenerative changes of the thoracic spine    1/2018: she returned complaining of bilateral wrist pain, also some pain across the back of her hand.  Pain is worse in the morning with morning stiffness for at least one hour.  Stiffness improves with activity.  Pain in her hands improves with activity.  She continues to have pain in her lower back, upper back, mid back, and neck.  spine pain is worse with activity and improves with rest.  She is following with an orthopedic surgeon for her back.      4/26/2018, she reported that she is doing poorly because she has a nonproductive cough but feels like she needs to cough something up.  She is going to have a chest CT later today.  No shortness of breath or chest pain.  Feels like she has chest congestion now.  Says that she is feeling better since using methotrexate.  She is off of prednisone.  Improved  strength bilaterally.  Hand pain, especially in the morning, has improved significantly.  No morning stiffness. No difficulty raising her arms above her head or standing up from a low seated position.  Denies jaw claudication, scalp tenderness, vision change, new headache, difficulty standing up from a chair, or difficulty raising her arms above her head.      3/1/2021: Not doing as well as she was doing previously when on methotrexate.  She said that she stopped taking methotrexate because she was concerned about potential side effects.  Now with  swelling in her wrists and across her MCPs that is worse in the morning and improved with time and activity.  Morning stiffness for at least 1 hour at the wrists.  Reduced  strength bilaterally.  She recalls that these were symptoms she had in the past that improved with methotrexate.  She notes drinking 4 ounces of wine per day.    2021: Currently doing well on methotrexate.  No joint pain or swelling.  No morning stiffness or gelling phenomenon.  She says that her   last October and she has now moved to [place] so she is adjusting to these changes.  Otherwise doing well.  Mild chronic neck pain that is worse with activity and improves with rest; nonradiating.    2022: Currently doing well with regard to inflammatory arthritis.  Tolerating methotrexate well.  No joint pain or stiffness except for the left foot that has been operated on twice and she follows with podiatry for this.  No joint swelling.    2023: Currently doing well.  No joint pain or swelling.  Morning stiffness for no more than 10 minutes but does not occur every day.  Positive gelling phenomenon if she sits for a very long time but this resolves quickly.  Methotrexate is effective for arthritis.  Recalls being on Fosamax in the distant past and does not recall any side effects from Fosamax.  No heartburn.  No dysphagia.  Reports having good dentition; had a tooth removed last year.  Does not anticipate any future invasive dental work.  Follows at the dentist regularly.    2023: Currently doing well with regard to inflammatory arthritis.  Large Heberden's nodes that are not painful.  No morning stiffness.  No gelling phenomenon.  Arthritis is not limiting her daily activities.    10/4/2023: Mild pain at the first CMC joints with overactivity, improved with rest; no swelling, increased warmth, or overlying erythema at the first CMC joints.  No other joint pain.  Morning stiffness for no more than 10 minutes, if  present at all.  Overall happy with how well she is doing.  Arthritis is not limiting her daily activities.    4/15/2024: Currently doing well.  No joint pain or swelling.  Morning stiffness for no more than 5-10 minutes.  Arthritis does not limit her daily activities, except for chronic left foot pain that has deformities, and has been operated on by a foot surgeon.    10/14/2024: currently doing well.  No joint pain or swelling.  Morning stiffness for < 10 min. No gelling.  Overall stable and happy with how well she is doing; except for chronic left foot pain that has deformities and has been operated on and is unchanged since last visit.     Today, 4/17/2025: Arthritis controlled.  Patient reports no referral joint pain or swelling.  Morning stiffness for less than 10 minutes.  No gelling phenomenon.  Reports that she is very active, and probably more active than people younger than her at her residence.  She does note having chronic stable neck pain with no cervical radiculopathy and would like to consider physical therapy to keep the neck pain from worsening over time.  She says that the neck pain has not worsened in the last few years.    Denies fevers, chills, nausea, vomiting, constipation, diarrhea. No abdominal pain. No chest pain/pressure, palpitations, or shortness of breath. No LE swelling.  No oral or nasal sores.  No rash.      Tobacco:  None  EtOH: No more than 1 drink per day: 4 ounce glass of wine  Drugs:   Occupation: used to work as a , retired now    ROS   12 point review of system was completed and negative except as noted in the HPI     Active Problem List     Patient Active Problem List   Diagnosis    Osteopenia    Palpitations    Hyperlipidemia LDL goal <100    Renal cyst    Insomnia    S/P total knee arthroplasty, left    Aortic valve disorder    Mitral valve disorder    PMR (polymyalgia rheumatica)    Encounter for long-term current use of medication    Major depressive  disorder, single episode, mild    Current chronic use of systemic steroids    Fatigue, unspecified type    Chronic pain of left knee    Venous stasis dermatitis of left lower extremity    Fecal urgency    Elevated hemidiaphragm    Inflammatory arthritis    Pulmonary nodules    Hypertension goal BP (blood pressure) < 140/90    Osteoporosis without current pathological fracture, unspecified osteoporosis type    Primary osteoarthritis of left knee    Personal history of colon polyps, unspecified    Diverticulosis of colon    Aortic insufficiency    Acute dyspnea     Past Medical History     Past Medical History:   Diagnosis Date    Aortic valve disorder     Elevated hemidiaphragm     Granulomatous lung disease (H) 8/7/2012    High cholesterol     Insomnia 10/8/2013    Benadryl gives her RLS    Mitral valve disorder     Osteoarthritis of knee 10/8/2013    Sees Dr. Whaley    Osteopenia 7/31/2012    Other chronic pain 8/3/2015    Patient is followed by KIARA RAINES for ongoing prescription of pain medication.  All refills should be approved by this provider, or covering partner.  Medication(s): Hydrocodone/APAP.  Maximum quantity per month: 30 Clinic visit frequency required: Q 3 months   Controlled substance agreement on file: Yes      Date(s): 8/3/15  Pain Clinic evaluation in the past: No  DIRE Total Score(s): No fl    Polymyalgia rheumatica     Pulmonary nodules     Renal cyst 8/7/2012     Past Surgical History     Past Surgical History:   Procedure Laterality Date    APPENDECTOMY  1951    CATARACT IOL, RT/LT  2010    bilateral     CHOLECYSTECTOMY  2010    ORTHOPEDIC SURGERY      Left Knee Surgery 1/2017    RECONSTRUCT FOREFOOT WITH METATARSOPHALANGEAL (MTP) FUSION Left 10/24/2018    Procedure: LEFT FIRST METATARSOPHALANGEAL JOINT ARTHRODESIS;  Surgeon: Rufus Harden MD;  Location: [facility] OR    REPAIR HAMMER TOE Left 10/24/2018    Procedure: LESSER TOE RECONSTRUCTION SECOND, THIRD, FOURTH AND FIFTH TOES;   Surgeon: Rufus Harden MD;  Location:  OR    SURGICAL HISTORY OF -   12/13    bilateral YAG laser surgery of eyes     Allergy     Allergies   Allergen Reactions    Simvastatin Muscle Pain (Myalgia)    Benzonatate Dizziness     Current Medication List     Current Outpatient Medications   Medication Sig Dispense Refill    Acetaminophen (TYLENOL PO) Take 650 mg by mouth 2 times daily as needed for mild pain or fever      albuterol (PROAIR HFA/PROVENTIL HFA/VENTOLIN HFA) 108 (90 Base) MCG/ACT inhaler Inhale 2 puffs into the lungs every 4 hours as needed.      aspirin - buffered (ASCRIPTIN) 325 MG TABS tablet Take 1 tablet (325 mg) by mouth daily (Patient taking differently: Take 325 mg by mouth as needed.) 60 each 0    atenolol (TENORMIN) 25 MG tablet Take 1 tablet (25 mg) by mouth daily 90 tablet 3    fluticasone (FLONASE) 50 MCG/ACT nasal spray Spray 2 sprays into both nostrils daily. 16 g 0    folic acid (FOLVITE) 1 MG tablet Take 1 tablet (1 mg) by mouth daily. 90 tablet 2    losartan (COZAAR) 50 MG tablet Take 1 tablet (50 mg) by mouth daily 90 tablet 3    Melatonin 10 MG TABS tablet Take 10 mg by mouth nightly as needed for sleep      methotrexate 2.5 MG tablet Take 6 tablets (15 mg) by mouth every 7 days. . Methotrexate is a once weekly medication, taken on the same day of each week.  Labs required every 3 months 78 tablet 1    Multiple Vitamins-Minerals (CENTRUM SILVER ADULT 50+ PO) Take 1 tablet by mouth daily Reported on 4/20/2017      omeprazole (PRILOSEC) 40 MG DR capsule Take 1 capsule (40 mg) by mouth daily 90 capsule 3    rosuvastatin (CRESTOR) 10 MG tablet Take 1 tablet (10 mg) by mouth daily 90 tablet 0    sertraline (ZOLOFT) 25 MG tablet Take 1 tablet (25 mg) by mouth daily 90 tablet 3    tiZANidine (ZANAFLEX) 2 MG tablet Take 0.5 tablets (1 mg) by mouth nightly as needed for muscle spasms 90 tablet 1    triamcinolone (KENALOG) 0.1 % external cream Apply topically 2 times daily as needed for  "irritation //itching 45 g 1    valACYclovir (VALTREX) 1000 mg tablet Take 1 tablet (1,000 mg) by mouth 2 times daily for 1 day 2 tablet 0     No current facility-administered medications for this visit.       Social History   See HPI    Family History     Family History   Problem Relation Age of Onset    Cancer Mother     Cerebrovascular Disease Father      Denies family history of autoimmune disease     Physical Exam     Temp Readings from Last 3 Encounters:   02/05/25 98.1  F (36.7  C) (Oral)   01/31/25 97.9  F (36.6  C) (Temporal)   05/03/24 98.1  F (36.7  C) (Tympanic)     BP Readings from Last 5 Encounters:   04/17/25 129/70   02/05/25 138/68   01/31/25 (!) 166/76   10/14/24 (!) 162/73   05/03/24 125/65     Pulse Readings from Last 1 Encounters:   04/17/25 70     Resp Readings from Last 1 Encounters:   04/17/25 16     Estimated body mass index is 29.34 kg/m  as calculated from the following:    Height as of 2/5/25: 1.473 m (4' 10\").    Weight as of this encounter: 63.7 kg (140 lb 6.4 oz).    GEN: NAD.  HEENT:  Anicteric, noninjected sclera. No obvious external lesions of the ear and nose. Hearing intact.  CV: S1, S2.  RRR.  No murmurs or rubs  PULM: No increased work of breathing.  CTA bilaterally  MSK: MCPs, PIPs, DIPs without swelling or tenderness to palpation.  Heberden's nodes present.  Squaring at the bilateral first CMC joints that were nontender to palpation and without increased warmth, swelling, or overlying erythema.  Wrists without swelling or tenderness to palpation.  Elbows and shoulders without swelling or tenderness to palpation.    Knees, ankles, and MTPs without swelling or tenderness to palpation.  Minimal arch of the left foot and ankle eversion when standing.  Cervical spine with some limits to range of motion in all directions.  SKIN: No rash or jaundice seen.  Nontender mobile subcutaneous nodule on the palmar ulnar aspect of the right second DIP  PSYCH: Alert. Appropriate.       Labs / " Imaging (select studies)     RF/CCP  Recent Labs   Lab Test 06/26/17  1854   CCPIGG <1  Negative     RHF <20     CBC  Recent Labs   Lab Test 04/14/25  1115 01/14/25  1116 10/07/24  1119 09/27/21  0939 07/08/21  1156 05/26/21  1328 10/09/18  1158 04/23/18  1054   WBC 6.5 10.3 6.0   < > 7.9 6.0   < > 6.7   RBC 3.93 4.28 4.12   < > 3.99 4.25   < > 4.18   HGB 13.0 14.1 13.6   < > 13.6 13.9   < > 13.4   HCT 37.8 40.8 40.1   < > 39.4 41.5   < > 40.4   MCV 96 95 97   < > 99 98   < > 97   RDW 13.3 12.7 13.1   < > 13.8 14.8   < > 14.8    276 259   < > 260 261   < > 210   MCH 33.1* 32.9 33.0   < > 34.1* 32.7   < > 32.1   MCHC 34.4 34.6 33.9   < > 34.5 33.5   < > 33.2   NEUTROPHIL 72 78 64   < > 71.3 69.7  --  62.7   LYMPH 17 13 19   < > 16.7 18.8  --  20.4   MONOCYTE 8 6 12   < > 8.8 6.9  --  15.6   EOSINOPHIL 2 2 4   < > 2.8 3.9  --  1.0   BASOPHIL 1 1 1   < > 0.4 0.7  --  0.3   ANEU  --   --   --   --  5.6 4.2  --  4.2   ALYM  --   --   --   --  1.3 1.1  --  1.4   LAURENCE  --   --   --   --  0.7 0.4  --  1.0   AEOS  --   --   --   --  0.2 0.2  --  0.1   ABAS  --   --   --   --  0.0 0.0  --  0.0   ANEUTAUTO 4.7 8.1 3.9   < >  --   --   --   --    ALYMPAUTO 1.1 1.4 1.2   < >  --   --   --   --    AMONOAUTO 0.5 0.6 0.7   < >  --   --   --   --    AEOSAUTO 0.1 0.2 0.2   < >  --   --   --   --    ABSBASO 0.1 0.1 0.1   < >  --   --   --   --     < > = values in this interval not displayed.     CMP  Recent Labs   Lab Test 04/14/25  1115 01/14/25  1116 10/07/24  1119 07/15/24  1107 05/09/24  0937 10/04/23  1329 03/28/23  1310 01/11/23  1121 09/27/21  0938 07/08/21  1156 05/26/21  1328 02/22/21  1634     --   --   --  141  --   --   --   --  140  --   --    POTASSIUM 3.9  --   --   --  4.3  --  4.9  --   --  4.7  --   --    CHLORIDE 109*  --   --   --  105  --   --   --   --  108  --   --    CO2 23  --   --   --  27  --   --   --   --  26  --   --    ANIONGAP 13  --   --   --  9  --   --   --   --  6  --   --    *   --   --   --  101*  --   --   --   --  94  --   --    BUN 16.1  --   --   --  15.6  --   --   --   --  14  --   --    CR 0.77 0.76 0.87   < > 0.74   < > 0.70 0.66   < > 0.80 0.88 0.70   GFRESTIMATED 73 74 63   < > 77   < > 83 84   < > 67 60* 79   GFRESTBLACK  --   --   --   --   --   --   --   --   --  78 69 >90   ALEJANDRO 9.6  --   --   --  9.3  --   --  9.6   < > 9.2  --   --    BILITOTAL 0.5 0.7 0.6   < >  --    < > 0.6 0.8   < > 0.7 0.4 0.3   ALBUMIN 4.4 4.7 4.5   < >  --    < > 4.0 4.1   < > 3.9 3.8 4.1   PROTTOTAL 6.7 7.2 6.9   < >  --    < > 7.2 7.1   < > 7.1 6.5* 7.1   ALKPHOS 76 90 87   < >  --    < > 98 83   < > 78 69 82   AST 28 31 27   < >  --    < > 22 25   < > 23 21 21   ALT 18 21 20   < >  --    < > 24 35   < > 30 28 26    < > = values in this interval not displayed.     Calcium/VitaminD  Recent Labs   Lab Test 04/14/25 1115 01/14/25  1116 05/09/24  0937 01/11/23  1121 09/27/21  0938   ALEJANDRO 9.6  --  9.3 9.6 9.7   VITDT  --  41  --  39 40     ESR/CRP  Recent Labs   Lab Test 04/14/25  1115 02/05/25  1019 10/07/24  1119 04/01/24  1228 03/28/23  1310 03/28/23  1310 01/11/23  1121 10/17/22  1127   SED 7  --  2 2  --  8 7 8   CRP  --   --   --   --   --  6.7 4.0 3.3   CRPI <3.00 4.76 <3.00 3.82   < >  --   --   --     < > = values in this interval not displayed.     Lipid Panel  Recent Labs   Lab Test 04/14/25  1115 05/09/24  0937 03/28/23  1310   CHOL 150 248* 259*   TRIG 126 137 142   HDL 58 50 56   LDL 67 171* 175*   NHDL 92 198* 203*     Hepatitis B  Recent Labs   Lab Test 01/24/18  0918   HBCAB Nonreactive   HEPBANG Nonreactive     Hepatitis C  Recent Labs   Lab Test 01/24/18 0918   HCVAB Nonreactive     Immunization History     Immunization History   Administered Date(s) Administered    COVID-19 12+ (MODERNA) 09/23/2024    COVID-19 MONOVALENT 12+ (Pfizer) 02/11/2021, 03/04/2021, 11/19/2021    COVID-19 Monovalent 12+ (Pfizer 2022) 04/11/2022    Flu, Unspecified 10/04/2016    Influenza (High Dose)  Trivalent,PF (Fluzone) 10/26/2010, 10/08/2013, 10/22/2014, 10/05/2015, 10/04/2016, 09/08/2017, 10/09/2018, 10/01/2019, 09/23/2024    Influenza (IIV3) PF 10/13/2006, 10/31/2008, 10/26/2010, 10/24/2011, 10/23/2012, 10/08/2020    Influenza (prior to 2024) 09/25/2009    Influenza Vaccine 65+ (Fluzone HD) 10/08/2020, 09/27/2021, 10/03/2022, 10/23/2023    Influenza, Split Virus, Trivalent, Pf (Fluzone\Fluarix) 09/25/2009    Pneumo Conj 13-V (2010&after) 11/03/2015    Pneumococcal 23 valent 12/16/2004    TD,PF 7+ (Tenivac) 07/21/2010, 06/04/2021    TDAP (Adacel,Boostrix) 07/21/2010    Zoster recombinant adjuvanted (Shingrix) 06/09/2022, 08/31/2022    Zoster vaccine, live 06/04/2009          Chart documentation done in part with Dragon Voice recognition Software. Although reviewed after completion, some word and grammatical error may remain.    Dale Marquis MD

## 2025-04-17 NOTE — PATIENT INSTRUCTIONS
RHEUMATOLOGY    [facility]  [address] [place] [address]  [place] [address]    Phone number: [phone]  Fax number: [phone]    If you need a medication refill, please contact us as you may need lab work and/or a follow up visit prior to your refill.      Thank you for choosing [facility]!    Mesha Nieves CMA Rheumatology

## 2025-04-20 DIAGNOSIS — F41.9 ANXIETY AND DEPRESSION: ICD-10-CM

## 2025-04-20 DIAGNOSIS — F32.A ANXIETY AND DEPRESSION: ICD-10-CM

## 2025-04-20 DIAGNOSIS — F33.42 MAJOR DEPRESSION, RECURRENT, FULL REMISSION: ICD-10-CM

## 2025-04-21 RX ORDER — SERTRALINE HYDROCHLORIDE 25 MG/1
25 TABLET, FILM COATED ORAL DAILY
Qty: 90 TABLET | Refills: 2 | Status: SHIPPED | OUTPATIENT
Start: 2025-04-21

## 2025-05-07 ENCOUNTER — OFFICE VISIT (OUTPATIENT)
Dept: FAMILY MEDICINE | Facility: CLINIC | Age: OVER 89
End: 2025-05-07
Payer: COMMERCIAL

## 2025-05-07 VITALS
OXYGEN SATURATION: 96 % | TEMPERATURE: 98 F | SYSTOLIC BLOOD PRESSURE: 121 MMHG | WEIGHT: 141.25 LBS | HEIGHT: 58 IN | HEART RATE: 58 BPM | DIASTOLIC BLOOD PRESSURE: 78 MMHG | RESPIRATION RATE: 16 BRPM | BODY MASS INDEX: 29.65 KG/M2

## 2025-05-07 DIAGNOSIS — Z23 NEED FOR VACCINATION: ICD-10-CM

## 2025-05-07 DIAGNOSIS — I10 HYPERTENSION, GOAL BELOW 140/90: ICD-10-CM

## 2025-05-07 DIAGNOSIS — K21.9 GASTROESOPHAGEAL REFLUX DISEASE WITHOUT ESOPHAGITIS: ICD-10-CM

## 2025-05-07 DIAGNOSIS — I10 HYPERTENSION GOAL BP (BLOOD PRESSURE) < 140/90: ICD-10-CM

## 2025-05-07 DIAGNOSIS — E78.5 HYPERLIPIDEMIA LDL GOAL <100: ICD-10-CM

## 2025-05-07 DIAGNOSIS — F33.42 MAJOR DEPRESSION, RECURRENT, FULL REMISSION: ICD-10-CM

## 2025-05-07 DIAGNOSIS — Z00.00 ENCOUNTER FOR MEDICARE ANNUAL WELLNESS EXAM: Primary | ICD-10-CM

## 2025-05-07 DIAGNOSIS — H61.23 BILATERAL IMPACTED CERUMEN: ICD-10-CM

## 2025-05-07 DIAGNOSIS — F41.1 GAD (GENERALIZED ANXIETY DISORDER): ICD-10-CM

## 2025-05-07 PROCEDURE — 3078F DIAST BP <80 MM HG: CPT | Performed by: INTERNAL MEDICINE

## 2025-05-07 PROCEDURE — 99214 OFFICE O/P EST MOD 30 MIN: CPT | Mod: 25 | Performed by: INTERNAL MEDICINE

## 2025-05-07 PROCEDURE — 3074F SYST BP LT 130 MM HG: CPT | Performed by: INTERNAL MEDICINE

## 2025-05-07 PROCEDURE — 1126F AMNT PAIN NOTED NONE PRSNT: CPT | Performed by: INTERNAL MEDICINE

## 2025-05-07 PROCEDURE — 69209 REMOVE IMPACTED EAR WAX UNI: CPT | Performed by: INTERNAL MEDICINE

## 2025-05-07 PROCEDURE — G0439 PPPS, SUBSEQ VISIT: HCPCS | Performed by: INTERNAL MEDICINE

## 2025-05-07 RX ORDER — LOSARTAN POTASSIUM 50 MG/1
50 TABLET ORAL DAILY
Qty: 90 TABLET | Refills: 3 | Status: SHIPPED | OUTPATIENT
Start: 2025-05-07

## 2025-05-07 RX ORDER — OMEPRAZOLE 40 MG/1
40 CAPSULE, DELAYED RELEASE ORAL DAILY
Qty: 90 CAPSULE | Refills: 3 | Status: SHIPPED | OUTPATIENT
Start: 2025-05-07

## 2025-05-07 RX ORDER — ATENOLOL 25 MG/1
25 TABLET ORAL DAILY
Qty: 90 TABLET | Refills: 3 | Status: SHIPPED | OUTPATIENT
Start: 2025-05-07

## 2025-05-07 RX ORDER — ROSUVASTATIN CALCIUM 10 MG/1
10 TABLET, COATED ORAL DAILY
Qty: 90 TABLET | Refills: 3 | Status: SHIPPED | OUTPATIENT
Start: 2025-05-07

## 2025-05-07 SDOH — HEALTH STABILITY: PHYSICAL HEALTH: ON AVERAGE, HOW MANY DAYS PER WEEK DO YOU ENGAGE IN MODERATE TO STRENUOUS EXERCISE (LIKE A BRISK WALK)?: 2 DAYS

## 2025-05-07 ASSESSMENT — ANXIETY QUESTIONNAIRES
8. IF YOU CHECKED OFF ANY PROBLEMS, HOW DIFFICULT HAVE THESE MADE IT FOR YOU TO DO YOUR WORK, TAKE CARE OF THINGS AT HOME, OR GET ALONG WITH OTHER PEOPLE?: NOT DIFFICULT AT ALL
GAD7 TOTAL SCORE: 1
7. FEELING AFRAID AS IF SOMETHING AWFUL MIGHT HAPPEN: NOT AT ALL
4. TROUBLE RELAXING: NOT AT ALL
5. BEING SO RESTLESS THAT IT IS HARD TO SIT STILL: NOT AT ALL
2. NOT BEING ABLE TO STOP OR CONTROL WORRYING: NOT AT ALL
GAD7 TOTAL SCORE: 1
IF YOU CHECKED OFF ANY PROBLEMS ON THIS QUESTIONNAIRE, HOW DIFFICULT HAVE THESE PROBLEMS MADE IT FOR YOU TO DO YOUR WORK, TAKE CARE OF THINGS AT HOME, OR GET ALONG WITH OTHER PEOPLE: NOT DIFFICULT AT ALL
7. FEELING AFRAID AS IF SOMETHING AWFUL MIGHT HAPPEN: NOT AT ALL
GAD7 TOTAL SCORE: 1
3. WORRYING TOO MUCH ABOUT DIFFERENT THINGS: NOT AT ALL
1. FEELING NERVOUS, ANXIOUS, OR ON EDGE: SEVERAL DAYS
6. BECOMING EASILY ANNOYED OR IRRITABLE: NOT AT ALL

## 2025-05-07 ASSESSMENT — PATIENT HEALTH QUESTIONNAIRE - PHQ9
SUM OF ALL RESPONSES TO PHQ QUESTIONS 1-9: 1
10. IF YOU CHECKED OFF ANY PROBLEMS, HOW DIFFICULT HAVE THESE PROBLEMS MADE IT FOR YOU TO DO YOUR WORK, TAKE CARE OF THINGS AT HOME, OR GET ALONG WITH OTHER PEOPLE: NOT DIFFICULT AT ALL
SUM OF ALL RESPONSES TO PHQ QUESTIONS 1-9: 1

## 2025-05-07 ASSESSMENT — PAIN SCALES - GENERAL: PAINLEVEL_OUTOF10: NO PAIN (0)

## 2025-05-07 ASSESSMENT — SOCIAL DETERMINANTS OF HEALTH (SDOH): HOW OFTEN DO YOU GET TOGETHER WITH FRIENDS OR RELATIVES?: TWICE A WEEK

## 2025-05-07 NOTE — PATIENT INSTRUCTIONS
Patient Education   Preventive Care Advice   This is general advice given by our system to help you stay healthy. However, your care team may have specific advice just for you. Please talk to your care team about your preventive care needs.  Nutrition  Eat 5 or more servings of fruits and vegetables each day.  Try wheat bread, brown rice and whole grain pasta (instead of white bread, rice, and pasta).  Get enough calcium and vitamin D. Check the label on foods and aim for 100% of the RDA (recommended daily allowance).  Lifestyle  Exercise at least 150 minutes each week  (30 minutes a day, 5 days a week).  Do muscle strengthening activities 2 days a week. These help control your weight and prevent disease.  No smoking.  Wear sunscreen to prevent skin cancer.  Have a dental exam and cleaning every 6 months.  Yearly exams  See your health care team every year to talk about:  Any changes in your health.  Any medicines your care team has prescribed.  Preventive care, family planning, and ways to prevent chronic diseases.  Shots (vaccines)   HPV shots (up to age 26), if you've never had them before.  Hepatitis B shots (up to age 59), if you've never had them before.  COVID-19 shot: Get this shot when it's due.  Flu shot: Get a flu shot every year.  Tetanus shot: Get a tetanus shot every 10 years.  Pneumococcal, hepatitis A, and RSV shots: Ask your care team if you need these based on your risk.  Shingles shot (for age 50 and up)  General health tests  Diabetes screening:  Starting at age 35, Get screened for diabetes at least every 3 years.  If you are younger than age 35, ask your care team if you should be screened for diabetes.  Cholesterol test: At age 39, start having a cholesterol test every 5 years, or more often if advised.  Bone density scan (DEXA): At age 50, ask your care team if you should have this scan for osteoporosis (brittle bones).  Hepatitis C: Get tested at least once in your life.  STIs (sexually  transmitted infections)  Before age 24: Ask your care team if you should be screened for STIs.  After age 24: Get screened for STIs if you're at risk. You are at risk for STIs (including HIV) if:  You are sexually active with more than one person.  You don't use condoms every time.  You or a partner was diagnosed with a sexually transmitted infection.  If you are at risk for HIV, ask about PrEP medicine to prevent HIV.  Get tested for HIV at least once in your life, whether you are at risk for HIV or not.  Cancer screening tests  Cervical cancer screening: If you have a cervix, begin getting regular cervical cancer screening tests starting at age 21.  Breast cancer scan (mammogram): If you've ever had breasts, begin having regular mammograms starting at age 40. This is a scan to check for breast cancer.  Colon cancer screening: It is important to start screening for colon cancer at age 45.  Have a colonoscopy test every 10 years (or more often if you're at risk) Or, ask your provider about stool tests like a FIT test every year or Cologuard test every 3 years.  To learn more about your testing options, visit:   .  For help making a decision, visit:   https://bit.ly/jo90064.  Prostate cancer screening test: If you have a prostate, ask your care team if a prostate cancer screening test (PSA) at age 55 is right for you.  Lung cancer screening: If you are a current or former smoker ages 50 to 80, ask your care team if ongoing lung cancer screenings are right for you.  For informational purposes only. Not to replace the advice of your health care provider. Copyright   2023 [facility] [employer]. All rights reserved. Clinically reviewed by the [facility] Transitions Program. Vibby [id number] - REV 01/24.  Hearing Loss: Care Instructions  Overview     Hearing loss is a sudden or slow decrease in how well you hear. It can range from slight to profound. Permanent hearing loss can occur with aging. It also can  happen when you are exposed long-term to loud noise. Examples include listening to loud music, riding motorcycles, or being around other loud machines.  Hearing loss can affect your work and home life. It can make you feel lonely or depressed. You may feel that you have lost your independence. But hearing aids and other devices can help you hear better and feel connected to others.  Follow-up care is a key part of your treatment and safety. Be sure to make and go to all appointments, and call your doctor if you are having problems. It's also a good idea to know your test results and keep a list of the medicines you take.  How can you care for yourself at home?  Avoid loud noises whenever possible. This helps keep your hearing from getting worse.  Always wear hearing protection around loud noises.  Wear a hearing aid as directed.  A professional can help you pick a hearing aid that will work best for you.  You can also get hearing aids over the counter for mild to moderate hearing loss.  Have hearing tests as your doctor suggests. They can show whether your hearing has changed. Your hearing aid may need to be adjusted.  Use other devices as needed. These may include:  Telephone amplifiers and hearing aids that can connect to a television, stereo, radio, or microphone.  Devices that use lights or vibrations. These alert you to the doorbell, a ringing telephone, or a baby monitor.  Television closed-captioning. This shows the words at the bottom of the screen. Most new TVs can do this.  TTY (text telephone). This lets you type messages back and forth on the telephone instead of talking or listening. These devices are also called TDD. When messages are typed on the keyboard, they are sent over the phone line to a receiving TTY. The message is shown on a monitor.  Use text messaging, social media, and email if it is hard for you to communicate by telephone.  Try to learn a listening technique called speechreading. It is  "not lipreading. You pay attention to people's gestures, expressions, posture, and tone of voice. These clues can help you understand what a person is saying. Face the person you are talking to, and have them face you. Make sure the lighting is good. You need to see the other person's face clearly.  Think about counseling if you need help to adjust to your hearing loss.  When should you call for help?  Watch closely for changes in your health, and be sure to contact your doctor if:    You think your hearing is getting worse.     You have new symptoms, such as dizziness or nausea.   Where can you learn more?  Go to https://www.Movigo.net/patiented  Enter R798 in the search box to learn more about \"Hearing Loss: Care Instructions.\"  Current as of: October 27, 2024  Content Version: 14.4    9603-4048 [employer].   Care instructions adapted under license by your healthcare professional. If you have questions about a medical condition or this instruction, always ask your healthcare professional. [employer] disclaims any warranty or liability for your use of this information.    Learning About Sleeping Well  What does sleeping well mean?     Sleeping well means getting enough sleep to feel good and stay healthy. How much sleep is enough varies among people.  The number of hours you sleep and how you feel when you wake up are both important. If you do not feel refreshed, you probably need more sleep. Another sign of not getting enough sleep is feeling tired during the day.  Experts recommend that adults get at least 7 or more hours of sleep per day. Children and older adults need more sleep.  Why is getting enough sleep important?  Getting enough quality sleep is a basic part of good health. When your sleep suffers, your physical health, mood, and your thoughts can suffer too. You may find yourself feeling more grumpy or stressed. Not getting enough sleep also can lead to serious problems, " "including injury, accidents, anxiety, and depression.  What might cause poor sleeping?  Many things can cause sleep problems, including:  Changes to your sleep schedule.  Stress. Stress can be caused by fear about a single event, such as giving a speech. Or you may have ongoing stress, such as worry about work or school.  Depression, anxiety, and other mental or emotional conditions.  Changes in your sleep habits or surroundings. This includes changes that happen where you sleep, such as noise, light, or sleeping in a different bed. It also includes changes in your sleep pattern, such as having jet lag or working a late shift.  Health problems, such as pain, breathing problems, and restless legs syndrome.  Lack of regular exercise.  Using alcohol, nicotine, or caffeine before bed.  How can you help yourself?  Here are some tips that may help you sleep more soundly and wake up feeling more refreshed.  Your sleeping area   Use your bedroom only for sleeping and sex. A bit of light reading may help you fall asleep. But if it doesn't, do your reading elsewhere in the house. Try not to use your TV, computer, smartphone, or tablet while you are in bed.  Be sure your bed is big enough to stretch out comfortably, especially if you have a sleep partner.  Keep your bedroom quiet, dark, and cool. Use curtains, blinds, or a sleep mask to block out light. To block out noise, use earplugs, soothing music, or a \"white noise\" machine.  Your evening and bedtime routine   Create a relaxing bedtime routine. You might want to take a warm shower or bath, or listen to soothing music.  Go to bed at the same time every night. And get up at the same time every morning, even if you feel tired.  What to avoid   Limit caffeine (coffee, tea, caffeinated sodas) during the day, and don't have any for at least 6 hours before bedtime.  Avoid drinking alcohol before bedtime. Alcohol can cause you to wake up more often during the night.  Try not to " "smoke or use tobacco, especially in the evening. Nicotine can keep you awake.  Limit naps during the day, especially close to bedtime.  Avoid lying in bed awake for too long. If you can't fall asleep or if you wake up in the middle of the night and can't get back to sleep within about 20 minutes, get out of bed and go to another room until you feel sleepy.  Avoid taking medicine right before bed that may keep you awake or make you feel hyper or energized. Your doctor can tell you if your medicine may do this and if you can take it earlier in the day.  If you can't sleep   Imagine yourself in a peaceful, pleasant scene. Focus on the details and feelings of being in a place that is relaxing.  Get up and do a quiet or boring activity until you feel sleepy.  Avoid drinking any liquids before going to bed to help prevent waking up often to use the bathroom.  Where can you learn more?  Go to https://www.Pepscan.net/patiented  Enter J942 in the search box to learn more about \"Learning About Sleeping Well.\"  Current as of: July 31, 2024  Content Version: 14.4    5644-8618 [employer].   Care instructions adapted under license by your healthcare professional. If you have questions about a medical condition or this instruction, always ask your healthcare professional. [employer] disclaims any warranty or liability for your use of this information.    Bladder Training: Care Instructions  Your Care Instructions     Bladder training is used to treat urge incontinence and stress incontinence. Urge incontinence means that the need to urinate comes on so fast that you can't get to a toilet in time. Stress incontinence means that you leak urine because of pressure on your bladder. For example, it may happen when you laugh, cough, or lift something heavy.  Bladder training can increase how long you can wait before you have to urinate. It can also help your bladder hold more urine. And it can give you better " control over the urge to urinate.  It is important to remember that bladder training takes a few weeks to a few months to make a difference. You may not see results right away, but don't give up.  Follow-up care is a key part of your treatment and safety. Be sure to make and go to all appointments, and call your doctor if you are having problems. It's also a good idea to know your test results and keep a list of the medicines you take.  How can you care for yourself at home?  Work with your doctor to come up with a bladder training program that is right for you. You may use one or more of the following methods.  Delayed urination  In the beginning, try to keep from urinating for 5 minutes after you first feel the need to go.  While you wait, take deep, slow breaths to relax. Kegel exercises can also help you delay the need to go to the bathroom.  After some practice, when you can easily wait 5 minutes to urinate, try to wait 10 minutes before you urinate.  Slowly increase the waiting period until you are able to control when you have to urinate.  Scheduled urination  Empty your bladder when you first wake up in the morning.  Schedule times throughout the day when you will urinate.  Start by going to the bathroom every hour, even if you don't need to go.  Slowly increase the time between trips to the bathroom.  When you have found a schedule that works well for you, keep doing it.  If you wake up during the night and have to urinate, do it. Apply your schedule to waking hours only.  Kegel exercises  These tighten and strengthen pelvic muscles, which can help you control the flow of urine. (If doing these exercises causes pain, stop doing them and talk with your doctor.) To do Kegel exercises:  Squeeze your muscles as if you were trying not to pass gas. Or squeeze your muscles as if you were stopping the flow of urine. Your belly, legs, and buttocks shouldn't move.  Hold the squeeze for 3 seconds, then relax for 5 to  "10 seconds.  Start with 3 seconds, then add 1 second each week until you are able to squeeze for 10 seconds.  Repeat the exercise 10 times a session. Do 3 to 8 sessions a day.  When should you call for help?  Watch closely for changes in your health, and be sure to contact your doctor if:    Your incontinence is getting worse.     You do not get better as expected.   Where can you learn more?  Go to https://www.OneSpot.net/patiented  Enter V684 in the search box to learn more about \"Bladder Training: Care Instructions.\"  Current as of: April 30, 2024  Content Version: 14.4    2114-8487 [employer].   Care instructions adapted under license by your healthcare professional. If you have questions about a medical condition or this instruction, always ask your healthcare professional. [employer] disclaims any warranty or liability for your use of this information.    Substance Use Disorder: Care Instructions  Overview     You can improve your life and health by stopping your use of alcohol or drugs. When you don't drink or use drugs, you may feel and sleep better. You may get along better with your family, friends, and coworkers. There are medicines and programs that can help with substance use disorder.  How can you care for yourself at home?  Here are some ways to help you stay sober and prevent relapse.  If you have been given medicine to help keep you sober or reduce your cravings, be sure to take it exactly as prescribed.  Talk to your doctor about programs that can help you stop using drugs or drinking alcohol.  Do not keep alcohol or drugs in your home.  Plan ahead. Think about what you'll say if other people ask you to drink or use drugs. Try not to spend time with people who drink or use drugs.  Use the time and money spent on drinking or drugs to do something that's important to you.  Preventing a relapse  Have a plan to deal with relapse. Learn to recognize changes in your thinking " that lead you to drink or use drugs. Get help before you start to drink or use drugs again.  Try to stay away from situations, friends, or places that may lead you to drink or use drugs.  If you feel the need to drink alcohol or use drugs again, seek help right away. Call a trusted friend or family member. Some people get support from organizations such as Narcotics Anonymous or [employer] or from treatment facilities.  If you relapse, get help as soon as you can. Some people make a plan with another person that outlines what they want that person to do for them if they relapse. The plan usually includes how to handle the relapse and who to notify in case of relapse.  Don't give up. Remember that a relapse doesn't mean that you have failed. Use the experience to learn the triggers that lead you to drink or use drugs. Then quit again. Recovery is a lifelong process. Many people have several relapses before they are able to quit for good.  Follow-up care is a key part of your treatment and safety. Be sure to make and go to all appointments, and call your doctor if you are having problems. It's also a good idea to know your test results and keep a list of the medicines you take.  When should you call for help?   Call 911  anytime you think you may need emergency care. For example, call if you or someone else:    Has overdosed or has withdrawal signs. Be sure to tell the emergency workers that you are or someone else is using or trying to quit using drugs. Overdose or withdrawal signs may include:  Losing consciousness.  Seizure.  Seeing or hearing things that aren't there (hallucinations).     Is thinking or talking about suicide or harming others.   Where to get help 24 hours a day, 7 days a week   If you or someone you know talks about suicide, self-harm, a mental health crisis, a substance use crisis, or any other kind of emotional distress, get help right away. You can:    Call the Suicide and Crisis Lifeline  "at 988.     Call 5-151-696-TALK (1-765.282.1911).     Text HOME to 811981 to access the Crisis Text Line.   Consider saving these numbers in your phone.  Go to [employer] for more information or to chat online.  Call your doctor now or seek immediate medical care if:    You are having withdrawal symptoms. These may include nausea or vomiting, sweating, shakiness, and anxiety.   Watch closely for changes in your health, and be sure to contact your doctor if:    You have a relapse.     You need more help or support to stop.   Where can you learn more?  Go to https://www.[employer]AP Networks.net/patiented  Enter H573 in the search box to learn more about \"Substance Use Disorder: Care Instructions.\"  Current as of: August 20, 2024  Content Version: 14.4    1706-8365 [employer].   Care instructions adapted under license by your healthcare professional. If you have questions about a medical condition or this instruction, always ask your healthcare professional. [employer] disclaims any warranty or liability for your use of this information.       "

## 2025-05-07 NOTE — PROGRESS NOTES
Preventive Care Visit  [facility]  Isela Mac MD PhD, Internal Medicine - Pediatrics  May 7, 2025      Assessment & Plan     Medicare annual wellness exam:  - in good health.     Gastroesophageal reflux disease without esophagitis:  - Likely related to excessive gas and burping. Possible dietary factors contributing.  - Avoid carbonated beverages for a week. Use Tums instead of Pepto-Bismol for upset stomach. Keep a food diary to identify potential triggers.    Hypertension goal BP (blood pressure) < 140/90:  - Blood pressure is well-controlled.  - Continue current medications, atenolol and losartan.    Need for vaccination:  - RSV vaccine recommended for prevention of respiratory infection.  - RSV vaccine prescription sent to pharmacy for administration.  - Risks and side effects: No side effects discussed.    Hypertension, goal below 140/90:  - Blood pressure is well-controlled.  - Continue current medications, atenolol and losartan.    Hyperlipidemia LDL goal <100:  - Cholesterol levels are well-controlled.  - Continue current cholesterol medication. Check cholesterol levels in April.    Bilateral impacted cerumen:  - Perform ear wash.      The longitudinal plan of care for the diagnosis(es)/condition(s) as documented were addressed during this visit. Due to the added complexity in care, I will continue to support Ailyn in the subsequent management and with ongoing continuity of care.       Counseling  Appropriate preventive services were addressed with this patient via screening, questionnaire, or discussion as appropriate for fall prevention, nutrition, physical activity, Tobacco-use cessation, social engagement, weight loss and cognition.  Checklist reviewing preventive services available has been given to the patient.  Reviewed patient's diet, addressing concerns and/or questions.   She is at risk for lack of exercise and has been provided with information to increase physical activity  for the benefit of her well-being.   Discussed possible causes of fatigue. The patient was provided with written information regarding signs of hearing loss.   Information on urinary incontinence and treatment options given to patient.       Follow-up  Return in about 1 year (around 5/7/2026) for wellness visit, Fasting Lab appointment.    Subjective   Ailyn is a 89 year old, presenting for the following:  Medicare Visit            Ailyn Trevino is a 89 year old female who has Hyperlipidemia LDL goal <100; Encounter for long-term current use of medication; Major depressive disorder, single episode, mild; Elevated hemidiaphragm; Inflammatory arthritis; Pulmonary nodules; Hypertension goal BP (blood pressure) < 140/90; and Osteoporosis without current pathological fracture, unspecified osteoporosis type on their pertinent problem list.        - Intermittent tenderness at the tops of ribs, ongoing for a couple of months. Had bad bronchiolitis the month before the start of the rib pain.  - Upset stomach and frequent burping, occurring several times a week, mostly in the afternoon  - Burping provides relief from discomfort  - No pain associated with burping, does not affect eating or appetite  - No recent changes in diet or medications  - Drinks Diet Coke daily, sometimes adds isabel  - Stool darkened by Pepto-Bismol use  - Passes gas daily, loud but not painful    Advance Care Planning    Discussed advance care planning with patient; informed AVS has link to [facility].        5/7/2025   General Health   How would you rate your overall physical health? Good   Feel stress (tense, anxious, or unable to sleep) Only a little   (!) STRESS CONCERN      5/7/2025   Nutrition   Diet: Regular (no restrictions)         5/7/2025   Exercise   Days per week of moderate/strenous exercise 2 days   (!) EXERCISE CONCERN      5/7/2025   Social Factors   Frequency of gathering with friends or relatives Twice a week   Worry food  won't last until get money to buy more No   Food not last or not have enough money for food? No   Do you have housing? (Housing is defined as stable permanent housing and does not include staying outside in a car, in a tent, in an abandoned building, in an overnight shelter, or couch-surfing.) Yes   Are you worried about losing your housing? No   Lack of transportation? No   Unable to get utilities (heat,electricity)? No         5/7/2025   Fall Risk   Fallen 2 or more times in the past year? No   Trouble with walking or balance? No          5/7/2025   Activities of Daily Living- Home Safety   Needs help with the following daily activites None of the above   Safety concerns in the home None of the above         5/7/2025   Dental   Dentist two times every year? Yes         5/7/2025   Hearing Screening   Hearing concerns? (!) I FEEL THAT PEOPLE ARE MUMBLING OR NOT SPEAKING CLEARLY.    (!) I NEED TO ASK PEOPLE TO SPEAK UP OR REPEAT THEMSELVES.    (!) IT'S HARDER TO UNDERSTAND WOMEN'S VOICES THAN MEN'S VOICES.    (!) IT'S HARD TO FOLLOW A CONVERSATION IN A NOISY RESTAURANT OR CROWDED ROOM.    (!) TROUBLE UNDESTANDING A SPEAKER IN A PUBLIC MEETING OR [employer] SERVICE.    (!) TROUBLE FOLLOWING DIALOGUE IN THE THEATHER.    (!) TROUBLE UNDERSTANDING SOFT OR WHISPERED SPEECH.    (!) TROUBLE UNDERSTANDING SPEECH ON THE TELEPHONE    None of the above       Multiple values from one day are sorted in reverse-chronological order         5/7/2025   Driving Risk Screening   Patient/family members have concerns about driving No         5/7/2025   General Alertness/Fatigue Screening   Have you been more tired than usual lately? (!) YES         5/7/2025   Urinary Incontinence Screening   Bothered by leaking urine in past 6 months Yes       Today's PHQ-9 Score:       5/7/2025    11:12 AM   PHQ-9 SCORE   PHQ-9 Total Score MyChart 1 (Minimal depression)   PHQ-9 Total Score 1        Patient-reported         5/7/2025   Substance Use    Alcohol more than 3/day or more than 7/wk No   Do you have a current opioid prescription? No   How severe/bad is pain from 1 to 10? 0/10 (No Pain)   Do you use any other substances recreationally? No    (!) OTHER       Multiple values from one day are sorted in reverse-chronological order     Social History     Tobacco Use    Smoking status: Former     Current packs/day: 0.00     Types: Cigarettes     Quit date: 1998     Years since quittin.3     Passive exposure: Never    Smokeless tobacco: Never   Vaping Use    Vaping status: Never Used   Substance Use Topics    Alcohol use: Yes     Comment: 1 drink a day or less    Drug use: No             5/3/2023   LAST FHS-7 RESULTS   1st degree relative breast or ovarian cancer No   Any relative bilateral breast cancer No   Any male have breast cancer No   Any ONE woman have BOTH breast AND ovarian cancer No   Any woman with breast cancer before 50yrs No   2 or more relatives with breast AND/OR ovarian cancer No   2 or more relatives with breast AND/OR bowel cancer No       Mammogram Screening - After age 74- determine frequency with patient based on health status, life expectancy and patient goals      Reviewed and updated as needed this visit by Provider                      Current providers sharing in care for this patient include:  Patient Care Team:  Isela Mac MD PhD as PCP - General (Internal Medicine - Pediatrics)  Bud Hurst MD as MD (Rheumatology)  Silvia Sarmiento RN as   Dale Marquis MD as Assigned Rheumatology Provider  Isela Mac MD PhD as Assigned PCP    The following health maintenance items are reviewed in Epic and correct as of today:  Health Maintenance   Topic Date Due    RSV VACCINE (1 - 1-dose 75+ series) Never done    PHQ-9  2025    ANNUAL REVIEW OF HM ORDERS  2026    BMP  2026    LIPID  2026    MICROALBUMIN  2026    MEDICARE ANNUAL WELLNESS VISIT  2026    ELISEO ASSESSMENT   "05/07/2026    FALL RISK ASSESSMENT  05/07/2026    ADVANCE CARE PLANNING  05/07/2030    DTAP/TDAP/TD IMMUNIZATION (4 - Td or Tdap) 06/04/2031    DEPRESSION ACTION PLAN  Completed    INFLUENZA VACCINE  Completed    Pneumococcal Vaccine: 50+ Years  Completed    ZOSTER IMMUNIZATION  Completed    HPV IMMUNIZATION  Aged Out    MENINGITIS IMMUNIZATION  Aged Out    DEXA  Discontinued    COVID-19 Vaccine  Discontinued         Review of Systems  Constitutional, HEENT, cardiovascular, pulmonary, gi and gu systems are negative, except as otherwise noted.     Objective    Exam  /78 (BP Location: Right arm, Patient Position: Sitting, Cuff Size: Adult Large)   Pulse 58   Temp 98  F (36.7  C) (Oral)   Resp 16   Ht 1.473 m (4' 10\")   Wt 64.1 kg (141 lb 4 oz)   SpO2 96%   BMI 29.52 kg/m     Estimated body mass index is 29.52 kg/m  as calculated from the following:    Height as of this encounter: 1.473 m (4' 10\").    Weight as of this encounter: 64.1 kg (141 lb 4 oz).    Physical Exam  GENERAL: alert and no distress  EYES: Eyes grossly normal to inspection, PERRL and conjunctivae and sclerae normal  HENT: ear canals and TM's normal, nose and mouth without ulcers or lesions  NECK: no adenopathy, no asymmetry, masses, or scars  RESP: lungs clear to auscultation - no rales, rhonchi or wheezes  CV: regular rate and rhythm, normal S1 S2, no S3 or S4, no murmur, click or rub, no peripheral edema  ABDOMEN: soft, nontender, no hepatosplenomegaly, no masses and bowel sounds normal  MS: no gross musculoskeletal defects noted, no edema  SKIN: no suspicious lesions or rashes  NEURO: Normal strength and tone, mentation intact and speech normal  PSYCH: mentation appears normal, affect normal/bright         5/7/2025   Mini Cog   Clock Draw Score 2 Normal   3 Item Recall 3 objects recalled   Mini Cog Total Score 5     Answers submitted by the patient for this visit:  Patient Health Questionnaire (Submitted on 5/7/2025)  If you checked " off any problems, how difficult have these problems made it for you to do your work, take care of things at home, or get along with other people?: Not difficult at all  PHQ9 TOTAL SCORE: 1  Patient Health Questionnaire (G7) (Submitted on 5/7/2025)  ELISEO 7 TOTAL SCORE: 1         Signed Electronically by: Isela Mac MD PhD    Disclaimer: This note consists of symbols derived from keyboarding, dictation and/or voice recognition software and AI tools. As a result, there may be errors in the script that have gone undetected. Please consider this when interpreting information found in this chart.

## 2025-07-21 ENCOUNTER — LAB (OUTPATIENT)
Dept: LAB | Facility: CLINIC | Age: OVER 89
End: 2025-07-21
Payer: COMMERCIAL

## 2025-07-21 DIAGNOSIS — Z79.899 HIGH RISK MEDICATION USE: ICD-10-CM

## 2025-07-21 DIAGNOSIS — M19.90 INFLAMMATORY ARTHRITIS: ICD-10-CM

## 2025-07-21 LAB
ALBUMIN SERPL BCG-MCNC: 4.5 G/DL (ref 3.5–5.2)
ALP SERPL-CCNC: 81 U/L (ref 40–150)
ALT SERPL W P-5'-P-CCNC: 19 U/L (ref 0–50)
AST SERPL W P-5'-P-CCNC: 26 U/L (ref 0–45)
BASOPHILS # BLD AUTO: 0.1 10E3/UL (ref 0–0.2)
BASOPHILS NFR BLD AUTO: 1 %
BILIRUB SERPL-MCNC: 0.5 MG/DL
BILIRUBIN DIRECT (ROCHE PRO & PURE): 0.23 MG/DL (ref 0–0.45)
CREAT SERPL-MCNC: 0.82 MG/DL (ref 0.51–0.95)
EGFRCR SERPLBLD CKD-EPI 2021: 68 ML/MIN/1.73M2
EOSINOPHIL # BLD AUTO: 0.2 10E3/UL (ref 0–0.7)
EOSINOPHIL NFR BLD AUTO: 3 %
ERYTHROCYTE [DISTWIDTH] IN BLOOD BY AUTOMATED COUNT: 12.9 % (ref 10–15)
HCT VFR BLD AUTO: 38.6 % (ref 35–47)
HGB BLD-MCNC: 13.2 G/DL (ref 11.7–15.7)
IMM GRANULOCYTES # BLD: 0 10E3/UL
IMM GRANULOCYTES NFR BLD: 0 %
LYMPHOCYTES # BLD AUTO: 1.4 10E3/UL (ref 0.8–5.3)
LYMPHOCYTES NFR BLD AUTO: 19 %
MCH RBC QN AUTO: 33.2 PG (ref 26.5–33)
MCHC RBC AUTO-ENTMCNC: 34.2 G/DL (ref 31.5–36.5)
MCV RBC AUTO: 97 FL (ref 78–100)
MONOCYTES # BLD AUTO: 0.7 10E3/UL (ref 0–1.3)
MONOCYTES NFR BLD AUTO: 9 %
NEUTROPHILS # BLD AUTO: 5.1 10E3/UL (ref 1.6–8.3)
NEUTROPHILS NFR BLD AUTO: 68 %
NRBC # BLD AUTO: 0 10E3/UL
NRBC BLD AUTO-RTO: 0 /100
PLATELET # BLD AUTO: 254 10E3/UL (ref 150–450)
PROT SERPL-MCNC: 6.9 G/DL (ref 6.4–8.3)
RBC # BLD AUTO: 3.98 10E6/UL (ref 3.8–5.2)
WBC # BLD AUTO: 7.5 10E3/UL (ref 4–11)

## 2025-07-21 PROCEDURE — 36415 COLL VENOUS BLD VENIPUNCTURE: CPT

## 2025-07-21 PROCEDURE — 85025 COMPLETE CBC W/AUTO DIFF WBC: CPT

## 2025-07-21 PROCEDURE — 82565 ASSAY OF CREATININE: CPT

## 2025-07-21 PROCEDURE — 80076 HEPATIC FUNCTION PANEL: CPT

## (undated) DEVICE — GLOVE PROTEXIS MICRO 8.5  2D73PM85

## (undated) DEVICE — DRSG KERLIX FLUFFS X5

## (undated) DEVICE — SOL WATER IRRIG 1000ML BOTTLE 2F7114

## (undated) DEVICE — DRSG XEROFORM 1X8"

## (undated) DEVICE — CAST PLASTER SPLINT 5X30" 7395

## (undated) DEVICE — BLADE KNIFE SURG 15 371115

## (undated) DEVICE — BNDG ROLLER GAUZE CONFORM 2"X4YD 41-52

## (undated) DEVICE — GOWN XXLG REINFORCED 9071EL

## (undated) DEVICE — BNDG COBAN 2"X5YDS UNSTERILE 2082

## (undated) DEVICE — TOURNIQUET CUFF 30" STERILE

## (undated) DEVICE — DRSG GAUZE 4X4" 3033

## (undated) DEVICE — DRAPE IOBAN INCISE 23X17" 6650EZ

## (undated) DEVICE — DRAPE SHEET REV FOLD 3/4 9349

## (undated) DEVICE — STPL SKIN 35W ROTATING HEAD PRW35

## (undated) DEVICE — CAST PADDING 4" UNSTERILE 9044

## (undated) DEVICE — GLOVE PROTEXIS BLUE W/NEU-THERA 9.0  2D73EB90

## (undated) DEVICE — IMM LIMB ELEVATOR DC40-0203

## (undated) DEVICE — BLADE SAW SAGITTAL MICROAIRE 1200-006

## (undated) DEVICE — PIN GUARD 10-1-001 101001PBX

## (undated) DEVICE — GLOVE PROTEGRITY MICRO 8.5 LATEX

## (undated) DEVICE — ESU GROUND PAD UNIVERSAL W/O CORD

## (undated) DEVICE — SU PROLENE 3-0 PS-2 18" 8687H

## (undated) DEVICE — LINEN TOWEL PACK X5 5464

## (undated) DEVICE — SU VICRYL 2-0 X-1 27" UND J459H

## (undated) DEVICE — SUCTION CANISTER MEDIVAC LINER 3000ML W/LID 65651-530

## (undated) DEVICE — PREP SKIN SCRUB TRAY 4461A

## (undated) DEVICE — PACK EXTREMITY SOP15EXFSD

## (undated) DEVICE — DRAPE POUCH INSTRUMENT 1018

## (undated) DEVICE — NDL 25GA 1.5" 305127

## (undated) RX ORDER — CEFAZOLIN SODIUM 2 G/100ML
INJECTION, SOLUTION INTRAVENOUS
Status: DISPENSED
Start: 2018-10-24